# Patient Record
Sex: MALE | Race: WHITE | Employment: OTHER | URBAN - METROPOLITAN AREA
[De-identification: names, ages, dates, MRNs, and addresses within clinical notes are randomized per-mention and may not be internally consistent; named-entity substitution may affect disease eponyms.]

---

## 2017-05-05 PROBLEM — N18.30 STAGE III CHRONIC KIDNEY DISEASE (HCC): Status: ACTIVE | Noted: 2017-05-05

## 2017-09-07 PROBLEM — M54.50 ACUTE RIGHT-SIDED LOW BACK PAIN WITHOUT SCIATICA: Status: ACTIVE | Noted: 2017-09-07

## 2017-11-10 PROBLEM — N18.30 TYPE 2 DIABETES MELLITUS WITH STAGE 3 CHRONIC KIDNEY DISEASE, WITH LONG-TERM CURRENT USE OF INSULIN (HCC): Status: ACTIVE | Noted: 2017-11-10

## 2017-11-10 PROBLEM — Z79.4 TYPE 2 DIABETES MELLITUS WITH STAGE 3 CHRONIC KIDNEY DISEASE, WITH LONG-TERM CURRENT USE OF INSULIN (HCC): Status: ACTIVE | Noted: 2017-11-10

## 2017-11-10 PROBLEM — E11.22 TYPE 2 DIABETES MELLITUS WITH STAGE 3 CHRONIC KIDNEY DISEASE, WITH LONG-TERM CURRENT USE OF INSULIN (HCC): Status: ACTIVE | Noted: 2017-11-10

## 2018-03-28 ENCOUNTER — HOSPITAL ENCOUNTER (OUTPATIENT)
Dept: DIABETES SERVICES | Age: 63
Discharge: HOME | End: 2018-03-28
Payer: MEDICARE

## 2018-03-28 VITALS — WEIGHT: 315 LBS | HEIGHT: 71 IN | BODY MASS INDEX: 44.1 KG/M2

## 2018-03-28 DIAGNOSIS — Z79.4 TYPE 2 DIABETES MELLITUS WITH STAGE 3 CHRONIC KIDNEY DISEASE, WITH LONG-TERM CURRENT USE OF INSULIN (HCC): ICD-10-CM

## 2018-03-28 DIAGNOSIS — E11.22 TYPE 2 DIABETES MELLITUS WITH STAGE 3 CHRONIC KIDNEY DISEASE, WITH LONG-TERM CURRENT USE OF INSULIN (HCC): ICD-10-CM

## 2018-03-28 DIAGNOSIS — N18.30 TYPE 2 DIABETES MELLITUS WITH STAGE 3 CHRONIC KIDNEY DISEASE, WITH LONG-TERM CURRENT USE OF INSULIN (HCC): ICD-10-CM

## 2018-03-28 PROCEDURE — G0108 DIAB MANAGE TRN  PER INDIV: HCPCS

## 2018-04-12 ENCOUNTER — HOSPITAL ENCOUNTER (OUTPATIENT)
Dept: DIABETES SERVICES | Age: 63
Discharge: HOME | End: 2018-04-12
Payer: MEDICARE

## 2018-04-12 PROCEDURE — G0109 DIAB MANAGE TRN IND/GROUP: HCPCS

## 2018-04-12 NOTE — PROGRESS NOTES
Attended nutrition diabetes #1 group session today. Topics included: disease process and treatment; carbohydrate choices (emphasizing high fiber carbohydrates); proteins (emphasizing heart healthy choices) and fat food choices (emphasizing unsaturated fats); free foods; combination food choices; nutrition tips for persons with diabetes; snack ideas; resources for diabetes management. Two methods of meal planning were reviewed: carbohydrate choices and carbohydrate counting. Basics of exercise discussed. Voiced /demonstrated understanding of material covered. Goal for next session is add a protein to meals and snacks. Anticipated adherence is good. Problems/barriers may be: none identified at this time.

## 2018-04-16 ENCOUNTER — HOSPITAL ENCOUNTER (OUTPATIENT)
Dept: DIABETES SERVICES | Age: 63
Discharge: HOME | End: 2018-04-16
Payer: MEDICARE

## 2018-04-16 PROCEDURE — G0109 DIAB MANAGE TRN IND/GROUP: HCPCS

## 2018-04-16 NOTE — PROGRESS NOTES
Participant attended Diabetes #1 session today. Topics included: Characteristics/pathophysiology type 1/type 2 diabetes; Goal/acceptable blood glucose ranges/Hgb A1C/interpreting/using results; Using medications safely; Sick day management; Prevention/detection/treatment of acute complications. - Verbalized understanding of material covered.   -Goal for next session Nutrition Two   -Anticipated adherence is good   -Problems/barriers may be none anticipated

## 2018-04-23 ENCOUNTER — HOSPITAL ENCOUNTER (OUTPATIENT)
Dept: DIABETES SERVICES | Age: 63
Discharge: HOME | End: 2018-04-23
Payer: MEDICARE

## 2018-04-23 PROCEDURE — G0109 DIAB MANAGE TRN IND/GROUP: HCPCS

## 2018-04-23 NOTE — PROGRESS NOTES
Attended nutrition diabetes #2 group session today. Topics included: plate method for portion control; fiber and sodium guidelines; sugar substitutes; alcohol; eating out; recipe modification; label reading. Voiced/demonstrated understanding of material covered. Anticipated adherence is good. Pt's nutrition goal is to add a snack if mealtimes are over 5 hours apart  Problems/barriers: none identified at this time. Recommend check pre-meal blood glucose and 2 hour post-prandial blood glucose to see how food choices affect blood glucose. Plan for follow up is attend diabetes medical #2 class on 5/2/18.

## 2018-05-02 ENCOUNTER — HOSPITAL ENCOUNTER (OUTPATIENT)
Dept: DIABETES SERVICES | Age: 63
Discharge: HOME | End: 2018-05-02
Payer: MEDICARE

## 2018-05-02 PROCEDURE — G0109 DIAB MANAGE TRN IND/GROUP: HCPCS

## 2018-05-02 NOTE — PROGRESS NOTES
Participant attended Diabetes #2 session today. Topics included: Prevention/detection/treatment of chronic complications; sleep apnea; Developing strategies to promote health/change behavior/recommended screenings; Developing strategies to address psychosocial issues; Goal setting. Participants goal/support plan includes Nutritional Goal:  To better control blood sugars, I will add a snack if mealtimes are over 5 hours began 4-12-18. Medical Goal:  I will carry a carbohydrate source to treat low blood sugar. What a Sugar Bar. Began 4-23-18 always. Problems/barriers may be:none anticipated. Plan for follow up/Recommendations: mail follow up survey in 3 months.

## 2018-05-11 PROBLEM — Z79.4 TYPE 2 DIABETES MELLITUS WITH DIABETIC NEUROPATHY, WITH LONG-TERM CURRENT USE OF INSULIN (HCC): Status: ACTIVE | Noted: 2018-05-11

## 2018-05-11 PROBLEM — E11.40 TYPE 2 DIABETES MELLITUS WITH DIABETIC NEUROPATHY (HCC): Status: ACTIVE | Noted: 2018-05-11

## 2018-06-12 ENCOUNTER — HOSPITAL ENCOUNTER (OUTPATIENT)
Dept: DIABETES SERVICES | Age: 63
Discharge: HOME | End: 2018-06-12
Payer: MEDICARE

## 2018-06-12 PROCEDURE — G0109 DIAB MANAGE TRN IND/GROUP: HCPCS

## 2018-06-12 NOTE — PROGRESS NOTES
Attended diabetes follow up group class. Open forum for clients to ask questions based on entire diabetes self management education program. Education topics are driven in this class based on clients' individual questions and needs. Discussed wt loss tips.

## 2018-06-29 PROBLEM — E11.40 TYPE 2 DIABETES MELLITUS WITH DIABETIC NEUROPATHY, WITH LONG-TERM CURRENT USE OF INSULIN (HCC): Status: RESOLVED | Noted: 2018-05-11 | Resolved: 2018-06-29

## 2018-06-29 PROBLEM — E78.00 HYPERCHOLESTEROLEMIA: Status: ACTIVE | Noted: 2018-06-29

## 2018-06-29 PROBLEM — Z79.4 TYPE 2 DIABETES MELLITUS WITH DIABETIC NEUROPATHY, WITH LONG-TERM CURRENT USE OF INSULIN (HCC): Status: RESOLVED | Noted: 2018-05-11 | Resolved: 2018-06-29

## 2018-11-09 PROBLEM — E11.40 TYPE 2 DIABETES MELLITUS WITH DIABETIC NEUROPATHY (HCC): Status: ACTIVE | Noted: 2018-11-09

## 2018-11-10 PROBLEM — Z23 NEED FOR SHINGLES VACCINE: Status: ACTIVE | Noted: 2018-11-10

## 2018-11-16 PROBLEM — E11.40 TYPE 2 DIABETES MELLITUS WITH DIABETIC NEUROPATHY (HCC): Status: RESOLVED | Noted: 2018-11-09 | Resolved: 2018-11-16

## 2018-11-16 PROBLEM — R80.9 ALBUMINURIA: Status: ACTIVE | Noted: 2018-11-16

## 2019-05-09 PROBLEM — Z23 NEED FOR VACCINATION WITH 13-POLYVALENT PNEUMOCOCCAL CONJUGATE VACCINE: Status: ACTIVE | Noted: 2019-05-09

## 2019-05-10 PROBLEM — M54.50 CHRONIC RIGHT-SIDED LOW BACK PAIN WITHOUT SCIATICA: Status: ACTIVE | Noted: 2019-05-10
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
## 2019-05-10 PROBLEM — S80.811D: Status: ACTIVE | Noted: 2019-05-10

## 2019-05-10 PROBLEM — G89.29 CHRONIC RIGHT-SIDED LOW BACK PAIN WITHOUT SCIATICA: Status: ACTIVE | Noted: 2019-05-10

## 2019-05-10 PROBLEM — E11.40 TYPE 2 DIABETES MELLITUS WITH DIABETIC NEUROPATHY (HCC): Status: ACTIVE | Noted: 2019-05-10

## 2019-08-30 PROBLEM — R80.9 ALBUMINURIA: Status: ACTIVE | Noted: 2019-08-30

## 2019-08-30 PROBLEM — E11.40 TYPE 2 DIABETES MELLITUS WITH DIABETIC NEUROPATHY (HCC): Status: RESOLVED | Noted: 2019-05-10 | Resolved: 2019-08-30

## 2019-09-23 PROBLEM — Z23 NEED FOR SHINGLES VACCINE: Status: RESOLVED | Noted: 2018-11-10 | Resolved: 2019-09-23

## 2019-11-07 PROBLEM — Z12.11 COLON CANCER SCREENING: Status: ACTIVE | Noted: 2019-11-07

## 2019-11-07 PROBLEM — Z86.010 HISTORY OF COLONIC POLYPS: Status: ACTIVE | Noted: 2019-11-07

## 2019-11-07 PROBLEM — M54.50 ACUTE RIGHT-SIDED LOW BACK PAIN WITHOUT SCIATICA: Status: RESOLVED | Noted: 2017-09-07 | Resolved: 2019-11-07

## 2019-11-29 PROBLEM — Z79.4 TYPE 2 DIABETES MELLITUS WITH DIABETIC POLYNEUROPATHY, WITH LONG-TERM CURRENT USE OF INSULIN (HCC): Status: ACTIVE | Noted: 2019-11-29

## 2019-11-29 PROBLEM — E11.40 TYPE 2 DIABETES MELLITUS WITH DIABETIC NEUROPATHY (HCC): Status: ACTIVE | Noted: 2019-11-29

## 2019-11-29 PROBLEM — E11.42 TYPE 2 DIABETES MELLITUS WITH DIABETIC POLYNEUROPATHY, WITH LONG-TERM CURRENT USE OF INSULIN (HCC): Status: ACTIVE | Noted: 2019-11-29

## 2020-01-03 PROBLEM — R80.9 ALBUMINURIA: Status: ACTIVE | Noted: 2020-01-03

## 2020-01-03 PROBLEM — I25.10 CHRONIC CORONARY ARTERY DISEASE: Status: ACTIVE | Noted: 2020-01-03

## 2020-05-01 ENCOUNTER — APPOINTMENT (OUTPATIENT)
Dept: CT IMAGING | Age: 65
End: 2020-05-01
Attending: EMERGENCY MEDICINE
Payer: MEDICARE

## 2020-05-01 ENCOUNTER — HOSPITAL ENCOUNTER (EMERGENCY)
Age: 65
Discharge: HOME | End: 2020-05-01
Attending: EMERGENCY MEDICINE
Payer: MEDICARE

## 2020-05-01 VITALS
WEIGHT: 315 LBS | HEART RATE: 73 BPM | OXYGEN SATURATION: 97 % | DIASTOLIC BLOOD PRESSURE: 90 MMHG | SYSTOLIC BLOOD PRESSURE: 147 MMHG | HEIGHT: 71 IN | RESPIRATION RATE: 20 BRPM | BODY MASS INDEX: 44.1 KG/M2 | TEMPERATURE: 98 F

## 2020-05-01 DIAGNOSIS — R10.9 FLANK PAIN: Primary | ICD-10-CM

## 2020-05-01 LAB
BACTERIA URNS QL MICRO: 0 /HPF
CASTS URNS QL MICRO: 0 /LPF
EPI CELLS #/AREA URNS HPF: 0 /HPF
RBC #/AREA URNS HPF: 0 /HPF
WBC URNS QL MICRO: NORMAL /HPF

## 2020-05-01 PROCEDURE — 81015 MICROSCOPIC EXAM OF URINE: CPT

## 2020-05-01 PROCEDURE — 74176 CT ABD & PELVIS W/O CONTRAST: CPT

## 2020-05-01 PROCEDURE — 81003 URINALYSIS AUTO W/O SCOPE: CPT

## 2020-05-01 PROCEDURE — 74011250637 HC RX REV CODE- 250/637: Performed by: EMERGENCY MEDICINE

## 2020-05-01 PROCEDURE — 99283 EMERGENCY DEPT VISIT LOW MDM: CPT

## 2020-05-01 RX ORDER — HYDROCODONE BITARTRATE AND ACETAMINOPHEN 7.5; 325 MG/1; MG/1
1 TABLET ORAL
Status: COMPLETED | OUTPATIENT
Start: 2020-05-01 | End: 2020-05-01

## 2020-05-01 RX ORDER — HYDROCODONE BITARTRATE AND ACETAMINOPHEN 5; 325 MG/1; MG/1
1 TABLET ORAL
Qty: 12 TAB | Refills: 0 | Status: SHIPPED | OUTPATIENT
Start: 2020-05-01 | End: 2020-05-04

## 2020-05-01 RX ADMIN — HYDROCODONE BITARTRATE AND ACETAMINOPHEN 1 TABLET: 7.5; 325 TABLET ORAL at 19:33

## 2020-05-01 NOTE — ED TRIAGE NOTES
Patient presents from home, mask in place on arrival, with complaints of right flank pain that started several weeks ago. Pain had been intermittent, now is becoming consistent. Pt states urinary frequency for two days and a mildly foul odor. Pt denies N/V/D. Pt ambulatory to Triage.

## 2020-05-01 NOTE — DISCHARGE INSTRUCTIONS
Heat to area of discomfort if it helps  May use over-the-counter medications for discomfort  Uncontrolled/unrelieved pain: Norco

## 2020-05-01 NOTE — ED NOTES
I have reviewed discharge instructions with the patient. The patient verbalized understanding. Patient left ED via Discharge Method: ambulatory to Home with family    Opportunity for questions and clarification provided. Patient given 1 scripts. To continue your aftercare when you leave the hospital, you may receive an automated call from our care team to check in on how you are doing. This is a free service and part of our promise to provide the best care and service to meet your aftercare needs.  If you have questions, or wish to unsubscribe from this service please call [phone]. Thank you for Choosing our [facility] Emergency Department.

## 2020-05-01 NOTE — ED PROVIDER NOTES
Here with some right flank lower back discomfort that has been present for a while but much worse the last 2 days. Is quite intense and patient is not able to state that it is worse with movement. Recalls no injury. He has no visible change to his urine is had no carla dysuria frequency or urgency. Other than himself no other direct family member has not had kidney stones by his report. No fevers chills. No nausea vomiting. The history is provided by the patient. Urinary Frequency    This is a new problem. There has been no fever. Associated symptoms include frequency and flank pain. Pertinent negatives include no chills. Flank Pain    Pertinent negatives include no fever.         Past Medical History:   Diagnosis Date    Albuminuria     Allergic rhinitis     Asthma     Atrial fibrillation     Benign essential hypertension     Chronic pain     COPD     Coronary artery disease     Edema     Gastroesophageal reflux disease     Gout     Hepatic steatosis     Hypercholesteremia     Hypertriglyceridemia     Iron deficiency anemia     Morbid obesity     Obstructive sleep apnea on CPAP     Osteoarthritis     Peptic ulcer disease     Sensory neuropathy     Type 2 diabetes mellitus, uncontrolled     Vitamin D deficiency        Past Surgical History:   Procedure Laterality Date    HX CHOLECYSTECTOMY      HX HEENT  mid 2000's    uvulopalatopharynguloplasty    HX HERNIA REPAIR  2013    Dr. Fanta Bal HX KNEE REPLACEMENT Bilateral     HX LAP CHOLECYSTECTOMY  2013    Dr. Fanta Bal [facility]TH[facility]  [address]    right hand    HX PACEMAKER      pacemaker june 2014   Sofia Ray  2013    Dr. Sheila Puga         Family History:   Problem Relation Age of Onset    Heart Disease Mother     Diabetes Mother     Heart Disease Father     Stroke Father     Hypertension Father     Diabetes Father     Cancer Sister     Diabetes Sister     Heart Disease Sister     Hypertension Sister    Blase Liming Diabetes Sister     Heart Disease Sister     Hypertension Sister     No Known Problems Brother     Cancer Brother        Social History     Socioeconomic History    Marital status:      Spouse name: Not on file    Number of children: Not on file    Years of education: Not on file    Highest education level: Not on file   Occupational History    Not on file   Social Needs    Financial resource strain: Not on file    Food insecurity     Worry: Not on file     Inability: Not on file   [employer] needs     Medical: Not on file     Non-medical: Not on file   Tobacco Use    Smoking status: Former Smoker     Packs/day: 0.25     Years: 1.00     Pack years: 0.25     Types: Cigars     Last attempt to quit: 3/24/1985     Years since quittin.1    Smokeless tobacco: Former User     Types: Chew    Tobacco comment: quits, but then starts back   Substance and Sexual Activity    Alcohol use: No     Alcohol/week: 0.0 standard drinks     Comment: former    Drug use: No    Sexual activity: Not on file   Lifestyle    Physical activity     Days per week: Not on file     Minutes per session: Not on file    Stress: Not on file   Relationships    Social connections     Talks on phone: Not on file     Gets together: Not on file     Attends Restorationism service: Not on file     Active member of club or organization: Not on file     Attends meetings of clubs or organizations: Not on file     Relationship status: Not on file    Intimate partner violence     Fear of current or ex partner: Not on file     Emotionally abused: Not on file     Physically abused: Not on file     Forced sexual activity: Not on file   Other Topics Concern    Not on file   Social History Narrative    Not on file         ALLERGIES: Francheska Valentin; Lisinopril; Morphine; and Pcn [penicillins]    Review of Systems   Constitutional: Negative for chills and fever. Respiratory: Negative. Cardiovascular: Negative. Gastrointestinal: Negative. Genitourinary: Positive for flank pain and frequency. Neurological: Negative. Psychiatric/Behavioral: Negative. All other systems reviewed and are negative. Vitals:    05/01/20 1716   BP: (!) 144/92   Pulse: 71   Resp: 20   Temp: 97.9 °F (36.6 °C)   SpO2: 96%   Weight: 147.9 kg (326 lb)   Height: 5' 11\" (1.803 m)            Physical Exam  Vitals signs and nursing note reviewed. Constitutional:       General: He is not in acute distress. Appearance: He is not toxic-appearing or diaphoretic. Comments: BMI 45   HENT:      Nose: Nose normal.      Mouth/Throat:      Pharynx: No oropharyngeal exudate. Eyes:      General: No scleral icterus. Cardiovascular:      Rate and Rhythm: Normal rate and regular rhythm. Pulmonary:      Effort: Pulmonary effort is normal.   Abdominal:      General: Abdomen is protuberant. Bowel sounds are normal.      Tenderness: There is no abdominal tenderness. There is no right CVA tenderness, left CVA tenderness, guarding or rebound. Hernia: A hernia is present. Hernia is present in the umbilical area. Musculoskeletal: Normal range of motion. General: No deformity. Lymphadenopathy:      Cervical: No cervical adenopathy. Skin:     General: Skin is warm. Findings: No bruising, erythema or lesion. Neurological:      General: No focal deficit present. Mental Status: He is alert. Psychiatric:         Mood and Affect: Mood normal.         Thought Content: Thought content normal.          MDM     Study Result     CT ABDOMEN AND PELVIS WITHOUT CONTRAST     HISTORY: Acute right flank and right side pain, 65 years Male complaints of  right flank pain that started several weeks ago. Pain had been intermittent, now  is becoming consistent. Pt states urinary frequency for two days and a mildly  foul odor     COMPARISON: CT abdomen March 13, 2014     TECHNIQUE: No oral or intravenous contrast administered.  Axial helical CT images  were obtained from above the diaphragm through the pelvis. All CT scans at this  location are performed using dose modulation techniques as appropriate to a  performed exam including the following: automated exposure control; adjustment  of the mA and/or kV according to patient's size (this includes techniques or  standardized protocols for targeted exams where dose is matched to  indication/reason for exam; i.e. extremities or head); use of iterative  reconstruction technique.     FINDINGS:     ABDOMEN:  Minimal dependent subsegmental atelectasis bilateral lung bases. Evidence of  cholecystectomy. Normal-appearing liver, spleen, pancreas, bilateral adrenal  glands. Normal appearance of the bilateral kidneys. There is no evidence of  hydronephrosis or nephrolithiasis. Mild calcific atherosclerosis of a normal  caliber abdominal aorta. No evidence of significant lymphadenopathy. Normal-appearing small bowel. No evidence of intraperitoneal free air or free  fluid. Small fat-containing umbilical hernia.     PELVIS:  Normal-appearing urinary bladder. Coarse prostatic calcifications. Normal-appearing seminal vesicles. Persistent sigmoid diverticulosis. Normal-appearing proximal colon and appendix. No evidence of pelvic free fluid. No evidence of significant inguinal or pelvic sidewall lymphadenopathy. Visualized osseous structures unremarkable.     IMPRESSION  IMPRESSION:   No acute pathology identified.   Other chronic findings as above.              Recent Results (from the past 12 hour(s))   URINE MICROSCOPIC    Collection Time: 05/01/20  5:32 PM   Result Value Ref Range    WBC 0-3 0 /hpf    RBC 0 0 /hpf    Epithelial cells 0 0 /hpf    Bacteria 0 0 /hpf    Casts 0 0 /lpf       Procedures

## 2020-05-04 ENCOUNTER — PATIENT OUTREACH (OUTPATIENT)
Dept: CASE MANAGEMENT | Age: 65
End: 2020-05-04

## 2020-05-04 NOTE — PROGRESS NOTES
Patient contacted regarding recent discharge and COVID-19 risk   Care Transition Nurse/ Ambulatory Care Manager contacted the patient by telephone to perform post discharge assessment. Verified name and  with patient as identifiers. Patient has following risk factors of: Flank Pain. CC reviewed discharge instructions, medical action plan and red flags related to discharge diagnosis. Reviewed and educated them on any new and changed medications related to discharge diagnosis. Advised obtaining a 90-day supply of all daily and as-needed medications. Education provided regarding infection prevention, and signs and symptoms of COVID-19 and when to seek medical attention with patient who verbalized understanding. Discussed exposure protocols and quarantine from [address] you at higher risk for severe illness  and given an opportunity for questions and concerns. The patient agrees to contact the COVID-19 hotline [phone] or PCP office for questions related to their healthcare. CTN/ACM provided contact information for future reference. From CDC: Are you at higher risk for severe illness?  Wash your hands often.  Avoid close contact (6 feet, which is about two arm lengths) with people who are sick.  Put distance between yourself and other people if COVID-19 is spreading in your community.  Clean and disinfect frequently touched surfaces.  Avoid all cruise travel and non-essential air travel.  Call your healthcare professional if you have concerns about COVID-19 and your underlying condition or if you are sick.     For more information on steps you can take to protect yourself, see CDC's How to Protect Yourself      Patient/family/caregiver given information for Kecia Aldrich and agrees to enroll no  Patient's preferred e-mail:  Eliza Song   Patient's preferred phone number:   Based on Loop alert triggers, patient will be contacted by nurse care manager for worsening symptoms. Plan for follow-up call in 7-14 days based on severity of symptoms and risk factors.

## 2020-05-04 NOTE — PROGRESS NOTES
1st attempt to contact the patient for COVID 19 Risk Education. Unable to reach the patient on the contact information provided. This Care Coordinator will attempt to call again within 1 business day.

## 2020-05-18 ENCOUNTER — PATIENT OUTREACH (OUTPATIENT)
Dept: CASE MANAGEMENT | Age: 65
End: 2020-05-18

## 2020-05-18 NOTE — PROGRESS NOTES
Patient resolved from Transition of Care episode on 5/18/2020  Patient/family has been provided the following resources and education related to COVID-19:                         Signs, symptoms and red flags related to COVID-19            CDC exposure and quarantine guidelines            Conduit exposure contact - [phone]            Contact for their local Department of Health                 Patient currently reports that the following symptoms have improved:  Flank Pain. No further outreach scheduled with this CC. Episode of Care resolved. Patient has this CC contact information if future needs arise.

## 2020-05-22 PROBLEM — R06.02 SHORTNESS OF BREATH: Status: ACTIVE | Noted: 2020-05-22

## 2020-06-08 PROBLEM — E11.42 TYPE 2 DIABETES MELLITUS WITH DIABETIC POLYNEUROPATHY, WITH LONG-TERM CURRENT USE OF INSULIN (HCC): Status: RESOLVED | Noted: 2019-11-29 | Resolved: 2020-06-08

## 2020-06-08 PROBLEM — Z79.4 TYPE 2 DIABETES MELLITUS WITH DIABETIC POLYNEUROPATHY, WITH LONG-TERM CURRENT USE OF INSULIN (HCC): Status: RESOLVED | Noted: 2019-11-29 | Resolved: 2020-06-08

## 2020-06-17 PROBLEM — K43.9 VENTRAL HERNIA WITHOUT OBSTRUCTION OR GANGRENE: Status: ACTIVE | Noted: 2020-03-10

## 2020-06-17 PROBLEM — M10.9 GOUT: Status: ACTIVE | Noted: 2020-06-17

## 2020-06-17 PROBLEM — K21.9 GASTROESOPHAGEAL REFLUX DISEASE: Status: ACTIVE | Noted: 2020-06-17

## 2020-06-17 PROBLEM — D64.9 NORMOCHROMIC NORMOCYTIC ANEMIA: Status: ACTIVE | Noted: 2020-03-09

## 2020-06-29 ENCOUNTER — HOSPITAL ENCOUNTER (OUTPATIENT)
Dept: LAB | Age: 65
Discharge: HOME | End: 2020-06-29
Payer: MEDICARE

## 2020-06-29 DIAGNOSIS — R06.02 SHORTNESS OF BREATH: ICD-10-CM

## 2020-06-29 LAB
ANION GAP SERPL CALC-SCNC: 7 MMOL/L (ref 7–16)
BASOPHILS # BLD: 0.1 K/UL (ref 0–0.2)
BASOPHILS NFR BLD: 1 % (ref 0–2)
BNP SERPL-MCNC: 494 PG/ML (ref 5–125)
BUN SERPL-MCNC: 26 MG/DL (ref 8–23)
CALCIUM SERPL-MCNC: 8.6 MG/DL (ref 8.3–10.4)
CHLORIDE SERPL-SCNC: 104 MMOL/L (ref 98–107)
CO2 SERPL-SCNC: 29 MMOL/L (ref 21–32)
CREAT SERPL-MCNC: 1.53 MG/DL (ref 0.8–1.5)
DIFFERENTIAL METHOD BLD: ABNORMAL
EOSINOPHIL # BLD: 0.3 K/UL (ref 0–0.8)
EOSINOPHIL NFR BLD: 4 % (ref 0.5–7.8)
ERYTHROCYTE [DISTWIDTH] IN BLOOD BY AUTOMATED COUNT: 17.1 % (ref 11.9–14.6)
GLUCOSE SERPL-MCNC: 110 MG/DL (ref 65–100)
HCT VFR BLD AUTO: 37.8 % (ref 41.1–50.3)
HGB BLD-MCNC: 11.8 G/DL (ref 13.6–17.2)
IMM GRANULOCYTES # BLD AUTO: 0 K/UL (ref 0–0.5)
IMM GRANULOCYTES NFR BLD AUTO: 0 % (ref 0–5)
INR PPP: 1.4
LYMPHOCYTES # BLD: 2.6 K/UL (ref 0.5–4.6)
LYMPHOCYTES NFR BLD: 32 % (ref 13–44)
MAGNESIUM SERPL-MCNC: 2.1 MG/DL (ref 1.8–2.4)
MCH RBC QN AUTO: 25.2 PG (ref 26.1–32.9)
MCHC RBC AUTO-ENTMCNC: 31.2 G/DL (ref 31.4–35)
MCV RBC AUTO: 80.8 FL (ref 79.6–97.8)
MONOCYTES # BLD: 0.6 K/UL (ref 0.1–1.3)
MONOCYTES NFR BLD: 7 % (ref 4–12)
NEUTS SEG # BLD: 4.5 K/UL (ref 1.7–8.2)
NEUTS SEG NFR BLD: 56 % (ref 43–78)
NRBC # BLD: 0 K/UL (ref 0–0.2)
PLATELET # BLD AUTO: 331 K/UL (ref 150–450)
PMV BLD AUTO: 10.8 FL (ref 9.4–12.3)
POTASSIUM SERPL-SCNC: 3.4 MMOL/L (ref 3.5–5.1)
PROTHROMBIN TIME: 17.4 SEC (ref 12–14.7)
RBC # BLD AUTO: 4.68 M/UL (ref 4.23–5.6)
SODIUM SERPL-SCNC: 140 MMOL/L (ref 136–145)
WBC # BLD AUTO: 8.1 K/UL (ref 4.3–11.1)

## 2020-06-29 PROCEDURE — 36415 COLL VENOUS BLD VENIPUNCTURE: CPT

## 2020-06-29 PROCEDURE — 80048 BASIC METABOLIC PNL TOTAL CA: CPT

## 2020-06-29 PROCEDURE — 83735 ASSAY OF MAGNESIUM: CPT

## 2020-06-29 PROCEDURE — 85610 PROTHROMBIN TIME: CPT

## 2020-06-29 PROCEDURE — 85025 COMPLETE CBC W/AUTO DIFF WBC: CPT

## 2020-06-29 PROCEDURE — 83880 ASSAY OF NATRIURETIC PEPTIDE: CPT

## 2020-06-29 NOTE — PROGRESS NOTES
Please call him, needs to double home K. Will need hydration protocol before LHC as well for CKD. Please arrange such.   Thanks

## 2020-07-01 ENCOUNTER — HOSPITAL ENCOUNTER (OUTPATIENT)
Dept: CARDIAC CATH/INVASIVE PROCEDURES | Age: 65
Discharge: HOME | End: 2020-07-01
Attending: INTERNAL MEDICINE | Admitting: INTERNAL MEDICINE
Payer: MEDICARE

## 2020-07-01 VITALS
OXYGEN SATURATION: 96 % | SYSTOLIC BLOOD PRESSURE: 160 MMHG | BODY MASS INDEX: 44.1 KG/M2 | RESPIRATION RATE: 22 BRPM | WEIGHT: 315 LBS | HEIGHT: 71 IN | DIASTOLIC BLOOD PRESSURE: 102 MMHG | HEART RATE: 72 BPM

## 2020-07-01 LAB
ATRIAL RATE: 54 BPM
CALCULATED R AXIS, ECG10: 152 DEGREES
CALCULATED T AXIS, ECG11: 94 DEGREES
DIAGNOSIS, 93000: NORMAL
ERYTHROCYTE [DISTWIDTH] IN BLOOD BY AUTOMATED COUNT: 17.1 % (ref 11.9–14.6)
HCT VFR BLD AUTO: 39.4 % (ref 41.1–50.3)
HGB BLD-MCNC: 11.7 G/DL (ref 13.6–17.2)
INR PPP: 1
MCH RBC QN AUTO: 24.2 PG (ref 26.1–32.9)
MCHC RBC AUTO-ENTMCNC: 29.7 G/DL (ref 31.4–35)
MCV RBC AUTO: 81.4 FL (ref 79.6–97.8)
NRBC # BLD: 0 K/UL (ref 0–0.2)
PLATELET # BLD AUTO: 374 K/UL (ref 150–450)
PMV BLD AUTO: 10.5 FL (ref 9.4–12.3)
PROTHROMBIN TIME: 13.7 SEC (ref 12–14.7)
Q-T INTERVAL, ECG07: 438 MS
QRS DURATION, ECG06: 128 MS
QTC CALCULATION (BEZET), ECG08: 479 MS
RBC # BLD AUTO: 4.84 M/UL (ref 4.23–5.6)
VENTRICULAR RATE, ECG03: 72 BPM
WBC # BLD AUTO: 9.2 K/UL (ref 4.3–11.1)

## 2020-07-01 PROCEDURE — 77030004559 HC CATH ANGI DX SUPT CARD -B

## 2020-07-01 PROCEDURE — 93458 L HRT ARTERY/VENTRICLE ANGIO: CPT

## 2020-07-01 PROCEDURE — 85027 COMPLETE CBC AUTOMATED: CPT

## 2020-07-01 PROCEDURE — 93005 ELECTROCARDIOGRAM TRACING: CPT | Performed by: INTERNAL MEDICINE

## 2020-07-01 PROCEDURE — 74011250636 HC RX REV CODE- 250/636: Performed by: INTERNAL MEDICINE

## 2020-07-01 PROCEDURE — 74011000250 HC RX REV CODE- 250: Performed by: INTERNAL MEDICINE

## 2020-07-01 PROCEDURE — C1769 GUIDE WIRE: HCPCS

## 2020-07-01 PROCEDURE — 77030029997 HC DEV COM RDL R BND TELE -B

## 2020-07-01 PROCEDURE — 99153 MOD SED SAME PHYS/QHP EA: CPT

## 2020-07-01 PROCEDURE — C1894 INTRO/SHEATH, NON-LASER: HCPCS

## 2020-07-01 PROCEDURE — 99152 MOD SED SAME PHYS/QHP 5/>YRS: CPT

## 2020-07-01 PROCEDURE — 85610 PROTHROMBIN TIME: CPT

## 2020-07-01 PROCEDURE — 74011636320 HC RX REV CODE- 636/320: Performed by: INTERNAL MEDICINE

## 2020-07-01 RX ORDER — GUAIFENESIN 100 MG/5ML
325 LIQUID (ML) ORAL ONCE
Status: DISCONTINUED | OUTPATIENT
Start: 2020-07-01 | End: 2020-07-01 | Stop reason: HOSPADM

## 2020-07-01 RX ORDER — SODIUM CHLORIDE 9 MG/ML
75 INJECTION, SOLUTION INTRAVENOUS CONTINUOUS
Status: DISCONTINUED | OUTPATIENT
Start: 2020-07-01 | End: 2020-07-01 | Stop reason: HOSPADM

## 2020-07-01 RX ORDER — HEPARIN SODIUM 10000 [USP'U]/ML
1000-10000 INJECTION, SOLUTION INTRAVENOUS; SUBCUTANEOUS
Status: DISCONTINUED | OUTPATIENT
Start: 2020-07-01 | End: 2020-07-01 | Stop reason: HOSPADM

## 2020-07-01 RX ORDER — MIDAZOLAM HYDROCHLORIDE 1 MG/ML
.5-2 INJECTION, SOLUTION INTRAMUSCULAR; INTRAVENOUS
Status: DISCONTINUED | OUTPATIENT
Start: 2020-07-01 | End: 2020-07-01 | Stop reason: HOSPADM

## 2020-07-01 RX ORDER — LIDOCAINE HYDROCHLORIDE 10 MG/ML
1-30 INJECTION, SOLUTION EPIDURAL; INFILTRATION; INTRACAUDAL; PERINEURAL ONCE
Status: COMPLETED | OUTPATIENT
Start: 2020-07-01 | End: 2020-07-01

## 2020-07-01 RX ORDER — HEPARIN SODIUM 200 [USP'U]/100ML
3 INJECTION, SOLUTION INTRAVENOUS CONTINUOUS
Status: DISCONTINUED | OUTPATIENT
Start: 2020-07-01 | End: 2020-07-01 | Stop reason: HOSPADM

## 2020-07-01 RX ORDER — FENTANYL CITRATE 50 UG/ML
25-75 INJECTION, SOLUTION INTRAMUSCULAR; INTRAVENOUS
Status: DISCONTINUED | OUTPATIENT
Start: 2020-07-01 | End: 2020-07-01 | Stop reason: HOSPADM

## 2020-07-01 RX ADMIN — SODIUM CHLORIDE 75 ML/HR: 900 INJECTION, SOLUTION INTRAVENOUS at 06:48

## 2020-07-01 RX ADMIN — IOPAMIDOL 50 ML: 755 INJECTION, SOLUTION INTRAVENOUS at 08:48

## 2020-07-01 RX ADMIN — HEPARIN SODIUM 3000 UNITS: 10000 INJECTION INTRAVENOUS; SUBCUTANEOUS at 08:34

## 2020-07-01 RX ADMIN — HEPARIN SODIUM 2 ML: 10000 INJECTION INTRAVENOUS; SUBCUTANEOUS at 08:33

## 2020-07-01 RX ADMIN — LIDOCAINE HYDROCHLORIDE 4 ML: 10 INJECTION, SOLUTION EPIDURAL; INFILTRATION; INTRACAUDAL; PERINEURAL at 08:33

## 2020-07-01 RX ADMIN — MIDAZOLAM 2 MG: 1 INJECTION INTRAMUSCULAR; INTRAVENOUS at 08:25

## 2020-07-01 RX ADMIN — HEPARIN SODIUM 3 UNITS/HR: 200 INJECTION, SOLUTION INTRAVENOUS at 08:13

## 2020-07-01 NOTE — PROCEDURES
[facility]  CARDIAC CATH    Name:  Orlin Welch  MR#:  [id number]  :  1955  ACCOUNT #:  [de-identified]  DATE OF SERVICE:  2020    PROCEDURES PERFORMED:   [facility] via R radial artery, selective coronary angiography. PREOPERATIVE DIAGNOSES:  Angina pectoris. POSTOPERATIVE DIAGNOSES:  Noncardiac chest pain. SURGEON:  Mio Wynne. Marlyn Venegas MD    ASSISTANT:  None. ESTIMATED BLOOD LOSS:  Less than 5 mL. SPECIMENS REMOVED:  None. COMPLICATIONS:  None. IMPLANTS:  None. ANESTHESIA:  The patient received 2 mg of Versed and was monitored for conscious sedation beginning at 08:25 and ending at 08:48 by nurse Samia Anders. PROCEDURE:  After informed consent, the patient was prepped and draped in the usual sterile fashion. The right wrist was infiltrated with lidocaine. The right radial artery was accessed by the modified Seldinger technique with a 6-Ivorian sheath. A total of 50 mL of Isovue contrast were used for the entire procedure. A Terumo band was used for hemostasis. CATHETERS USED:  Include a 6-Ivorian JL-3.5, 6-Ivorian JR-5 and 6-Ivorian angled pigtail catheter. FINDINGS:  Left ventricle:  Left ventricular ejection fraction was not assessed in order to reduce contrast exposure. LVEDP:  20 mmHg. Left main normal.  Left anterior descending artery had mild luminal irregularities throughout it. The circumflex artery had mild luminal irregularities throughout. The right coronary artery was dominant, had mild luminal irregularities throughout it. CONCLUSIONS:  This is a 58-year-old male with shortness of breath, positive stress test, but had only mild nonobstructive disease by heart catheterization.         MD CHINO [email or web address]  D:  2020 9:06  T:  2020 14:27  JOB #:  [id number]

## 2020-07-01 NOTE — PROGRESS NOTES
Report received from [address] [place] [address] Procedural findings communicated. Intra procedural  medication administration reviewed. Progression of care discussed.      Patient received into [address] [facility] 1 post sheath removal.     Access site without bleeding or swelling yes    Dressing dry and intact yes    Patient instructed to limit movement to right upper extremity    Routine post procedural vital signs and site assessment initiated yes

## 2020-07-01 NOTE — PROGRESS NOTES
Patient received to [address] [place] [address] room # 10  Ambulatory from [facility]. Patient scheduled for [facility] today with Dr Richie Yancey. Procedure reviewed & questions answered, voiced good understanding consent obtained & placed on chart. All medications and medical history reviewed. Will prep patient per orders. Patient & family updated on plan of care. The patient has a fraility score of 3-MANAGING WELL, based on patient A&Ox3, patient able to ambulate to room without difficulty.

## 2020-07-01 NOTE — DISCHARGE INSTRUCTIONS
DO NOT TAKE METFORMIN (GLUCOPHAGE) UNTIL Friday AFTERNOON. HEART CATHETERIZATION/ANGIOGRAPHY DISCHARGE INSTRUCTIONS    1. Check puncture site frequently for swelling or bleeding. If there is any bleeding, lie down and apply pressure over the area with a clean towel or washcloth and call 911. Notify your doctor for any redness, swelling, drainage, or oozing from the puncture site. Notify your doctor for any fever or chills. 2. If the extremity becomes cold, numb, or painful call Dr. Laura Fleming at [phone].  3. Activity should be limited for the next 48 hours. Avoid pushing, pulling and bending of affected wrist for 48 hours. No heavy lifting (anything over 5 pounds) for 3 days. No driving for 48 hours. 4. You may resume your usual diet. Drink more fluids than usual.  5. Have a responsible person drive you home and stay with you for at least 24 hours after your heart catheterization/angiography. 6. You may remove bandage from your right arm  in 24 hours. You may shower in 24 hours. No tub baths, hot tubs, or swimming for 1 week. Do not place any lotions, creams, powders, or ointments over puncture site for 1 week. You may place a clean band-aid over the puncture site each day for 5 days. Change daily. I have read the above instructions and have had the opportunity to ask questions.

## 2020-07-01 NOTE — ROUTINE PROCESS
TRANSFER - OUT REPORT: 
 
[facility] Dr. Eliazar Bryant RRA access Versed 2mg Diagnostic cath, medical management R band placed with 10ml air Verbal report given to [facility] RN(name) on Luna Cluck  being transferred to cpru(unit) for routine progression of care Report consisted of patients Situation, Background, Assessment and  
Recommendations(SBAR). Information from the following report(s) Procedure Summary was reviewed with the receiving nurse. Lines:  
Peripheral IV 07/01/20 Anterior;Left;Proximal Forearm (Active) Peripheral IV 07/01/20 Anterior;Proximal;Right Forearm (Active) Opportunity for questions and clarification was provided. Patient transported with: 
 [email or web address]

## 2020-07-01 NOTE — PROCEDURES
Brief Cardiac Procedure Note    Patient: Viviana Morales MRN: [id number]  SSN: [id number]    YOB: 1955  Age: 76 y.o. Sex: male      Date of Procedure: 7/1/2020     Pre-procedure Diagnosis: Atypical Angina    Post-procedure Diagnosis: Coronary Artery Disease    Reason for Procedure: New Onset Angina < or = 2 Months    Procedure: Left Heart Catheterization    Brief Description of Procedure:  LHC via R radial artery, selective coronary angiography. Performed By: Myles Garcia MD     Assistants: None    Anesthesia: Moderate Sedation    Estimated Blood Loss: Less than 10 mL      Specimens: None    Implants: None    Findings: LM normal, LAD luminal irregs, Circ luminal irregs, RCA luminal irregs. LVEDP 20 mmHg    Complications: None    Recommendations: Continue medical therapy.     Signed By: Myles Garcia MD     July 1, 2020

## 2020-07-01 NOTE — PROGRESS NOTES
Patient up to bedside, vital signs and site stable. Patient ambulated to bathroom without difficulty. Patient voided without difficulty. Vascular site stable. Discharge instructions and home medications reviewed with patient. Time allowed for questions and answers. 1135 Patient ambulated second time without difficulty. Site stable after ambulation. Peripheral IV sites dc'd without difficulty with tips intact. 1140 Patient discharged to home with family.

## 2020-07-14 ENCOUNTER — HOSPITAL ENCOUNTER (OUTPATIENT)
Dept: LAB | Age: 65
Discharge: HOME | End: 2020-07-14
Attending: INTERNAL MEDICINE
Payer: MEDICARE

## 2020-07-14 DIAGNOSIS — R06.02 SHORTNESS OF BREATH: ICD-10-CM

## 2020-07-14 LAB
ANION GAP SERPL CALC-SCNC: 10 MMOL/L (ref 7–16)
BUN SERPL-MCNC: 22 MG/DL (ref 8–23)
CALCIUM SERPL-MCNC: 8.9 MG/DL (ref 8.3–10.4)
CHLORIDE SERPL-SCNC: 101 MMOL/L (ref 98–107)
CO2 SERPL-SCNC: 28 MMOL/L (ref 21–32)
CREAT SERPL-MCNC: 1.65 MG/DL (ref 0.8–1.5)
GLUCOSE SERPL-MCNC: 106 MG/DL (ref 65–100)
MAGNESIUM SERPL-MCNC: 2 MG/DL (ref 1.8–2.4)
POTASSIUM SERPL-SCNC: 3.9 MMOL/L (ref 3.5–5.1)
SODIUM SERPL-SCNC: 139 MMOL/L (ref 136–145)

## 2020-07-14 PROCEDURE — 80048 BASIC METABOLIC PNL TOTAL CA: CPT

## 2020-07-14 PROCEDURE — 36415 COLL VENOUS BLD VENIPUNCTURE: CPT

## 2020-07-14 PROCEDURE — 83735 ASSAY OF MAGNESIUM: CPT

## 2020-07-15 NOTE — PROGRESS NOTES
Please call him, labs are ok. Normal K, Cr ok. Remain on meds, no changes. Good news. See me as planned, call for issues.    Thanks

## 2020-08-11 ENCOUNTER — APPOINTMENT (OUTPATIENT)
Dept: GENERAL RADIOLOGY | Age: 65
DRG: 177 | End: 2020-08-11
Attending: EMERGENCY MEDICINE
Payer: MEDICARE

## 2020-08-11 ENCOUNTER — HOSPITAL ENCOUNTER (INPATIENT)
Age: 65
LOS: 9 days | Discharge: HOME | DRG: 177 | End: 2020-08-20
Attending: EMERGENCY MEDICINE | Admitting: INTERNAL MEDICINE
Payer: MEDICARE

## 2020-08-11 DIAGNOSIS — R09.02 HYPOXIA: ICD-10-CM

## 2020-08-11 DIAGNOSIS — U07.1 COVID-19: ICD-10-CM

## 2020-08-11 DIAGNOSIS — J44.1 ACUTE EXACERBATION OF CHRONIC OBSTRUCTIVE PULMONARY DISEASE (COPD) (HCC): Primary | ICD-10-CM

## 2020-08-11 PROBLEM — E66.01 MORBID OBESITY (HCC): Chronic | Status: ACTIVE | Noted: 2020-08-11

## 2020-08-11 PROBLEM — I27.20 PULMONARY HYPERTENSION (HCC): Chronic | Status: ACTIVE | Noted: 2020-08-11

## 2020-08-11 PROBLEM — G47.33 OSA ON CPAP: Chronic | Status: ACTIVE | Noted: 2020-08-11

## 2020-08-11 PROBLEM — I48.91 ATRIAL FIBRILLATION (HCC): Chronic | Status: ACTIVE | Noted: 2020-08-11

## 2020-08-11 PROBLEM — Z99.89 OSA ON CPAP: Chronic | Status: ACTIVE | Noted: 2020-08-11

## 2020-08-11 LAB
ALBUMIN SERPL-MCNC: 2.9 G/DL (ref 3.2–4.6)
ALBUMIN/GLOB SERPL: 0.6 {RATIO} (ref 1.2–3.5)
ALP SERPL-CCNC: 106 U/L (ref 50–136)
ALT SERPL-CCNC: 28 U/L (ref 12–65)
ANION GAP SERPL CALC-SCNC: 8 MMOL/L (ref 7–16)
AST SERPL-CCNC: 37 U/L (ref 15–37)
ATRIAL RATE: 70 BPM
BASOPHILS # BLD: 0 K/UL (ref 0–0.2)
BASOPHILS NFR BLD: 1 % (ref 0–2)
BILIRUB SERPL-MCNC: 0.4 MG/DL (ref 0.2–1.1)
BNP SERPL-MCNC: 1347 PG/ML (ref 5–125)
BUN SERPL-MCNC: 35 MG/DL (ref 8–23)
CALCIUM SERPL-MCNC: 8.9 MG/DL (ref 8.3–10.4)
CALCULATED P AXIS, ECG09: 125 DEGREES
CALCULATED R AXIS, ECG10: 142 DEGREES
CALCULATED T AXIS, ECG11: 66 DEGREES
CHLORIDE SERPL-SCNC: 104 MMOL/L (ref 98–107)
CO2 SERPL-SCNC: 26 MMOL/L (ref 21–32)
CREAT SERPL-MCNC: 2.03 MG/DL (ref 0.8–1.5)
CRP SERPL-MCNC: 9.5 MG/DL (ref 0–0.9)
DIAGNOSIS, 93000: NORMAL
DIFFERENTIAL METHOD BLD: ABNORMAL
EOSINOPHIL # BLD: 0.2 K/UL (ref 0–0.8)
EOSINOPHIL NFR BLD: 3 % (ref 0.5–7.8)
ERYTHROCYTE [DISTWIDTH] IN BLOOD BY AUTOMATED COUNT: 16.7 % (ref 11.9–14.6)
ERYTHROCYTE [SEDIMENTATION RATE] IN BLOOD: 66 MM/HR (ref 0–20)
FERRITIN SERPL-MCNC: 53 NG/ML (ref 8–388)
GLOBULIN SER CALC-MCNC: 5.1 G/DL (ref 2.3–3.5)
GLUCOSE BLD STRIP.AUTO-MCNC: 135 MG/DL (ref 65–100)
GLUCOSE SERPL-MCNC: 91 MG/DL (ref 65–100)
HCT VFR BLD AUTO: 37.9 % (ref 41.1–50.3)
HGB BLD-MCNC: 11.6 G/DL (ref 13.6–17.2)
IMM GRANULOCYTES # BLD AUTO: 0.1 K/UL (ref 0–0.5)
IMM GRANULOCYTES NFR BLD AUTO: 1 % (ref 0–5)
INR PPP: 1.5
LACTATE SERPL-SCNC: 1.4 MMOL/L (ref 0.4–2)
LDH SERPL L TO P-CCNC: 271 U/L (ref 110–210)
LYMPHOCYTES # BLD: 1.9 K/UL (ref 0.5–4.6)
LYMPHOCYTES NFR BLD: 32 % (ref 13–44)
MAGNESIUM SERPL-MCNC: 2.2 MG/DL (ref 1.8–2.4)
MCH RBC QN AUTO: 24.5 PG (ref 26.1–32.9)
MCHC RBC AUTO-ENTMCNC: 30.6 G/DL (ref 31.4–35)
MCV RBC AUTO: 80.1 FL (ref 79.6–97.8)
MONOCYTES # BLD: 0.3 K/UL (ref 0.1–1.3)
MONOCYTES NFR BLD: 5 % (ref 4–12)
NEUTS SEG # BLD: 3.6 K/UL (ref 1.7–8.2)
NEUTS SEG NFR BLD: 59 % (ref 43–78)
NRBC # BLD: 0 K/UL (ref 0–0.2)
P-R INTERVAL, ECG05: 216 MS
PLATELET # BLD AUTO: 299 K/UL (ref 150–450)
PMV BLD AUTO: 10.3 FL (ref 9.4–12.3)
POTASSIUM SERPL-SCNC: 3.8 MMOL/L (ref 3.5–5.1)
PROT SERPL-MCNC: 8 G/DL (ref 6.3–8.2)
PROTHROMBIN TIME: 18.3 SEC (ref 12–14.7)
Q-T INTERVAL, ECG07: 412 MS
QRS DURATION, ECG06: 154 MS
QTC CALCULATION (BEZET), ECG08: 444 MS
RBC # BLD AUTO: 4.73 M/UL (ref 4.23–5.6)
SODIUM SERPL-SCNC: 138 MMOL/L (ref 136–145)
TSH SERPL DL<=0.005 MIU/L-ACNC: 0.85 UIU/ML (ref 0.36–3.74)
VENTRICULAR RATE, ECG03: 70 BPM
WBC # BLD AUTO: 6.1 K/UL (ref 4.3–11.1)

## 2020-08-11 PROCEDURE — 71045 X-RAY EXAM CHEST 1 VIEW: CPT

## 2020-08-11 PROCEDURE — 94640 AIRWAY INHALATION TREATMENT: CPT

## 2020-08-11 PROCEDURE — 84443 ASSAY THYROID STIM HORMONE: CPT

## 2020-08-11 PROCEDURE — 85652 RBC SED RATE AUTOMATED: CPT

## 2020-08-11 PROCEDURE — 83615 LACTATE (LD) (LDH) ENZYME: CPT

## 2020-08-11 PROCEDURE — 87635 SARS-COV-2 COVID-19 AMP PRB: CPT

## 2020-08-11 PROCEDURE — 74011000250 HC RX REV CODE- 250: Performed by: EMERGENCY MEDICINE

## 2020-08-11 PROCEDURE — 82728 ASSAY OF FERRITIN: CPT

## 2020-08-11 PROCEDURE — 93005 ELECTROCARDIOGRAM TRACING: CPT | Performed by: EMERGENCY MEDICINE

## 2020-08-11 PROCEDURE — 80053 COMPREHEN METABOLIC PANEL: CPT

## 2020-08-11 PROCEDURE — 99285 EMERGENCY DEPT VISIT HI MDM: CPT

## 2020-08-11 PROCEDURE — 74011250637 HC RX REV CODE- 250/637: Performed by: INTERNAL MEDICINE

## 2020-08-11 PROCEDURE — 94660 CPAP INITIATION&MGMT: CPT

## 2020-08-11 PROCEDURE — 74011000302 HC RX REV CODE- 302: Performed by: INTERNAL MEDICINE

## 2020-08-11 PROCEDURE — 85025 COMPLETE CBC W/AUTO DIFF WBC: CPT

## 2020-08-11 PROCEDURE — 65660000000 HC RM CCU STEPDOWN

## 2020-08-11 PROCEDURE — 77010033711 HC HIGH FLOW OXYGEN

## 2020-08-11 PROCEDURE — 96374 THER/PROPH/DIAG INJ IV PUSH: CPT

## 2020-08-11 PROCEDURE — 94761 N-INVAS EAR/PLS OXIMETRY MLT: CPT

## 2020-08-11 PROCEDURE — 83735 ASSAY OF MAGNESIUM: CPT

## 2020-08-11 PROCEDURE — 86140 C-REACTIVE PROTEIN: CPT

## 2020-08-11 PROCEDURE — 82962 GLUCOSE BLOOD TEST: CPT

## 2020-08-11 PROCEDURE — 83605 ASSAY OF LACTIC ACID: CPT

## 2020-08-11 PROCEDURE — 85610 PROTHROMBIN TIME: CPT

## 2020-08-11 PROCEDURE — 86580 TB INTRADERMAL TEST: CPT | Performed by: INTERNAL MEDICINE

## 2020-08-11 PROCEDURE — 74011250636 HC RX REV CODE- 250/636: Performed by: EMERGENCY MEDICINE

## 2020-08-11 PROCEDURE — 74011636637 HC RX REV CODE- 636/637: Performed by: INTERNAL MEDICINE

## 2020-08-11 PROCEDURE — 83880 ASSAY OF NATRIURETIC PEPTIDE: CPT

## 2020-08-11 RX ORDER — SODIUM CHLORIDE 0.9 % (FLUSH) 0.9 %
5-40 SYRINGE (ML) INJECTION EVERY 8 HOURS
Status: DISCONTINUED | OUTPATIENT
Start: 2020-08-11 | End: 2020-08-20 | Stop reason: HOSPADM

## 2020-08-11 RX ORDER — AZITHROMYCIN 250 MG/1
500 TABLET, FILM COATED ORAL DAILY
Status: DISCONTINUED | OUTPATIENT
Start: 2020-08-11 | End: 2020-08-11

## 2020-08-11 RX ORDER — AMLODIPINE BESYLATE 5 MG/1
5 TABLET ORAL DAILY
Status: DISCONTINUED | OUTPATIENT
Start: 2020-08-12 | End: 2020-08-20 | Stop reason: HOSPADM

## 2020-08-11 RX ORDER — DEXAMETHASONE SODIUM PHOSPHATE 100 MG/10ML
6 INJECTION INTRAMUSCULAR; INTRAVENOUS DAILY
Status: DISCONTINUED | OUTPATIENT
Start: 2020-08-12 | End: 2020-08-16

## 2020-08-11 RX ORDER — INSULIN GLARGINE 100 [IU]/ML
44 INJECTION, SOLUTION SUBCUTANEOUS 2 TIMES DAILY
Status: DISCONTINUED | OUTPATIENT
Start: 2020-08-11 | End: 2020-08-15

## 2020-08-11 RX ORDER — CARVEDILOL 12.5 MG/1
12.5 TABLET ORAL 2 TIMES DAILY WITH MEALS
Status: DISCONTINUED | OUTPATIENT
Start: 2020-08-11 | End: 2020-08-20 | Stop reason: HOSPADM

## 2020-08-11 RX ORDER — ACETAMINOPHEN 650 MG/1
650 SUPPOSITORY RECTAL
Status: DISCONTINUED | OUTPATIENT
Start: 2020-08-11 | End: 2020-08-20 | Stop reason: HOSPADM

## 2020-08-11 RX ORDER — POLYETHYLENE GLYCOL 3350 17 G/17G
17 POWDER, FOR SOLUTION ORAL DAILY PRN
Status: DISCONTINUED | OUTPATIENT
Start: 2020-08-11 | End: 2020-08-20 | Stop reason: HOSPADM

## 2020-08-11 RX ORDER — ALLOPURINOL 300 MG/1
300 TABLET ORAL DAILY
Status: DISCONTINUED | OUTPATIENT
Start: 2020-08-12 | End: 2020-08-20 | Stop reason: HOSPADM

## 2020-08-11 RX ORDER — ONDANSETRON 2 MG/ML
4 INJECTION INTRAMUSCULAR; INTRAVENOUS
Status: DISCONTINUED | OUTPATIENT
Start: 2020-08-11 | End: 2020-08-20 | Stop reason: HOSPADM

## 2020-08-11 RX ORDER — ATORVASTATIN CALCIUM 10 MG/1
10 TABLET, FILM COATED ORAL DAILY
Status: DISCONTINUED | OUTPATIENT
Start: 2020-08-12 | End: 2020-08-20 | Stop reason: HOSPADM

## 2020-08-11 RX ORDER — DEXAMETHASONE SODIUM PHOSPHATE 100 MG/10ML
6 INJECTION INTRAMUSCULAR; INTRAVENOUS
Status: COMPLETED | OUTPATIENT
Start: 2020-08-11 | End: 2020-08-11

## 2020-08-11 RX ORDER — TORSEMIDE 20 MG/1
20 TABLET ORAL 2 TIMES DAILY
Status: DISCONTINUED | OUTPATIENT
Start: 2020-08-11 | End: 2020-08-20 | Stop reason: HOSPADM

## 2020-08-11 RX ORDER — PANTOPRAZOLE SODIUM 40 MG/1
40 TABLET, DELAYED RELEASE ORAL
Status: DISCONTINUED | OUTPATIENT
Start: 2020-08-12 | End: 2020-08-20 | Stop reason: HOSPADM

## 2020-08-11 RX ORDER — LORATADINE 10 MG/1
10 TABLET ORAL DAILY
Status: DISCONTINUED | OUTPATIENT
Start: 2020-08-12 | End: 2020-08-20 | Stop reason: HOSPADM

## 2020-08-11 RX ORDER — BUDESONIDE AND FORMOTEROL FUMARATE DIHYDRATE 160; 4.5 UG/1; UG/1
2 AEROSOL RESPIRATORY (INHALATION)
Status: DISCONTINUED | OUTPATIENT
Start: 2020-08-11 | End: 2020-08-20 | Stop reason: HOSPADM

## 2020-08-11 RX ORDER — SODIUM CHLORIDE 9 MG/ML
250 INJECTION, SOLUTION INTRAVENOUS AS NEEDED
Status: DISCONTINUED | OUTPATIENT
Start: 2020-08-11 | End: 2020-08-20 | Stop reason: HOSPADM

## 2020-08-11 RX ORDER — COLCHICINE 0.6 MG/1
0.6 TABLET ORAL DAILY
Status: DISCONTINUED | OUTPATIENT
Start: 2020-08-12 | End: 2020-08-16

## 2020-08-11 RX ORDER — ACETAMINOPHEN 325 MG/1
650 TABLET ORAL
Status: DISCONTINUED | OUTPATIENT
Start: 2020-08-11 | End: 2020-08-20 | Stop reason: HOSPADM

## 2020-08-11 RX ORDER — INSULIN LISPRO 100 [IU]/ML
INJECTION, SOLUTION INTRAVENOUS; SUBCUTANEOUS
Status: DISCONTINUED | OUTPATIENT
Start: 2020-08-11 | End: 2020-08-20 | Stop reason: HOSPADM

## 2020-08-11 RX ORDER — SODIUM CHLORIDE 0.9 % (FLUSH) 0.9 %
5-40 SYRINGE (ML) INJECTION AS NEEDED
Status: DISCONTINUED | OUTPATIENT
Start: 2020-08-11 | End: 2020-08-20 | Stop reason: HOSPADM

## 2020-08-11 RX ORDER — ASCORBIC ACID 500 MG
500 TABLET ORAL 3 TIMES DAILY
Status: DISCONTINUED | OUTPATIENT
Start: 2020-08-11 | End: 2020-08-20 | Stop reason: HOSPADM

## 2020-08-11 RX ORDER — IPRATROPIUM BROMIDE AND ALBUTEROL SULFATE 2.5; .5 MG/3ML; MG/3ML
3 SOLUTION RESPIRATORY (INHALATION)
Status: COMPLETED | OUTPATIENT
Start: 2020-08-11 | End: 2020-08-11

## 2020-08-11 RX ORDER — ALBUTEROL SULFATE 0.83 MG/ML
2.5 SOLUTION RESPIRATORY (INHALATION)
Status: COMPLETED | OUTPATIENT
Start: 2020-08-11 | End: 2020-08-11

## 2020-08-11 RX ADMIN — BUDESONIDE AND FORMOTEROL FUMARATE DIHYDRATE 2 PUFF: 160; 4.5 AEROSOL RESPIRATORY (INHALATION) at 20:00

## 2020-08-11 RX ADMIN — APIXABAN 5 MG: 5 TABLET, FILM COATED ORAL at 20:22

## 2020-08-11 RX ADMIN — Medication 10 ML: at 22:11

## 2020-08-11 RX ADMIN — OXYCODONE HYDROCHLORIDE AND ACETAMINOPHEN 500 MG: 500 TABLET ORAL at 22:07

## 2020-08-11 RX ADMIN — TUBERCULIN PURIFIED PROTEIN DERIVATIVE 5 UNITS: 5 INJECTION INTRADERMAL at 20:23

## 2020-08-11 RX ADMIN — IPRATROPIUM BROMIDE AND ALBUTEROL SULFATE 3 ML: .5; 3 SOLUTION RESPIRATORY (INHALATION) at 17:26

## 2020-08-11 RX ADMIN — Medication 5 ML: at 17:12

## 2020-08-11 RX ADMIN — Medication 10 ML: at 22:10

## 2020-08-11 RX ADMIN — TORSEMIDE 20 MG: 20 TABLET ORAL at 20:22

## 2020-08-11 RX ADMIN — INSULIN GLARGINE 44 UNITS: 100 INJECTION, SOLUTION SUBCUTANEOUS at 20:26

## 2020-08-11 RX ADMIN — ALBUTEROL SULFATE 2.5 MG: 2.5 SOLUTION RESPIRATORY (INHALATION) at 15:11

## 2020-08-11 RX ADMIN — Medication 10 ML: at 22:08

## 2020-08-11 RX ADMIN — DEXAMETHASONE SODIUM PHOSPHATE 6 MG: 10 INJECTION INTRAMUSCULAR; INTRAVENOUS at 17:12

## 2020-08-11 RX ADMIN — CARVEDILOL 12.5 MG: 12.5 TABLET, FILM COATED ORAL at 20:22

## 2020-08-11 NOTE — ED NOTES
TRANSFER - OUT REPORT:    Verbal report given to yi on Sergei Crisostomo  being transferred to 5th floor for routine progression of care       Report consisted of patients Situation, Background, Assessment and   Recommendations(SBAR). Information from the following report(s) SBAR, ED Summary and MAR was reviewed with the receiving nurse. Lines:   Peripheral IV 08/11/20 Right Hand (Active)   Site Assessment Clean, dry, & intact 08/11/20 1519   Phlebitis Assessment 0 08/11/20 1519   Infiltration Assessment 0 08/11/20 1519   Dressing Status Clean, dry, & intact 08/11/20 1519   Hub Color/Line Status Pink 08/11/20 1519        Opportunity for questions and clarification was provided.       Patient transported with:   O2 @ 2 liters

## 2020-08-11 NOTE — H&P
Hospitalist H&P Note     Admit Date:  2020  2:36 PM   Name:  Luna Awad   Age:  76 y.o.  :  1955   MRN:  [id number]   PCP:  Esther Rocha DO  Treatment Team: Attending Provider: Neil Walls MD; Primary Nurse: Estela Poole RN    HPI:     CC: shortness of breath       Mr. Mario Sutton is a 77 yo male with PMH of morbid obesity, HTN, DM2, CKD3, COPD, AFIB on eliquis s/p AVN ablation/ PPM, RONAK on CPAP, HFpEF, who is evaluated with known COVID positive status (20) and progressive malaise, dyspnea. He was seen by PCP and given oral steroids/ zpack. He admits to fever, no anorexia, some cough with congestion. Has chronic edema. CXR shows vascular congestion. Resting O2 sats are stable though he desats with ambulation to 87%. Prior ECHO  EF 55-60%, moderate pulm HTN. Also [address] [facility] .     10 systems reviewed and negative except as noted in HPI. - no mood or memory changes, has arthritis, has malaise, no ear or throat pain        Past Medical History:   Diagnosis Date    Albuminuria     Allergic rhinitis     Asthma     Atrial fibrillation     Benign essential hypertension     Chronic pain     COPD     COVID-19 2020    Edema     Gastroesophageal reflux disease     Gout     Hepatic steatosis     Hypercholesteremia     Hypertriglyceridemia     Iron deficiency anemia     Obstructive sleep apnea on CPAP     Osteoarthritis     Peptic ulcer disease     Sensory neuropathy     Vitamin D deficiency       Past Surgical History:   Procedure Laterality Date    HX CHOLECYSTECTOMY      HX HEENT  mid     uvulopalatopharynguloplasty    HX HERNIA REPAIR      Dr. Leno Garcia    HX KNEE REPLACEMENT Bilateral     HX LAP CHOLECYSTECTOMY      Dr. Neelima Tim  1262'K    right hand    HX PACEMAKER PLACEMENT      Dr. Ashlee Billingsley      Allergies   Allergen Reactions    Cozaar [Losartan] Angioedema    Lisinopril Angioedema    Morphine Anaphylaxis and Swelling    Pcn [Penicillins] Anaphylaxis     Previous RX for cephalosporin tolerated      Social History     Tobacco Use    Smoking status: Former Smoker     Packs/day: 0.25     Years: 1.00     Pack years: 0.25     Types: Cigars     Last attempt to quit: 3/24/1985     Years since quittin.4    Smokeless tobacco: Former User     Types: Chew    Tobacco comment: quits, but then starts back   Substance Use Topics    Alcohol use: No     Alcohol/week: 0.0 standard drinks     Comment: former      Family History   Problem Relation Age of Onset    Heart Disease Mother     Diabetes Mother     Heart Disease Father     Stroke Father     Hypertension Father     Diabetes Father     Cancer Sister     Diabetes Sister     Heart Disease Sister     Hypertension Sister     Diabetes Sister     Heart Disease Sister     Hypertension Sister     No Known Problems Brother     Cancer Brother       Immunization History   Administered Date(s) Administered    Influenza High Dose Vaccine PF 10/02/2019    Influenza Vaccine 10/19/2012, 10/08/2013, 10/06/2014, 10/03/2016, 2018    Influenza Vaccine (Quad) Mdck Pf 10/18/2017    Influenza Vaccine PF 10/02/2015    Pneumococcal Conjugate (PCV-13) 05/10/2019    Pneumococcal Vaccine (Unspecified Type) 2007    Td 2003    Tdap 10/08/2013     PTA Medications:  Prior to Admission Medications   Prescriptions Last Dose Informant Patient Reported? Taking? ALCOHOL PREP PADS padm   Yes No   BD Insulin Syringe Ultra-Fine 1 mL 30 gauge x 1/2\" syrg   No No   Sig: Use to inject insulin twice daily for Dx E11.65   Lantus U-100 Insulin 100 unit/mL injection   No No   Si Units by SubCUTAneous route two (2) times a day. Adjust by 2 units every 3 days to keep fasting BG . Max 130 units per day. Neelima Pen Needle 32 gauge x \" ndle   No No   Sig: Once a day for Victoza.   Dx E11.65   OMEGA-3 FATTY ACIDS (FISH OIL CONCENTRATE PO)   Yes No   Si in am, 2 in afternoon and 1 at bedtime   OTHER   No No   Sig: . DISABILITY CERTIFICATE:  This patient has a disability requiring a DISABLED PLACARD as documented. Trulicity 1.5 IP/5.7 mL sub-q pen   No No   Si.5 mL by SubCUTAneous route every seven (7) days. acetaminophen (TYLENOL ARTHRITIS PAIN) 650 mg CR tablet   Yes No   Sig: Take 650 mg by mouth every six (6) hours as needed for Pain. albuterol (PROVENTIL VENTOLIN) 2.5 mg /3 mL (0.083 %) nebu   No No   Sig: 3 mL by Nebulization route every four (4) hours as needed (wheezing). allopurinol (ZYLOPRIM) 300 mg tablet   No No   Sig: Take 1 Tab by mouth daily. Indications: prevention of acute gout attack   amLODIPine (NORVASC) 5 mg tablet   No No   Sig: Take 1 Tab by mouth daily. apixaban (Eliquis) 5 mg tablet   Yes No   Sig: Take 5 mg by mouth two (2) times a day. atorvastatin (LIPITOR) 10 mg tablet   No No   Sig: Take 1 Tab by mouth daily. budesonide-formoterol (SYMBICORT) 160-4.5 mcg/actuation HFAA   Yes No   Sig: Take 2 Puffs by inhalation two (2) times a day. carvediloL (COREG) 12.5 mg tablet   No No   Sig: Take 1 Tab by mouth two (2) times daily (with meals). colchicine 0.6 mg tablet   No No   Sig: Take 1 Tab by mouth daily. diclofenac (VOLTAREN) 1 % gel   No No   Sig: Apply 4 g to affected area four (4) times daily. ferrous sulfate (IRON) 325 mg (65 mg iron) tablet   Yes No   Sig: Take  by mouth Daily (before breakfast). Patient states he only takes 2-3 times per week   ipratropium-albuteroL (Combivent Respimat)  mcg/actuation inhaler   Yes No   Sig: Take 1 Puff by inhalation every six (6) hours as needed for Wheezing.   loratadine (CLARITIN) 10 mg tablet   Yes No   Sig: Take 10 mg by mouth daily. metFORMIN (GLUCOPHAGE) 500 mg tablet   No No   Sig: Take 1 Tab by mouth two (2) times daily (with meals). omeprazole (PRILOSEC) 20 mg capsule   Yes No   Sig: Take 20 mg by mouth every morning.    pioglitazone (Actos) 30 mg tablet   No No   Sig: Take 0.5 Tabs by mouth daily. potassium chloride (KLOR-CON) 10 mEq tablet   No No   Sig: Take 1 Tab by mouth two (2) times a day. torsemide (DEMADEX) 20 mg tablet   No No   Sig: TAKE ONE TABLET BY MOUTH TWICE A DAY      Facility-Administered Medications: None       Objective:     Patient Vitals for the past 24 hrs:   Temp Pulse Resp BP SpO2   08/11/20 1726     95 %   08/11/20 1701    110/63 95 %   08/11/20 1640  74  118/65 95 %   08/11/20 1621    118/65 95 %   08/11/20 1537     95 %   08/11/20 1519     95 %   08/11/20 1519     90 %   08/11/20 1500  75  130/68 92 %   08/11/20 1359 99.2 °F (37.3 °C) 72 24 110/76 90 %     Oxygen Therapy  O2 Sat (%): 95 % (08/11/20 1726)  Pulse via Oximetry: 70 beats per minute (08/11/20 1726)  O2 Device: Nasal cannula (08/11/20 1726)  O2 Flow Rate (L/min): 2 l/min (08/11/20 1726)  No intake or output data in the 24 hours ending 08/11/20 1811    Physical Exam:  General:    Alert. No distress, obese, elderly  Eyes:   Normal sclera. Extraocular movements intact. ENT:  Normocephalic, atraumatic. CV:   Irregular, 1+ edema with venous stasis   Lungs:  Diffuse expiratory wheezing  Abdomen: Soft, nontender, obese  Extremities: Warm and dry. Neurologic:  grossly intact. Psych:  Normal mood and affect. I reviewed the labs, imaging, EKGs, telemetry, and other studies done this admission. EKG tracing personally reviewed as paced      Data Review:   Recent Results (from the past 24 hour(s))   EKG, 12 LEAD, INITIAL    Collection Time: 08/11/20  3:09 PM   Result Value Ref Range    Ventricular Rate 70 BPM    Atrial Rate 70 BPM    P-R Interval 216 ms    QRS Duration 154 ms    Q-T Interval 412 ms    QTC Calculation (Bezet) 444 ms    Calculated P Axis 125 degrees    Calculated R Axis 142 degrees    Calculated T Axis 66 degrees    Diagnosis       !! AGE AND GENDER SPECIFIC ECG ANALYSIS !!   Electronic ventricular pacemaker  When compared with ECG of 01-JUL-2020 07:10,  Vent. rate has decreased BY   2 BPM  Confirmed by ST CHAU DUNLAP MD (), MARYJO G ([id number]) on 8/11/2020 4:52:41 PM     CBC WITH AUTOMATED DIFF    Collection Time: 08/11/20  3:11 PM   Result Value Ref Range    WBC 6.1 4.3 - 11.1 K/uL    RBC 4.73 4.23 - 5.6 M/uL    HGB 11.6 (L) 13.6 - 17.2 g/dL    HCT 37.9 (L) 41.1 - 50.3 %    MCV 80.1 79.6 - 97.8 FL    MCH 24.5 (L) 26.1 - 32.9 PG    MCHC 30.6 (L) 31.4 - 35.0 g/dL    RDW 16.7 (H) 11.9 - 14.6 %    PLATELET 099 487 - 356 K/uL    MPV 10.3 9.4 - 12.3 FL    ABSOLUTE NRBC 0.00 0.0 - 0.2 K/uL    DF AUTOMATED      NEUTROPHILS 59 43 - 78 %    LYMPHOCYTES 32 13 - 44 %    MONOCYTES 5 4.0 - 12.0 %    EOSINOPHILS 3 0.5 - 7.8 %    BASOPHILS 1 0.0 - 2.0 %    IMMATURE GRANULOCYTES 1 0.0 - 5.0 %    ABS. NEUTROPHILS 3.6 1.7 - 8.2 K/UL    ABS. LYMPHOCYTES 1.9 0.5 - 4.6 K/UL    ABS. MONOCYTES 0.3 0.1 - 1.3 K/UL    ABS. EOSINOPHILS 0.2 0.0 - 0.8 K/UL    ABS. BASOPHILS 0.0 0.0 - 0.2 K/UL    ABS. IMM. GRANS. 0.1 0.0 - 0.5 K/UL   METABOLIC PANEL, COMPREHENSIVE    Collection Time: 08/11/20  3:11 PM   Result Value Ref Range    Sodium 138 136 - 145 mmol/L    Potassium 3.8 3.5 - 5.1 mmol/L    Chloride 104 98 - 107 mmol/L    CO2 26 21 - 32 mmol/L    Anion gap 8 7 - 16 mmol/L    Glucose 91 65 - 100 mg/dL    BUN 35 (H) 8 - 23 MG/DL    Creatinine 2.03 (H) 0.8 - 1.5 MG/DL    GFR est AA 42 (L) >60 ml/min/1.73m2    GFR est non-AA 35 (L) >60 ml/min/1.73m2    Calcium 8.9 8.3 - 10.4 MG/DL    Bilirubin, total 0.4 0.2 - 1.1 MG/DL    ALT (SGPT) 28 12 - 65 U/L    AST (SGOT) 37 15 - 37 U/L    Alk.  phosphatase 106 50 - 136 U/L    Protein, total 8.0 6.3 - 8.2 g/dL    Albumin 2.9 (L) 3.2 - 4.6 g/dL    Globulin 5.1 (H) 2.3 - 3.5 g/dL    A-G Ratio 0.6 (L) 1.2 - 3.5     MAGNESIUM    Collection Time: 08/11/20  3:11 PM   Result Value Ref Range    Magnesium 2.2 1.8 - 2.4 mg/dL   TSH 3RD GENERATION    Collection Time: 08/11/20  3:11 PM   Result Value Ref Range    TSH 0.849 0.358 - 3.740 uIU/mL   LACTIC ACID Collection Time: 08/11/20  3:11 PM   Result Value Ref Range    Lactic acid 1.4 0.4 - 2.0 MMOL/L   NT-PRO BNP    Collection Time: 08/11/20  3:11 PM   Result Value Ref Range    NT pro-BNP 1,347 (H) 5 - 125 PG/ML       All Micro Results     None          Other Studies:  Xr Chest Sngl V    Result Date: 8/11/2020  PORTABLE CHEST 1 VIEW HISTORY: Cough COMPARISON: October 2, 2009 FINDINGS: EKG leads are present. A cardiac pacemaker device is present. There is pulmonary vascular congestion with retrocardiac consolidation. IMPRESSION: Pulmonary vascular congestion with retrocardiac atelectasis or consolidation.       Assessment and Plan:     Hospital Problems as of 8/11/2020 Date Reviewed: 7/14/2020          Codes Class Noted - Resolved POA    * (Principal) COVID-19 ICD-10-CM: U07.1  ICD-9-CM: 079.89  8/11/2020 - Present Yes        Pulmonary hypertension (HCC) (Chronic) ICD-10-CM: I27.20  ICD-9-CM: 416.8  8/11/2020 - Present Yes        Morbid obesity ([address] (Chronic) ICD-10-CM: E66.01  ICD-9-CM: 278.01  8/11/2020 - Present Yes        Atrial fibrillation ([facility] [address] (Chronic) ICD-10-CM: I48.91  ICD-9-CM: 427.31  8/11/2020 - Present Yes        RONAK on CPAP (Chronic) ICD-10-CM: G47.33, Z99.89  ICD-9-CM: 327.23, V46.8  8/11/2020 - Present Yes        Type 2 diabetes mellitus with stage 3 chronic kidney disease, with long-term current use of insulin (HCC) ICD-10-CM: E11.22, N18.3, Z79.4  ICD-9-CM: 250.40, 585.3, V58.67  11/10/2017 - Present Yes        CKD (chronic kidney disease) stage 3, GFR 30-59 ml/min (HCC) ICD-10-CM: N18.3  ICD-9-CM: 585.3  5/5/2017 - Present Yes        Benign essential hypertension ICD-10-CM: I10  ICD-9-CM: 401.1  Unknown - Present Yes        Atrioventricular block, complete ([facility] ICD-10-CM: I44.2  ICD-9-CM: 426.0  4/14/2016 - Present Yes        Edema of both lower extremities due to peripheral venous insufficiency ICD-10-CM: A62.0  ICD-9-CM: 459.81  12/29/2015 - Present Yes        Chronic obstructive pulmonary disease [facility]) ICD-10-CM: J44.9  ICD-9-CM: 350  Unknown - Present Yes    Overview Signed 1/14/2015 12:11 PM by Luis Vazquez     uses inhalers no oxygen                   · COVID positive with desaturation on ambulation/ hypoxia:  · Admit to COVID isolation, remote tele  · Continued IV decadron D1/10  · Add vitamin C  · Hold zinc with CKD3  · agrees for convalescent plasma  · Has had recent zpack and PCN allergy, hold off on further antibiotics  · Checking LDH, ferritin and sed rate/ CRP      · DM2:  · SSI and continued lantus       · AFIB:  · Continued eliquis      · COPD:  · Continued symbicort and albuterol       · RONAK:  · Will see if RT will allow his CPAP      · CKD3:  · followup BMP      · Gout:  · Continued allopurinol and colchicine       · HTN:  · Continued norvasc, coreg      · HFpEF:  · Continued demedex    Discharge planning:  PPD pending   DVT ppx:   Code status:  Full  Estimated LOS:  Greater than 2 midnights  Risk:  high  Care plan: wife [place]  [phone]  Signed:  Dawit Cordero MD

## 2020-08-11 NOTE — PROGRESS NOTES
TRANSFER - IN REPORT:    Verbal report received from BAUDILIO Mohan on [facility] being received from Emergency Department for routine progression of care. Report consisted of patients Situation, Background, Assessment and   Recommendations(SBAR). Information from the following report(s) SBAR, Kardex, ED Summary, [facility] and Recent Results was reviewed with the receiving nurse. Opportunity for questions and clarification was provided. Assessment will be completed upon patients arrival to unit and care assumed.

## 2020-08-11 NOTE — ED TRIAGE NOTES
Pt arrives from home, mask in place, COVID + with c/o still feeling poorly x 1 week. Pt called PCP who sent him to the ED for \"another test\". Pt SaO2 in triage 90%, placed on O2 for comfort; SaO2 now at 94%. Pt has underlying COPD, does not wear O2 at home. No other c/o pain or discomfort.

## 2020-08-11 NOTE — ED PROVIDER NOTES
50-year-old  male with a history of atrial fibrillation, COPD, hypertension and obesity who presents the emergency department complaining of progressive shortness of breath and cough. Patient was diagnosed with COVID a week ago, given a Z-Willy as well as a prednisone Dosepak without improvement. The history is provided by the patient. Shortness of Breath   This is a new problem. The average episode lasts 1 week. The problem occurs continuously. The current episode started more than 2 days ago. The problem has been rapidly worsening. Associated symptoms include a fever, cough, sputum production, wheezing and leg swelling (chonic and unchanged). Pertinent negatives include no headaches, no coryza, no rhinorrhea, no sore throat, no swollen glands, no ear pain, no neck pain, no hemoptysis, no PND, no orthopnea, no chest pain, no syncope, no vomiting, no abdominal pain, no rash, no leg pain and no claudication. It is unknown what precipitated the problem. He has tried beta-agonist inhalers, leukotriene antagonists and oral steroids (Z-pack) for the symptoms. The treatment provided no relief. He has had no prior hospitalizations. He has had no prior ED visits. He has had no prior ICU admissions. Associated medical issues include asthma, COPD and CAD. Associated medical issues do not include pneumonia, chronic lung disease, PE, heart failure, past MI, DVT or recent surgery.         Past Medical History:   Diagnosis Date    Albuminuria     Allergic rhinitis     Asthma     Atrial fibrillation     Benign essential hypertension     Chronic pain     COPD     Edema     Gastroesophageal reflux disease     Gout     Hepatic steatosis     Hypercholesteremia     Hypertriglyceridemia     Iron deficiency anemia     Obstructive sleep apnea on CPAP     Osteoarthritis     Peptic ulcer disease     Sensory neuropathy     Vitamin D deficiency        Past Surgical History:   Procedure Laterality Date    HX CHOLECYSTECTOMY      HX HEENT  mid     uvulopalatopharynguloplasty    HX HERNIA REPAIR      Dr. Antionette Inman Bilateral     HX LAP CHOLECYSTECTOMY      Dr. Jeffery Tomas      right hand    HX PACEMAKER PLACEMENT      Dr. Agata Redmond         Family History:   Problem Relation Age of Onset    Heart Disease Mother     Diabetes Mother     Heart Disease Father     Stroke Father     Hypertension Father     Diabetes Father     Cancer Sister     Diabetes Sister     Heart Disease Sister     Hypertension Sister     Diabetes Sister     Heart Disease Sister     Hypertension Sister     No Known Problems Brother     Cancer Brother        Social History     Socioeconomic History    Marital status:      Spouse name: Not on file    Number of children: Not on file    Years of education: Not on file    Highest education level: Not on file   Occupational History    Not on file   Social Needs    Financial resource strain: Not on file    Food insecurity     Worry: Not on file     Inability: Not on file    Transportation needs     Medical: Not on file     Non-medical: Not on file   Tobacco Use    Smoking status: Former Smoker     Packs/day: 0.25     Years: 1.00     Pack years: 0.25     Types: Cigars     Last attempt to quit: 3/24/1985     Years since quittin.4    Smokeless tobacco: Former User     Types: Chew    Tobacco comment: quits, but then starts back   Substance and Sexual Activity    Alcohol use: No     Alcohol/week: 0.0 standard drinks     Comment: former    Drug use: No    Sexual activity: Not on file   Lifestyle    Physical activity     Days per week: Not on file     Minutes per session: Not on file    Stress: Not on file   Relationships    Social connections     Talks on phone: Not on file     Gets together: Not on file     Attends [employer] service: Not on file     Active member of club or organization: Not on file     Attends meetings of clubs or organizations: Not on file     Relationship status: Not on file    Intimate partner violence     Fear of current or ex partner: Not on file     Emotionally abused: Not on file     Physically abused: Not on file     Forced sexual activity: Not on file   Other Topics Concern    Not on file   Social History Narrative    Not on file         ALLERGIES: Cozaar [losartan]; Lisinopril; Morphine; and Pcn [penicillins]    Review of Systems   Constitutional: Positive for fever. HENT: Negative for ear pain, rhinorrhea and sore throat. Respiratory: Positive for cough, sputum production, shortness of breath and wheezing. Negative for hemoptysis. Cardiovascular: Positive for leg swelling (chonic and unchanged). Negative for chest pain, orthopnea, claudication, syncope and PND. Gastrointestinal: Negative for abdominal pain, nausea and vomiting. Musculoskeletal: Negative for neck pain. Skin: Negative for rash. Neurological: Negative for headaches. All other systems reviewed and are negative. Vitals:    08/11/20 1359   BP: 110/76   Pulse: 72   Resp: 24   Temp: 99.2 °F (37.3 °C)   SpO2: 90%   Weight: 149.7 kg (330 lb)   Height: 5' 11\" (1.803 m)            Physical Exam  Vitals signs and nursing note reviewed. Constitutional:       General: He is in acute distress. Appearance: He is well-developed. HENT:      Head: Normocephalic and atraumatic. Right Ear: External ear normal.      Left Ear: External ear normal.      Nose: Nose normal.      Mouth/Throat:      Mouth: Mucous membranes are moist.   Eyes:      Extraocular Movements: Extraocular movements intact. Conjunctiva/sclera: Conjunctivae normal.      Pupils: Pupils are equal, round, and reactive to light. Neck:      Musculoskeletal: Normal range of motion and neck supple. Vascular: No carotid bruit. Cardiovascular:      Rate and Rhythm: Normal rate and regular rhythm. Pulses: Normal pulses.       Heart sounds: Normal heart sounds. No murmur. Pulmonary:      Effort: Pulmonary effort is normal. Tachypnea and prolonged expiration present. No respiratory distress. Breath sounds: Examination of the right-lower field reveals rhonchi. Wheezing (Mild scattered) and rhonchi present. No decreased breath sounds or rales. Abdominal:      General: Bowel sounds are normal.      Palpations: Abdomen is soft. Tenderness: There is no abdominal tenderness. There is no right CVA tenderness, left CVA tenderness or guarding. Musculoskeletal: Normal range of motion. Right lower leg: Edema (1-2+ bilaterally) present. Left lower leg: Edema present. Skin:     General: Skin is warm and dry. Capillary Refill: Capillary refill takes less than 2 seconds. Coloration: Skin is not jaundiced. Findings: No rash. Neurological:      General: No focal deficit present. Mental Status: He is alert and oriented to person, place, and time. Psychiatric:         Mood and Affect: Mood normal.         Behavior: Behavior normal.          MDM  Number of Diagnoses or Management Options  Acute exacerbation of chronic obstructive pulmonary disease (COPD) ([facility] [address]): new and requires workup  COVID-19: established and worsening  Hypoxia: new and requires workup     Amount and/or Complexity of Data Reviewed  Clinical lab tests: ordered and reviewed  Tests in the radiology section of CPT®: ordered and reviewed  Obtain history from someone other than the patient: yes  Review and summarize past medical records: yes  Discuss the patient with other providers: yes  Independent visualization of images, tracings, or specimens: yes    Risk of Complications, Morbidity, and/or Mortality  Presenting problems: high  Diagnostic procedures: moderate  Management options: high    Patient Progress  Patient progress: stable         Procedures    The patient was observed in the ED.  off oxygen, patient transferring from just the wheelchair to the bed dropped his oxygen saturations to 87% with increased respiratory distress. First breathing treatment did minimal to help the patient's breathing, but apparently he spilled half of it on his chest while he was getting it. Due to the patient's multiple medical problems as well as tachypnea, case was discussed with the hospitalist who will admit. Results Reviewed:      Recent Results (from the past 24 hour(s))   EKG, 12 LEAD, INITIAL    Collection Time: 08/11/20  3:09 PM   Result Value Ref Range    Ventricular Rate 70 BPM    Atrial Rate 70 BPM    P-R Interval 216 ms    QRS Duration 154 ms    Q-T Interval 412 ms    QTC Calculation (Bezet) 444 ms    Calculated P Axis 125 degrees    Calculated R Axis 142 degrees    Calculated T Axis 66 degrees    Diagnosis       !! AGE AND GENDER SPECIFIC ECG ANALYSIS !! Electronic ventricular pacemaker  When compared with ECG of 01-JUL-2020 07:10,  Vent. rate has decreased BY   2 BPM  Confirmed by ST CHAU DUNLAP MD (), MARYJO ERAZO ([id number]) on 8/11/2020 4:52:41 PM     CBC WITH AUTOMATED DIFF    Collection Time: 08/11/20  3:11 PM   Result Value Ref Range    WBC 6.1 4.3 - 11.1 K/uL    RBC 4.73 4.23 - 5.6 M/uL    HGB 11.6 (L) 13.6 - 17.2 g/dL    HCT 37.9 (L) 41.1 - 50.3 %    MCV 80.1 79.6 - 97.8 FL    MCH 24.5 (L) 26.1 - 32.9 PG    MCHC 30.6 (L) 31.4 - 35.0 g/dL    RDW 16.7 (H) 11.9 - 14.6 %    PLATELET 123 428 - 377 K/uL    MPV 10.3 9.4 - 12.3 FL    ABSOLUTE NRBC 0.00 0.0 - 0.2 K/uL    DF AUTOMATED      NEUTROPHILS 59 43 - 78 %    LYMPHOCYTES 32 13 - 44 %    MONOCYTES 5 4.0 - 12.0 %    EOSINOPHILS 3 0.5 - 7.8 %    BASOPHILS 1 0.0 - 2.0 %    IMMATURE GRANULOCYTES 1 0.0 - 5.0 %    ABS. NEUTROPHILS 3.6 1.7 - 8.2 K/UL    ABS. LYMPHOCYTES 1.9 0.5 - 4.6 K/UL    ABS. MONOCYTES 0.3 0.1 - 1.3 K/UL    ABS. EOSINOPHILS 0.2 0.0 - 0.8 K/UL    ABS. BASOPHILS 0.0 0.0 - 0.2 K/UL    ABS. IMM.  GRANS. 0.1 0.0 - 0.5 K/UL   METABOLIC PANEL, COMPREHENSIVE    Collection Time: 08/11/20  3:11 PM   Result Value Ref Range    Sodium 138 136 - 145 mmol/L    Potassium 3.8 3.5 - 5.1 mmol/L    Chloride 104 98 - 107 mmol/L    CO2 26 21 - 32 mmol/L    Anion gap 8 7 - 16 mmol/L    Glucose 91 65 - 100 mg/dL    BUN 35 (H) 8 - 23 MG/DL    Creatinine 2.03 (H) 0.8 - 1.5 MG/DL    GFR est AA 42 (L) >60 ml/min/1.73m2    GFR est non-AA 35 (L) >60 ml/min/1.73m2    Calcium 8.9 8.3 - 10.4 MG/DL    Bilirubin, total 0.4 0.2 - 1.1 MG/DL    ALT (SGPT) 28 12 - 65 U/L    AST (SGOT) 37 15 - 37 U/L    Alk. phosphatase 106 50 - 136 U/L    Protein, total 8.0 6.3 - 8.2 g/dL    Albumin 2.9 (L) 3.2 - 4.6 g/dL    Globulin 5.1 (H) 2.3 - 3.5 g/dL    A-G Ratio 0.6 (L) 1.2 - 3.5     MAGNESIUM    Collection Time: 08/11/20  3:11 PM   Result Value Ref Range    Magnesium 2.2 1.8 - 2.4 mg/dL   TSH 3RD GENERATION    Collection Time: 08/11/20  3:11 PM   Result Value Ref Range    TSH 0.849 0.358 - 3.740 uIU/mL   LACTIC ACID    Collection Time: 08/11/20  3:11 PM   Result Value Ref Range    Lactic acid 1.4 0.4 - 2.0 MMOL/L   NT-PRO BNP    Collection Time: 08/11/20  3:11 PM   Result Value Ref Range    NT pro-BNP 1,347 (H) 5 - 125 PG/ML     XR CHEST SNGL V   Final Result   IMPRESSION: Pulmonary vascular congestion with retrocardiac atelectasis or   consolidation.

## 2020-08-12 LAB
ABO + RH BLD: NORMAL
ALBUMIN SERPL-MCNC: 2.7 G/DL (ref 3.2–4.6)
ALBUMIN/GLOB SERPL: 0.5 {RATIO} (ref 1.2–3.5)
ALP SERPL-CCNC: 105 U/L (ref 50–136)
ALT SERPL-CCNC: 27 U/L (ref 12–65)
ANION GAP SERPL CALC-SCNC: 9 MMOL/L (ref 7–16)
AST SERPL-CCNC: 37 U/L (ref 15–37)
BASOPHILS # BLD: 0 K/UL (ref 0–0.2)
BASOPHILS NFR BLD: 0 % (ref 0–2)
BILIRUB SERPL-MCNC: 0.4 MG/DL (ref 0.2–1.1)
BLOOD GROUP ANTIBODIES SERPL: NORMAL
BUN SERPL-MCNC: 36 MG/DL (ref 8–23)
CALCIUM SERPL-MCNC: 8.5 MG/DL (ref 8.3–10.4)
CHLORIDE SERPL-SCNC: 104 MMOL/L (ref 98–107)
CO2 SERPL-SCNC: 26 MMOL/L (ref 21–32)
CREAT SERPL-MCNC: 1.96 MG/DL (ref 0.8–1.5)
DIFFERENTIAL METHOD BLD: ABNORMAL
EOSINOPHIL # BLD: 0 K/UL (ref 0–0.8)
EOSINOPHIL NFR BLD: 0 % (ref 0.5–7.8)
ERYTHROCYTE [DISTWIDTH] IN BLOOD BY AUTOMATED COUNT: 16.6 % (ref 11.9–14.6)
GLOBULIN SER CALC-MCNC: 5.4 G/DL (ref 2.3–3.5)
GLUCOSE BLD STRIP.AUTO-MCNC: 146 MG/DL (ref 65–100)
GLUCOSE BLD STRIP.AUTO-MCNC: 176 MG/DL (ref 65–100)
GLUCOSE BLD STRIP.AUTO-MCNC: 180 MG/DL (ref 65–100)
GLUCOSE BLD STRIP.AUTO-MCNC: 186 MG/DL (ref 65–100)
GLUCOSE SERPL-MCNC: 140 MG/DL (ref 65–100)
HCT VFR BLD AUTO: 38.3 % (ref 41.1–50.3)
HGB BLD-MCNC: 11.5 G/DL (ref 13.6–17.2)
IMM GRANULOCYTES # BLD AUTO: 0 K/UL (ref 0–0.5)
IMM GRANULOCYTES NFR BLD AUTO: 0 % (ref 0–5)
LYMPHOCYTES # BLD: 1.2 K/UL (ref 0.5–4.6)
LYMPHOCYTES NFR BLD: 30 % (ref 13–44)
MCH RBC QN AUTO: 24.1 PG (ref 26.1–32.9)
MCHC RBC AUTO-ENTMCNC: 30 G/DL (ref 31.4–35)
MCV RBC AUTO: 80.3 FL (ref 79.6–97.8)
MONOCYTES # BLD: 0.2 K/UL (ref 0.1–1.3)
MONOCYTES NFR BLD: 4 % (ref 4–12)
NEUTS SEG # BLD: 2.6 K/UL (ref 1.7–8.2)
NEUTS SEG NFR BLD: 66 % (ref 43–78)
NRBC # BLD: 0 K/UL (ref 0–0.2)
PLATELET # BLD AUTO: 298 K/UL (ref 150–450)
PMV BLD AUTO: 10.1 FL (ref 9.4–12.3)
POTASSIUM SERPL-SCNC: 4.1 MMOL/L (ref 3.5–5.1)
PROT SERPL-MCNC: 8.1 G/DL (ref 6.3–8.2)
RBC # BLD AUTO: 4.77 M/UL (ref 4.23–5.6)
SARS COV-2, XPGCVT: POSITIVE
SODIUM SERPL-SCNC: 139 MMOL/L (ref 136–145)
SOURCE, COVRS: ABNORMAL
SPECIMEN EXP DATE BLD: NORMAL
WBC # BLD AUTO: 3.9 K/UL (ref 4.3–11.1)

## 2020-08-12 PROCEDURE — 94660 CPAP INITIATION&MGMT: CPT

## 2020-08-12 PROCEDURE — 36415 COLL VENOUS BLD VENIPUNCTURE: CPT

## 2020-08-12 PROCEDURE — 74011636637 HC RX REV CODE- 636/637: Performed by: INTERNAL MEDICINE

## 2020-08-12 PROCEDURE — 74011250636 HC RX REV CODE- 250/636: Performed by: INTERNAL MEDICINE

## 2020-08-12 PROCEDURE — 94640 AIRWAY INHALATION TREATMENT: CPT

## 2020-08-12 PROCEDURE — 36430 TRANSFUSION BLD/BLD COMPNT: CPT

## 2020-08-12 PROCEDURE — 82962 GLUCOSE BLOOD TEST: CPT

## 2020-08-12 PROCEDURE — 77010033711 HC HIGH FLOW OXYGEN

## 2020-08-12 PROCEDURE — 86900 BLOOD TYPING SEROLOGIC ABO: CPT

## 2020-08-12 PROCEDURE — XW13325 TRANSFUSION OF CONVALESCENT PLASMA (NONAUTOLOGOUS) INTO PERIPHERAL VEIN, PERCUTANEOUS APPROACH, NEW TECHNOLOGY GROUP 5: ICD-10-PCS | Performed by: FAMILY MEDICINE

## 2020-08-12 PROCEDURE — 94760 N-INVAS EAR/PLS OXIMETRY 1: CPT

## 2020-08-12 PROCEDURE — 85025 COMPLETE CBC W/AUTO DIFF WBC: CPT

## 2020-08-12 PROCEDURE — 65660000000 HC RM CCU STEPDOWN

## 2020-08-12 PROCEDURE — 80053 COMPREHEN METABOLIC PANEL: CPT

## 2020-08-12 PROCEDURE — 74011250637 HC RX REV CODE- 250/637: Performed by: INTERNAL MEDICINE

## 2020-08-12 RX ORDER — ALBUTEROL SULFATE 90 UG/1
2 AEROSOL, METERED RESPIRATORY (INHALATION)
Status: DISCONTINUED | OUTPATIENT
Start: 2020-08-12 | End: 2020-08-20 | Stop reason: HOSPADM

## 2020-08-12 RX ADMIN — APIXABAN 5 MG: 5 TABLET, FILM COATED ORAL at 08:50

## 2020-08-12 RX ADMIN — LORATADINE 10 MG: 10 TABLET ORAL at 08:50

## 2020-08-12 RX ADMIN — ATORVASTATIN CALCIUM 10 MG: 10 TABLET, FILM COATED ORAL at 08:50

## 2020-08-12 RX ADMIN — Medication 10 ML: at 05:39

## 2020-08-12 RX ADMIN — INSULIN LISPRO 2 UNITS: 100 INJECTION, SOLUTION INTRAVENOUS; SUBCUTANEOUS at 11:25

## 2020-08-12 RX ADMIN — OXYCODONE HYDROCHLORIDE AND ACETAMINOPHEN 500 MG: 500 TABLET ORAL at 21:19

## 2020-08-12 RX ADMIN — ACETAMINOPHEN 650 MG: 325 TABLET, FILM COATED ORAL at 21:37

## 2020-08-12 RX ADMIN — BUDESONIDE AND FORMOTEROL FUMARATE DIHYDRATE 2 PUFF: 160; 4.5 AEROSOL RESPIRATORY (INHALATION) at 08:00

## 2020-08-12 RX ADMIN — CARVEDILOL 12.5 MG: 12.5 TABLET, FILM COATED ORAL at 17:42

## 2020-08-12 RX ADMIN — Medication 20 ML: at 21:21

## 2020-08-12 RX ADMIN — PANTOPRAZOLE SODIUM 40 MG: 40 TABLET, DELAYED RELEASE ORAL at 05:36

## 2020-08-12 RX ADMIN — OXYCODONE HYDROCHLORIDE AND ACETAMINOPHEN 500 MG: 500 TABLET ORAL at 17:42

## 2020-08-12 RX ADMIN — OXYCODONE HYDROCHLORIDE AND ACETAMINOPHEN 500 MG: 500 TABLET ORAL at 08:50

## 2020-08-12 RX ADMIN — DEXAMETHASONE SODIUM PHOSPHATE 6 MG: 10 INJECTION INTRAMUSCULAR; INTRAVENOUS at 17:39

## 2020-08-12 RX ADMIN — INSULIN LISPRO 2 UNITS: 100 INJECTION, SOLUTION INTRAVENOUS; SUBCUTANEOUS at 17:39

## 2020-08-12 RX ADMIN — TORSEMIDE 20 MG: 20 TABLET ORAL at 17:39

## 2020-08-12 RX ADMIN — Medication 20 ML: at 05:37

## 2020-08-12 RX ADMIN — CARVEDILOL 12.5 MG: 12.5 TABLET, FILM COATED ORAL at 08:50

## 2020-08-12 RX ADMIN — TORSEMIDE 20 MG: 20 TABLET ORAL at 08:50

## 2020-08-12 RX ADMIN — APIXABAN 5 MG: 5 TABLET, FILM COATED ORAL at 17:39

## 2020-08-12 RX ADMIN — Medication 10 ML: at 15:31

## 2020-08-12 RX ADMIN — INSULIN LISPRO 2 UNITS: 100 INJECTION, SOLUTION INTRAVENOUS; SUBCUTANEOUS at 21:20

## 2020-08-12 RX ADMIN — INSULIN GLARGINE 44 UNITS: 100 INJECTION, SOLUTION SUBCUTANEOUS at 08:50

## 2020-08-12 RX ADMIN — BUDESONIDE AND FORMOTEROL FUMARATE DIHYDRATE 2 PUFF: 160; 4.5 AEROSOL RESPIRATORY (INHALATION) at 20:12

## 2020-08-12 RX ADMIN — INSULIN GLARGINE 44 UNITS: 100 INJECTION, SOLUTION SUBCUTANEOUS at 17:38

## 2020-08-12 RX ADMIN — Medication 10 ML: at 21:21

## 2020-08-12 NOTE — PROGRESS NOTES
Problem: Risk for Spread of Infection  Goal: Prevent transmission of infectious organism to others  Description: Prevent the transmission of infectious organisms to other patients, staff members, and visitors. Outcome: Progressing Towards Goal     Problem: Falls - Risk of  Goal: *Absence of Falls  Description: Document John Mascorro Fall Risk and appropriate interventions in the flowsheet.   Outcome: Progressing Towards Goal  Note: Fall Risk Interventions:            Medication Interventions: Evaluate medications/consider consulting pharmacy

## 2020-08-12 NOTE — PROGRESS NOTES
's visit via telephone call attempted. The call was unanswered. Chaplains remain available follow-up.      Priscilla De Los Santos MDIV, [facility]

## 2020-08-12 NOTE — PROGRESS NOTES
Nutrition Assessment     Type and Reason for Visit: Initial, Positive nutrition screen    Nutrition Assessment:  Pt admitted due to COVID-19. PMH includes HTN, DM II, CKD III, COPD, HFpEF, GERD, OA, PUD, and a.fib. Per chart review, pt has not had any significant wt loss noted. Pt reports good appetite. Current intakes adequately meet estimated nutrition needs. Estimated Daily Nutrient Needs:  Energy (kcal):  1953-2343kcal(25-30kcal/kg (ideal BW: 78.1kg))  Protein (g):  78-94gm(1-1.2gm/kg; IBW 78.1kg)         Current Nutrition Therapies:  DIET DIABETIC CONSISTENT CARB Regular  DIET NUTRITIONAL SUPPLEMENTS HS Snack; Other (snack)    Anthropometric Measures:  · Height:  5' 10.98\" (180.3 cm)  · Current Body Wt:  146.3 kg (322 lb 8.5 oz)  · BMI: 45    Nutrition Diagnosis:   No nutrition diagnosis at this time    Nutrition Intervention:  Food and/or Nutrient Delivery: Continue current diet     Goals:   Will continue to meet >75% of estimated nutrition needs during admission       Nutrition Monitoring and Evaluation:   Food/Nutrient Intake Outcomes: Diet advancement/tolerance    Discharge Planning:    No discharge needs at this time     Electronically signed by Nico Webb MS, [facility] LOERA 8/12/2020 at 3:38 PM  Contact: [phone]

## 2020-08-12 NOTE — PROGRESS NOTES
SW spoke with patient via telephone call to room 521 on this date. Patient on COVID19 isolation precautions. Patient lives with his wife and 11 y/o granddaughter. Wife is in good health. Patient states he is independent at baseline. DME at home includes: walker, cane, crutches. No home O2 at baseline; 4L here. DC plan: pending further progress. Home with no needs vs possible home O2. CM team will continue to monitor. Care Management Interventions  Mode of Transport at Discharge: Other (see comment)  Transition of Care Consult (CM Consult): Other(No CM consult)  Discharge Durable Medical Equipment: No  Physical Therapy Consult: No  Occupational Therapy Consult: No  Speech Therapy Consult: No  Current Support Network: Lives with Spouse, Family Lives [place], Own Home  Confirm Follow Up Transport: Family  The Plan for Transition of Care is Related to the Following Treatment Goals : return to home with appropriate services coordinated.    Discharge Location  Discharge Placement: Home

## 2020-08-12 NOTE — PROGRESS NOTES
Patient is new admission that came to floor for covid issues. Patient on 4L NC at this time. Bipap being placed in room for patient tonight. Patient has pacemaker that's  Showing mostly V Paced rhythm. . Patient complains of loose stools as well.

## 2020-08-12 NOTE — DISCHARGE INSTRUCTIONS
Advance Care Planning  People with COVID-19 may have no symptoms, mild symptoms, such as fever, cough, and shortness of breath or they may have more severe illness, developing severe and fatal pneumonia. As a result, Advance Care Planning with attention to naming a health care decision maker (someone you trust to make healthcare decisions for you if you could not speak for yourself) and sharing other health care preferences is important BEFORE a possible health crisis. Please contact your Primary Care Provider to discuss Advance Care Planning. Preventing the Spread of Coronavirus Disease 2019 in Homes and Residential Communities  For the most recent information go to [email or web address]    Prevention steps for People with confirmed or suspected COVID-19 (including persons under investigation) who do not need to be hospitalized  and   People with confirmed COVID-19 who were hospitalized and determined to be medically stable to go home    Your healthcare provider and public health staff will evaluate whether you can be cared for at home. If it is determined that you do not need to be hospitalized and can be isolated at home, you will be monitored by staff from your local or state health department. You should follow the prevention steps below until a healthcare provider or local or state health department says you can return to your normal activities. Stay home except to get medical care  People who are mildly ill with COVID-19 are able to isolate at home during their illness. You should restrict activities outside your home, except for getting medical care. Do not go to work, school, or public areas. Avoid using public transportation, ride-sharing, or taxis. Separate yourself from other people and animals in your home  People: As much as possible, you should stay in a specific room and away from other people in your home.  Also, you should use a separate bathroom, if available. Animals: You should restrict contact with pets and other animals while you are sick with COVID-19, just like you would around other people. Although there have not been reports of pets or other animals becoming sick with COVID-19, it is still recommended that people sick with COVID-19 limit contact with animals until more information is known about the virus. When possible, have another member of your household care for your animals while you are sick. If you are sick with COVID-19, avoid contact with your pet, including petting, snuggling, being kissed or licked, and sharing food. If you must care for your pet or be around animals while you are sick, wash your hands before and after you interact with pets and wear a facemask. Call ahead before visiting your doctor  If you have a medical appointment, call the healthcare provider and tell them that you have or may have COVID-19. This will help the healthcare providers office take steps to keep other people from getting infected or exposed. Wear a facemask  You should wear a facemask when you are around other people (e.g., sharing a room or vehicle) or pets and before you enter a healthcare providers office. If you are not able to wear a facemask (for example, because it causes trouble breathing), then people who live with you should not stay in the same room with you, or they should wear a facemask if they enter your room. Cover your coughs and sneezes  Cover your mouth and nose with a tissue when you cough or sneeze. Throw used tissues in a lined trash can. Immediately wash your hands with soap and water for at least 20 seconds or, if soap and water are not available, clean your hands with an alcohol-based hand  that contains at least 60% alcohol.   Clean your hands often  Wash your hands often with soap and water for at least 20 seconds, especially after blowing your nose, coughing, or sneezing; going to the bathroom; and before eating or preparing food. If soap and water are not readily available, use an alcohol-based hand  with at least 60% alcohol, covering all surfaces of your hands and rubbing them together until they feel dry. Soap and water are the best option if hands are visibly dirty. Avoid touching your eyes, nose, and mouth with unwashed hands. Avoid sharing personal household items  You should not share dishes, drinking glasses, cups, eating utensils, towels, or bedding with other people or pets in your home. After using these items, they should be washed thoroughly with soap and water. Clean all high-touch surfaces everyday  High touch surfaces include counters, tabletops, doorknobs, bathroom fixtures, toilets, phones, keyboards, tablets, and bedside tables. Also, clean any surfaces that may have blood, stool, or body fluids on them. Use a household cleaning spray or wipe, according to the label instructions. Labels contain instructions for safe and effective use of the cleaning product including precautions you should take when applying the product, such as wearing gloves and making sure you have good ventilation during use of the product. Monitor your symptoms  Seek prompt medical attention if your illness is worsening (e.g., difficulty breathing). Before seeking care, call your healthcare provider and tell them that you have, or are being evaluated for, COVID-19. Put on a facemask before you enter the facility. These steps will help the healthcare providers office to keep other people in the office or waiting room from getting infected or exposed. Ask your healthcare provider to call the local or state health department. Persons who are placed under active monitoring or facilitated self-monitoring should follow instructions provided by their local health department or occupational health professionals, as appropriate. When working with your local health department check their available hours.   If you have a medical emergency and need to call 911, notify the dispatch personnel that you have, or are being evaluated for COVID-19. If possible, put on a facemask before emergency medical services arrive. Discontinuing home isolation  Patients with confirmed COVID-19 should remain under home isolation precautions until the risk of secondary transmission to others is thought to be low. The decision to discontinue home isolation precautions should be made on a case-by-case basis, in consultation with healthcare providers and state and local health departments. Patient Education        Coronavirus (YIEEC-06): Care Instructions  Overview  The coronavirus disease (COVID-19) is caused by a virus. Symptoms may include a fever, a cough, and shortness of breath. It mainly spreads person-to-person through droplets from coughing and sneezing. The virus also can spread when people are in close contact with someone who is infected. Most people have mild symptoms and can take care of themselves at home. If their symptoms get worse, they may need care in a hospital. There is no medicine to fight the virus. It's important to not spread the virus to others. If you have COVID-19, wear a face cover anytime you are around other people. You need to isolate yourself while you are sick. Your doctor or local public health official will tell you when you no longer need to be isolated. Leave your home only if you need to get medical care. Follow-up care is a key part of your treatment and safety. Be sure to make and go to all appointments, and call your doctor if you are having problems. It's also a good idea to know your test results and keep a list of the medicines you take. How can you care for yourself at home? · Get extra rest. It can help you feel better. · Drink plenty of fluids. This helps replace fluids lost from fever. Fluids also help ease a scratchy throat.  Water, soup, fruit juice, and hot tea with lemon are good choices. · Take acetaminophen (such as Tylenol) to reduce a fever. It may also help with muscle aches. Read and follow all instructions on the label. · Sponge your body with lukewarm water to help with fever. Don't use cold water or ice. · Use petroleum jelly on sore skin. This can help if the skin around your nose and lips becomes sore from rubbing a lot with tissues. Tips for isolation  · Wear a cloth face cover when you are around other people. It can help stop the spread of the virus when you cough or sneeze. · Limit contact with people in your home. If possible, stay in a separate bedroom and use a separate bathroom. · If you have to leave home, avoid crowds and try to stay at least 6 feet away from other people. · Avoid contact with pets and other animals. · Cover your mouth and nose with a tissue when you cough or sneeze. Then throw it in the trash right away. · Wash your hands often, especially after you cough or sneeze. Use soap and water, and scrub for at least 20 seconds. If soap and water aren't available, use an alcohol-based hand . · Don't share personal household items. These include bedding, towels, cups and glasses, and eating utensils. · [address] in the warmest water allowed for the fabric type, and dry it completely. It's okay to wash other people's laundry with yours. · Clean and disinfect your home every day. Use household  and disinfectant wipes or sprays. Take special care to clean things that you grab with your hands. These include doorknobs, remote controls, phones, and handles on your refrigerator and microwave. And don't forget countertops, tabletops, bathrooms, and computer keyboards. When should you call for help? [id number] anytime you think you may need emergency care. For example, call if you have life-threatening symptoms, such as:  · You have severe trouble breathing. (You can't talk at all.)  · You have constant chest pain or pressure.   · You are severely dizzy or lightheaded. · You are confused or can't think clearly. · Your face and lips have a blue color. · You pass out (lose consciousness) or are very hard to wake up. Call your doctor now or seek immediate medical care if:  · You have moderate trouble breathing. (You can't speak a full sentence.)  · You are coughing up blood (more than about 1 teaspoon). · You have signs of low blood pressure. These include feeling lightheaded; being too weak to stand; and having cold, pale, clammy skin. Watch closely for changes in your health, and be sure to contact your doctor if:  · Your symptoms get worse. · You are not getting better as expected. Call before you go to the doctor's office. Follow their instructions. And wear a cloth face cover. Current as of: May 8, 2020               Content Version: 12.5  © 2006-2020 [employer]. Care instructions adapted under license by [employer] (which disclaims liability or warranty for this information). If you have questions about a medical condition or this instruction, always ask your healthcare professional. [address] any warranty or liability for your use of this information.

## 2020-08-12 NOTE — ACP (ADVANCE CARE PLANNING)
SW spoke with patient via telephone call to room 521 to complete ACP discussion. Patient has no ACP documents. Wife is next-of-kin. He has 2 adult children (son and daughter). His responses to healthcare preference questions are listed below. These are patient's current wishes as discussed today and are not intended to take place of [facility]. Advance Care Planning     Advance Care Planning Activator (Inpatient)  Conversation Note      Date of ACP Conversation: 08/12/20     Conversation Conducted with:   Patient with Decision Making Capacity    ACP Activator: 111 \[facility] [place] [facility]\"" makes decisions on behalf of the incapacitated patient: Decision Maker is asked to consider and make decisions based on patient values, known preferences, or best interests. Health Care Decision Maker:    Current Designated Health Care Decision Maker:   (If there is a valid [address] named in the [address] [place] [address] Makers\" box in the ACP activity, but it is not visible above, be sure to open that field and then select the health care decision maker relationship (ie \"primary\") in the blank space to the right of the name.) Validate  this information as still accurate & up-to-date; edit [address] field as needed.)    Note: Assess and validate information in current ACP documents, as indicated. If no Decision Maker listed above or available through scanned documents, then:    If no Authorized Decision Maker has previously been identified, then patient chooses [address]:  \"Who would you like to name as your primary health care decision-maker? \"    Name: Lawrence Barrera   Relationship: wife Phone number: [phone]  Lexie Kennedy this person be reached easily? \" YES      Note: If the relationship of these Decision-Makers to the patient does NOT follow your state's Next of Kin hierarchy, recommend that patient complete ACP document that meets state-specific requirements to allow them to act on the patient's behalf when appropriate. Care Preferences    Ventilation: \"If you were in your present state of health and suddenly became very ill and were unable to breathe on your own, what would your preference be about the use of a ventilator (breathing machine) if it were available to you? \"      If patient would desire the use of a ventilator (breathing machine), answer \"yes\", if not \"no\":yes    \"If your health worsens and it becomes clear that your chance of recovery is unlikely, what would your preference be about the use of a ventilator (breathing machine) if it were available to you? \"     Would the patient desire the use of a ventilator (breathing machine)? YES        Resuscitation  \"CPR works best to restart the heart when there is a sudden event, like a heart attack, in someone who is otherwise healthy. Unfortunately, CPR does not typically restart the heart for people who have serious health conditions or who are very sick. \"    \"In the event your heart stopped as a result of an underlying serious health condition, would you want attempts to be made to restart your heart (answer \"yes\" for attempt to resuscitate) or would you prefer a natural death (answer \"no\" for do not attempt to resuscitate)? \" yes      NOTE: If the patient has a valid advance directive AND now provides care preference(s) that are inconsistent with that prior directive, advise the patient to consider either: creating a new advance directive that complies with state-specific requirements; or, if that is not possible, orally revoking that prior directive in accordance with state-specific requirements, which must be documented in the EHR. [x] Yes  [] No   Educated Patient / [place] regarding differences between Advance Directives and portable DNR orders.     Length of ACP Conversation in minutes:  10    Conversation Outcomes:  [x] ACP discussion completed  [] Existing advance directive reviewed with patient; no changes to patient's previously recorded wishes     [] New Advance Directive completed   [] Portable Do Not Resuscitate prepared for Provider review and signature  [] POLST/POST/MOLST/MOST prepared for Provider review and signature      Follow-up plan:    [] Schedule follow-up conversation to continue planning  [] Referred individual to Provider for additional questions/concerns   [] Advised patient/agent/surrogate to review completed ACP document and update if needed with changes in condition, patient preferences or care setting     [] This note routed to one or more involved healthcare providers

## 2020-08-12 NOTE — PROGRESS NOTES
History of Present Illness/Hospital Course:  75 yo male with PMH of morbid obesity, HTN, DM2, CKD3, COPD, AFIB on eliquis s/p AVN ablation/ PPM, RONAK on CPAP, HFpEF, who is evaluated with known COVID positive status (8-5-20) and progressive malaise, dyspnea. He was seen by PCP and given oral steroids/ zpack. He admits to fever, no anorexia, some cough with congestion. Has chronic edema. CXR shows vascular congestion. Resting O2 sats are stable though he desats with ambulation to 87%. Prior ECHO 7,202 EF 55-60%, moderate pulm HTN. Also [facility] 2,2020. Patient admitted and started on supplemental oxygen, convalescent plasma and IV dexamethasone. Today: Patient wheezing on exam, add albuterol MDI via spacer. Not a candidate for remdesivir given renal function. Comprehensive review of systems negative unless stated above. I have personally reviewed all current data for this patient. Physical Exam:  General: Elderly white male, sitting on side of bed eating lunch, mild to moderate respiratory distress  HEENT: NCAT, moist mucous membranes  Skin: No rash noted  Cardio: RRR, normal S1/S2, no rubs, no gallops, no murmurs  Pulm:  Moderately labored respirations on 3 LPM via NC, good breath sounds, diffuse scattered end expiratory wheezing, no rales, no rhonchi  GI: Soft, Nt, Nd, Nml bowel sounds, no masses noted  Extremity: Atraumatic, no deformities, no edema  Neuro: Alert, oriented, moving all extremities, no focal deficits noted  Psych: Pleasant, cooperative, normal range of affect    Assessment and Plan:    · COVID positive with desaturation on ambulation/ hypoxia:  · Admit to COVID isolation, remote tele  · Supplemental oxygen, wean as able  · Continued IV dexamethasone  · Consented for convalescent plasma, pending  · Not a candidate for remdesivir  · Continue vitamin C  · Hold zinc with CKD3  · Has had recent azithromycin and PCN allergy, hold off on further antibiotics        · DM2:  · Insulin glargine with sliding scale insulin protocol        · AFIB:  · Continued apixaban        · COPD:  · Continued home inhaler        · RONAK:  · Will see if RT will allow his CPAP        · CKD3:  · Trend BMP        · Gout:  · Continued allopurinol and colchicine         · HTN:  · Continued norvasc, coreg        · HFpEF:  · Continued demedex    Inpatient for above    Full code    Apixaban for VTE prophylaxis    Please call [phone] for questions or concerns.

## 2020-08-12 NOTE — PROGRESS NOTES
Patient does not have current positive Covid test result in chart. Patient states that he doesn't know where he got tested but that it was \"somewhere in [place]". Covid test completed for send-out in Emergency Department prior to patient's transfer to 5th floor. Primary RN, Cedrick Shearer, aware of all of the above stated.

## 2020-08-12 NOTE — PROGRESS NOTES
Patient sitting on side of bed. Patient SOB on exertion with coarse lung sounds at this time. Patient on 5L NC at this time. Call light within reach.

## 2020-08-13 LAB
ALBUMIN SERPL-MCNC: 2.8 G/DL (ref 3.2–4.6)
ALBUMIN/GLOB SERPL: 0.5 {RATIO} (ref 1.2–3.5)
ALP SERPL-CCNC: 106 U/L (ref 50–136)
ALT SERPL-CCNC: 31 U/L (ref 12–65)
ANION GAP SERPL CALC-SCNC: 7 MMOL/L (ref 7–16)
AST SERPL-CCNC: 43 U/L (ref 15–37)
BASOPHILS # BLD: 0 K/UL (ref 0–0.2)
BASOPHILS NFR BLD: 0 % (ref 0–2)
BILIRUB SERPL-MCNC: 0.4 MG/DL (ref 0.2–1.1)
BLD PROD TYP BPU: NORMAL
BLOOD BANK CMNT PATIENT-IMP: NORMAL
BNP SERPL-MCNC: 807 PG/ML (ref 5–125)
BPU ID: NORMAL
BUN SERPL-MCNC: 37 MG/DL (ref 8–23)
CALCIUM SERPL-MCNC: 8.9 MG/DL (ref 8.3–10.4)
CHLORIDE SERPL-SCNC: 104 MMOL/L (ref 98–107)
CO2 SERPL-SCNC: 27 MMOL/L (ref 21–32)
CREAT SERPL-MCNC: 1.63 MG/DL (ref 0.8–1.5)
DIFFERENTIAL METHOD BLD: ABNORMAL
EOSINOPHIL # BLD: 0 K/UL (ref 0–0.8)
EOSINOPHIL NFR BLD: 0 % (ref 0.5–7.8)
ERYTHROCYTE [DISTWIDTH] IN BLOOD BY AUTOMATED COUNT: 16.5 % (ref 11.9–14.6)
GLOBULIN SER CALC-MCNC: 5.6 G/DL (ref 2.3–3.5)
GLUCOSE BLD STRIP.AUTO-MCNC: 158 MG/DL (ref 65–100)
GLUCOSE BLD STRIP.AUTO-MCNC: 164 MG/DL (ref 65–100)
GLUCOSE BLD STRIP.AUTO-MCNC: 200 MG/DL (ref 65–100)
GLUCOSE BLD STRIP.AUTO-MCNC: 271 MG/DL (ref 65–100)
GLUCOSE SERPL-MCNC: 144 MG/DL (ref 65–100)
HCT VFR BLD AUTO: 39.5 % (ref 41.1–50.3)
HGB BLD-MCNC: 12.5 G/DL (ref 13.6–17.2)
IMM GRANULOCYTES # BLD AUTO: 0 K/UL (ref 0–0.5)
IMM GRANULOCYTES NFR BLD AUTO: 0 % (ref 0–5)
LYMPHOCYTES # BLD: 1.8 K/UL (ref 0.5–4.6)
LYMPHOCYTES NFR BLD: 25 % (ref 13–44)
MCH RBC QN AUTO: 25.3 PG (ref 26.1–32.9)
MCHC RBC AUTO-ENTMCNC: 31.6 G/DL (ref 31.4–35)
MCV RBC AUTO: 79.8 FL (ref 79.6–97.8)
MONOCYTES # BLD: 0.4 K/UL (ref 0.1–1.3)
MONOCYTES NFR BLD: 6 % (ref 4–12)
NEUTS SEG # BLD: 4.8 K/UL (ref 1.7–8.2)
NEUTS SEG NFR BLD: 69 % (ref 43–78)
NRBC # BLD: 0 K/UL (ref 0–0.2)
PLATELET # BLD AUTO: 231 K/UL (ref 150–450)
PLATELET COMMENTS,PCOM: ADEQUATE
PMV BLD AUTO: 11 FL (ref 9.4–12.3)
POTASSIUM SERPL-SCNC: 4.2 MMOL/L (ref 3.5–5.1)
PROCALCITONIN SERPL-MCNC: 0.12 NG/ML
PROT SERPL-MCNC: 8.4 G/DL (ref 6.3–8.2)
RBC # BLD AUTO: 4.95 M/UL (ref 4.23–5.6)
RBC MORPH BLD: ABNORMAL
SODIUM SERPL-SCNC: 138 MMOL/L (ref 136–145)
STATUS OF UNIT,%ST: NORMAL
UNIT DIVISION, %UDIV: NORMAL
WBC # BLD AUTO: 7 K/UL (ref 4.3–11.1)
WBC MORPH BLD: ABNORMAL

## 2020-08-13 PROCEDURE — 83880 ASSAY OF NATRIURETIC PEPTIDE: CPT

## 2020-08-13 PROCEDURE — 84145 PROCALCITONIN (PCT): CPT

## 2020-08-13 PROCEDURE — 82962 GLUCOSE BLOOD TEST: CPT

## 2020-08-13 PROCEDURE — 94760 N-INVAS EAR/PLS OXIMETRY 1: CPT

## 2020-08-13 PROCEDURE — 74011250636 HC RX REV CODE- 250/636: Performed by: INTERNAL MEDICINE

## 2020-08-13 PROCEDURE — 85025 COMPLETE CBC W/AUTO DIFF WBC: CPT

## 2020-08-13 PROCEDURE — 74011250637 HC RX REV CODE- 250/637: Performed by: INTERNAL MEDICINE

## 2020-08-13 PROCEDURE — 74011636637 HC RX REV CODE- 636/637: Performed by: INTERNAL MEDICINE

## 2020-08-13 PROCEDURE — 94640 AIRWAY INHALATION TREATMENT: CPT

## 2020-08-13 PROCEDURE — 36415 COLL VENOUS BLD VENIPUNCTURE: CPT

## 2020-08-13 PROCEDURE — 65660000000 HC RM CCU STEPDOWN

## 2020-08-13 PROCEDURE — 80053 COMPREHEN METABOLIC PANEL: CPT

## 2020-08-13 PROCEDURE — 94660 CPAP INITIATION&MGMT: CPT

## 2020-08-13 RX ADMIN — INSULIN LISPRO 4 UNITS: 100 INJECTION, SOLUTION INTRAVENOUS; SUBCUTANEOUS at 21:26

## 2020-08-13 RX ADMIN — Medication 10 ML: at 06:13

## 2020-08-13 RX ADMIN — Medication 10 ML: at 15:11

## 2020-08-13 RX ADMIN — CARVEDILOL 12.5 MG: 12.5 TABLET, FILM COATED ORAL at 17:09

## 2020-08-13 RX ADMIN — INSULIN GLARGINE 44 UNITS: 100 INJECTION, SOLUTION SUBCUTANEOUS at 17:10

## 2020-08-13 RX ADMIN — INSULIN GLARGINE 44 UNITS: 100 INJECTION, SOLUTION SUBCUTANEOUS at 09:18

## 2020-08-13 RX ADMIN — DEXAMETHASONE SODIUM PHOSPHATE 6 MG: 10 INJECTION INTRAMUSCULAR; INTRAVENOUS at 09:18

## 2020-08-13 RX ADMIN — CARVEDILOL 12.5 MG: 12.5 TABLET, FILM COATED ORAL at 09:19

## 2020-08-13 RX ADMIN — INSULIN LISPRO 6 UNITS: 100 INJECTION, SOLUTION INTRAVENOUS; SUBCUTANEOUS at 17:10

## 2020-08-13 RX ADMIN — APIXABAN 5 MG: 5 TABLET, FILM COATED ORAL at 17:09

## 2020-08-13 RX ADMIN — OXYCODONE HYDROCHLORIDE AND ACETAMINOPHEN 500 MG: 500 TABLET ORAL at 17:09

## 2020-08-13 RX ADMIN — APIXABAN 5 MG: 5 TABLET, FILM COATED ORAL at 09:20

## 2020-08-13 RX ADMIN — TORSEMIDE 20 MG: 20 TABLET ORAL at 17:09

## 2020-08-13 RX ADMIN — OXYCODONE HYDROCHLORIDE AND ACETAMINOPHEN 500 MG: 500 TABLET ORAL at 09:20

## 2020-08-13 RX ADMIN — LORATADINE 10 MG: 10 TABLET ORAL at 09:19

## 2020-08-13 RX ADMIN — INSULIN LISPRO 2 UNITS: 100 INJECTION, SOLUTION INTRAVENOUS; SUBCUTANEOUS at 06:12

## 2020-08-13 RX ADMIN — PANTOPRAZOLE SODIUM 40 MG: 40 TABLET, DELAYED RELEASE ORAL at 06:14

## 2020-08-13 RX ADMIN — OXYCODONE HYDROCHLORIDE AND ACETAMINOPHEN 500 MG: 500 TABLET ORAL at 21:25

## 2020-08-13 RX ADMIN — Medication 10 ML: at 21:26

## 2020-08-13 RX ADMIN — BUDESONIDE AND FORMOTEROL FUMARATE DIHYDRATE 2 PUFF: 160; 4.5 AEROSOL RESPIRATORY (INHALATION) at 08:13

## 2020-08-13 RX ADMIN — INSULIN LISPRO 2 UNITS: 100 INJECTION, SOLUTION INTRAVENOUS; SUBCUTANEOUS at 11:38

## 2020-08-13 RX ADMIN — BUDESONIDE AND FORMOTEROL FUMARATE DIHYDRATE 2 PUFF: 160; 4.5 AEROSOL RESPIRATORY (INHALATION) at 20:34

## 2020-08-13 RX ADMIN — TORSEMIDE 20 MG: 20 TABLET ORAL at 09:19

## 2020-08-13 RX ADMIN — ATORVASTATIN CALCIUM 10 MG: 10 TABLET, FILM COATED ORAL at 09:20

## 2020-08-13 NOTE — PROGRESS NOTES
Patient on bipap at this time. No distress noted at this time. Call light within reach and urged to call with needs.

## 2020-08-13 NOTE — PROGRESS NOTES
Problem: Risk for Spread of Infection  Goal: Prevent transmission of infectious organism to others  Description: Prevent the transmission of infectious organisms to other patients, staff members, and visitors. Outcome: Progressing Towards Goal     Problem: Falls - Risk of  Goal: *Absence of Falls  Description: Document [facility] Fall Risk and appropriate interventions in the flowsheet.   Outcome: Progressing Towards Goal  Note: Fall Risk Interventions:            Medication Interventions: Patient to call before getting OOB

## 2020-08-13 NOTE — PROGRESS NOTES
Beside shift report received from THE [facility]  Patient lying in bed  Respirations present  No signs of distress  No needs expressed at this time  Safety measures in place

## 2020-08-13 NOTE — PROGRESS NOTES
History of Present Illness/Hospital Course:  77 yo male with PMH of morbid obesity, HTN, DM2, CKD3, COPD, AFIB on eliquis s/p AVN ablation/ PPM, RONAK on CPAP, HFpEF, who is evaluated with known COVID positive status (8-5-20) and progressive malaise, dyspnea. He was seen by PCP and given oral steroids/ zpack. He admits to fever, no anorexia, some cough with congestion. Has chronic edema. CXR shows vascular congestion. Resting O2 sats are stable though he desats with ambulation to 87%. Prior ECHO 7,202 EF 55-60%, moderate pulm HTN. Also [facility] 2,2020. Patient admitted and started on supplemental oxygen, convalescent plasma and IV dexamethasone. Patient wheezing on exam, add albuterol MDI via spacer. Not a candidate for remdesivir given renal function. Today: Persistent respiratory distress, on 5 LPM via NC. Continue current therapy. Comprehensive review of systems negative unless stated above. I have personally reviewed all current data for this patient. Physical Exam:  General: Elderly white male, sitting on side of bed eating lunch, mild to moderate respiratory distress  HEENT: NCAT, moist mucous membranes  Skin: No rash noted  Cardio: RRR, normal S1/S2, no rubs, no gallops, no murmurs  Pulm:  Moderately labored respirations on 3 LPM via NC, good breath sounds, diffuse scattered end expiratory wheezing, no rales, no rhonchi  GI: Soft, Nt, Nd, Nml bowel sounds, no masses noted  Extremity: Atraumatic, no deformities, no edema  Neuro: Alert, oriented, moving all extremities, no focal deficits noted  Psych: Pleasant, cooperative, normal range of affect    Assessment and Plan:    · COVID positive with desaturation on ambulation/ hypoxia:  · Admit to COVID isolation, remote tele  · Supplemental oxygen, wean as able  · Continued IV dexamethasone  · Consented for convalescent plasma, pending  · Not a candidate for remdesivir  · Continue vitamin C  · Hold zinc with CKD3  · Has had recent azithromycin and PCN allergy, hold off on further antibiotics  · Albuterol MDI        · DM2:  · Insulin glargine with sliding scale insulin protocol        · AFIB:  · Continued apixaban        · COPD:  · Continued home maintenance inhaler        · RONAK:  · Will see if RT will allow his CPAP        · CKD3:  · Trend BMP        · Gout:  · Continued allopurinol and colchicine         · HTN:  · Continued norvasc, coreg        · HFpEF:  · Continued demedex    Inpatient for above    Full code    Apixaban for VTE prophylaxis    Please call [phone] for questions or concerns.

## 2020-08-14 LAB
ALBUMIN SERPL-MCNC: 2.7 G/DL (ref 3.2–4.6)
ALBUMIN/GLOB SERPL: 0.4 {RATIO} (ref 1.2–3.5)
ALP SERPL-CCNC: 114 U/L (ref 50–136)
ALT SERPL-CCNC: 40 U/L (ref 12–65)
ANION GAP SERPL CALC-SCNC: 10 MMOL/L (ref 7–16)
AST SERPL-CCNC: 53 U/L (ref 15–37)
BASOPHILS # BLD: 0 K/UL (ref 0–0.2)
BASOPHILS NFR BLD: 0 % (ref 0–2)
BILIRUB SERPL-MCNC: 0.5 MG/DL (ref 0.2–1.1)
BUN SERPL-MCNC: 36 MG/DL (ref 8–23)
CALCIUM SERPL-MCNC: 9.1 MG/DL (ref 8.3–10.4)
CHLORIDE SERPL-SCNC: 101 MMOL/L (ref 98–107)
CO2 SERPL-SCNC: 27 MMOL/L (ref 21–32)
CREAT SERPL-MCNC: 1.56 MG/DL (ref 0.8–1.5)
DIFFERENTIAL METHOD BLD: ABNORMAL
EOSINOPHIL # BLD: 0 K/UL (ref 0–0.8)
EOSINOPHIL NFR BLD: 0 % (ref 0.5–7.8)
ERYTHROCYTE [DISTWIDTH] IN BLOOD BY AUTOMATED COUNT: 16.9 % (ref 11.9–14.6)
GLOBULIN SER CALC-MCNC: 6.3 G/DL (ref 2.3–3.5)
GLUCOSE BLD STRIP.AUTO-MCNC: 141 MG/DL (ref 65–100)
GLUCOSE BLD STRIP.AUTO-MCNC: 182 MG/DL (ref 65–100)
GLUCOSE BLD STRIP.AUTO-MCNC: 235 MG/DL (ref 65–100)
GLUCOSE BLD STRIP.AUTO-MCNC: 254 MG/DL (ref 65–100)
GLUCOSE SERPL-MCNC: 159 MG/DL (ref 65–100)
HCT VFR BLD AUTO: 41.7 % (ref 41.1–50.3)
HGB BLD-MCNC: 12.7 G/DL (ref 13.6–17.2)
IMM GRANULOCYTES # BLD AUTO: 0 K/UL (ref 0–0.5)
IMM GRANULOCYTES NFR BLD AUTO: 0 % (ref 0–5)
LYMPHOCYTES # BLD: 1.1 K/UL (ref 0.5–4.6)
LYMPHOCYTES NFR BLD: 10 % (ref 13–44)
MCH RBC QN AUTO: 24.6 PG (ref 26.1–32.9)
MCHC RBC AUTO-ENTMCNC: 30.5 G/DL (ref 31.4–35)
MCV RBC AUTO: 80.8 FL (ref 79.6–97.8)
MONOCYTES # BLD: 0.3 K/UL (ref 0.1–1.3)
MONOCYTES NFR BLD: 3 % (ref 4–12)
NEUTS SEG # BLD: 9.2 K/UL (ref 1.7–8.2)
NEUTS SEG NFR BLD: 87 % (ref 43–78)
NRBC # BLD: 0 K/UL (ref 0–0.2)
PLATELET # BLD AUTO: 351 K/UL (ref 150–450)
PMV BLD AUTO: 10.4 FL (ref 9.4–12.3)
POTASSIUM SERPL-SCNC: 3.7 MMOL/L (ref 3.5–5.1)
PROT SERPL-MCNC: 9 G/DL (ref 6.3–8.2)
RBC # BLD AUTO: 5.16 M/UL (ref 4.23–5.6)
SODIUM SERPL-SCNC: 138 MMOL/L (ref 136–145)
WBC # BLD AUTO: 10.7 K/UL (ref 4.3–11.1)

## 2020-08-14 PROCEDURE — 82962 GLUCOSE BLOOD TEST: CPT

## 2020-08-14 PROCEDURE — 74011636637 HC RX REV CODE- 636/637: Performed by: INTERNAL MEDICINE

## 2020-08-14 PROCEDURE — 65660000000 HC RM CCU STEPDOWN

## 2020-08-14 PROCEDURE — 74011250636 HC RX REV CODE- 250/636: Performed by: INTERNAL MEDICINE

## 2020-08-14 PROCEDURE — 74011250637 HC RX REV CODE- 250/637: Performed by: INTERNAL MEDICINE

## 2020-08-14 PROCEDURE — 94660 CPAP INITIATION&MGMT: CPT

## 2020-08-14 PROCEDURE — 36415 COLL VENOUS BLD VENIPUNCTURE: CPT

## 2020-08-14 PROCEDURE — 94640 AIRWAY INHALATION TREATMENT: CPT

## 2020-08-14 PROCEDURE — 85025 COMPLETE CBC W/AUTO DIFF WBC: CPT

## 2020-08-14 PROCEDURE — 80053 COMPREHEN METABOLIC PANEL: CPT

## 2020-08-14 RX ADMIN — INSULIN GLARGINE 44 UNITS: 100 INJECTION, SOLUTION SUBCUTANEOUS at 09:00

## 2020-08-14 RX ADMIN — Medication 10 ML: at 14:29

## 2020-08-14 RX ADMIN — Medication 10 ML: at 22:00

## 2020-08-14 RX ADMIN — Medication 10 ML: at 06:00

## 2020-08-14 RX ADMIN — BUDESONIDE AND FORMOTEROL FUMARATE DIHYDRATE 2 PUFF: 160; 4.5 AEROSOL RESPIRATORY (INHALATION) at 08:00

## 2020-08-14 RX ADMIN — CARVEDILOL 12.5 MG: 12.5 TABLET, FILM COATED ORAL at 08:59

## 2020-08-14 RX ADMIN — TORSEMIDE 20 MG: 20 TABLET ORAL at 17:02

## 2020-08-14 RX ADMIN — OXYCODONE HYDROCHLORIDE AND ACETAMINOPHEN 500 MG: 500 TABLET ORAL at 21:58

## 2020-08-14 RX ADMIN — PANTOPRAZOLE SODIUM 40 MG: 40 TABLET, DELAYED RELEASE ORAL at 05:32

## 2020-08-14 RX ADMIN — ALLOPURINOL 300 MG: 300 TABLET ORAL at 08:59

## 2020-08-14 RX ADMIN — INSULIN LISPRO 2 UNITS: 100 INJECTION, SOLUTION INTRAVENOUS; SUBCUTANEOUS at 11:27

## 2020-08-14 RX ADMIN — BUDESONIDE AND FORMOTEROL FUMARATE DIHYDRATE 2 PUFF: 160; 4.5 AEROSOL RESPIRATORY (INHALATION) at 20:00

## 2020-08-14 RX ADMIN — CARVEDILOL 12.5 MG: 12.5 TABLET, FILM COATED ORAL at 17:01

## 2020-08-14 RX ADMIN — ATORVASTATIN CALCIUM 10 MG: 10 TABLET, FILM COATED ORAL at 08:59

## 2020-08-14 RX ADMIN — INSULIN GLARGINE 44 UNITS: 100 INJECTION, SOLUTION SUBCUTANEOUS at 17:03

## 2020-08-14 RX ADMIN — INSULIN LISPRO 4 UNITS: 100 INJECTION, SOLUTION INTRAVENOUS; SUBCUTANEOUS at 16:28

## 2020-08-14 RX ADMIN — APIXABAN 5 MG: 5 TABLET, FILM COATED ORAL at 21:58

## 2020-08-14 RX ADMIN — OXYCODONE HYDROCHLORIDE AND ACETAMINOPHEN 500 MG: 500 TABLET ORAL at 08:59

## 2020-08-14 RX ADMIN — DEXAMETHASONE SODIUM PHOSPHATE 6 MG: 10 INJECTION INTRAMUSCULAR; INTRAVENOUS at 09:00

## 2020-08-14 RX ADMIN — TORSEMIDE 20 MG: 20 TABLET ORAL at 09:04

## 2020-08-14 RX ADMIN — Medication 10 ML: at 21:58

## 2020-08-14 RX ADMIN — APIXABAN 5 MG: 5 TABLET, FILM COATED ORAL at 08:59

## 2020-08-14 RX ADMIN — AMLODIPINE BESYLATE 5 MG: 5 TABLET ORAL at 09:00

## 2020-08-14 RX ADMIN — OXYCODONE HYDROCHLORIDE AND ACETAMINOPHEN 500 MG: 500 TABLET ORAL at 15:49

## 2020-08-14 RX ADMIN — INSULIN LISPRO 6 UNITS: 100 INJECTION, SOLUTION INTRAVENOUS; SUBCUTANEOUS at 21:57

## 2020-08-14 RX ADMIN — Medication 10 ML: at 05:30

## 2020-08-14 RX ADMIN — LORATADINE 10 MG: 10 TABLET ORAL at 08:59

## 2020-08-14 NOTE — PROGRESS NOTES
Patient has slept well overnight and has been observed during hourly rounding. He has worn the BiPap overnight and was up to go to bathroom during night. Patient denies needs to this nurse. He will continue to be assisted and monitored until day shift assumes care of this patient.

## 2020-08-14 NOTE — PROGRESS NOTES
Assumed care at [address] [place] [address]. A&Ox4. Resting in bed at this time. No s/sx of distress at this time. Droplet plus isolation due to positive COVID 19 test result and proper PPE worn. Call light within reach and is encouraged to call for assistance if needed. Will continue to monitor.

## 2020-08-14 NOTE — PROGRESS NOTES
History of Present Illness/Hospital Course:  75 yo male with PMH of morbid obesity, HTN, DM2, CKD3, COPD, AFIB on eliquis s/p AVN ablation/ PPM, RONAK on CPAP, HFpEF, who is evaluated with known COVID positive status (8-5-20) and progressive malaise, dyspnea. He was seen by PCP and given oral steroids/ zpack. He admits to fever, no anorexia, some cough with congestion. Has chronic edema. CXR shows vascular congestion. Resting O2 sats are stable though he desats with ambulation to 87%. Prior ECHO 7,202 EF 55-60%, moderate pulm HTN. Also [address] [facility] 2,2020. Patient admitted and started on supplemental oxygen, convalescent plasma and IV dexamethasone. Patient wheezing on exam, add albuterol MDI via spacer. Not a candidate for remdesivir given renal function. Today: Improved respiratory status, on 3 LPM via NC. Continue current therapy. Comprehensive review of systems negative unless stated above. I have personally reviewed all current data for this patient.     Physical Exam:  General: Elderly white male, sitting on side of bed eating lunch, mild to moderate respiratory distress  HEENT: NCAT, moist mucous membranes  Skin: No rash noted  Cardio: RRR, normal S1/S2, no rubs, no gallops, no murmurs  Pulm: Conversational dyspnea, mildly labored respirations on 3 LPM via NC, good breath sounds, diffuse scattered end expiratory wheezing, no rales, no rhonchi  GI: Soft, Nt, Nd, Nml bowel sounds, no masses noted  Extremity: Atraumatic, no deformities, no edema  Neuro: Alert, oriented, moving all extremities, no focal deficits noted  Psych: Pleasant, cooperative, normal range of affect    Assessment and Plan:    · COVID positive with desaturation on ambulation/ hypoxia:  · Admit to COVID isolation, remote tele  · Supplemental oxygen, wean as able  · Continued IV dexamethasone  · Consented for convalescent plasma, pending  · Not a candidate for remdesivir  · Continue vitamin C  · Hold zinc with CKD3  · Has had recent azithromycin and PCN allergy, hold off on further antibiotics  · Albuterol MDI        · DM2:  · Insulin glargine with sliding scale insulin protocol        · AFIB:  · Continued apixaban        · COPD:  · Continued home maintenance inhaler        · RONAK:  · Will see if RT will allow his CPAP        · CKD3:  · Trend BMP        · Gout:  · Continued allopurinol and colchicine         · HTN:  · Continued norvasc, coreg        · HFpEF:  · Continued demedex    Inpatient for above    Full code    Apixaban for VTE prophylaxis    Please call [phone] for questions or concerns.

## 2020-08-14 NOTE — PROGRESS NOTES
Problem: Patient Education: Go to Patient Education Activity  Goal: Patient/Family Education  Outcome: Progressing Towards Goal  Problem: Falls - Risk of  Goal: *Absence of Falls  Description: Document Morgan Melendrez Fall Risk and appropriate interventions in the flowsheet.   Outcome: Progressing Towards Goal  Note: Fall Risk Interventions:     Medication Interventions: Patient to call before getting OOB, Teach patient to arise slowly

## 2020-08-15 LAB
ALBUMIN SERPL-MCNC: 2.6 G/DL (ref 3.2–4.6)
ALBUMIN/GLOB SERPL: 0.5 {RATIO} (ref 1.2–3.5)
ALP SERPL-CCNC: 103 U/L (ref 50–136)
ALT SERPL-CCNC: 39 U/L (ref 12–65)
ANION GAP SERPL CALC-SCNC: 7 MMOL/L (ref 7–16)
AST SERPL-CCNC: 41 U/L (ref 15–37)
BASOPHILS # BLD: 0 K/UL (ref 0–0.2)
BASOPHILS NFR BLD: 0 % (ref 0–2)
BILIRUB SERPL-MCNC: 0.4 MG/DL (ref 0.2–1.1)
BUN SERPL-MCNC: 42 MG/DL (ref 8–23)
CALCIUM SERPL-MCNC: 8.9 MG/DL (ref 8.3–10.4)
CHLORIDE SERPL-SCNC: 104 MMOL/L (ref 98–107)
CO2 SERPL-SCNC: 29 MMOL/L (ref 21–32)
CREAT SERPL-MCNC: 1.55 MG/DL (ref 0.8–1.5)
DIFFERENTIAL METHOD BLD: ABNORMAL
EOSINOPHIL # BLD: 0 K/UL (ref 0–0.8)
EOSINOPHIL NFR BLD: 0 % (ref 0.5–7.8)
ERYTHROCYTE [DISTWIDTH] IN BLOOD BY AUTOMATED COUNT: 16.4 % (ref 11.9–14.6)
GLOBULIN SER CALC-MCNC: 5.5 G/DL (ref 2.3–3.5)
GLUCOSE BLD STRIP.AUTO-MCNC: 151 MG/DL (ref 65–100)
GLUCOSE BLD STRIP.AUTO-MCNC: 186 MG/DL (ref 65–100)
GLUCOSE BLD STRIP.AUTO-MCNC: 242 MG/DL (ref 65–100)
GLUCOSE BLD STRIP.AUTO-MCNC: 271 MG/DL (ref 65–100)
GLUCOSE SERPL-MCNC: 152 MG/DL (ref 65–100)
HCT VFR BLD AUTO: 38.4 % (ref 41.1–50.3)
HGB BLD-MCNC: 11.9 G/DL (ref 13.6–17.2)
IMM GRANULOCYTES # BLD AUTO: 0.1 K/UL (ref 0–0.5)
IMM GRANULOCYTES NFR BLD AUTO: 1 % (ref 0–5)
LYMPHOCYTES # BLD: 1.3 K/UL (ref 0.5–4.6)
LYMPHOCYTES NFR BLD: 15 % (ref 13–44)
MCH RBC QN AUTO: 24.7 PG (ref 26.1–32.9)
MCHC RBC AUTO-ENTMCNC: 31 G/DL (ref 31.4–35)
MCV RBC AUTO: 79.7 FL (ref 79.6–97.8)
MONOCYTES # BLD: 0.4 K/UL (ref 0.1–1.3)
MONOCYTES NFR BLD: 5 % (ref 4–12)
NEUTS SEG # BLD: 6.9 K/UL (ref 1.7–8.2)
NEUTS SEG NFR BLD: 79 % (ref 43–78)
NRBC # BLD: 0.02 K/UL (ref 0–0.2)
PLATELET # BLD AUTO: 342 K/UL (ref 150–450)
PMV BLD AUTO: 10.2 FL (ref 9.4–12.3)
POTASSIUM SERPL-SCNC: 3.7 MMOL/L (ref 3.5–5.1)
PROT SERPL-MCNC: 8.1 G/DL (ref 6.3–8.2)
RBC # BLD AUTO: 4.82 M/UL (ref 4.23–5.6)
SODIUM SERPL-SCNC: 140 MMOL/L (ref 136–145)
WBC # BLD AUTO: 8.7 K/UL (ref 4.3–11.1)

## 2020-08-15 PROCEDURE — 65660000000 HC RM CCU STEPDOWN

## 2020-08-15 PROCEDURE — 80053 COMPREHEN METABOLIC PANEL: CPT

## 2020-08-15 PROCEDURE — 82962 GLUCOSE BLOOD TEST: CPT

## 2020-08-15 PROCEDURE — 74011250637 HC RX REV CODE- 250/637: Performed by: INTERNAL MEDICINE

## 2020-08-15 PROCEDURE — 85025 COMPLETE CBC W/AUTO DIFF WBC: CPT

## 2020-08-15 PROCEDURE — 77010033678 HC OXYGEN DAILY

## 2020-08-15 PROCEDURE — 94760 N-INVAS EAR/PLS OXIMETRY 1: CPT

## 2020-08-15 PROCEDURE — 74011250636 HC RX REV CODE- 250/636: Performed by: INTERNAL MEDICINE

## 2020-08-15 PROCEDURE — 74011636637 HC RX REV CODE- 636/637: Performed by: FAMILY MEDICINE

## 2020-08-15 PROCEDURE — 94640 AIRWAY INHALATION TREATMENT: CPT

## 2020-08-15 PROCEDURE — 94660 CPAP INITIATION&MGMT: CPT

## 2020-08-15 PROCEDURE — 74011636637 HC RX REV CODE- 636/637: Performed by: INTERNAL MEDICINE

## 2020-08-15 RX ORDER — INSULIN GLARGINE 100 [IU]/ML
50 INJECTION, SOLUTION SUBCUTANEOUS 2 TIMES DAILY
Status: DISCONTINUED | OUTPATIENT
Start: 2020-08-15 | End: 2020-08-15

## 2020-08-15 RX ORDER — INSULIN GLARGINE 100 [IU]/ML
50 INJECTION, SOLUTION SUBCUTANEOUS 2 TIMES DAILY
Status: DISCONTINUED | OUTPATIENT
Start: 2020-08-16 | End: 2020-08-17

## 2020-08-15 RX ADMIN — TORSEMIDE 20 MG: 20 TABLET ORAL at 08:37

## 2020-08-15 RX ADMIN — ALLOPURINOL 300 MG: 300 TABLET ORAL at 08:43

## 2020-08-15 RX ADMIN — OXYCODONE HYDROCHLORIDE AND ACETAMINOPHEN 500 MG: 500 TABLET ORAL at 08:37

## 2020-08-15 RX ADMIN — Medication 10 ML: at 21:19

## 2020-08-15 RX ADMIN — CARVEDILOL 12.5 MG: 12.5 TABLET, FILM COATED ORAL at 08:37

## 2020-08-15 RX ADMIN — INSULIN GLARGINE 50 UNITS: 100 INJECTION, SOLUTION SUBCUTANEOUS at 17:19

## 2020-08-15 RX ADMIN — Medication 10 ML: at 06:05

## 2020-08-15 RX ADMIN — BUDESONIDE AND FORMOTEROL FUMARATE DIHYDRATE 2 PUFF: 160; 4.5 AEROSOL RESPIRATORY (INHALATION) at 21:10

## 2020-08-15 RX ADMIN — AMLODIPINE BESYLATE 5 MG: 5 TABLET ORAL at 08:36

## 2020-08-15 RX ADMIN — OXYCODONE HYDROCHLORIDE AND ACETAMINOPHEN 500 MG: 500 TABLET ORAL at 21:17

## 2020-08-15 RX ADMIN — OXYCODONE HYDROCHLORIDE AND ACETAMINOPHEN 500 MG: 500 TABLET ORAL at 17:18

## 2020-08-15 RX ADMIN — BUDESONIDE AND FORMOTEROL FUMARATE DIHYDRATE 2 PUFF: 160; 4.5 AEROSOL RESPIRATORY (INHALATION) at 09:22

## 2020-08-15 RX ADMIN — INSULIN GLARGINE 44 UNITS: 100 INJECTION, SOLUTION SUBCUTANEOUS at 08:42

## 2020-08-15 RX ADMIN — LORATADINE 10 MG: 10 TABLET ORAL at 08:37

## 2020-08-15 RX ADMIN — APIXABAN 5 MG: 5 TABLET, FILM COATED ORAL at 21:17

## 2020-08-15 RX ADMIN — INSULIN LISPRO 4 UNITS: 100 INJECTION, SOLUTION INTRAVENOUS; SUBCUTANEOUS at 17:20

## 2020-08-15 RX ADMIN — PANTOPRAZOLE SODIUM 40 MG: 40 TABLET, DELAYED RELEASE ORAL at 06:07

## 2020-08-15 RX ADMIN — TORSEMIDE 20 MG: 20 TABLET ORAL at 17:18

## 2020-08-15 RX ADMIN — ATORVASTATIN CALCIUM 10 MG: 10 TABLET, FILM COATED ORAL at 08:37

## 2020-08-15 RX ADMIN — APIXABAN 5 MG: 5 TABLET, FILM COATED ORAL at 08:36

## 2020-08-15 RX ADMIN — Medication 10 ML: at 14:00

## 2020-08-15 RX ADMIN — INSULIN LISPRO 2 UNITS: 100 INJECTION, SOLUTION INTRAVENOUS; SUBCUTANEOUS at 11:45

## 2020-08-15 RX ADMIN — INSULIN LISPRO 6 UNITS: 100 INJECTION, SOLUTION INTRAVENOUS; SUBCUTANEOUS at 21:18

## 2020-08-15 RX ADMIN — CARVEDILOL 12.5 MG: 12.5 TABLET, FILM COATED ORAL at 17:21

## 2020-08-15 RX ADMIN — Medication 10 ML: at 06:06

## 2020-08-15 RX ADMIN — DEXAMETHASONE SODIUM PHOSPHATE 6 MG: 10 INJECTION INTRAMUSCULAR; INTRAVENOUS at 08:37

## 2020-08-15 NOTE — PROGRESS NOTES
Offered to call family to update. Pt states \"I have already talked to my wife just now and updated her\". Up in a recliner. Denies needs at present. To monitor.

## 2020-08-15 NOTE — PROGRESS NOTES
Received bedside shift report from Con Wade RN. Pt lying in bed. No apparent distress. Respirations even and unlabored. Instructed to call for assistance with needs, as they arise. Pt voiced understanding.

## 2020-08-15 NOTE — PROGRESS NOTES
Resting quietly at present. NAD noted. Remains on droplet plus isolation for positive COVID-19 screening. Ordered PPE in place and in use. Safety maintained through out the shift. To report off to oncoming nurse.

## 2020-08-15 NOTE — PROGRESS NOTES
Received bedside shift report from Jorge Abarca RN. Pt in recliner, resting quietly. No apparent distress. Respirations even and unlabored. Instructed to call for assistance with needs, as they arise. Pt voiced understanding.

## 2020-08-15 NOTE — PROGRESS NOTES
Bedside and Verbal shift change report given to self (oncoming nurse) by Tigre Irvin RN (offgoing nurse). Report included the following information SBAR, Kardex, Procedure Summary and MAR.

## 2020-08-15 NOTE — PROGRESS NOTES
Bedside report received from night nurse Lexington Edy. Assessment done as noted  Respiration even and unlabored 20/min; denies pain or nausea at present. Remains afebrile this morning. Non-productive coughing at interval. Remain on remote tele with V-Paced confirmed per monitor tech. O2 intact with on 3 liter via nasal canula. Remains on Droplet plus isolation for positive COVID-19 screening. Ordered PPE in place and in use. Encouraged to call with needs.

## 2020-08-15 NOTE — PROGRESS NOTES
History of Present Illness/Hospital Course:  75 yo male with PMH of morbid obesity, HTN, DM2, CKD3, COPD, AFIB on eliquis s/p AVN ablation/ PPM, RONAK on CPAP, HFpEF, who is evaluated with known COVID positive status (8-5-20) and progressive malaise, dyspnea. He was seen by PCP and given oral steroids/ zpack. He admits to fever, no anorexia, some cough with congestion. Has chronic edema. CXR shows vascular congestion. Resting O2 sats are stable though he desats with ambulation to 87%. Prior ECHO 7,202 EF 55-60%, moderate pulm HTN. Also [address] [facility] 2,2020. Patient admitted and started on supplemental oxygen, convalescent plasma and IV dexamethasone. Patient wheezing on exam, add albuterol MDI via spacer. Not a candidate for remdesivir given renal function. Today: Improved respiratory status, on 1. 5LPM via NC. Continue current therapy. Comprehensive review of systems negative unless stated above. I have personally reviewed all current data for this patient.     Physical Exam:  General: Elderly white male, sitting on side of bed eating lunch, mild to moderate respiratory distress  HEENT: NCAT, moist mucous membranes  Skin: No rash noted  Cardio: RRR, normal S1/S2, no rubs, no gallops, no murmurs  Pulm: Conversational dyspnea, mildly labored respirations on 3 LPM via NC, good breath sounds, diffuse scattered end expiratory wheezing, no rales, no rhonchi  GI: Soft, Nt, Nd, Nml bowel sounds, no masses noted  Extremity: Atraumatic, no deformities, no edema  Neuro: Alert, oriented, moving all extremities, no focal deficits noted  Psych: Pleasant, cooperative, normal range of affect    Assessment and Plan:    · COVID positive with desaturation on ambulation/ hypoxia:  · Admit to COVID isolation, remote tele  · Supplemental oxygen, wean as able  · Continued IV dexamethasone  · Consented for convalescent plasma, pending  · Not a candidate for remdesivir  · Continue vitamin C  · Hold zinc with CKD3  · Has had recent azithromycin and PCN allergy, hold off on further antibiotics  · Albuterol MDI        · DM2:  · Insulin glargine with sliding scale insulin protocol        · AFIB:  · Continued apixaban        · COPD:  · Continued home maintenance inhaler        · RONAK:  · Will see if RT will allow his CPAP        · CKD3:  · Trend BMP        · Gout:  · Continued allopurinol and colchicine         · HTN:  · Continued norvasc, coreg        · HFpEF:  · Continued demedex    Inpatient for above    Full code    Apixaban for VTE prophylaxis    Please call [phone] for questions or concerns.

## 2020-08-16 LAB
ALBUMIN SERPL-MCNC: 2.6 G/DL (ref 3.2–4.6)
ALBUMIN/GLOB SERPL: 0.5 {RATIO} (ref 1.2–3.5)
ALP SERPL-CCNC: 112 U/L (ref 50–136)
ALT SERPL-CCNC: 43 U/L (ref 12–65)
ANION GAP SERPL CALC-SCNC: 9 MMOL/L (ref 7–16)
AST SERPL-CCNC: 35 U/L (ref 15–37)
BASOPHILS # BLD: 0 K/UL (ref 0–0.2)
BASOPHILS NFR BLD: 0 % (ref 0–2)
BILIRUB SERPL-MCNC: 0.5 MG/DL (ref 0.2–1.1)
BUN SERPL-MCNC: 41 MG/DL (ref 8–23)
CALCIUM SERPL-MCNC: 9.1 MG/DL (ref 8.3–10.4)
CHLORIDE SERPL-SCNC: 102 MMOL/L (ref 98–107)
CO2 SERPL-SCNC: 30 MMOL/L (ref 21–32)
CREAT SERPL-MCNC: 1.59 MG/DL (ref 0.8–1.5)
DIFFERENTIAL METHOD BLD: ABNORMAL
EOSINOPHIL # BLD: 0 K/UL (ref 0–0.8)
EOSINOPHIL NFR BLD: 0 % (ref 0.5–7.8)
ERYTHROCYTE [DISTWIDTH] IN BLOOD BY AUTOMATED COUNT: 16.4 % (ref 11.9–14.6)
GLOBULIN SER CALC-MCNC: 5.7 G/DL (ref 2.3–3.5)
GLUCOSE BLD STRIP.AUTO-MCNC: 149 MG/DL (ref 65–100)
GLUCOSE BLD STRIP.AUTO-MCNC: 211 MG/DL (ref 65–100)
GLUCOSE BLD STRIP.AUTO-MCNC: 314 MG/DL (ref 65–100)
GLUCOSE BLD STRIP.AUTO-MCNC: 318 MG/DL (ref 65–100)
GLUCOSE SERPL-MCNC: 139 MG/DL (ref 65–100)
HCT VFR BLD AUTO: 44.5 % (ref 41.1–50.3)
HGB BLD-MCNC: 13.2 G/DL (ref 13.6–17.2)
IMM GRANULOCYTES # BLD AUTO: 0.1 K/UL (ref 0–0.5)
IMM GRANULOCYTES NFR BLD AUTO: 1 % (ref 0–5)
LYMPHOCYTES # BLD: 1.4 K/UL (ref 0.5–4.6)
LYMPHOCYTES NFR BLD: 14 % (ref 13–44)
MCH RBC QN AUTO: 24.1 PG (ref 26.1–32.9)
MCHC RBC AUTO-ENTMCNC: 29.7 G/DL (ref 31.4–35)
MCV RBC AUTO: 81.2 FL (ref 79.6–97.8)
MONOCYTES # BLD: 0.5 K/UL (ref 0.1–1.3)
MONOCYTES NFR BLD: 5 % (ref 4–12)
NEUTS SEG # BLD: 8.1 K/UL (ref 1.7–8.2)
NEUTS SEG NFR BLD: 80 % (ref 43–78)
NRBC # BLD: 0 K/UL (ref 0–0.2)
PLATELET # BLD AUTO: 230 K/UL (ref 150–450)
PMV BLD AUTO: 11.6 FL (ref 9.4–12.3)
POTASSIUM SERPL-SCNC: 3.7 MMOL/L (ref 3.5–5.1)
PROT SERPL-MCNC: 8.3 G/DL (ref 6.3–8.2)
RBC # BLD AUTO: 5.48 M/UL (ref 4.23–5.6)
SODIUM SERPL-SCNC: 141 MMOL/L (ref 136–145)
WBC # BLD AUTO: 10.1 K/UL (ref 4.3–11.1)

## 2020-08-16 PROCEDURE — 74011636637 HC RX REV CODE- 636/637: Performed by: INTERNAL MEDICINE

## 2020-08-16 PROCEDURE — 77010033678 HC OXYGEN DAILY

## 2020-08-16 PROCEDURE — 74011250636 HC RX REV CODE- 250/636: Performed by: INTERNAL MEDICINE

## 2020-08-16 PROCEDURE — 85025 COMPLETE CBC W/AUTO DIFF WBC: CPT

## 2020-08-16 PROCEDURE — 74011636637 HC RX REV CODE- 636/637: Performed by: FAMILY MEDICINE

## 2020-08-16 PROCEDURE — 80053 COMPREHEN METABOLIC PANEL: CPT

## 2020-08-16 PROCEDURE — 94760 N-INVAS EAR/PLS OXIMETRY 1: CPT

## 2020-08-16 PROCEDURE — 94640 AIRWAY INHALATION TREATMENT: CPT

## 2020-08-16 PROCEDURE — 94660 CPAP INITIATION&MGMT: CPT

## 2020-08-16 PROCEDURE — 36415 COLL VENOUS BLD VENIPUNCTURE: CPT

## 2020-08-16 PROCEDURE — 74011250637 HC RX REV CODE- 250/637: Performed by: FAMILY MEDICINE

## 2020-08-16 PROCEDURE — 82962 GLUCOSE BLOOD TEST: CPT

## 2020-08-16 PROCEDURE — 74011250637 HC RX REV CODE- 250/637: Performed by: INTERNAL MEDICINE

## 2020-08-16 PROCEDURE — 65660000000 HC RM CCU STEPDOWN

## 2020-08-16 RX ORDER — DEXAMETHASONE SODIUM PHOSPHATE 100 MG/10ML
6 INJECTION INTRAMUSCULAR; INTRAVENOUS DAILY
Status: DISCONTINUED | OUTPATIENT
Start: 2020-08-17 | End: 2020-08-20 | Stop reason: HOSPADM

## 2020-08-16 RX ADMIN — CARVEDILOL 12.5 MG: 12.5 TABLET, FILM COATED ORAL at 08:39

## 2020-08-16 RX ADMIN — INSULIN GLARGINE 50 UNITS: 100 INJECTION, SOLUTION SUBCUTANEOUS at 08:40

## 2020-08-16 RX ADMIN — CARVEDILOL 12.5 MG: 12.5 TABLET, FILM COATED ORAL at 18:23

## 2020-08-16 RX ADMIN — OXYCODONE HYDROCHLORIDE AND ACETAMINOPHEN 500 MG: 500 TABLET ORAL at 08:40

## 2020-08-16 RX ADMIN — AMLODIPINE BESYLATE 5 MG: 5 TABLET ORAL at 08:38

## 2020-08-16 RX ADMIN — INSULIN LISPRO 8 UNITS: 100 INJECTION, SOLUTION INTRAVENOUS; SUBCUTANEOUS at 18:24

## 2020-08-16 RX ADMIN — BUDESONIDE AND FORMOTEROL FUMARATE DIHYDRATE: 160; 4.5 AEROSOL RESPIRATORY (INHALATION) at 22:04

## 2020-08-16 RX ADMIN — Medication 10 ML: at 05:08

## 2020-08-16 RX ADMIN — Medication 10 ML: at 18:25

## 2020-08-16 RX ADMIN — OXYCODONE HYDROCHLORIDE AND ACETAMINOPHEN 500 MG: 500 TABLET ORAL at 21:20

## 2020-08-16 RX ADMIN — ATORVASTATIN CALCIUM 10 MG: 10 TABLET, FILM COATED ORAL at 08:40

## 2020-08-16 RX ADMIN — INSULIN GLARGINE 50 UNITS: 100 INJECTION, SOLUTION SUBCUTANEOUS at 21:40

## 2020-08-16 RX ADMIN — ALBUTEROL SULFATE: 108 INHALANT RESPIRATORY (INHALATION) at 22:03

## 2020-08-16 RX ADMIN — OXYCODONE HYDROCHLORIDE AND ACETAMINOPHEN 500 MG: 500 TABLET ORAL at 17:07

## 2020-08-16 RX ADMIN — ALLOPURINOL 300 MG: 300 TABLET ORAL at 08:39

## 2020-08-16 RX ADMIN — DEXAMETHASONE SODIUM PHOSPHATE 6 MG: 10 INJECTION INTRAMUSCULAR; INTRAVENOUS at 08:40

## 2020-08-16 RX ADMIN — TORSEMIDE 20 MG: 20 TABLET ORAL at 18:23

## 2020-08-16 RX ADMIN — BUDESONIDE AND FORMOTEROL FUMARATE DIHYDRATE 2 PUFF: 160; 4.5 AEROSOL RESPIRATORY (INHALATION) at 08:35

## 2020-08-16 RX ADMIN — LORATADINE 10 MG: 10 TABLET ORAL at 08:39

## 2020-08-16 RX ADMIN — Medication 10 ML: at 21:41

## 2020-08-16 RX ADMIN — APIXABAN 5 MG: 5 TABLET, FILM COATED ORAL at 08:40

## 2020-08-16 RX ADMIN — APIXABAN 5 MG: 5 TABLET, FILM COATED ORAL at 21:19

## 2020-08-16 RX ADMIN — PANTOPRAZOLE SODIUM 40 MG: 40 TABLET, DELAYED RELEASE ORAL at 06:07

## 2020-08-16 RX ADMIN — INSULIN LISPRO 4 UNITS: 100 INJECTION, SOLUTION INTRAVENOUS; SUBCUTANEOUS at 12:49

## 2020-08-16 RX ADMIN — TORSEMIDE 20 MG: 20 TABLET ORAL at 08:39

## 2020-08-16 RX ADMIN — INSULIN LISPRO 8 UNITS: 100 INJECTION, SOLUTION INTRAVENOUS; SUBCUTANEOUS at 21:39

## 2020-08-16 NOTE — PROGRESS NOTES
History of Present Illness/Hospital Course:  75 yo male with PMH of morbid obesity, HTN, DM2, CKD3, COPD, AFIB on eliquis s/p AVN ablation/ PPM, RONAK on CPAP, HFpEF, who is evaluated with known COVID positive status (8-5-20) and progressive malaise, dyspnea. He was seen by PCP and given oral steroids/ zpack. He admits to fever, no anorexia, some cough with congestion. Has chronic edema. CXR shows vascular congestion. Resting O2 sats are stable though he desats with ambulation to 87%. Prior ECHO 7,202 EF 55-60%, moderate pulm HTN. Also [address] [facility] 2,2020. Patient admitted and started on supplemental oxygen, convalescent plasma and IV dexamethasone. Patient wheezing on exam, add albuterol MDI via spacer. Not a candidate for remdesivir given renal function. Today: Minimally worse oxygen requirement today. Patient appears fatigued today. Patient states pleuritic pain has resolved. Continue current therapy. Comprehensive review of systems negative unless stated above. I have personally reviewed all current data for this patient.     Physical Exam:  General: Elderly white male, sitting on side of bed eating lunch, mild to moderate respiratory distress  HEENT: NCAT, moist mucous membranes  Skin: No rash noted  Cardio: RRR, normal S1/S2, no rubs, no gallops, no murmurs  Pulm: Conversational dyspnea, mildly labored respirations on 3 LPM via NC, good breath sounds, diffuse scattered end expiratory wheezing, no rales, no rhonchi  GI: Soft, Nt, Nd, Nml bowel sounds, no masses noted  Extremity: Atraumatic, no deformities, no edema  Neuro: Alert, oriented, moving all extremities, no focal deficits noted  Psych: Pleasant, cooperative, normal range of affect    Assessment and Plan:    · COVID positive with desaturation on ambulation/ hypoxia:  · Admit to COVID isolation, remote tele  · Supplemental oxygen, wean as able  · Continued IV dexamethasone, started on 8/12/2020  · Consented for convalescent plasma, transfuse 8/11/2020  · Not a candidate for remdesivir  · Continue vitamin C  · Hold zinc with CKD3  · Has had recent azithromycin and PCN allergy, hold off on further antibiotics  · Albuterol MDI        · DM2:  · Insulin glargine with sliding scale insulin protocol        · AFIB:  · Continued apixaban        · COPD:  · Continued home maintenance inhaler        · RONAK:  · Will see if RT will allow his CPAP        · CKD3:  · Trend BMP        · Gout:  · Continued allopurinol and colchicine         · HTN:  · Continued norvasc, coreg        · HFpEF:  · Continued demedex    Inpatient for above    Full code    Apixaban for VTE prophylaxis    Please call [phone] for questions or concerns.

## 2020-08-16 NOTE — ROUTINE PROCESS
Bedside and Verbal shift change report given to [address] (oncoming nurse) by self (offgoing nurse). Report included the following information SBAR, MAR and Recent Results.

## 2020-08-16 NOTE — PROGRESS NOTES
Bedside report received from night nurse Cathy Wilkins. Assessment done as noted  Respiration even and unlabored 20/min; denies pain or nausea at present. Afebrile this morning. Non-productive coughing at interval. Remain on remote tele with V-Paced 69 confirmed per monitor tech. O2 intact with  3 litesr via nasal canula with sats 97%. BiPAP in place at present. Remains on Droplet plus isolation for positive COVID-19 screening. Ordered PPE in place and in use. Encouraged to call with needs.

## 2020-08-17 LAB
ALBUMIN SERPL-MCNC: 2.7 G/DL (ref 3.2–4.6)
ALBUMIN/GLOB SERPL: 0.5 {RATIO} (ref 1.2–3.5)
ALP SERPL-CCNC: 109 U/L (ref 50–136)
ALT SERPL-CCNC: 46 U/L (ref 12–65)
ANION GAP SERPL CALC-SCNC: 7 MMOL/L (ref 7–16)
AST SERPL-CCNC: 31 U/L (ref 15–37)
BASOPHILS # BLD: 0 K/UL (ref 0–0.2)
BASOPHILS NFR BLD: 0 % (ref 0–2)
BILIRUB SERPL-MCNC: 0.5 MG/DL (ref 0.2–1.1)
BUN SERPL-MCNC: 41 MG/DL (ref 8–23)
CALCIUM SERPL-MCNC: 9.1 MG/DL (ref 8.3–10.4)
CHLORIDE SERPL-SCNC: 101 MMOL/L (ref 98–107)
CO2 SERPL-SCNC: 31 MMOL/L (ref 21–32)
CREAT SERPL-MCNC: 1.58 MG/DL (ref 0.8–1.5)
DIFFERENTIAL METHOD BLD: ABNORMAL
EOSINOPHIL # BLD: 0 K/UL (ref 0–0.8)
EOSINOPHIL NFR BLD: 0 % (ref 0.5–7.8)
ERYTHROCYTE [DISTWIDTH] IN BLOOD BY AUTOMATED COUNT: 16.6 % (ref 11.9–14.6)
GLOBULIN SER CALC-MCNC: 5.7 G/DL (ref 2.3–3.5)
GLUCOSE BLD STRIP.AUTO-MCNC: 161 MG/DL (ref 65–100)
GLUCOSE BLD STRIP.AUTO-MCNC: 207 MG/DL (ref 65–100)
GLUCOSE BLD STRIP.AUTO-MCNC: 335 MG/DL (ref 65–100)
GLUCOSE BLD STRIP.AUTO-MCNC: 381 MG/DL (ref 65–100)
GLUCOSE SERPL-MCNC: 197 MG/DL (ref 65–100)
HCT VFR BLD AUTO: 43.7 % (ref 41.1–50.3)
HGB BLD-MCNC: 13.1 G/DL (ref 13.6–17.2)
IMM GRANULOCYTES # BLD AUTO: 0.1 K/UL (ref 0–0.5)
IMM GRANULOCYTES NFR BLD AUTO: 1 % (ref 0–5)
LYMPHOCYTES # BLD: 1.3 K/UL (ref 0.5–4.6)
LYMPHOCYTES NFR BLD: 10 % (ref 13–44)
MCH RBC QN AUTO: 24.3 PG (ref 26.1–32.9)
MCHC RBC AUTO-ENTMCNC: 30 G/DL (ref 31.4–35)
MCV RBC AUTO: 81.1 FL (ref 79.6–97.8)
MONOCYTES # BLD: 0.6 K/UL (ref 0.1–1.3)
MONOCYTES NFR BLD: 4 % (ref 4–12)
NEUTS SEG # BLD: 11.6 K/UL (ref 1.7–8.2)
NEUTS SEG NFR BLD: 85 % (ref 43–78)
NRBC # BLD: 0 K/UL (ref 0–0.2)
PLATELET # BLD AUTO: 500 K/UL (ref 150–450)
PMV BLD AUTO: 10.9 FL (ref 9.4–12.3)
POTASSIUM SERPL-SCNC: 3.5 MMOL/L (ref 3.5–5.1)
PROT SERPL-MCNC: 8.4 G/DL (ref 6.3–8.2)
RBC # BLD AUTO: 5.39 M/UL (ref 4.23–5.6)
SODIUM SERPL-SCNC: 139 MMOL/L (ref 136–145)
WBC # BLD AUTO: 13.6 K/UL (ref 4.3–11.1)

## 2020-08-17 PROCEDURE — 74011636637 HC RX REV CODE- 636/637: Performed by: INTERNAL MEDICINE

## 2020-08-17 PROCEDURE — 82962 GLUCOSE BLOOD TEST: CPT

## 2020-08-17 PROCEDURE — 77010033678 HC OXYGEN DAILY

## 2020-08-17 PROCEDURE — 36415 COLL VENOUS BLD VENIPUNCTURE: CPT

## 2020-08-17 PROCEDURE — 94640 AIRWAY INHALATION TREATMENT: CPT

## 2020-08-17 PROCEDURE — 94760 N-INVAS EAR/PLS OXIMETRY 1: CPT

## 2020-08-17 PROCEDURE — 74011250636 HC RX REV CODE- 250/636: Performed by: FAMILY MEDICINE

## 2020-08-17 PROCEDURE — 74011636637 HC RX REV CODE- 636/637: Performed by: FAMILY MEDICINE

## 2020-08-17 PROCEDURE — 74011250637 HC RX REV CODE- 250/637: Performed by: INTERNAL MEDICINE

## 2020-08-17 PROCEDURE — 80053 COMPREHEN METABOLIC PANEL: CPT

## 2020-08-17 PROCEDURE — 65660000000 HC RM CCU STEPDOWN

## 2020-08-17 PROCEDURE — 85025 COMPLETE CBC W/AUTO DIFF WBC: CPT

## 2020-08-17 RX ORDER — INSULIN GLARGINE 100 [IU]/ML
55 INJECTION, SOLUTION SUBCUTANEOUS DAILY
Status: DISCONTINUED | OUTPATIENT
Start: 2020-08-17 | End: 2020-08-20 | Stop reason: HOSPADM

## 2020-08-17 RX ORDER — INSULIN GLARGINE 100 [IU]/ML
50 INJECTION, SOLUTION SUBCUTANEOUS
Status: DISCONTINUED | OUTPATIENT
Start: 2020-08-17 | End: 2020-08-20 | Stop reason: HOSPADM

## 2020-08-17 RX ORDER — INSULIN GLARGINE 100 [IU]/ML
5 INJECTION, SOLUTION SUBCUTANEOUS ONCE
Status: COMPLETED | OUTPATIENT
Start: 2020-08-17 | End: 2020-08-17

## 2020-08-17 RX ADMIN — Medication 10 ML: at 21:00

## 2020-08-17 RX ADMIN — TORSEMIDE 20 MG: 20 TABLET ORAL at 09:03

## 2020-08-17 RX ADMIN — INSULIN LISPRO 2 UNITS: 100 INJECTION, SOLUTION INTRAVENOUS; SUBCUTANEOUS at 05:53

## 2020-08-17 RX ADMIN — APIXABAN 5 MG: 5 TABLET, FILM COATED ORAL at 20:34

## 2020-08-17 RX ADMIN — CARVEDILOL 12.5 MG: 12.5 TABLET, FILM COATED ORAL at 17:42

## 2020-08-17 RX ADMIN — AMLODIPINE BESYLATE 5 MG: 5 TABLET ORAL at 09:03

## 2020-08-17 RX ADMIN — DEXAMETHASONE SODIUM PHOSPHATE 6 MG: 10 INJECTION INTRAMUSCULAR; INTRAVENOUS at 09:03

## 2020-08-17 RX ADMIN — Medication 10 ML: at 05:34

## 2020-08-17 RX ADMIN — INSULIN GLARGINE 50 UNITS: 100 INJECTION, SOLUTION SUBCUTANEOUS at 20:36

## 2020-08-17 RX ADMIN — BUDESONIDE AND FORMOTEROL FUMARATE DIHYDRATE 2 PUFF: 160; 4.5 AEROSOL RESPIRATORY (INHALATION) at 09:45

## 2020-08-17 RX ADMIN — ATORVASTATIN CALCIUM 10 MG: 10 TABLET, FILM COATED ORAL at 09:02

## 2020-08-17 RX ADMIN — INSULIN LISPRO 8 UNITS: 100 INJECTION, SOLUTION INTRAVENOUS; SUBCUTANEOUS at 17:48

## 2020-08-17 RX ADMIN — INSULIN LISPRO 10 UNITS: 100 INJECTION, SOLUTION INTRAVENOUS; SUBCUTANEOUS at 20:37

## 2020-08-17 RX ADMIN — ALLOPURINOL 300 MG: 300 TABLET ORAL at 09:02

## 2020-08-17 RX ADMIN — INSULIN GLARGINE 5 UNITS: 100 INJECTION, SOLUTION SUBCUTANEOUS at 12:48

## 2020-08-17 RX ADMIN — BUDESONIDE AND FORMOTEROL FUMARATE DIHYDRATE 2 PUFF: 160; 4.5 AEROSOL RESPIRATORY (INHALATION) at 20:00

## 2020-08-17 RX ADMIN — LORATADINE 10 MG: 10 TABLET ORAL at 09:02

## 2020-08-17 RX ADMIN — OXYCODONE HYDROCHLORIDE AND ACETAMINOPHEN 500 MG: 500 TABLET ORAL at 09:03

## 2020-08-17 RX ADMIN — INSULIN LISPRO 4 UNITS: 100 INJECTION, SOLUTION INTRAVENOUS; SUBCUTANEOUS at 12:38

## 2020-08-17 RX ADMIN — PANTOPRAZOLE SODIUM 40 MG: 40 TABLET, DELAYED RELEASE ORAL at 05:34

## 2020-08-17 RX ADMIN — OXYCODONE HYDROCHLORIDE AND ACETAMINOPHEN 500 MG: 500 TABLET ORAL at 20:40

## 2020-08-17 RX ADMIN — TORSEMIDE 20 MG: 20 TABLET ORAL at 17:43

## 2020-08-17 RX ADMIN — OXYCODONE HYDROCHLORIDE AND ACETAMINOPHEN 500 MG: 500 TABLET ORAL at 17:42

## 2020-08-17 RX ADMIN — CARVEDILOL 12.5 MG: 12.5 TABLET, FILM COATED ORAL at 09:02

## 2020-08-17 RX ADMIN — INSULIN GLARGINE 50 UNITS: 100 INJECTION, SOLUTION SUBCUTANEOUS at 09:01

## 2020-08-17 RX ADMIN — APIXABAN 5 MG: 5 TABLET, FILM COATED ORAL at 09:02

## 2020-08-17 NOTE — PROGRESS NOTES
Patient on telemetry and this nurse called telemetry and patient is having artifact and 72 pulse.   Will continue to assess telemetry

## 2020-08-17 NOTE — PROGRESS NOTES
Bedside report received from night nurse Tiera Mccain. Assessment done as noted  Respiration even and unlabored 20/min; denies pain or nausea at present. Afebrile this morning. Productive/non-productive coughing at interval. Remain on remote tele with *** confirmed per monitor tech. O2 intact with *** liter via nasal canula, HiFlo, non rebreather mask. Remains on Droplet plus isolation for positive COVID-19 screening. . Ordered PPE in place and in use. Encouraged to call with needs.

## 2020-08-17 NOTE — PROGRESS NOTES
History of Present Illness/Hospital Course:  77 yo male with PMH of morbid obesity, HTN, DM2, CKD3, COPD, AFIB on eliquis s/p AVN ablation/ PPM, RONAK on CPAP, HFpEF, who is evaluated with known COVID positive status (8-5-20) and progressive malaise, dyspnea. He was seen by PCP and given oral steroids/ zpack. He admits to fever, no anorexia, some cough with congestion. Has chronic edema. CXR shows vascular congestion. Resting O2 sats are stable though he desats with ambulation to 87%. Prior ECHO 7,202 EF 55-60%, moderate pulm HTN. Also [address] [facility] 2,2020. Patient admitted and started on supplemental oxygen, convalescent plasma and IV dexamethasone. Patient wheezing on exam, add albuterol MDI via spacer. Not a candidate for remdesivir given renal function. Today: Persistent hypoxic respiratory failure requiring supplemental oxygen. Patient states he feels much improved today. Continue current therapy. Comprehensive review of systems negative unless stated above. I have personally reviewed all current data for this patient.     Physical Exam:  General: Elderly white male, sitting on side of bed eating lunch, mild to moderate respiratory distress  HEENT: NCAT, moist mucous membranes  Skin: No rash noted  Cardio: RRR, normal S1/S2, no rubs, no gallops, no murmurs  Pulm: Conversational dyspnea, mildly labored respirations on 3 LPM via NC, good breath sounds, diffuse scattered end expiratory wheezing, no rales, no rhonchi  GI: Soft, Nt, Nd, Nml bowel sounds, no masses noted  Extremity: Atraumatic, no deformities, no edema  Neuro: Alert, oriented, moving all extremities, no focal deficits noted  Psych: Pleasant, cooperative, normal range of affect    Assessment and Plan:    · COVID positive with desaturation on ambulation/ hypoxia:  · Admit to COVID isolation, remote tele  · Supplemental oxygen, wean as able  · Continued IV dexamethasone, started on 8/12/2020  · Consented for convalescent plasma, transfuse 8/11/2020  · Not a candidate for remdesivir  · Continue vitamin C  · Hold zinc with CKD3  · Has had recent azithromycin and PCN allergy, hold off on further antibiotics  · Albuterol MDI        · DM2:  · Insulin glargine with sliding scale insulin protocol        · AFIB:  · Continued apixaban        · COPD:  · Continued home maintenance inhaler        · RONAK:  · Will see if RT will allow his CPAP        · CKD3:  · Trend BMP        · Gout:  · Continued allopurinol and colchicine         · HTN:  · Continued norvasc, coreg        · HFpEF:  · Continued demedex    Inpatient for above    Full code    Apixaban for VTE prophylaxis    Please call [phone] for questions or concerns.

## 2020-08-17 NOTE — PROGRESS NOTES
Assessment complete and patient is sitting up in bedside chair. Patient o2 is on at 3 liters and no breathing difficulty noted. No pain or distress at this time.

## 2020-08-17 NOTE — PROGRESS NOTES
Respiratory helping patient put Bi-pap and patient has it on for the night. No pain or distress noted. Patient instructed to use call light when needing assistance with call light in reach.

## 2020-08-17 NOTE — PROGRESS NOTES
Patient resting in bed and Bi-pap still on this am.  Breathing even and non-labored. Non-productive cough. No pain or distress at this time. Call light in reach. Will prepare for bedside shift report.

## 2020-08-17 NOTE — PROGRESS NOTES
Re-assessed patient and no pain or distress noted. Patient has Bi-pap on and has no breathing difficulty noted.   Call light in reach and will continue to assess patient

## 2020-08-18 LAB
ALBUMIN SERPL-MCNC: 2.6 G/DL (ref 3.2–4.6)
ALBUMIN/GLOB SERPL: 0.5 {RATIO} (ref 1.2–3.5)
ALP SERPL-CCNC: 100 U/L (ref 50–136)
ALT SERPL-CCNC: 46 U/L (ref 12–65)
ANION GAP SERPL CALC-SCNC: 8 MMOL/L (ref 7–16)
AST SERPL-CCNC: 27 U/L (ref 15–37)
BASOPHILS # BLD: 0 K/UL (ref 0–0.2)
BASOPHILS NFR BLD: 0 % (ref 0–2)
BILIRUB SERPL-MCNC: 0.4 MG/DL (ref 0.2–1.1)
BUN SERPL-MCNC: 38 MG/DL (ref 8–23)
CALCIUM SERPL-MCNC: 9.3 MG/DL (ref 8.3–10.4)
CHLORIDE SERPL-SCNC: 103 MMOL/L (ref 98–107)
CO2 SERPL-SCNC: 26 MMOL/L (ref 21–32)
CREAT SERPL-MCNC: 1.54 MG/DL (ref 0.8–1.5)
DIFFERENTIAL METHOD BLD: ABNORMAL
EOSINOPHIL # BLD: 0 K/UL (ref 0–0.8)
EOSINOPHIL NFR BLD: 0 % (ref 0.5–7.8)
ERYTHROCYTE [DISTWIDTH] IN BLOOD BY AUTOMATED COUNT: 16.5 % (ref 11.9–14.6)
GLOBULIN SER CALC-MCNC: 5.4 G/DL (ref 2.3–3.5)
GLUCOSE BLD STRIP.AUTO-MCNC: 120 MG/DL (ref 65–100)
GLUCOSE BLD STRIP.AUTO-MCNC: 165 MG/DL (ref 65–100)
GLUCOSE BLD STRIP.AUTO-MCNC: 323 MG/DL (ref 65–100)
GLUCOSE BLD STRIP.AUTO-MCNC: 325 MG/DL (ref 65–100)
GLUCOSE SERPL-MCNC: 162 MG/DL (ref 65–100)
HCT VFR BLD AUTO: 41.9 % (ref 41.1–50.3)
HGB BLD-MCNC: 13.6 G/DL (ref 13.6–17.2)
IMM GRANULOCYTES # BLD AUTO: 0.2 K/UL (ref 0–0.5)
IMM GRANULOCYTES NFR BLD AUTO: 1 % (ref 0–5)
LYMPHOCYTES # BLD: 1.4 K/UL (ref 0.5–4.6)
LYMPHOCYTES NFR BLD: 8 % (ref 13–44)
MCH RBC QN AUTO: 25.2 PG (ref 26.1–32.9)
MCHC RBC AUTO-ENTMCNC: 32.5 G/DL (ref 31.4–35)
MCV RBC AUTO: 77.6 FL (ref 79.6–97.8)
MONOCYTES # BLD: 0.6 K/UL (ref 0.1–1.3)
MONOCYTES NFR BLD: 4 % (ref 4–12)
NEUTS SEG # BLD: 14.1 K/UL (ref 1.7–8.2)
NEUTS SEG NFR BLD: 87 % (ref 43–78)
NRBC # BLD: 0 K/UL (ref 0–0.2)
PLATELET # BLD AUTO: 424 K/UL (ref 150–450)
PMV BLD AUTO: 10.8 FL (ref 9.4–12.3)
POTASSIUM SERPL-SCNC: 4.1 MMOL/L (ref 3.5–5.1)
PROT SERPL-MCNC: 8 G/DL (ref 6.3–8.2)
RBC # BLD AUTO: 5.4 M/UL (ref 4.23–5.6)
SODIUM SERPL-SCNC: 137 MMOL/L (ref 136–145)
WBC # BLD AUTO: 16.3 K/UL (ref 4.3–11.1)

## 2020-08-18 PROCEDURE — 74011250637 HC RX REV CODE- 250/637: Performed by: FAMILY MEDICINE

## 2020-08-18 PROCEDURE — 65660000000 HC RM CCU STEPDOWN

## 2020-08-18 PROCEDURE — 74011636637 HC RX REV CODE- 636/637: Performed by: INTERNAL MEDICINE

## 2020-08-18 PROCEDURE — 80053 COMPREHEN METABOLIC PANEL: CPT

## 2020-08-18 PROCEDURE — 74011636637 HC RX REV CODE- 636/637: Performed by: FAMILY MEDICINE

## 2020-08-18 PROCEDURE — 36415 COLL VENOUS BLD VENIPUNCTURE: CPT

## 2020-08-18 PROCEDURE — 85025 COMPLETE CBC W/AUTO DIFF WBC: CPT

## 2020-08-18 PROCEDURE — 82962 GLUCOSE BLOOD TEST: CPT

## 2020-08-18 PROCEDURE — 74011250636 HC RX REV CODE- 250/636: Performed by: FAMILY MEDICINE

## 2020-08-18 PROCEDURE — 74011250637 HC RX REV CODE- 250/637: Performed by: INTERNAL MEDICINE

## 2020-08-18 PROCEDURE — 77010033678 HC OXYGEN DAILY

## 2020-08-18 PROCEDURE — 94660 CPAP INITIATION&MGMT: CPT

## 2020-08-18 PROCEDURE — 94640 AIRWAY INHALATION TREATMENT: CPT

## 2020-08-18 PROCEDURE — 94760 N-INVAS EAR/PLS OXIMETRY 1: CPT

## 2020-08-18 RX ADMIN — LORATADINE 10 MG: 10 TABLET ORAL at 08:26

## 2020-08-18 RX ADMIN — AMLODIPINE BESYLATE 5 MG: 5 TABLET ORAL at 08:26

## 2020-08-18 RX ADMIN — Medication 10 ML: at 06:24

## 2020-08-18 RX ADMIN — APIXABAN 5 MG: 5 TABLET, FILM COATED ORAL at 20:23

## 2020-08-18 RX ADMIN — TORSEMIDE 20 MG: 20 TABLET ORAL at 17:04

## 2020-08-18 RX ADMIN — OXYCODONE HYDROCHLORIDE AND ACETAMINOPHEN 500 MG: 500 TABLET ORAL at 17:04

## 2020-08-18 RX ADMIN — INSULIN GLARGINE 50 UNITS: 100 INJECTION, SOLUTION SUBCUTANEOUS at 21:00

## 2020-08-18 RX ADMIN — ALBUTEROL SULFATE 2 PUFF: 108 INHALANT RESPIRATORY (INHALATION) at 07:40

## 2020-08-18 RX ADMIN — Medication 10 ML: at 21:00

## 2020-08-18 RX ADMIN — APIXABAN 5 MG: 5 TABLET, FILM COATED ORAL at 08:26

## 2020-08-18 RX ADMIN — ATORVASTATIN CALCIUM 10 MG: 10 TABLET, FILM COATED ORAL at 08:26

## 2020-08-18 RX ADMIN — INSULIN GLARGINE 55 UNITS: 100 INJECTION, SOLUTION SUBCUTANEOUS at 08:25

## 2020-08-18 RX ADMIN — ALBUTEROL SULFATE 2 PUFF: 108 INHALANT RESPIRATORY (INHALATION) at 19:56

## 2020-08-18 RX ADMIN — ALLOPURINOL 300 MG: 300 TABLET ORAL at 08:26

## 2020-08-18 RX ADMIN — CARVEDILOL 12.5 MG: 12.5 TABLET, FILM COATED ORAL at 17:04

## 2020-08-18 RX ADMIN — Medication 10 ML: at 13:46

## 2020-08-18 RX ADMIN — INSULIN LISPRO 8 UNITS: 100 INJECTION, SOLUTION INTRAVENOUS; SUBCUTANEOUS at 21:00

## 2020-08-18 RX ADMIN — OXYCODONE HYDROCHLORIDE AND ACETAMINOPHEN 500 MG: 500 TABLET ORAL at 08:26

## 2020-08-18 RX ADMIN — OXYCODONE HYDROCHLORIDE AND ACETAMINOPHEN 500 MG: 500 TABLET ORAL at 20:23

## 2020-08-18 RX ADMIN — TORSEMIDE 20 MG: 20 TABLET ORAL at 08:25

## 2020-08-18 RX ADMIN — INSULIN LISPRO 2 UNITS: 100 INJECTION, SOLUTION INTRAVENOUS; SUBCUTANEOUS at 11:48

## 2020-08-18 RX ADMIN — BUDESONIDE AND FORMOTEROL FUMARATE DIHYDRATE 2 PUFF: 160; 4.5 AEROSOL RESPIRATORY (INHALATION) at 07:40

## 2020-08-18 RX ADMIN — CARVEDILOL 12.5 MG: 12.5 TABLET, FILM COATED ORAL at 08:25

## 2020-08-18 RX ADMIN — PANTOPRAZOLE SODIUM 40 MG: 40 TABLET, DELAYED RELEASE ORAL at 06:23

## 2020-08-18 RX ADMIN — BUDESONIDE AND FORMOTEROL FUMARATE DIHYDRATE 2 PUFF: 160; 4.5 AEROSOL RESPIRATORY (INHALATION) at 19:57

## 2020-08-18 RX ADMIN — INSULIN LISPRO 8 UNITS: 100 INJECTION, SOLUTION INTRAVENOUS; SUBCUTANEOUS at 17:05

## 2020-08-18 RX ADMIN — DEXAMETHASONE SODIUM PHOSPHATE 6 MG: 10 INJECTION INTRAMUSCULAR; INTRAVENOUS at 08:25

## 2020-08-18 NOTE — ROUTINE PROCESS
Respiratory Care Services Policy Number: -[id number] Title: Oxygen Protocol Effective Date: 01/1996 Revised Date: 06/2013, 02/29/2016, 4/2018, 7/2019 Reviewed Date: 05/2014, 03/2015, 06/2017 I. Policy: The Oxygen Protocol will be initiated for all patients upon written order from physician for administration of oxygen therapy or if a patient is found to have an oxygen saturation of 88% or less. Special consideration: the goal of oxygen therapy for COPD patients is to maintain oxygen saturation between 88% - 92% to comply with GOLD Guidelines. II. Purpose: To provide protocol driven respiratory therapy for the administration of oxygen at concentrations greater than that in ambient air with the intent of treating or preventing the symptoms and manifestations of hypoxia. III. Responsibility: Director Respiratory Care Services, all Respiratory Care Practitioners IV. Indications: A. Implement this protocol for patients when physician orders oxygen to be administered or when patient is found to have an oxygen saturation of 88% or less. B. To assure routine monitoring of patient's oxygen saturation b.i.d. and to make appropriate adjustments in accordance with ordered oxygen saturation parameters. C. To assure continuity of respiratory care that meets [facility] Clinical Practice Guidelines and GOLD Guidelines. D. Hb < 8 
E. Sickle Cell anemia crisis V. Assessment:  Assess the following parameters to determine the need to adjust oxygen: A. Measurement of patient's oxygen saturation via pulse oximetry. B. Observation of patient's color, respiratory effort, and responsiveness. C. Measurement of heart rate and respiratory rate. D. Complete a three-step ambulatory oxygen saturation when ordered. VI. Initiation:  Upon receipt of an order for oxygen, the RCP will: A. Verify order in the patient's EMR, which should include the desired oxygen saturation to be maintained. B. The patient shall be placed on oxygen with humidity (except for those oxygen delivery devices that do not require humidity, i.e. venturi masks and non-rebreather masks) as ordered by the physician to achieve the prescribed oxygen saturation. C. In the event that no saturation is specified, a saturation of 90% will be maintained. D. Patients, who are found to have a SaO2 of 88% or less, may be started on supplemental oxygen as described above. E. Patients admitted with cardiac problems/disease shall be maintained at 92% per Chest Committee recommendation. F. The patient will be informed of the \"no smoking policy\" and instructed in the proper use of oxygen therapy. G. Once desired oxygen saturation has been achieved, the RCP will document FIO2 and oxygen saturation in the respiratory section of the patient's EMR. VII. Maintenance: A. 30-second oxygen saturation check will be taken to maintain the saturation ordered by the physician each day. B. Patients will be assessed each shift and as needed by pulse oximetry to determine if oxygen needs to be decreased, increased or discontinued. C. If changes in FIO2 are indicated, all changes will be documented in the respiratory section of the patient's EMR. D. If no changes in FIO2 are required, the patient's oxygen flow rate and saturation will be recorded in the respiratory section of the patient's EMR. E. Per [facility], patients who are receiving oxygen therapy but are not on oxygen at home, should be weaned off oxygen as soon as possible or when anticipated discharge becomes evident. Min Broderick will be discontinued after oxygen saturation has been maintained for 24 hours on room air and documented in the patient's EMR. G. Patients on the Inpatient Rehabilitation area on 9th floor will be exempt from having their oxygen discontinued per protocol.  Oxygen may be weaned but will be changed to prn to meet the needs of the patient when exercising and participating in therapy. H. The goal of oxygen therapy is to maintain patients with a diagnosis of COPD at oxygen saturation between 88% - 92% to comply with GOLD Guidelines. VIII. Safety: RCP will address the following safety issues: A. Identify patient using the two patient identifiers name and birth date via ID bracelet. B. Perform hand hygiene per hospital policy utilizing Standard Precautions for all patients and following transmission-based isolation as indicated per hospital policy. C. Cardiac patients will be maintained at an oxygen saturation of 92%. D. If a patient's FIO2 requirements necessitate changing oxygen delivery devices to a high concentration of oxygen, documentation indicating the change must be included in the progress notes, as well as in the respiratory flowsheet. E. If a patient has a hemoglobin level <8 mg. RCP will consult physician before discontinuing oxygen. IX. Interventions: A. RCP will assess patient for signs of respiratory distress or suspicion of CO2 retention. B. An ABG may be obtained for patients exhibiting respiratory distress or sickle cell      crisis. C. An order should be entered into patient's EMR for ABG under per protocol. X. Documentation A. Document assessment findings in the respiratory section of the patient's EMR. B. Document changes in therapy per protocol in the respiratory orders section and in the care plan section of the patient's EMR. C. Document patient education in the patient education section of the patient's EMR. XI. Reportable Conditions:  Report to the physician immediately: A. Acute changes in patient's respiratory status. B. An oxygen saturation <85%. C. A change in oxygen delivery device to provide a high concentration of oxygen. XII. Patient Instructions: Review with Patient A. Purpose of oxygen therapy B. Proper technique for using oxygen C. No smoking policy Approval: Pulmonary Committee (1-25-96) Revision: Chest Committee (4-28-05) L - Respiratory Care Department Policy, Procedure and Protocol Guideline Manual, [place], EFREN George. L - Therapist Driven Respiratory Care Protocols  A Practitioner's Guide for Criteria-Based Respiratory Care by Chioma Roland M.D., and EFREN Mcconnell RRT. L - The rationale for therapist-driven protocols: an update. Respiratory Care [facility]. N  [facility] Clinical Practice Guidelines. Respiratory Care Services Policy Number: -[id number] Title: Patient Care Assessment Protocol Effective Date: 01/1999 Revised Date: 05/2014, 04/2018, 12/2018, 07/2019 Reviewed Date: 06/2013/ 03/2015, 03/2016, 06/2017 Overview In an effort to improve quality and reduce costs of respiratory care at [facility], the Respiratory Department has developed a number of Patient Care Protocols. These protocols have been developed according to Gay 3 and are utilized for those patients who are ordered respiratory therapy using therapeutic indications and standardized approaches for accomplishing objectives. Patient Care Protocols are intended to improve care by: ? Defining the indications and standards of care agreed upon by the Pulmonary Medicine and [facility] [place] [address] of [facility]. ? Training respiratory care practitioners to apply those criteria to individual patients and modify therapy as indicated by the protocols. ? Documenting the indication and care plan as part of the initial ordering process. ? Tapering or discontinuing treatments once the indication for therapy changes. The Patient Care Protocols shall be universally applied throughout the hospital as determined by  
the Pulmonary Medicine and [facility] Rationale for Patient Care Assessment Protocols: 
? Continuous Quality Improvement ? Cost containment ? Standardization of care 
? Enhanced continuity of care ? Utilization review ? Timely intervention The following patient care assessment protocols have been developed: ? Aerosolized Medication Protocol http://[facility]/stfrancis/policies/Respiratory%20Care%20Services%20Policies/-[id number]. doc ? Bronchial Hygiene Protocol http://spb/localRed [facility]/stfrancis/policies/Respiratory%20Care%20Services%20Policies/-[id number]. doc 
? Oxygen Protocolhttp://spb/[facility]/stfrancis/policies/Respiratory Care Services Policies/-[id number]. doc http://spb/[facility]/stfrancis/policies/Respiratory%20Care%20Services%20Policies/-[id number]. doc 
? CVRU Fast Track Weaning Protocol http://spb/[facility]/stfrancis/policies/Respiratory%20Care%20Services%20Policies/-[id number]. doc ? Asthma Treatment Protocol ERhttp://spb/[facility]/stfrancis/policies/Respiratory Care Services Policies/-[id number]. doc http://spb/[facility]/stfrancis/policies/Respiratory%20Care%20Services%20Policies/-[id number]. doc ? Pediatric Asthma Treatment Protocol ERhttp://spb/[facility]/stfrancis/policies/Respiratory Care Services Policies/-[id number]. doc http://spb/localDianDian/[facility]/stfrancis/policies/Respiratory%20Care%20Services%20Policies/-[id number]. doc ? Alpha-1 Antitrypsin Deficiency Protocolhttp://spb/localRed Foundrystems/gvl/stfrancis/policies/Respiratory Care Services Policies/-[id number]. doc http://[facility]/stancis/policies/Respiratory%20Care%20Services%20Policies/-[id number]. doc ? Prone Positioning Protocol http://[facility]/policies/Respiratory%20Care%20Services%20Policies/-[id number]. doc 
? COPD Protocol http://[facility]/policies/Respiratory%20Care%20Services%20Policies/-[id number]. doc 
?  Home Oxygen Assessment Protocolhttp://[facility]/stfrancis/policies/Respiratory Care Services Policies/-[id number]. doc http://spb/[facility]/stfrancis/policies/Respiratory%20Care%20Services%20Policies/-[id number]. doc 
? Ventilator Weaning Protocol http://spb/[facility]/stfrancis/policies/Respiratory%20Care%20Services%20Policies/-[id number]. doc ? Lung Volume Expansion Protocolhttp://spb/[facility]/stfrancis/policies/Respiratory Care Services Policies/-[id number]. doc http://[facility]/stfrancis/policies/Respiratory%20Care%20Services%20Policies/-[id number]. docm The Director of [address] [place] [address] oversees the Patient Care Assessment Program. The Respiratory Educator is responsible for protocol development and training. The Supervisor is responsible for implementation and  activities. Each patient with an order for respiratory treatments will receive an evaluation. Respiratory Care Practitioners (RCP's) will perform the evaluations. The same evaluation tool will be utilized for initial and follow-up assessments. If the patient does not meet criteria for ordered therapy, the therapy will be discontinued. If the patient demonstrates an adverse response to initially ordered therapy, the therapy will be discontinued and the physician will be contacted. Specific physician's orders that deviate from protocols and are deemed \"inappropriate\" or \"unsafe\" will be addressed with ordering physician and/or medical director as required. Respiratory Patient Care Assessment Protocols I. Policy:   In an effort to provide quality patient care and effective utilization of services, physicians who order respiratory therapy will have their patients treated via the protocols established (see attached) Respiratory Care Practitioners (RCP's) will complete the initial assessment which will indicate patient needs,  the care plan developed and will performed within 24 hours of admission. Frequency of the therapy will be set according to the results of the respiratory therapy evaluation and frequency guidelines policy. Reassessment will be continued every 48 hours and more frequently as needed for the individual patient. II. Purpose: F. To provide a process that will allow for ongoing assessment and care plan modification for patients receiving respiratory services based on both objective and or subjective patient responses to interventions. This process of protocol utilization will assist in patient care progression while eliminating the need for the physician to continually update respiratory therapy orders. G. To assure continuity of respiratory care that meets [facility] Clinical Practice Guidelines. III. Initiation:  Implement Respiratory Care Protocols for patients who are ordered by physician  
       to receive respiratory therapy procedures or for ventilator management. IV. Protocol: 
E. Upon receiving an order for therapy the RCP will review the patient's EMR (electronic medical record) for all pertinent information includin. Physician's order for therapy 2. Patient history and physical examination 3. Physician progress notes 4. Diagnostic. X-rays, PFT's, arterial blood gases etc. 
F. The RCP will perform a respiratory assessment in the following manner: 1. General observations: color, pattern and effort of breathing, chest expansion, (symmetrical and bilateral), level of consciousness and the ability to ambulate. 2. The RCP will assess patient's cough ability and determine if bronchial hygiene is needed. If patient is unable to produce sputum, at that time, the RCP should question the patient with regard to their sputum: production, color consistency, frequency and amount.  
3. Auscultation: Using a stethoscope, the RCP will listen and note quality of breath sounds and presence or absence of adventitious breath sounds in all lung fields, both anteriorly and posteriorly. 4. Upon completing the EMR review and physical assessment, the RCP will document findings in the RT Assessment section of the EMR. The score level will be provided and will be used to determine the frequency of therapy. V. Indications: A. Bronchial Hygiene Protocol indications: 1. Potential for or presence of atelectasis. 1. Need for hydration and removal of retained secretions. 1. Need for improvement of cough effectiveness. 1. Presence of conditions associated with disorder of pulmonary clearance: 
a. Cystic fibrosis b. Bronchiectasis 
c. Neuromuscular disease 
d. Obstructive lung diseases 
e. Restrictive lung diseases Aerosolized Medication(s) Protocol indications:Treatment of bronchospasm/wheezing 2. Improvement of mucociliary clearance 3. Treatment of stridor 4. History of Bronchiectasis, Asthma or COPD 
C. Oxygen Therapy Protocol indications: 1. Documented hypoxemia 2. Severe trauma 3. Acute myocardial infarction 4. Short-term therapy (e.g. post anesthesia recovery) D. CVRU Ventilator Weaning Protocol indications: 1. All mechanically ventilated surgical patients unless they have a no wean order. E. Asthma Treatment Protocol ER indications: 1. Patients 15years of age and older that have been triaged or diagnosed with   asthma exacerbation shall be indicated for the ER Asthma Treatment Protocol. A physician order will be required to initiate the protocol. F. Pediatric Asthma protocol in the ER indications: 1. Patients less than 15years old that have been triaged or diagnosed with asthma exacerbation shall be indicated for the Pediatric Asthma protocol. A physician order will be required to initiate the protocol. G. Alpha-1 Antitrypsin Deficiency Testing protocol indications: 1. Patients admitted and diagnosed with COPD. H. Prone Positioning Protocol indications: 
       1. Acute lung injury 2. Acute respiratory distress syndrome (ARDS) I. Respiratory Care COPD Protocol indications: 1. History of COPD in patient's records 2. Smoking history J. Home Oxygen Assessment Protocol indications: 1. Chronic lung disease 2. Cor pulmonale 3. Unable to wean to room air 48 hours prior to anticipated discharge. K.  Ventilator Weaning Protocol indications: 1. Patient's mechanically ventilated 2. Managed by intensivist 
L. Lung Volume Expansion Protocol indications: 
      1. Any patient at risk for pulmonary complications. VI. Maintenance: H. Timely patient assessment is an integral part of this protocol therefore the following will be applied: 1. All non- critical care patients will be evaluated upon receiving initial respiratory care orders within 24 hours and re-evaluated within 48 hours (or more as needed). 2. Orders requesting a Respiratory Consult will be responded to in the following manner: 
a. In patient emergency situations, the RCP assigned to the floor will respond immediately to the patient, provide an initial respiratory assessment, and contact the patient's physician as necessary for appropriate orders. b. In non-emergent situations, the RCP assigned to the floor will respond to the patient within 90 minutes and provide an initial respiratory assessment and contact patient's physician as necessary for appropriate orders. c. An RCP will provide a comprehensive assessment as soon as possible. 3. Upon completion of an evaluation, the RCP will complete documentation in the patient's EMR in the RT Assessment section. 4. The RCP who completes the assessment will document orders for therapy in the orders section of the patient's EMR selecting new order. Next, per protocol should be selected indicating it is a protocol order and sign orders should be selected to complete the process.  The applicable protocol must be added to the progress note per Joint Commission guidelines. 5. The Pharmacy and Therapeutics (P&T) Committee has mandated that the medication Xopenex may be changed to unit dose albuterol without an order, except for those patients receiving Xopenex due to cardiac arrhythmias. 1. The dosage for these patients should be 0.63 mg. and may be changed from 1.25 mg. to 0.63 mg per P & T Committee by the RCP completing the assessment. 6. Patients who are not experiencing cardiac arrhythmias, and are ordered Xopenex and Atrovent may be changed to Duoneb. VII. Safety:       
I. The following safety issues shall be monitored: 1. The RCP will perform hand hygiene per hospital policy utilizing Standard Precautions for all patients and following transmission-based isolation as indicated per hospital policy. 2. The RCP must exercise professional judgment in classifying the patient for frequency of therapy. 3. Appropriate classification of the patient will require an evaluation utilizing the Therapy Assessment Protocol Guidelines. 4. The RCP will confer with the physician concerning the care of the patient at any time questions or problems arise. 5. If during therapy, the patient exhibits no improvement, or deterioration in clinical status the RCP will notify the physician and the patient's nurse. VIII. Interventions:  
F. The patient's nurse is responsible concerning all items related to his/her care. Ongoing communication with nursing is essential to successful protocol management. G. The RCP recognizes the value of the team approach in meeting the patient's needs. Nursing input regarding the patient's pulmonary condition will be sought as needed. IX. Reportable conditions: The RCP will inform the physician if: 1. There are acute changes in patient's respiratory status. 2. The therapist is unable to determine appropriate care plan upon assessment. 3. The patient fails to reach therapeutic objective. 4. A change or additional medication is needed. X.  Patient Education:   
D. Patient will receive instruction on the followin. The treatment modality, including objectives and proper technique of therapy 2. Respiratory medications E. Documentation shall occur in the patient education section of the patient's EMR. XI. Documentation: Record all findings as described above in the patient's EMR. Related Protocols: A. Aerosolized Medication Protocol B. Bronchial Hygiene C. Oxygen Protocol D. [facility] Fast Track Weaning Protocol E. Asthma Treatment protocol ER 
F. Alpha-1 antitrypsin Deficiency Protocol G. Prone Positioning Protocol H. Respiratory Care COPD Protocol I. Home Oxygen assessment Protocol J. Ventilator Weaning Protocols K. Volume Expansion protocol Indications      Frequency          Level A. Aerosol therapy 1.  q4h     Severe SOB, wheezing, unable to sleep 1 2.  qid, q4 wa or q6h   Moderate SOB, wheezing   2 3.  tid      Hx of asthma, or COPD mild wheezing,  
      or facilitate secretion removal              3 
 4.  bid      Asthma, or COPD, Intermittent wheezing 4 5. PRN, i.e. tid PRN, qid PRN Asthma, or COPD, occasional wheezing 5 B. Bronchopulmonary Hygiene 1. q4h             Copious secretions, SOB, unable to sleep 1 2. qid & PRN            Moderate amounts of secretions   2 3. tid           Small amounts of secretions and poor cough,   
           history of secretions    3 4. PRN, i.e. tid PRN, qid PRN     Breathing exercises, encourage cough only 4  
  
C. Oxygen Therapy     Follow hospital approved Oxygen Protocol Note: 
qid treatments are due 0800, 1200, 1600, and 2000. tid treatments are due 0800, 1400, and 2000 Q6h treatments are due 0800, 1400, 2000, 0200 Q4 wa teatments are due 0800, 1200, 1600, and 2000. Q4h treatments are due  0800, 1200, 1600, 2000, 0000, and 0400. The Level 1-5 rating system is only to be used as criteria for determining appropriate frequency of therapy. References:  
[address] Standard L    Respiratory Care Department Policy, Procedure and Protocol Guideline Manual, 1995, EFREN [place]  Therapist Driven Respiratory Care Protocols  A Practitioner's Guide for Criteria-Based Respiratory Care by Rachel Salazar M.D., and DESIRAE Emmanuel  The rationale for therapist-driven protocols: an update. Respiratory Care 1998; 29:535-714 L Therapist Driven Respiratory Care Protocols  A Practitioner's Guide for Criteria-Based Respiratory Care by Rachel Salazar M.D., and DESIRAE Emmanuel The rationale for therapist-driven protocols: an update. Respiratory Care 1998; [id number]. N   [facility] Clinical Practice Guidelines. D. The RCP will perform a respiratory assessment in the following manner: 1. Perform hand hygiene per hospital policy utilizing Standard Precautions for all patients and following transmission-based isolation as indicated per policy. 2. Identify patient via ID bracelet verifying patient name and birth date. 3. General observations: color, pattern and effort of breathing, chest expansion, (symmetrical and bilateral), level of consciousness and the ability to ambulate. 4. The RCP will assess patients cough ability and determine if Nasotracheal suctioning is needed. If patient is unable to produce sputum, at that time, the RCP should question the patient with regard to their sputum: production, color consistency, frequency and amount. 5. Auscultation: Using a stethoscope, the RCP will listen and note quality of breath sounds and presence or absence of adventitious breath sounds in all lung fields, both anteriorly and posteriorly. 6. Upon completing the EMR review and physical assessment, the RCP will document findings in the RT Assessment section of the EMR.  The score level will be provided and will be used to determine the frequency of therapy. V. Indications: A. Indications for Bronchial Hygiene Protocol will include: 1. Potential for or presence of atelectasis. 2. Need for hydration and removal of retained secretions. 3. Need for improvement of cough effectiveness. 4. Presence of conditions associated with disorder of pulmonary clearance: 
a. Cystic fibrosis b. Bronchiectasis B. Indications for Aerosolized Medication(s) Protocol should include: 1. Treatment of bronchospasm/wheezing 2. Improvement of mucociliary clearance 3. Treatment of stridor 4. History of Asthma or COPD 
           C.  Indications for Oxygen Therapy Protocol should include: 1. Documented hypoxemia 2. Severe trauma 3. Acute myocardial infarction 4. Short-term therapy (e.g. post anesthesia recovery) VI. Maintenance:   
D. Timely patient assessment is an integral part of this protocol therefore the following will be applied: 1. All non- critical care patients will be evaluated upon receiving initial respiratory care orders within 24 hours and re-evaluated within 48 hours (or more as needed). 2. Orders requesting a Respiratory Consult will be responded to in the following manner: 
a. In patient emergency situations, the RCP assigned to the floor will respond immediately to the patient, provide an initial respiratory assessment, and contact the patients physician as necessary for appropriate orders. b. In non-emergent situations, the RCP assigned to the floor will respond to the patient within 90 minutes and provide an initial respiratory assessment and contact patients physician as necessary for appropriate orders. c. An RCP will provide a comprehensive assessment as soon as possible. 3. Upon completion of an evaluation, the RCP will complete documentation in the patients EMR in the RT Assessment section.  
4. The RCP who completes the assessment will document orders for therapy in the orders section of the patients EMR selecting new order. Next, per protocol should be selected indicating it is a protocol order and sign orders should be selected to complete the process. 5. The Pharmacy and Therapeutics (P&T) Committee has mandated that the medication Xopenex may be changed to unit dose albuterol without an order, except for those patients receiving Xopenex due to cardiac arrhythmias. The dosage for these patients should be 0.63 mg. and may be changed from 1.25 mg. to 0.63 mg per P & T Committee by the RCP completing the assessment. 6. Patients who are not experiencing cardiac arrhythmias, and are ordered Xopenex and Atrovent may be changed to Duoneb. VII. Safety: A. The following safety issues shall be monitored: 1. The RCP will perform hand hygiene per hospital policy utilizing Standard Precautions for all patients and following transmission-based isolation as indicated per hospital policy. 2. The RCP must exercise professional judgment in classifying the patient for frequency of therapy. 3. Appropriate classification of the patient will require an evaluation utilizing the Therapy Assessment Protocol Guidelines. 4. The RCP will confer with the physician concerning the care of the patient at any time questions or problems arise. 5. If during therapy, the patient exhibits no improvement or deterioration in clinical status the RCP will notify the physician and the patients nurse. VIII. Interventions: A. The patients nurse is responsible concerning all items related to his/her care. Ongoing communication with nursing is essential to successful protocol management. B. The RCP recognizes the value of the team approach in meeting the patients needs. Nursing input regarding the patients pulmonary condition will be sought as needed. IX. Reportable conditions: The RCP will inform the physician if: 1. There are acute changes in patients respiratory status. 2. The therapist is unable to determine appropriate care plan upon assessment. 3. The patient fails to reach therapeutic objective. 4. A change or additional medication is needed. X.  Patient Education: A. Patient will receive instruction on the followin. The treatment modality, including objectives and proper technique of therapy 2. Respiratory medications B. Documentation shall occur in the patient education section of the patients EMR. XI. Documentation: Record all findings as described above in the patients EMR. Related Protocols: A. Aerosolized Medication Protocol B. Bronchial Hygiene C. Oxygen Protocol D. Volume Expansion/Secretion Clearance E. Ventilator Weaning Protocols References: 
[address] Standard L    Respiratory Care Department Policy, Procedure and Protocol Guideline Manual, , EFREN [place]  Therapist Driven Respiratory Care Protocols  A Practitioners Guide for Criteria-Based Respiratory Care by Brad Monroy M.D., and DESIRAE Ashby  The rationale for therapist-driven protocols: an update. Respiratory Care 1998; 31:807-675 N   [facility] Clinical Practice Guidelines. Respiratory Care Services Policy Number: -[id number] Title: Aerosolized Medication Protocol Effective Date: 10/1998 Revised Date: , , ,  Reviewed Date: / 03/15 , ,  I. Policy: The Aerosolized Medication Protocol shall by implemented by Respiratory Care Practitioners (RCP) for patients with orders to receive aerosol therapy with medication. II. Purpose: To open and maintain obstructed airways, the RCP, will utilize the following  
protocol to select the indicated aerosolized medication(s) and determine the most effective method of delivery to the patient. III. Patient Type:  All patients who are determined to meet aerosolized medication criteria as  
       outlined in this protocol. IV. Responsibility: Director, [address], registered Respiratory Care Practitioners (RCP's) with documented competency in the performance of respiratory therapeutic techniques. V. Equipment needed: Elyn Legacy I. Pulse oximeter J. AeroEclipse nebulizer K. Dry Powder Inhaler (DPI) VI. Protocol:  
G. The following conditions are accepted indications for aerosolized medication therapy. 5. Bronchospasm/wheezing 6. Impaired mucociliary clearance 7. Tracheobronchial mucosal congestion/and laryngeal stridor 8. Diseases which commonly require aerosolized medication therapy include, but are not limited to: 
f. Asthma/reactive airway disease 
g. Bronchitis/emphysema (COPD) h. Cystic fibrosis 
i. Severe laryngitis/tracheitis 
j. Bronchiectasis 
k. Smoke inhalation or chemical trauma to the lung or upper airway 
l. Physical trauma to the upper airway 
m. Laryngotracheobronchitis 
n. Bronchiolitis 
o. Non-specific wheezing B. Indications for bronchodilator medications will include: 
d. Bronchospasm/ wheezing 
e. Asthma/reactive airway disease 
f. Chronic obstructive pulmonary disease 
g. Obstructive defect on pulmonary function testing C. Administration of medications 5. If a bronchodilator or any other type of respiratory medication is needed, a physician order must be indicated in the medication section in the patient's EMR. 6. When the physician specifies the medication and dosage at the time of request, the ordered medication will be used as part of the care plan. D. The following guidelines will be utilized in the evaluation and selection of the appropriate delivery device for indicated medication(s): 
1. Unassisted aerosol (UA) is the preferred method of aerosol delivery and indicated if 
c. Ventilation is inadequate 
d. Patient demonstrates wheezing e. Routine treatments shall be given via the AeroEclipse nebulizer. f. The Aerogen nebulizer shall be used in the following circumstances: 
i. ER patients and they will continue with this nebulizer if admitted to 8th floor or ICU. 
ii. Patients in ICU  
iii. Patients on 8th floor with severe wheezing (at the RCP's discretion) 2. Dry PowderInhaler (DPI)  
d. Patient should be alert/cooperative 
e. Able to perform 3 second breath hold. f. Patient has used DPI therapy previously, either at home or in the hospital. 
g. Note: The only approved inhaler on formulary is Spiriva. VII. Guidelines:  
Monitor patient's vital signs and evaluate patient's clinical status. The need to change medication and/or modality may be indicated by: 5. A pulse greater than 120 bpm, or if a pulse increase of 20 bpm occurs with bronchodilator medications. 6. Significant worsening of dyspnea or wheezing occurring during or within 30 minutes of discontinuing therapy. 7. Worsening of patient's sensorium (e.g. patient becomes confused or obtunded, and unable to follow directions). 8. Worsening of patient's chest x-ray. 9. Change in sputum (e.g. increased pulmonary infiltrate, which might indicate need for volume expansion therapy). 10. Patient has difficulty coughing up secretions, which might indicate need for acetylcysteine and/or bronchial hygiene therapy. 11. Call physician immediately if dyspnea worsens and is not responsive to modifications allowed by protocol. VIII. Clinical Responsibility: 
2. The therapy assessment guidelines will be used to evaluate all patients receiving aerosolized medications with the exception of critical care areas. 5. RCP's will perform changes in therapy per protocol. 6. It will be the responsibility of RCP to provide instruction regarding respiratory medications, possible side effects, aerosol therapy and proper DPI technique, as well as, spacer usage to patients ordered DPI therapy. 7. Current therapy that is part of a patient's home regimen will not be discontinued. a. Provide spacer and educate patient on proper inhaler technique if needed. IX. Documentation D. Document assessment findings in the respiratory assessment section of the patient's EMR. E. Document changes in therapy per protocol in the respiratory orders section and in the care plan section of the patient's EMR. F. Document patient education in the patient education section of the patient's EMR. X. Outcome Criteria: 
I. Relief of wheezes and obstruction J. Improved cough and sputum color and consistency K. Improved chest x-ray L. Improved arterial oxygen tension and or SaO2 M. Improved Peak Flow on asthmatic patients XI. Related Protocols: J. Respiratory Patient Care Protocols K. Bronchial Hygiene Therapy L. Oxygen Protocol Reference: L - Respiratory Care Department Policy, Procedure and Protocol Guideline Manual, 1995, EFREN George. L -  Therapist Driven Respiratory Care Protocols  A Practitioner's Guide for Criteria-Based Respiratory Care by Calvin Marie M.D., and EFREN Ugarte, EDWAR. L - The rationale for therapist-driven protocols: an update. Respiratory Care 1998; [id number].  -[facility] Clinical Practice Guidelines. Respiratory Care Services Policy Number: -[id number] Title: Bronchial Hygiene Protocol Effective Date: 01/1999 Revised Date: 12/2014, 11/2017, 7/2019 Reviewed Date: 06/2013, 05/2014, 03/2015, 03/2016, 06/2017, 4/2018 I. Purpose: The Respiratory Care Practitioner (RCP) will utilize the following protocol to select and initiate bronchial hygiene therapy to open and maintain obstructed airways when indicated. II. Patients: All patients who are ordered bronchial hygiene therapy. III. Clinical Area: All general patient floors IV. Protocol: The following conditions or diseases are indications for bronchial hygiene therapy. L. Oscillating PEP Therapy Indications should include:  
9. Atelectasis caused by mucus plugging or foreign body 10. Chronic mucociliary clearance disorders 11. Retained secretions which may be associated with the following conditions: 
p. Bronchitis 
q. Bronchiectasis 
r. Pneumonia M. PAP- Positive airway pressure therapy Indications include: 7. Patients with post-operative atelectasis or to prevent post operative atelectasis. 8. Patients who cannot perform deep breathing exercises due to pain. 9. Patients requiring lung expansion therapy who cannot follow instructions. 10. Patients requiring lung expansion therapy with poor inspiratory capacity <10cc/kg. 11. Patients requiring aerosol therapy in conjunction with opening their airways. N. Vibratory / Acoustical Airway Clearance Therapy (ACT)- i.e. (Vibralung, Vest, or Percussor) Indications should include 12. Patient conditions  that involve retained secretions, increased mucus production and defective mucociliary clearance such as: 
h. Cystic fibrosis 
i. Chronic bronchitis 
j. Bronchiectasis 
k. Pneumonia 
l. Asthma 
m. Muscular dystrophy 
n. Post-operative atelectasis 
o. Neuromuscular respiratory impairments 
p. ACT may be considered in patients with COPD with 
symptomatic secretion retention, guided by patient preference, 
toleration, and effectiveness of therapy Chayito Che et al., 2013). O. Nasotracheal suctioning indications should include: 
8. Inability to cough effectively 9. Excessive secretions 10. Artificial airway V. Equipment: A. PEP therapy device B. Vest therapy equipment Twyla Masters Kalyn Cons F. NT suction equipment VI. Guidelines:   Monitor patient's vital signs and evaluate patient's clinical status. The need to  
                             change therapy modality may be indicated by: Marti Galeazzi in patient's sensorium (patient now confused or obtunded, and unable to follow directions). I. A significant deterioration is evident on patient's chest radiograph or increased sputum production. J. Increased thickening of secretions (e.g. mucolytic therapy may be indicated.) K. Development of wheezing L. Decrease in oxygen saturation M. Development of chest pain. VII. Clinical Responsibility: The Therapy Assessment Protocol guidelines will be used to  
             re-evaluate all patients on bronchial hygiene therapy (See Therapy Assessment Protocol). N. RCP's will perform changes in therapy according to protocol. Maria Muck O. Bronchial hygiene therapy may be discontinued when goals of therapy are met, e.g., secretions easily expectorated for 48 hours, atelectasis is resolved, etc. 
P. PAP Therapy may be utilized in place of IPPB therapy per discretion of the RCP, as approved by the Pulmonary Medicine and [facility] Shlomo Mustafa. VIII. Outcome Criteria:  Outcome criteria for bronchial hygiene therapy should include: M. Decrease in sputum production N. Improved breath sounds O. Improved arterial oxygen tension and/or SaO2 P. Improved chest X-ray Q. Subjective response to therapy IX. Documentation G. Document assessment findings in the respiratory assessment section of the patient's EMR. H. Document changes in therapy per protocol in the respiratory orders section and in the care plan section of the patient's EMR. I. Document patient education in the patient education section of the patient's EMR. X. Related Protocols: 3. Respiratory Patient Care Assessment Protocols 2. Aerosolized Medication Protocol 2. Oxygen Therapy Protocol Reference: L - Respiratory Care Department Policy, Procedure and Protocol Guideline Manual, [place], EFREN George. L - Therapist Driven Respiratory Care Protocols  A Practitioner's Guide for Criteria-Based Respiratory Care by Yang Cha M.D., and DESIRAE Vargas  [facility] Clinical Practice Guidelines. [place]., Jaquita Fothergill., Mayur Arango., Isabela Brock., Chloe Bain., . . . MATILDA Alicea (). [facility] Clinical Practice Guideline: Effectiveness of Nonpharmacologic Airway Clearance Therapies in Hospitalized Patients. Respiratory Care, 5812), [phone]. Retrieved 2019 Respiratory Care Services Policy Number: -[id number] Title: Noninvasive Positive Pressure                         Ventilation, (BIPAP VISION) and 
          Respironics (V60) Effective Date: 2005, 2017 Revised Date: 2012, 2014, 2015, 2016 I. Description: Non Invasive ventilation (NIV) is a ventilatory assist technique used as an alternative to endotracheal intubation. NIV is pressure ventilation delivered as BIPAP (Bi-level Positive Airway Pressure) which is a low pressure electronically driven device intended for use as a ventilatory support system for patients who have an intact respiratory drive. The device provides non-invasive ventilatory assistance through the use of a nasal or full face mask. BiPAP  (two levels of ventilatory support) known as inspired positive airway pressure (IPAP) and  positive airway support (EPAP). One level of support can also be delivered which is delivered as EPAP or CPAP which is delivered throughout the respiratory cycle. The aim is to maintain adequate ventilation and minimize the effort of breathing. The BIPAP Vision System and the Respironics V60 are the two systems available for non-invasive ventilation. These devices are also capable of being used for invasive ventilation in the critical care units only. BIPAP Vision: This device uses an electronic pressure control sensing monitor pressure differential in the patient circuit. This feedback allows for adjustment of the flow and pressure output to assist in inhalation or exhalation through the administration at two distinct levels of positive pressure. During inspiration, the level is variably positive and is always higher than the expiratory level. During exhalation, pressure is variably positive or near ambient. In addition, this device has the ability to compensate for leaks through automatic               adjustment of the trigger threshold. This capability allows for the application of BIPAP    for mask-applied ventilation assistance. Respironics V60 The Respironics V60 uses Auto-Trak technology to help ensure patient synchrony and therapy acceptance and is designed to address the specific challenges of NIV. By providing auto-adaptive leak compensation, inspiratory triggering, and expiratory cycling, Auto-Trak delivers optimal synchrony in the face of dynamic leak and changing patient demand. The Respironics V60 is designed to include pediatric use and is equipped with several modes, which allows the practitioner to meet the specific needs of the patients. See Appendix 1 for definitions of settings. II. Policy: The administration of non-invasive positive pressure ventilation (NPPV) will be the responsibility of the Respiratory Care Practitioner (RCP). Upon receipt of a physician order, proper patient instruction, set up and monitoring will be provided to ensure patient understanding, compliance and proper utilization of prescribed therapy. A. A patient may be allowed to use their own NPPV machine after having their equipment checked and approved by [employer]. B. A consent and Release for Home Ventilator form must be signed by the patient and witnessed by a health care provider if the patient chooses to use his home ventilator. C. A patient that is being treated for acute respiratory failure and placed on non-invasive ventilation must be placed in a critical care unit, per Medical Director. D.  A patient who is being treated for sleep apnea may be treated on the floors. E.   A patient has the option of using a hospital owned NPPV unit. F.   A patient may use a hospital unit and their own mask if they choose. G. Patients may be placed on full face mask with NPPV units on the hospital floors            under the following conditions: 1. Patient has an end stage disease process. 2. Patient was placed on full face mask and NPPV unit in the Critical Care Units and it has been established as safe and effective therapy. 3. Patient is ordered full face mask with NPPV unit for home use. 4. In the event patient is to be set up or transitioned to home on a NPPV unit, the Respironics V60 (in the AVAPS mode) shall be utilized while the patient is in the hospital. If a Evelia Carrizales is unavailable, a home NPPV unit may be provided by the DME provider for no more than 48 hours. III. Responsibility: Director, Leena Mustafa, and all Respiratory Care          Practitioners with documented competency. IV. Indications for non-invasive ventilation: A. Acute respiratory failure (Patient must be in Critical Care Unit) B. Chronic respiratory failure C. Alveolar hypoventilation D. Documented sleep apnea V. Contraindications: A. Patients with severe respiratory failure without a spontaneous respiratory drive B. Noninvasive ventilation may be contraindicated for patients with the followin. Inability to maintain a patent airway or adequately clear secretions 2. Acute sinusitis or otitis media 3. Risk for aspiration of gastric contents 4. Hypotension 5. Pre-existing pneumothorax or pneumomediastinum 6. Epistaxis 7. Recent facial, oral or skull surgery or trauma 8. history of allergy or sensitivity to mask materials where the risk from allergic reaction outweighs the benefit of ventilatory assistance C. Potential Complications: 
1. Cardiovascular compromise 2. Skin breakdown and discomfort from mask 3. Gastric distention 4. Increased intracranial pressure 5. Pulmonary barotraumas VI. Mask fit A. It is essential that the patient be correctly fitted with an appropriate size mask. 1. Choose mask according to sizing template on disposable setups. 2.  The mask should fit comfortably on the patients nose,  not occluding the            nares and the base of the mask should fit comfortably between the chin and        bottom lip see picture on setup guide. 3.  Headgear should fit comfortably not tight. The bottom edge of the headgear        should sit at the base of the nape of the neck with side straps underneath the        earlobes. 4. The face masks are better for patients who are dyspneic and tachypneic as            they tend to mouth breathe with a nasal mask and reduce the effectiveness          of the ventilation. 5. Masks that are too tight are uncomfortable and cause pressure areas and              masks that are too loose leak which reduce the efficiency of the system. 6. When using a nasal mask the patient must keep their mouth closed to obtain       the desired effect of the ventilation. 7. Place an Allevyn Gentle Border dressing over bridge of nose to avoid skin          breakdown. VII. Equipment for BIPAP Vision A. BIPAP Vision Ventilatory Support Unit B. BIPAP Vision disposable circuit with proximal pressure and exhalation port. C. Main flow bacterial filter D. Nasal or face mask and disposable head strap F. Smooth inner lumen tubing for connecting the humidifier system to the BIPAP unit. G. Pulse oximetry equipment and supplies H. Oxygen analyzer with circuit adapter I. Device specific humidification system which must be used when using BIPAP. VIII. Procedure for Non-invasive ventilation via BIPAP Vision: A. BIPAP Vision Set Up 1. Assemble the circuit with exhalation port proximal to the patient. 2.  A bacterial filter and oxygen analyzer should be place between the                             machines patient interface port and the patient circuit. If using the oxygen               module, connect to a 50 psi oxygen source. 3. Attach humidifier to the system. 4. Plug electrical cord in AC outlet. Press START on the back of the machine. 5. The Vision will perform a self-test as indicated by the display screen,                        System Self-Test in Progress.  6. Perform the Test Exhalation Port second button from top, left of screen. a) Insure that whistle port and pressure tubing are in line. b) Occlude end of whistle port at end of tubing throughout the test. 
c) Press START TEST, top button right screen; this tests the leak of the            circuit. 7. Assess appropriateness of physicians orders and set ventilatory parameters accordingly. Initial settings as well as changes to ventilatory parameters must be accompanied by a physician order. 8. Select the proper mode by first selecting the mode button at the bottom of screen. a) Choose CPAP or ST mode, top button, right side of screen per  order. b) Activate view mode by pressing the Health system button, bottom right of screen. 9. Select the parameters button below the screen. a) Choose a parameter from the left and right sides of screen. Press the soft button for the parameter of choice. Once it is highlighted, spin knob clockwise to increase value, and counter clockwise to decrease value in the parameter block. Repress the button for that particular parameter to activate the new value. b) Consult the BIPAP Vision Ventilatory Support System Clinical Manual for specific information on the modes of operation and set parameters. 10.   Select alarms button, below screen. Set values for Hi Pressure, Lo Pressure, Lo      Pressure Delay, Apnea, Lo Min Vent, Hi Rate, Lo Rate as appropriate for patient. Kat Bruner Procedure -Cleaning and Sterilization for the BIPAP Vision 1. Before cleaning the unit, turn the Start/Stop switch to the Stop position and unplug the electrical cord from the wall and from the rear of the unit. 2. Clean the front panel with water or 70% Isopropyl Alcohol only. Do not immerse the Vision unit in water. 3. Clean the exterior of the enclosure with a hospital approved disinfectant solution. Do not allow any liquid to enter the inside of the ventilator. 4. Refer to the BIPAP Vision Ventilatory Support System Clinical Manual, Chapter 15 Cleaning and Routine Maintenance and Replacing the [facility]. IX. Procedure for non-invasive ventilation using the V60: 
A. Set up machine circuit using disposable mask and circuit setups only. 1.  Ensure that there is always an exhalation port at the base of the mask for C02 to be . 2. Set mode and settings as per physician orders. 3.  See Appendix 1 for definitions of all modes, usual and alarm settings. 4. Set Alarms on machine. See usual alarm settings in appendix. 5.  Turn on machine and gently hold mask over nose/face until patient becomes accustomed to the airflow. 6. Attach the head strap. 7. Stay with patient until the 02 saturations and observations are stable. B. Monitor patients response for: 1. Decreased Heart rate, respiratory rate, and blood pressure. 2. Decreased sweating 3. Decreased work of breathing (as per baseline observations) 4. Patient feels more comfortable 5. Patient finds it easier to breathe X. Monitoring: A. While in use, the RCP should check the patient and non-invasive unit no less than every four hours. B. Failure of NIV should be suspected if: 1. The patient is unable to maintain adequate oxygenation, decreasing 02 saturations despite increases in 02. 
2. There is a reduction in neurological or conscious state. 3. The patient has excessive secretions or increasing respiratory rate. 4. Failure of PaCO2 or PH to improve on ABG sample. 5. The patient has poorly compliant lungs. XI. Weaning A. Weaning or cessation of non-invasive ventilation should occur under the following                        conditions: 1. PaC02 returns to normal and patient maintains O2 saturations with minimal                oxygen. 2. Respiratory rate is returned within normal limits. 3. Patient is unable to tolerate non-invasive ventilation. 4. Patients dyspnea is reduced. 5. Patient exhibits normal overnight ventilation. 6. Patient is receiving palliative treatment. XII. Documentation: A. Documentation should include the followin. Ventilator settings comply with physician order. 2. Ventilator is functioning properly as evidenced by a check of measured volumes, rates, pressures and FIO2. 3. Alarms are set appropriately. 4. Measured inspired gas temperature (invasive mode only) 5. Vital signs (pulse, respiratory rate, oxygen saturation, breath sounds) 6. Patient tolerance to therapy. 7. Manual resuscitator and appropriate size mask at patient bedside REFERENCES 
BIPAP Vision Ventilatory Support System Clinical Manual. 
 
[facility]  Therapy and Respiratory Care Section. HI Hardwick 2001. Introducing non-invasive positive pressure ventilation. Nursing Standard. 15,26, 42-45. [place]. 2003. Using non-invasive ventilation in acute wards: part 2. Nursing Standard. 18,1, 41-44. Denny 47. Use of continuous positive airway pressure (CPAP) in the critically ill-physiological principles. Critical Care 12 [phone]) 154-58 SARAH Hatch2002. Bi-level positive airway pressure (BiPAP) and acute cardiogenicpulmonary edema ( ACPO) in the emergency department. Critical Care 15 (2):51-63 OCTAVIO Gale2002. Non-invasive BiPap-implementation of a new service. Intensive and Critical Care Nursing 12,[phone] SARAH Leal,et al 2002. Non-invasive ventilation in acute respiratory failure. [place]ax 19:601-539 DEFINITIONS AND USUAL SETTINGS                                                            APPENDIX 1 Settings Settings in Active CPAP Settings in Active BiPAP Description Range Usual Setting Used in NIV 
 
  
 
CPAP Mode   Continuous Positive Airway Pressure 4-24 cmH20 8-14 ST or  BiPAP MODE  Spontaneous Timed or BiLevel Positive Airway Pressure Ventilation. Two level system of alternating during non- invasive ventilation in sync with breathing-set IPAP and EPAP   
 
__  
 
__ AVAPS Mode (only available  on V60)          The volume-targeted AVAPS (average volume-assured pressure support) mode combines the attributes of pressure-controlled and volume-targeted ventilation. __  
 
__  
 
 
EPAP 
 
         
 
            
 
        Positive Airway Pressure. Recruits under ventilated alveoli to remain open during expiration by providing a constant pressure throughout resp. cycle. Must be less than or equal to IPAP  
 4-25 cmH20 5-14 Settings Settings in Active CPAP Settings in Active BiPAP Description Range Usual Setting Used in NIV  
 
IPAP  Inspired Positive Airway Pressure provides pressure throughout the inspiratory phase to support patient ventilation  4-40 cmH20 10-20 I-time  Inspiratory time- time taken to inspire in seconds  0.3  3.0 sec 1:3  
FIO2          Oxygen delivered  21- 100% As ordered Ramp time             
 
 
       The Ramp Time function helps your patient adapt to ventilation by gradually increasing inspiratory and expiratory pressure over a set interval (minutes). This time gradually delivers pressures so to reduce patient anxiety and increases comfort. Off 
or 
5-45 minutes 10 minutes Rate (RR)          Patients respiratory rate 4- 60 BPM 4 Rise time          Speed at which the inspiratory pressure rises to the set pressure. 1-5 
(1 is the fastest) Set at 3 Patient Data Data Description Range Usual setting in NIV Breath phase/trigger Indicator  Bar in left hand corner. Colored according to breath trigger. n/a n/a  
PIP Positive Inspiratory pressure  0-50 n/a Patient total leak Est. or unintentional leak  0-200 n/a Patient trigger Patient triggered breaths as a percentage  0-100% Should be 100% Respiratory rate Respiratory rate 0-90 n/a Ti/Ttot Inspiratory duty cycle or inspiratory time divided by total cycle time  0-91% n/a Minute volume Est. minute ventilation. TV x rate=MV  0-99 LPM n/a Tidal volume Est.  tidal volume  0-3000 ml n/a Alarms Alarm Description Range Set  at Hi Rate High respiratory rate 5-90 10 breaths above patients breath rate Low Rate Low respiratory rate alarm 1-89 BPM 5 breaths below patients breath rate Hi VT High tidal volume alarm 200-2500 ml 200 ml above patients own Low VT Low tidal volume alarm OFF  1500 ml OFF  
HIP High Inspiratory pressure alarm 5-50 cm H20 10 cm above IPAP  
LIP Low inspiratory pressure alarm OFF, 1-40 cmH20 OFF Lo VE Low minute vent alarm OFF, 0.1 to 99L/min OFF  
LIP T Low inspiratory delay time 5-60 seconds 5 seconds CPAP Settings BiPAP Settings Set CPAP level as ordered Set breath rate as ordered Set FIO2 as ordered Set I-time as 1.3 Set Ramp time as ordered Set EPAP as ordered Set C-Flex at 3 Set IPAP as ordered Set Rise time as ordered Set FIO2 as ordered Res[facility] Services Consent and Release for Home Ventilator Patient Name: _________________________________________ Room: ___________ 
 
SSN: _______________________________           Account Number:  __________________ Use and care of my personal home ventilator, (invasive or non-invasive) mechanical life support device while at [facility] system has been discussed with me by my physician and I have been provided with an opportunity to ask questions which have been answered to my satisfaction. I also understand a respiratory care practitioner is not expected to remain in my room or general vicinity at all times. It is understood that every precaution consistent with the best medical practice will be taken and I give Respiratory Care Services permission to monitor my ventilator during my hospital stay. I hereby release [facility] system, its agents, employees and medical staff from liability arising from any and all claims, demands, losses and damages to person and/or property as a result of the above mentioned requests. I certify that I have read and understand this consent agreement and release and that I am legally qualified to danyelle this authorization. ___________________ Date 
 
_____________________________________        ________________________ Signature of Patient or Parent (of minor patient,                  Relationship (if other than Patient) halfway parent) if applicable. Closest Relative, 
Guardian or legal Representative 
 
_____________________________________ Witness Legal representative is defined as person named by the court as a guardian, executor or , or having power of  specifically set forth to authorize this action. [phone] (3/02, 7/14)   Consent and Release for Home Ventilator

## 2020-08-18 NOTE — PROGRESS NOTES
Problem: Risk for Spread of Infection  Goal: Prevent transmission of infectious organism to others  Description: Prevent the transmission of infectious organisms to other patients, staff members, and visitors. Outcome: Progressing Towards Goal     Problem: Falls - Risk of  Goal: *Absence of Falls  Description: Document Rosette [place] Fall Risk and appropriate interventions in the flowsheet.   Outcome: Progressing Towards Goal  Note: Fall Risk Interventions:            Medication Interventions: Evaluate medications/consider consulting pharmacy                   Problem: Gas Exchange - Impaired  Goal: *Absence of hypoxia  Outcome: Progressing Towards Goal

## 2020-08-18 NOTE — PROGRESS NOTES
Patient awake resting in recliner. Respirations present. On 3.5 L NC.  NO signs of distress. No needs expressed. Bed low and locked. Call light within reach. Bedside report given to oncoming RN, Horacio Solo.

## 2020-08-18 NOTE — PROGRESS NOTES
Name: Alexandre Pulido MRN: [id number]  : 1955  Age:65 y.o.  male  Admit Date:  2020 LOS: 7      Hospitalist Progress Note    Alexandre Pulido is a 76 y.o. male with medical history significant for morbid obesity, HTN, DM2, CKD3, COPD, AFIB on eliquis s/p AVN ablation/ PPM, RONAK on CPAP, HFpEF, recent COVID-19 infection (20) who presented due to and progressive malaise, dyspnea despite being on zpack per PMD.  CXR shows vascular congestion. Resting O2 sats are stable though he desats with ambulation to 87%. Patient was admitted for dyspnea and desaturation on exertion. Not a candidate for remdesivir per ID. Received convalescent plasma on . Course of dexamethasone from  - . Subjective (20) :  Patient is seen and examined at bedside. No acute events reported overnight by nursing staff. Patient reports feeling better today. Currently on 4L NC and saturating at 92%. Reports that he feels short of breath when ambulating still but believed it has improved since admission. Patient denies fever, chills, chest pains, shortness of breath at rest, n/v, abdominal pain. Tolerating diet and having BM.       ROS: 10 point review of systems is otherwise negative with the exception of the elements mentioned above.     Objective:    Patient Vitals for the past 24 hrs:   Temp Pulse Resp BP SpO2   20 1527 97.8 °F (36.6 °C) 76 19 140/72 93 %   20 1121 97.9 °F (36.6 °C) 73 18 125/88 91 %   20 0756 97.4 °F (36.3 °C) 74 20 125/72 94 %   20 0740     90 %   20 0311 97.5 °F (36.4 °C) 76 20 139/80 96 %   20 2330 98.4 °F (36.9 °C) 78 20 119/78 95 %   20 2151     93 %   20 2017 98.6 °F (37 °C) 74 19 129/83 92 %     Oxygen Therapy  O2 Sat (%): 93 % (20 1527)  Pulse via Oximetry: 76 beats per minute (20 0740)  O2 Device: Nasal cannula (20 0740)  O2 Flow Rate (L/min): 4 l/min (20 0740)  FIO2 (%): 32 % (20 6173)    Estimated body mass index is 45 kg/m² as calculated from the following:    Height as of this encounter: 5' 10.98\" (1.803 m). Weight as of this encounter: 146.3 kg (322 lb 8.5 oz). Intake/Output Summary (Last 24 hours) at 8/18/2020 1603  Last data filed at 8/18/2020 6185  Gross per 24 hour   Intake 716 ml   Output    Net 716 ml       *Note that automatically entered I/Os may not be accurate; dependent on patient compliance with collection and accurate  by techs. Physical Exam:   General:     alert, awake, no acute distress. Well nourished. Obese  Head:   normocephalic, atraumatic  Eyes, Ears, nose: PERRL, EOMI. Normal conjunctiva  Neck:    supple, non-tender. Trachea midline. Lungs:   Slight diffuse wheezing with , no wheezing, rhonchi, rales  Cardiac:   RRR, Normal S1 and S2. Abdomen:   Soft, distended, nontender, +BS, no guarding/rebound  Extremities:   Warm, dry. No edema   Skin:   No rashes, no jaundice  Neuro:  AAOx3.  No gross focal neurological deficit  Psychiatric:  No anxiety, calm, cooperative    Data Review:  I have reviewed all labs, meds, and studies from the last 24 hours:    Recent Results (from the past 24 hour(s))   GLUCOSE, POC    Collection Time: 08/17/20  4:19 PM   Result Value Ref Range    Glucose (POC) 335 (H) 65 - 100 mg/dL   GLUCOSE, POC    Collection Time: 08/17/20  8:23 PM   Result Value Ref Range    Glucose (POC) 381 (H) 65 - 100 mg/dL   GLUCOSE, POC    Collection Time: 08/18/20  5:51 AM   Result Value Ref Range    Glucose (POC) 120 (H) 65 - 100 mg/dL   GLUCOSE, POC    Collection Time: 08/18/20 11:17 AM   Result Value Ref Range    Glucose (POC) 165 (H) 65 - 100 mg/dL   CBC WITH AUTOMATED DIFF    Collection Time: 08/18/20 12:00 PM   Result Value Ref Range    WBC 16.3 (H) 4.3 - 11.1 K/uL    RBC 5.40 4.23 - 5.6 M/uL    HGB 13.6 13.6 - 17.2 g/dL    HCT 41.9 41.1 - 50.3 %    MCV 77.6 (L) 79.6 - 97.8 FL    MCH 25.2 (L) 26.1 - 32.9 PG    MCHC 32.5 31.4 - 35.0 g/dL RDW 16.5 (H) 11.9 - 14.6 %    PLATELET 694 208 - 843 K/uL    MPV 10.8 9.4 - 12.3 FL    ABSOLUTE NRBC 0.00 0.0 - 0.2 K/uL    DF AUTOMATED      NEUTROPHILS 87 (H) 43 - 78 %    LYMPHOCYTES 8 (L) 13 - 44 %    MONOCYTES 4 4.0 - 12.0 %    EOSINOPHILS 0 (L) 0.5 - 7.8 %    BASOPHILS 0 0.0 - 2.0 %    IMMATURE GRANULOCYTES 1 0.0 - 5.0 %    ABS. NEUTROPHILS 14.1 (H) 1.7 - 8.2 K/UL    ABS. LYMPHOCYTES 1.4 0.5 - 4.6 K/UL    ABS. MONOCYTES 0.6 0.1 - 1.3 K/UL    ABS. EOSINOPHILS 0.0 0.0 - 0.8 K/UL    ABS. BASOPHILS 0.0 0.0 - 0.2 K/UL    ABS. IMM. GRANS. 0.2 0.0 - 0.5 K/UL   METABOLIC PANEL, COMPREHENSIVE    Collection Time: 08/18/20 12:00 PM   Result Value Ref Range    Sodium 137 136 - 145 mmol/L    Potassium 4.1 3.5 - 5.1 mmol/L    Chloride 103 98 - 107 mmol/L    CO2 26 21 - 32 mmol/L    Anion gap 8 7 - 16 mmol/L    Glucose 162 (H) 65 - 100 mg/dL    BUN 38 (H) 8 - 23 MG/DL    Creatinine 1.54 (H) 0.8 - 1.5 MG/DL    GFR est AA 58 (L) >60 ml/min/1.73m2    GFR est non-AA 48 (L) >60 ml/min/1.73m2    Calcium 9.3 8.3 - 10.4 MG/DL    Bilirubin, total 0.4 0.2 - 1.1 MG/DL    ALT (SGPT) 46 12 - 65 U/L    AST (SGOT) 27 15 - 37 U/L    Alk.  phosphatase 100 50 - 136 U/L    Protein, total 8.0 6.3 - 8.2 g/dL    Albumin 2.6 (L) 3.2 - 4.6 g/dL    Globulin 5.4 (H) 2.3 - 3.5 g/dL    A-G Ratio 0.5 (L) 1.2 - 3.5          All Micro Results     None          Current Meds:  Current Facility-Administered Medications   Medication Dose Route Frequency    insulin glargine (LANTUS) injection 50 Units  50 Units SubCUTAneous QHS    insulin glargine (LANTUS) injection 55 Units  55 Units SubCUTAneous DAILY    dexamethasone (DECADRON) 10 mg/mL injection 6 mg  6 mg IntraVENous DAILY    albuterol (PROVENTIL HFA, VENTOLIN HFA, PROAIR HFA) inhaler 2 Puff  2 Puff Inhalation Q4H PRN    sodium chloride (NS) flush 5-40 mL  5-40 mL IntraVENous Q8H    sodium chloride (NS) flush 5-40 mL  5-40 mL IntraVENous PRN    allopurinoL (ZYLOPRIM) tablet 300 mg  300 mg Oral DAILY    amLODIPine (NORVASC) tablet 5 mg  5 mg Oral DAILY    apixaban (ELIQUIS) tablet 5 mg  5 mg Oral BID    atorvastatin (LIPITOR) tablet 10 mg  10 mg Oral DAILY    budesonide-formoteroL (SYMBICORT) 160-4.5 mcg/actuation HFA inhaler 2 Puff  2 Puff Inhalation BID RT    carvediloL (COREG) tablet 12.5 mg  12.5 mg Oral BID WITH MEALS    loratadine (CLARITIN) tablet 10 mg  10 mg Oral DAILY    pantoprazole (PROTONIX) tablet 40 mg  40 mg Oral ACB    torsemide (DEMADEX) tablet 20 mg  20 mg Oral BID    sodium chloride (NS) flush 5-40 mL  5-40 mL IntraVENous Q8H    sodium chloride (NS) flush 5-40 mL  5-40 mL IntraVENous PRN    acetaminophen (TYLENOL) tablet 650 mg  650 mg Oral Q6H PRN    Or    acetaminophen (TYLENOL) suppository 650 mg  650 mg Rectal Q6H PRN    polyethylene glycol (MIRALAX) packet 17 g  17 g Oral DAILY PRN    ondansetron (ZOFRAN) injection 4 mg  4 mg IntraVENous Q6H PRN    insulin lispro (HUMALOG) injection   SubCUTAneous AC&HS    0.9% sodium chloride infusion 250 mL  250 mL IntraVENous PRN    ascorbic acid (vitamin C) (VITAMIN C) tablet 500 mg  500 mg Oral TID         Other Studies:  Xr Chest Sngl V    Result Date: 8/11/2020  PORTABLE CHEST 1 VIEW HISTORY: Cough COMPARISON: October 2, 2009 FINDINGS: EKG leads are present. A cardiac pacemaker device is present. There is pulmonary vascular congestion with retrocardiac consolidation. IMPRESSION: Pulmonary vascular congestion with retrocardiac atelectasis or consolidation.       Assessment:    Active Hospital Problems    Diagnosis Date Noted    COVID-19 08/11/2020    Pulmonary hypertension ([facility] [address] 08/11/2020    Morbid obesity ([facility] [address] 08/11/2020    Atrial fibrillation ([facility] [address] 08/11/2020    RONAK on CPAP 08/11/2020    Type 2 diabetes mellitus with stage 3 chronic kidney disease, with long-term current use of insulin ([facility] [address] 11/10/2017    CKD (chronic kidney disease) stage 3, GFR 30-59 ml/min ([facility]) 05/05/2017    Benign essential hypertension  Atrioventricular block, complete ([facility] [address] 04/14/2016    Edema of both lower extremities due to peripheral venous insufficiency 12/29/2015    Chronic obstructive pulmonary disease (HCC)      uses inhalers no oxygen         Plan:    Covid 19 virus infection  Tested positive 8/5. Convalescent plasma on 8/11. Not a candidate for remdesivir.  - Supplemental oxygen, wean as able  - continue with dexamethasone (8/12 - )  - continue with vitamin C  - holding zinc due to renal function  - albuterol MDI    COPD: stable, continue with nebs  RONAK: CPAP  Gout: continue allopurinol and colchicine    T2DM  - ISS and lantus    HTN // Afib // HFpEF: continue with apixaban, coreg, norvasc, demedex  CKD 3: stable        Diet:  DIET DIABETIC CONSISTENT CARB  DIET NUTRITIONAL SUPPLEMENTS  DVT PPx: apixaban   Code: Full Code  Dispo: home  Estimated Discharge: 48-72hrs    Labs/Imaging Reviewed. Patient is HIGH risk due to current condition and comorbid conditions as well as requiring frequent monitoring and high risk of decline. Plan discussed with staff, patient/family and are in agreement. Time Spent: Greater than 45 minutes was spent in reviewing charts, physical exam, discussion with patient, and answered any questions.     Signed By: Slick Brambila MD     August 18, 2020

## 2020-08-18 NOTE — PROGRESS NOTES
This RN called lab and spoke to Andres to request phlebotomist to draw active labs. Andres said she is not sure when someone would be able to get up here but that she would send a phlebotomist a text message to ask them to do labs.

## 2020-08-18 NOTE — PROGRESS NOTES
Received report from Joel Mccann, [facility]. Patient awake resting in bed. Respirations present. On bi-pap at this time. AxO x4. No needs expressed. No signs of distress. Bed low and locked. Call light within reach. Will continue to monitor. Per previous RN Son Hyatt for labs was sent to lab.   This RN called lab and spoke to [facility] to request phlebotomist.

## 2020-08-19 LAB
ALBUMIN SERPL-MCNC: 2.7 G/DL (ref 3.2–4.6)
ALBUMIN/GLOB SERPL: 0.5 {RATIO} (ref 1.2–3.5)
ALP SERPL-CCNC: 100 U/L (ref 50–136)
ALT SERPL-CCNC: 43 U/L (ref 12–65)
ANION GAP SERPL CALC-SCNC: 8 MMOL/L (ref 7–16)
AST SERPL-CCNC: 22 U/L (ref 15–37)
BASOPHILS # BLD: 0 K/UL (ref 0–0.2)
BASOPHILS NFR BLD: 0 % (ref 0–2)
BILIRUB SERPL-MCNC: 0.5 MG/DL (ref 0.2–1.1)
BUN SERPL-MCNC: 42 MG/DL (ref 8–23)
CALCIUM SERPL-MCNC: 9.3 MG/DL (ref 8.3–10.4)
CHLORIDE SERPL-SCNC: 102 MMOL/L (ref 98–107)
CO2 SERPL-SCNC: 31 MMOL/L (ref 21–32)
CREAT SERPL-MCNC: 1.59 MG/DL (ref 0.8–1.5)
DIFFERENTIAL METHOD BLD: ABNORMAL
EOSINOPHIL # BLD: 0 K/UL (ref 0–0.8)
EOSINOPHIL NFR BLD: 0 % (ref 0.5–7.8)
ERYTHROCYTE [DISTWIDTH] IN BLOOD BY AUTOMATED COUNT: 16.5 % (ref 11.9–14.6)
GLOBULIN SER CALC-MCNC: 5.3 G/DL (ref 2.3–3.5)
GLUCOSE BLD STRIP.AUTO-MCNC: 115 MG/DL (ref 65–100)
GLUCOSE BLD STRIP.AUTO-MCNC: 190 MG/DL (ref 65–100)
GLUCOSE BLD STRIP.AUTO-MCNC: 318 MG/DL (ref 65–100)
GLUCOSE BLD STRIP.AUTO-MCNC: 339 MG/DL (ref 65–100)
GLUCOSE SERPL-MCNC: 118 MG/DL (ref 65–100)
HCT VFR BLD AUTO: 41.9 % (ref 41.1–50.3)
HGB BLD-MCNC: 13.4 G/DL (ref 13.6–17.2)
IMM GRANULOCYTES # BLD AUTO: 0.1 K/UL (ref 0–0.5)
IMM GRANULOCYTES NFR BLD AUTO: 1 % (ref 0–5)
LYMPHOCYTES # BLD: 1.2 K/UL (ref 0.5–4.6)
LYMPHOCYTES NFR BLD: 10 % (ref 13–44)
MCH RBC QN AUTO: 25.2 PG (ref 26.1–32.9)
MCHC RBC AUTO-ENTMCNC: 32 G/DL (ref 31.4–35)
MCV RBC AUTO: 78.8 FL (ref 79.6–97.8)
MONOCYTES # BLD: 0.7 K/UL (ref 0.1–1.3)
MONOCYTES NFR BLD: 6 % (ref 4–12)
NEUTS SEG # BLD: 10 K/UL (ref 1.7–8.2)
NEUTS SEG NFR BLD: 83 % (ref 43–78)
NRBC # BLD: 0 K/UL (ref 0–0.2)
PLATELET # BLD AUTO: 525 K/UL (ref 150–450)
PMV BLD AUTO: 10.4 FL (ref 9.4–12.3)
POTASSIUM SERPL-SCNC: 3.5 MMOL/L (ref 3.5–5.1)
PROT SERPL-MCNC: 8 G/DL (ref 6.3–8.2)
RBC # BLD AUTO: 5.32 M/UL (ref 4.23–5.6)
SODIUM SERPL-SCNC: 141 MMOL/L (ref 136–145)
WBC # BLD AUTO: 12.1 K/UL (ref 4.3–11.1)

## 2020-08-19 PROCEDURE — 94760 N-INVAS EAR/PLS OXIMETRY 1: CPT

## 2020-08-19 PROCEDURE — 74011636637 HC RX REV CODE- 636/637: Performed by: INTERNAL MEDICINE

## 2020-08-19 PROCEDURE — 94660 CPAP INITIATION&MGMT: CPT

## 2020-08-19 PROCEDURE — 74011636637 HC RX REV CODE- 636/637: Performed by: FAMILY MEDICINE

## 2020-08-19 PROCEDURE — 94761 N-INVAS EAR/PLS OXIMETRY MLT: CPT

## 2020-08-19 PROCEDURE — 77010033678 HC OXYGEN DAILY

## 2020-08-19 PROCEDURE — 85025 COMPLETE CBC W/AUTO DIFF WBC: CPT

## 2020-08-19 PROCEDURE — 80053 COMPREHEN METABOLIC PANEL: CPT

## 2020-08-19 PROCEDURE — 74011250636 HC RX REV CODE- 250/636: Performed by: FAMILY MEDICINE

## 2020-08-19 PROCEDURE — 94640 AIRWAY INHALATION TREATMENT: CPT

## 2020-08-19 PROCEDURE — 74011250637 HC RX REV CODE- 250/637: Performed by: INTERNAL MEDICINE

## 2020-08-19 PROCEDURE — 65660000000 HC RM CCU STEPDOWN

## 2020-08-19 PROCEDURE — 82962 GLUCOSE BLOOD TEST: CPT

## 2020-08-19 RX ADMIN — LORATADINE 10 MG: 10 TABLET ORAL at 08:17

## 2020-08-19 RX ADMIN — APIXABAN 5 MG: 5 TABLET, FILM COATED ORAL at 08:17

## 2020-08-19 RX ADMIN — OXYCODONE HYDROCHLORIDE AND ACETAMINOPHEN 500 MG: 500 TABLET ORAL at 08:17

## 2020-08-19 RX ADMIN — BUDESONIDE AND FORMOTEROL FUMARATE DIHYDRATE 2 PUFF: 160; 4.5 AEROSOL RESPIRATORY (INHALATION) at 08:39

## 2020-08-19 RX ADMIN — CARVEDILOL 12.5 MG: 12.5 TABLET, FILM COATED ORAL at 08:17

## 2020-08-19 RX ADMIN — OXYCODONE HYDROCHLORIDE AND ACETAMINOPHEN 500 MG: 500 TABLET ORAL at 20:19

## 2020-08-19 RX ADMIN — OXYCODONE HYDROCHLORIDE AND ACETAMINOPHEN 500 MG: 500 TABLET ORAL at 17:12

## 2020-08-19 RX ADMIN — Medication 10 ML: at 20:20

## 2020-08-19 RX ADMIN — ATORVASTATIN CALCIUM 10 MG: 10 TABLET, FILM COATED ORAL at 08:17

## 2020-08-19 RX ADMIN — CARVEDILOL 12.5 MG: 12.5 TABLET, FILM COATED ORAL at 17:12

## 2020-08-19 RX ADMIN — Medication 10 ML: at 13:17

## 2020-08-19 RX ADMIN — INSULIN LISPRO 8 UNITS: 100 INJECTION, SOLUTION INTRAVENOUS; SUBCUTANEOUS at 17:12

## 2020-08-19 RX ADMIN — INSULIN LISPRO 8 UNITS: 100 INJECTION, SOLUTION INTRAVENOUS; SUBCUTANEOUS at 20:52

## 2020-08-19 RX ADMIN — INSULIN GLARGINE 55 UNITS: 100 INJECTION, SOLUTION SUBCUTANEOUS at 08:18

## 2020-08-19 RX ADMIN — BUDESONIDE AND FORMOTEROL FUMARATE DIHYDRATE 2 PUFF: 160; 4.5 AEROSOL RESPIRATORY (INHALATION) at 19:35

## 2020-08-19 RX ADMIN — TORSEMIDE 20 MG: 20 TABLET ORAL at 17:12

## 2020-08-19 RX ADMIN — DEXAMETHASONE SODIUM PHOSPHATE 6 MG: 10 INJECTION INTRAMUSCULAR; INTRAVENOUS at 08:17

## 2020-08-19 RX ADMIN — TORSEMIDE 20 MG: 20 TABLET ORAL at 08:17

## 2020-08-19 RX ADMIN — INSULIN GLARGINE 50 UNITS: 100 INJECTION, SOLUTION SUBCUTANEOUS at 20:52

## 2020-08-19 RX ADMIN — AMLODIPINE BESYLATE 5 MG: 5 TABLET ORAL at 08:17

## 2020-08-19 RX ADMIN — ALLOPURINOL 300 MG: 300 TABLET ORAL at 08:17

## 2020-08-19 RX ADMIN — APIXABAN 5 MG: 5 TABLET, FILM COATED ORAL at 20:19

## 2020-08-19 RX ADMIN — Medication 10 ML: at 13:18

## 2020-08-19 RX ADMIN — INSULIN LISPRO 2 UNITS: 100 INJECTION, SOLUTION INTRAVENOUS; SUBCUTANEOUS at 11:21

## 2020-08-19 RX ADMIN — Medication 10 ML: at 06:10

## 2020-08-19 RX ADMIN — PANTOPRAZOLE SODIUM 40 MG: 40 TABLET, DELAYED RELEASE ORAL at 06:12

## 2020-08-19 RX ADMIN — Medication 10 ML: at 06:11

## 2020-08-19 NOTE — PROGRESS NOTES
Oxygen Qualifier       Room air: SpO2 with O2 and liter flow   Resting SpO2  86%  88% on 1L, 91% on 2L   Ambulating SpO2   89% on 2L, 92% on 3L       Completed by:    Gerson Miller, RT

## 2020-08-19 NOTE — PROGRESS NOTES
08/19/20 0110   Oxygen Therapy   O2 Sat (%) 93 %   Pulse via Oximetry 82 beats per minute   O2 Device BIPAP   FIO2 (%) 50 %   Respiratory   Respiratory (WDL) WDL   Chest/Tracheal Assessment Chest expansion, symmetrical   Breath Sounds Bilateral Diminished;Clear   Cough Non-productive   CPAP/BIPAP   CPAP/BIPAP Start/Stop On   Device Mode BIPAP   $$ Bipap Daily Yes   Mask Type and Size Large   Skin Condition intact   PIP Observed 20 cm H20   IPAP (cm H2O) 20 cm H2O   EPAP (cm H2O) 16 cm H2O   Inspiratory Time (sec) 0.9 seconds   Vt Spont (ml) 532 ml   Ve Observed (l/min) 15.5 l/min   Backup Rate 15   Total RR (Spontaneous) 29 breaths per minute   Insp Rise Time (sec) 3   Leak (Estimated) 0 L/min   Pt's Home Machine No   Biomedical Check Performed Yes   Settings Verified Yes   Alarm Settings   High Pressure 30   Low Pressure 5   Apnea 20   Low Ve 2   High Rate 60   Low Rate 5   Pulmonary Toilet   Pulmonary Toilet Cough and deep breath;H. O.B elevated

## 2020-08-19 NOTE — PROGRESS NOTES
Name: Chris Mendoza MRN: [id number]  : 1955  Age:65 y.o.  male  Admit Date:  2020 LOS: 8      Hospitalist Progress Note    Chris Mendoza is a 76 y.o. male with medical history significant for morbid obesity, HTN, DM2, CKD3, COPD, AFIB on eliquis s/p AVN ablation/ PPM, RONAK on CPAP, HFpEF, recent COVID-19 infection (20) who presented due to and progressive malaise, dyspnea despite being on zpack per PMD.  CXR shows vascular congestion. Resting O2 sats are stable though he desats with ambulation to 87%. Patient was admitted for dyspnea and desaturation on exertion. Not a candidate for remdesivir per ID. Received convalescent plasma on . Course of dexamethasone from  - . Subjective (20) :  Patient is seen and examined at bedside. No acute events reported overnight by nursing staff. Currently on 3L NC and saturating at 94%. Able to ambulate in the room and with PT with minimal distress. Still requiring O2 ~3L on ambulation. Patient denies fever, chills, chest pains, shortness of breath at rest, n/v, abdominal pain. Tolerating diet and having BM.     ROS: 10 point review of systems is otherwise negative with the exception of the elements mentioned above.     Objective:    Patient Vitals for the past 24 hrs:   Temp Pulse Resp BP SpO2   20 1133 97.6 °F (36.4 °C) 75 20 107/73 92 %   20 0839     91 %   20 0800 97.9 °F (36.6 °C) 73 22 112/56 96 %   20 0331 98.2 °F (36.8 °C) 87 19 150/65 92 %   20 0110     93 %   20 2331 98.3 °F (36.8 °C) 78 20 148/68 91 %   20 98.3 °F (36.8 °C) 71 20 126/79 93 %   20 1527 97.8 °F (36.6 °C) 76 19 140/72 93 %     Oxygen Therapy  O2 Sat (%): 92 % (20 1133)  Pulse via Oximetry: 80 beats per minute (20)  O2 Device: Nasal cannula (20)  O2 Flow Rate (L/min): 2 l/min (20)  FIO2 (%): 50 % (20 0110)    Estimated body mass index is 45 kg/m² as calculated from the following:    Height as of this encounter: 5' 10.98\" (1.803 m). Weight as of this encounter: 146.3 kg (322 lb 8.5 oz). Intake/Output Summary (Last 24 hours) at 8/19/2020 1424  Last data filed at 8/19/2020 0844  Gross per 24 hour   Intake 476 ml   Output    Net 476 ml       *Note that automatically entered I/Os may not be accurate; dependent on patient compliance with collection and accurate  by techs. Physical Exam:   General:     alert, awake, no acute distress. Well nourished. Obese  Head:   normocephalic, atraumatic  Eyes, Ears, nose: PERRL, EOMI. Normal conjunctiva  Neck:    supple, non-tender. Trachea midline. Lungs:   Expiratory wheezing with slight crackles   Cardiac:   RRR, Normal S1 and S2. Abdomen:   Soft, distended, nontender, +BS  Extremities:   Warm, dry. No edema   Skin:   No rashes, no jaundice  Neuro:  AAOx3.  No gross focal neurological deficit  Psychiatric:  No anxiety, calm, cooperative    Data Review:  I have reviewed all labs, meds, and studies from the last 24 hours:    Recent Results (from the past 24 hour(s))   GLUCOSE, POC    Collection Time: 08/18/20  4:27 PM   Result Value Ref Range    Glucose (POC) 325 (H) 65 - 100 mg/dL   GLUCOSE, POC    Collection Time: 08/18/20  8:16 PM   Result Value Ref Range    Glucose (POC) 323 (H) 65 - 100 mg/dL   CBC WITH AUTOMATED DIFF    Collection Time: 08/19/20  3:44 AM   Result Value Ref Range    WBC 12.1 (H) 4.3 - 11.1 K/uL    RBC 5.32 4.23 - 5.6 M/uL    HGB 13.4 (L) 13.6 - 17.2 g/dL    HCT 41.9 41.1 - 50.3 %    MCV 78.8 (L) 79.6 - 97.8 FL    MCH 25.2 (L) 26.1 - 32.9 PG    MCHC 32.0 31.4 - 35.0 g/dL    RDW 16.5 (H) 11.9 - 14.6 %    PLATELET 735 (H) 730 - 450 K/uL    MPV 10.4 9.4 - 12.3 FL    ABSOLUTE NRBC 0.00 0.0 - 0.2 K/uL    DF AUTOMATED      NEUTROPHILS 83 (H) 43 - 78 %    LYMPHOCYTES 10 (L) 13 - 44 %    MONOCYTES 6 4.0 - 12.0 %    EOSINOPHILS 0 (L) 0.5 - 7.8 %    BASOPHILS 0 0.0 - 2.0 %    IMMATURE GRANULOCYTES 1 0.0 - 5.0 %    ABS. NEUTROPHILS 10.0 (H) 1.7 - 8.2 K/UL    ABS. LYMPHOCYTES 1.2 0.5 - 4.6 K/UL    ABS. MONOCYTES 0.7 0.1 - 1.3 K/UL    ABS. EOSINOPHILS 0.0 0.0 - 0.8 K/UL    ABS. BASOPHILS 0.0 0.0 - 0.2 K/UL    ABS. IMM. GRANS. 0.1 0.0 - 0.5 K/UL   METABOLIC PANEL, COMPREHENSIVE    Collection Time: 08/19/20  3:44 AM   Result Value Ref Range    Sodium 141 136 - 145 mmol/L    Potassium 3.5 3.5 - 5.1 mmol/L    Chloride 102 98 - 107 mmol/L    CO2 31 21 - 32 mmol/L    Anion gap 8 7 - 16 mmol/L    Glucose 118 (H) 65 - 100 mg/dL    BUN 42 (H) 8 - 23 MG/DL    Creatinine 1.59 (H) 0.8 - 1.5 MG/DL    GFR est AA 56 (L) >60 ml/min/1.73m2    GFR est non-AA 47 (L) >60 ml/min/1.73m2    Calcium 9.3 8.3 - 10.4 MG/DL    Bilirubin, total 0.5 0.2 - 1.1 MG/DL    ALT (SGPT) 43 12 - 65 U/L    AST (SGOT) 22 15 - 37 U/L    Alk.  phosphatase 100 50 - 136 U/L    Protein, total 8.0 6.3 - 8.2 g/dL    Albumin 2.7 (L) 3.2 - 4.6 g/dL    Globulin 5.3 (H) 2.3 - 3.5 g/dL    A-G Ratio 0.5 (L) 1.2 - 3.5     GLUCOSE, POC    Collection Time: 08/19/20  5:40 AM   Result Value Ref Range    Glucose (POC) 115 (H) 65 - 100 mg/dL   GLUCOSE, POC    Collection Time: 08/19/20 11:07 AM   Result Value Ref Range    Glucose (POC) 190 (H) 65 - 100 mg/dL        All Micro Results     None          Current Meds:  Current Facility-Administered Medications   Medication Dose Route Frequency    insulin glargine (LANTUS) injection 50 Units  50 Units SubCUTAneous QHS    insulin glargine (LANTUS) injection 55 Units  55 Units SubCUTAneous DAILY    dexamethasone (DECADRON) 10 mg/mL injection 6 mg  6 mg IntraVENous DAILY    albuterol (PROVENTIL HFA, VENTOLIN HFA, PROAIR HFA) inhaler 2 Puff  2 Puff Inhalation Q4H PRN    sodium chloride (NS) flush 5-40 mL  5-40 mL IntraVENous Q8H    sodium chloride (NS) flush 5-40 mL  5-40 mL IntraVENous PRN    allopurinoL (ZYLOPRIM) tablet 300 mg  300 mg Oral DAILY    amLODIPine (NORVASC) tablet 5 mg  5 mg Oral DAILY    apixaban (ELIQUIS) tablet 5 mg  5 mg Oral BID    atorvastatin (LIPITOR) tablet 10 mg  10 mg Oral DAILY    budesonide-formoteroL (SYMBICORT) 160-4.5 mcg/actuation HFA inhaler 2 Puff  2 Puff Inhalation BID RT    carvediloL (COREG) tablet 12.5 mg  12.5 mg Oral BID WITH MEALS    loratadine (CLARITIN) tablet 10 mg  10 mg Oral DAILY    pantoprazole (PROTONIX) tablet 40 mg  40 mg Oral ACB    torsemide (DEMADEX) tablet 20 mg  20 mg Oral BID    sodium chloride (NS) flush 5-40 mL  5-40 mL IntraVENous Q8H    sodium chloride (NS) flush 5-40 mL  5-40 mL IntraVENous PRN    acetaminophen (TYLENOL) tablet 650 mg  650 mg Oral Q6H PRN    Or    acetaminophen (TYLENOL) suppository 650 mg  650 mg Rectal Q6H PRN    polyethylene glycol (MIRALAX) packet 17 g  17 g Oral DAILY PRN    ondansetron (ZOFRAN) injection 4 mg  4 mg IntraVENous Q6H PRN    insulin lispro (HUMALOG) injection   SubCUTAneous AC&HS    0.9% sodium chloride infusion 250 mL  250 mL IntraVENous PRN    ascorbic acid (vitamin C) (VITAMIN C) tablet 500 mg  500 mg Oral TID         Other Studies:  Xr Chest Sngl V    Result Date: 8/11/2020  PORTABLE CHEST 1 VIEW HISTORY: Cough COMPARISON: October 2, 2009 FINDINGS: EKG leads are present. A cardiac pacemaker device is present. There is pulmonary vascular congestion with retrocardiac consolidation. IMPRESSION: Pulmonary vascular congestion with retrocardiac atelectasis or consolidation.       Assessment:    Active Hospital Problems    Diagnosis Date Noted    COVID-19 08/11/2020    Pulmonary hypertension ([facility] [address] 08/11/2020    Morbid obesity ([facility] [address] 08/11/2020    Atrial fibrillation ([facility] [address] 08/11/2020    RONAK on CPAP 08/11/2020    Type 2 diabetes mellitus with stage 3 chronic kidney disease, with long-term current use of insulin ([address] 11/10/2017    CKD (chronic kidney disease) stage 3, GFR 30-59 ml/min ([facility] 05/05/2017    Benign essential hypertension     Atrioventricular block, complete ([address] 04/14/2016    Edema of both lower extremities due to peripheral venous insufficiency 12/29/2015    Chronic obstructive pulmonary disease (HCC)      uses inhalers no oxygen         Plan:    Covid 19 virus infection  Tested positive 8/5. Convalescent plasma on 8/11. Not a candidate for remdesivir.  - Supplemental oxygen, wean as able. Currently at 3L NC  - continue with dexamethasone (8/12 - )  - continue with vitamin C  - holding zinc due to renal function  - albuterol MDI    COPD: stable, continue with nebs  RONAK: CPAP  Gout: continue allopurinol and colchicine    T2DM  - ISS and lantus    HTN // Afib // HFpEF: continue with apixaban, coreg, norvasc, demedex  CKD 3: stable      Diet:  DIET DIABETIC CONSISTENT CARB  DIET NUTRITIONAL SUPPLEMENTS  DVT PPx: apixaban   Code: Full Code  Dispo: home  Estimated Discharge: 48hrs pending O2 approval from [place]    Labs/Imaging Reviewed. Patient is HIGH risk due to current condition and comorbid conditions as well as requiring frequent monitoring and high risk of decline. Plan discussed with staff, patient/family and are in agreement. Time Spent: Greater than 45 minutes was spent in reviewing charts, physical exam, discussion with patient, and answered any questions.     Signed By: Shin Paz MD     August 19, 2020

## 2020-08-19 NOTE — PROGRESS NOTES
Pt up in chair all day, breathing unlabored on 2L NC, up to bathroom independently without dyspnea but requires 3L with ambulation. Denies pain, complaints, or needs at this time.

## 2020-08-19 NOTE — PROGRESS NOTES
Patient resting quietly in chair at this time. Respirations regular and unlabored. Call light within reach.

## 2020-08-19 NOTE — PROGRESS NOTES
Home Oxygen faxed to [address] [place] [address] at [phone]  for referral CM also placed f/u call  To confirm fax had been sent. [phone]  left with Alena at   Pending approval from the [place] referral  CM will continue to follow. PT and OT consults placed for discharge planning.   Pending results  CM will continue to follow

## 2020-08-20 VITALS
WEIGHT: 315 LBS | HEART RATE: 72 BPM | OXYGEN SATURATION: 95 % | RESPIRATION RATE: 18 BRPM | BODY MASS INDEX: 44.1 KG/M2 | SYSTOLIC BLOOD PRESSURE: 136 MMHG | DIASTOLIC BLOOD PRESSURE: 84 MMHG | TEMPERATURE: 97.9 F | HEIGHT: 71 IN

## 2020-08-20 LAB
ALBUMIN SERPL-MCNC: 2.6 G/DL (ref 3.2–4.6)
ALBUMIN/GLOB SERPL: 0.5 {RATIO} (ref 1.2–3.5)
ALP SERPL-CCNC: 96 U/L (ref 50–136)
ALT SERPL-CCNC: 41 U/L (ref 12–65)
ANION GAP SERPL CALC-SCNC: 7 MMOL/L (ref 7–16)
AST SERPL-CCNC: 23 U/L (ref 15–37)
BASOPHILS # BLD: 0 K/UL (ref 0–0.2)
BASOPHILS NFR BLD: 0 % (ref 0–2)
BILIRUB SERPL-MCNC: 0.5 MG/DL (ref 0.2–1.1)
BUN SERPL-MCNC: 42 MG/DL (ref 8–23)
CALCIUM SERPL-MCNC: 9 MG/DL (ref 8.3–10.4)
CHLORIDE SERPL-SCNC: 103 MMOL/L (ref 98–107)
CO2 SERPL-SCNC: 30 MMOL/L (ref 21–32)
CREAT SERPL-MCNC: 1.55 MG/DL (ref 0.8–1.5)
DIFFERENTIAL METHOD BLD: ABNORMAL
EOSINOPHIL # BLD: 0 K/UL (ref 0–0.8)
EOSINOPHIL NFR BLD: 0 % (ref 0.5–7.8)
ERYTHROCYTE [DISTWIDTH] IN BLOOD BY AUTOMATED COUNT: 16.6 % (ref 11.9–14.6)
GLOBULIN SER CALC-MCNC: 5.3 G/DL (ref 2.3–3.5)
GLUCOSE BLD STRIP.AUTO-MCNC: 112 MG/DL (ref 65–100)
GLUCOSE BLD STRIP.AUTO-MCNC: 134 MG/DL (ref 65–100)
GLUCOSE BLD STRIP.AUTO-MCNC: 232 MG/DL (ref 65–100)
GLUCOSE BLD STRIP.AUTO-MCNC: 261 MG/DL (ref 65–100)
GLUCOSE SERPL-MCNC: 156 MG/DL (ref 65–100)
HCT VFR BLD AUTO: 41.2 % (ref 41.1–50.3)
HGB BLD-MCNC: 13.2 G/DL (ref 13.6–17.2)
IMM GRANULOCYTES # BLD AUTO: 0.1 K/UL (ref 0–0.5)
IMM GRANULOCYTES NFR BLD AUTO: 1 % (ref 0–5)
LYMPHOCYTES # BLD: 1.3 K/UL (ref 0.5–4.6)
LYMPHOCYTES NFR BLD: 11 % (ref 13–44)
MCH RBC QN AUTO: 25.4 PG (ref 26.1–32.9)
MCHC RBC AUTO-ENTMCNC: 32 G/DL (ref 31.4–35)
MCV RBC AUTO: 79.2 FL (ref 79.6–97.8)
MONOCYTES # BLD: 0.8 K/UL (ref 0.1–1.3)
MONOCYTES NFR BLD: 6 % (ref 4–12)
NEUTS SEG # BLD: 10.3 K/UL (ref 1.7–8.2)
NEUTS SEG NFR BLD: 82 % (ref 43–78)
NRBC # BLD: 0 K/UL (ref 0–0.2)
PLATELET # BLD AUTO: 528 K/UL (ref 150–450)
PMV BLD AUTO: 10.4 FL (ref 9.4–12.3)
POTASSIUM SERPL-SCNC: 3.6 MMOL/L (ref 3.5–5.1)
PROT SERPL-MCNC: 7.9 G/DL (ref 6.3–8.2)
RBC # BLD AUTO: 5.2 M/UL (ref 4.23–5.6)
SODIUM SERPL-SCNC: 140 MMOL/L (ref 136–145)
WBC # BLD AUTO: 12.6 K/UL (ref 4.3–11.1)

## 2020-08-20 PROCEDURE — 82962 GLUCOSE BLOOD TEST: CPT

## 2020-08-20 PROCEDURE — 97530 THERAPEUTIC ACTIVITIES: CPT

## 2020-08-20 PROCEDURE — 94640 AIRWAY INHALATION TREATMENT: CPT

## 2020-08-20 PROCEDURE — 85025 COMPLETE CBC W/AUTO DIFF WBC: CPT

## 2020-08-20 PROCEDURE — 97165 OT EVAL LOW COMPLEX 30 MIN: CPT

## 2020-08-20 PROCEDURE — 74011250637 HC RX REV CODE- 250/637: Performed by: INTERNAL MEDICINE

## 2020-08-20 PROCEDURE — 74011636637 HC RX REV CODE- 636/637: Performed by: INTERNAL MEDICINE

## 2020-08-20 PROCEDURE — 97161 PT EVAL LOW COMPLEX 20 MIN: CPT

## 2020-08-20 PROCEDURE — 80053 COMPREHEN METABOLIC PANEL: CPT

## 2020-08-20 PROCEDURE — 74011636637 HC RX REV CODE- 636/637: Performed by: FAMILY MEDICINE

## 2020-08-20 PROCEDURE — 74011250636 HC RX REV CODE- 250/636: Performed by: FAMILY MEDICINE

## 2020-08-20 RX ORDER — DEXAMETHASONE 6 MG/1
6 TABLET ORAL
Qty: 2 TAB | Refills: 0 | Status: SHIPPED | OUTPATIENT
Start: 2020-08-21 | End: 2020-08-23

## 2020-08-20 RX ADMIN — OXYCODONE HYDROCHLORIDE AND ACETAMINOPHEN 500 MG: 500 TABLET ORAL at 16:19

## 2020-08-20 RX ADMIN — Medication 10 ML: at 06:00

## 2020-08-20 RX ADMIN — CARVEDILOL 12.5 MG: 12.5 TABLET, FILM COATED ORAL at 08:17

## 2020-08-20 RX ADMIN — Medication 10 ML: at 15:55

## 2020-08-20 RX ADMIN — INSULIN LISPRO 4 UNITS: 100 INJECTION, SOLUTION INTRAVENOUS; SUBCUTANEOUS at 11:23

## 2020-08-20 RX ADMIN — OXYCODONE HYDROCHLORIDE AND ACETAMINOPHEN 500 MG: 500 TABLET ORAL at 08:10

## 2020-08-20 RX ADMIN — APIXABAN 5 MG: 5 TABLET, FILM COATED ORAL at 08:11

## 2020-08-20 RX ADMIN — INSULIN GLARGINE 55 UNITS: 100 INJECTION, SOLUTION SUBCUTANEOUS at 08:09

## 2020-08-20 RX ADMIN — AMLODIPINE BESYLATE 5 MG: 5 TABLET ORAL at 08:10

## 2020-08-20 RX ADMIN — BUDESONIDE AND FORMOTEROL FUMARATE DIHYDRATE 2 PUFF: 160; 4.5 AEROSOL RESPIRATORY (INHALATION) at 08:12

## 2020-08-20 RX ADMIN — TORSEMIDE 20 MG: 20 TABLET ORAL at 16:19

## 2020-08-20 RX ADMIN — PANTOPRAZOLE SODIUM 40 MG: 40 TABLET, DELAYED RELEASE ORAL at 06:30

## 2020-08-20 RX ADMIN — Medication 10 ML: at 08:08

## 2020-08-20 RX ADMIN — ATORVASTATIN CALCIUM 10 MG: 10 TABLET, FILM COATED ORAL at 08:10

## 2020-08-20 RX ADMIN — CARVEDILOL 12.5 MG: 12.5 TABLET, FILM COATED ORAL at 16:19

## 2020-08-20 RX ADMIN — DEXAMETHASONE SODIUM PHOSPHATE 6 MG: 10 INJECTION INTRAMUSCULAR; INTRAVENOUS at 08:11

## 2020-08-20 RX ADMIN — INSULIN LISPRO 6 UNITS: 100 INJECTION, SOLUTION INTRAVENOUS; SUBCUTANEOUS at 16:41

## 2020-08-20 RX ADMIN — LORATADINE 10 MG: 10 TABLET ORAL at 08:10

## 2020-08-20 RX ADMIN — TORSEMIDE 20 MG: 20 TABLET ORAL at 08:10

## 2020-08-20 RX ADMIN — ALLOPURINOL 300 MG: 300 TABLET ORAL at 08:11

## 2020-08-20 NOTE — DISCHARGE SUMMARY
Hospitalist Discharge Summary     Admit Date:  2020  2:36 PM   Name:  Juan Gutierrez   Age:  76 y.o.  :  1955   MRN:  [id number]   PCP:  Willis Meredith DO  Treatment Team: Attending Provider: Sam Valera MD; Utilization Review: Anthony Valera; Care Manager: Miranda Elliott RN; Primary Nurse: Savita Hart; Primary Nurse: Surekha Ojeda    Problem List for this Hospitalization:  Active Hospital Problems    Diagnosis Date Noted    COVID-19 2020    Pulmonary hypertension ([address] 2020    Morbid obesity ([address] 2020    Atrial fibrillation ([address] 2020    RONAK on CPAP 2020    Type 2 diabetes mellitus with stage 3 chronic kidney disease, with long-term current use of insulin ([address] 11/10/2017    CKD (chronic kidney disease) stage 3, GFR 30-59 ml/min ([address] 2017    Benign essential hypertension     Atrioventricular block, complete ([address] 2016    Edema of both lower extremities due to peripheral venous insufficiency 2015    Chronic obstructive pulmonary disease ([address]      uses inhalers no oxygen           Hospital Course :  Please refer to the admission H&P for details of presentation. In summary, Juan Gutierrez is a 76 y.o. male with past medical history significant for  morbid obesity, HTN, DM2, CKD3, COPD, AFIB on eliquis s/p AVN ablation/ PPM, RONAK on CPAP, HFpEF, recent COVID-19 infection (20) who presented due to and progressive malaise, dyspnea despite being on zpack per PMD.  CXR shows vascular congestion. Resting O2 sats are stable though he desats with ambulation to 87%. Patient was admitted for dyspnea and desaturation on exertion. Not a candidate for remdesivir per ID. Received convalescent plasma on . Course of dexamethasone from  - . RT evaluated patient and patient qualifies for O2 at rest and ambulation. Patient is medically stable. O2 has been setup by CM and patient is being discharged home today.  He has no needs per PT's evaluation. Disposition: Home or Self Care  Activity: Activity as tolerated  Diet: DIET DIABETIC CONSISTENT CARB Regular  DIET NUTRITIONAL SUPPLEMENTS HS Snack; Other (snack)  Code Status: Full Code      Follow up instructions, discharge meds at bottom of this note. Plan was discussed with patient/family. All questions answered. Patient was stable at time of discharge. Patient will call a physician or return if any concerns. Diagnostic Imaging/Tests:   Xr Chest Sngl V    Result Date: 8/11/2020  PORTABLE CHEST 1 VIEW HISTORY: Cough COMPARISON: October 2, 2009 FINDINGS: EKG leads are present. A cardiac pacemaker device is present. There is pulmonary vascular congestion with retrocardiac consolidation. IMPRESSION: Pulmonary vascular congestion with retrocardiac atelectasis or consolidation. Echocardiogram results:  No results found for this visit on 08/11/20.     Procedures done this admission:  * No surgery found *    All Micro Results     None          Labs: Results:       BMP, Mg, Phos Recent Labs     08/20/20 0348 08/19/20  0344 08/18/20  1200    141 137   K 3.6 3.5 4.1    102 103   CO2 30 31 26   AGAP 7 8 8   BUN 42* 42* 38*   CREA 1.55* 1.59* 1.54*   CA 9.0 9.3 9.3   * 118* 162*      CBC Recent Labs     08/20/20 0348 08/19/20  0344 08/18/20  1200   WBC 12.6* 12.1* 16.3*   RBC 5.20 5.32 5.40   HGB 13.2* 13.4* 13.6   HCT 41.2 41.9 41.9   * 525* 424   GRANS 82* 83* 87*   LYMPH 11* 10* 8*   EOS 0* 0* 0*   MONOS 6 6 4   BASOS 0 0 0   IG 1 1 1   ANEU 10.3* 10.0* 14.1*   ABL 1.3 1.2 1.4   SUE 0.0 0.0 0.0   ABM 0.8 0.7 0.6   ABB 0.0 0.0 0.0   AIG 0.1 0.1 0.2      LFT Recent Labs     08/20/20 0348 08/19/20  0344 08/18/20  1200   ALT 41 43 46   AP 96 100 100   TP 7.9 8.0 8.0   ALB 2.6* 2.7* 2.6*   GLOB 5.3* 5.3* 5.4*   AGRAT 0.5* 0.5* 0.5*      Cardiac Testing No results found for: BNPP, BNP, CPK, RCK1, RCK2, RCK3, RCK4, CKMB, CKNDX, CKND1, TROPT, TROIQ Coagulation Tests Lab Results   Component Value Date/Time    Prothrombin time 18.3 (H) 08/11/2020 03:11 PM    Prothrombin time 13.7 07/01/2020 06:34 AM    Prothrombin time 17.4 (H) 06/29/2020 10:21 AM    INR 1.5 08/11/2020 03:11 PM    INR 1.0 07/01/2020 06:34 AM    INR 1.4 06/29/2020 10:21 AM      A1c Lab Results   Component Value Date/Time    Hemoglobin A1c 8.4 (H) 02/20/2018 09:04 AM    Hemoglobin A1c 7.5 (H) 10/13/2016 08:36 AM    Hemoglobin A1c 8.3 (H) 07/18/2016 08:36 AM    Hemoglobin A1c, External 7.4 11/05/2014      Lipid Panel Lab Results   Component Value Date/Time    Cholesterol, total 99 (L) 05/10/2019 10:43 AM    HDL Cholesterol 25 (L) 05/10/2019 10:43 AM    LDL, calculated 30 05/10/2019 10:43 AM    VLDL, calculated 44 (H) 05/10/2019 10:43 AM    Triglyceride 221 (H) 05/10/2019 10:43 AM      Thyroid Panel Lab Results   Component Value Date/Time    TSH 0.849 08/11/2020 03:11 PM        Most Recent UA Lab Results   Component Value Date/Time    WBC 0-3 05/01/2020 05:32 PM    RBC 0 05/01/2020 05:32 PM    Epithelial cells 0 05/01/2020 05:32 PM    Bacteria 0 05/01/2020 05:32 PM    Casts 0 05/01/2020 05:32 PM        Allergies   Allergen Reactions    Cozaar [Losartan] Angioedema    Lisinopril Angioedema    Morphine Anaphylaxis and Swelling    Pcn [Penicillins] Anaphylaxis     Previous RX for cephalosporin tolerated     Immunization History   Administered Date(s) Administered    Influenza High Dose Vaccine PF 10/02/2019    Influenza Vaccine 10/19/2012, 10/08/2013, 10/06/2014, 10/03/2016, 09/28/2018    Influenza Vaccine (Quad) Mdck Pf 10/18/2017    Influenza Vaccine PF 10/02/2015    Pneumococcal Conjugate (PCV-13) 05/10/2019    Pneumococcal Vaccine (Unspecified Type) 01/01/2007    TB Skin Test (PPD) Intradermal 08/11/2020    Td 01/01/2003    Tdap 10/08/2013       All Labs from Last 24 Hrs:  Recent Results (from the past 24 hour(s))   GLUCOSE, POC    Collection Time: 08/19/20  4:29 PM   Result Value Ref Range    Glucose (POC) 318 (H) 65 - 100 mg/dL   GLUCOSE, POC    Collection Time: 08/19/20  8:24 PM   Result Value Ref Range    Glucose (POC) 339 (H) 65 - 100 mg/dL   CBC WITH AUTOMATED DIFF    Collection Time: 08/20/20  3:48 AM   Result Value Ref Range    WBC 12.6 (H) 4.3 - 11.1 K/uL    RBC 5.20 4.23 - 5.6 M/uL    HGB 13.2 (L) 13.6 - 17.2 g/dL    HCT 41.2 41.1 - 50.3 %    MCV 79.2 (L) 79.6 - 97.8 FL    MCH 25.4 (L) 26.1 - 32.9 PG    MCHC 32.0 31.4 - 35.0 g/dL    RDW 16.6 (H) 11.9 - 14.6 %    PLATELET 184 (H) 892 - 450 K/uL    MPV 10.4 9.4 - 12.3 FL    ABSOLUTE NRBC 0.00 0.0 - 0.2 K/uL    DF AUTOMATED      NEUTROPHILS 82 (H) 43 - 78 %    LYMPHOCYTES 11 (L) 13 - 44 %    MONOCYTES 6 4.0 - 12.0 %    EOSINOPHILS 0 (L) 0.5 - 7.8 %    BASOPHILS 0 0.0 - 2.0 %    IMMATURE GRANULOCYTES 1 0.0 - 5.0 %    ABS. NEUTROPHILS 10.3 (H) 1.7 - 8.2 K/UL    ABS. LYMPHOCYTES 1.3 0.5 - 4.6 K/UL    ABS. MONOCYTES 0.8 0.1 - 1.3 K/UL    ABS. EOSINOPHILS 0.0 0.0 - 0.8 K/UL    ABS. BASOPHILS 0.0 0.0 - 0.2 K/UL    ABS. IMM. GRANS. 0.1 0.0 - 0.5 K/UL   METABOLIC PANEL, COMPREHENSIVE    Collection Time: 08/20/20  3:48 AM   Result Value Ref Range    Sodium 140 136 - 145 mmol/L    Potassium 3.6 3.5 - 5.1 mmol/L    Chloride 103 98 - 107 mmol/L    CO2 30 21 - 32 mmol/L    Anion gap 7 7 - 16 mmol/L    Glucose 156 (H) 65 - 100 mg/dL    BUN 42 (H) 8 - 23 MG/DL    Creatinine 1.55 (H) 0.8 - 1.5 MG/DL    GFR est AA 58 (L) >60 ml/min/1.73m2    GFR est non-AA 48 (L) >60 ml/min/1.73m2    Calcium 9.0 8.3 - 10.4 MG/DL    Bilirubin, total 0.5 0.2 - 1.1 MG/DL    ALT (SGPT) 41 12 - 65 U/L    AST (SGOT) 23 15 - 37 U/L    Alk.  phosphatase 96 50 - 136 U/L    Protein, total 7.9 6.3 - 8.2 g/dL    Albumin 2.6 (L) 3.2 - 4.6 g/dL    Globulin 5.3 (H) 2.3 - 3.5 g/dL    A-G Ratio 0.5 (L) 1.2 - 3.5     GLUCOSE, POC    Collection Time: 08/20/20  5:28 AM   Result Value Ref Range    Glucose (POC) 134 (H) 65 - 100 mg/dL   GLUCOSE, POC    Collection Time: 08/20/20  7:15 AM Result Value Ref Range    Glucose (POC) 112 (H) 65 - 100 mg/dL   GLUCOSE, POC    Collection Time: 08/20/20 11:15 AM   Result Value Ref Range    Glucose (POC) 232 (H) 65 - 100 mg/dL       Current Med List in Hospital:   Current Facility-Administered Medications   Medication Dose Route Frequency    insulin glargine (LANTUS) injection 50 Units  50 Units SubCUTAneous QHS    insulin glargine (LANTUS) injection 55 Units  55 Units SubCUTAneous DAILY    dexamethasone (DECADRON) 10 mg/mL injection 6 mg  6 mg IntraVENous DAILY    albuterol (PROVENTIL HFA, VENTOLIN HFA, PROAIR HFA) inhaler 2 Puff  2 Puff Inhalation Q4H PRN    sodium chloride (NS) flush 5-40 mL  5-40 mL IntraVENous Q8H    sodium chloride (NS) flush 5-40 mL  5-40 mL IntraVENous PRN    allopurinoL (ZYLOPRIM) tablet 300 mg  300 mg Oral DAILY    amLODIPine (NORVASC) tablet 5 mg  5 mg Oral DAILY    apixaban (ELIQUIS) tablet 5 mg  5 mg Oral BID    atorvastatin (LIPITOR) tablet 10 mg  10 mg Oral DAILY    budesonide-formoteroL (SYMBICORT) 160-4.5 mcg/actuation HFA inhaler 2 Puff  2 Puff Inhalation BID RT    carvediloL (COREG) tablet 12.5 mg  12.5 mg Oral BID WITH MEALS    loratadine (CLARITIN) tablet 10 mg  10 mg Oral DAILY    pantoprazole (PROTONIX) tablet 40 mg  40 mg Oral ACB    torsemide (DEMADEX) tablet 20 mg  20 mg Oral BID    sodium chloride (NS) flush 5-40 mL  5-40 mL IntraVENous Q8H    sodium chloride (NS) flush 5-40 mL  5-40 mL IntraVENous PRN    acetaminophen (TYLENOL) tablet 650 mg  650 mg Oral Q6H PRN    Or    acetaminophen (TYLENOL) suppository 650 mg  650 mg Rectal Q6H PRN    polyethylene glycol (MIRALAX) packet 17 g  17 g Oral DAILY PRN    ondansetron (ZOFRAN) injection 4 mg  4 mg IntraVENous Q6H PRN    insulin lispro (HUMALOG) injection   SubCUTAneous AC&HS    0.9% sodium chloride infusion 250 mL  250 mL IntraVENous PRN    ascorbic acid (vitamin C) (VITAMIN C) tablet 500 mg  500 mg Oral TID       Discharge Exam:  Patient Vitals for the past 24 hrs:   Temp Pulse Resp BP SpO2   08/20/20 1533 97.9 °F (36.6 °C) 72 18 136/84 95 %   08/20/20 1124 97.5 °F (36.4 °C) 73 18 128/84 93 %   08/20/20 0812     92 %   08/20/20 0739 97.4 °F (36.3 °C) 72 18 129/87 97 %   08/20/20 0248 97.9 °F (36.6 °C) 70 19 119/79 95 %   08/19/20 2253 98.3 °F (36.8 °C) 79 19 120/69 98 %   08/19/20 2032 97.9 °F (36.6 °C) 71 20 121/79 93 %   08/19/20 1935     94 %     Oxygen Therapy  O2 Sat (%): 95 % (08/20/20 1533)  Pulse via Oximetry: 76 beats per minute (08/20/20 0812)  O2 Device: Nasal cannula (08/20/20 0812)  O2 Flow Rate (L/min): 3 l/min (08/20/20 0812)  FIO2 (%): 32 % (08/19/20 1935)    Estimated body mass index is 45 kg/m² as calculated from the following:    Height as of this encounter: 5' 10.98\" (1.803 m). Weight as of this encounter: 146.3 kg (322 lb 8.5 oz). Intake/Output Summary (Last 24 hours) at 8/20/2020 1611  Last data filed at 8/20/2020 1300  Gross per 24 hour   Intake 400 ml   Output    Net 400 ml       *Note that automatically entered I/Os may not be accurate; dependent on patient compliance with collection and accurate  by assistants. Physical Exam:   General:                     alert, awake, no acute distress. Well nourished. Obese  Head:                          normocephalic, atraumatic  Eyes, Ears, nose:      PERRL, EOMI. Normal conjunctiva  Neck:                          supple, non-tender. Trachea midline. Lungs:                        Expiratory wheezing with slight crackles   Cardiac:                      RRR, Normal S1 and S2. Abdomen:                  Soft, distended, nontender, +BS  Extremities:               Warm, dry. No edema   Skin:                           No rashes, no jaundice  Neuro:              AAOx3.  No gross focal neurological deficit  Psychiatric:                No anxiety, calm, cooperative         Discharge Info:   Current Discharge Medication List      START taking these medications Details   dexAMETHasone (DECADRON) 6 mg tablet Take 1 Tab by mouth Daily (before breakfast) for 2 days. Qty: 2 Tab, Refills: 0         CONTINUE these medications which have NOT CHANGED    Details   ipratropium-albuteroL (Combivent Respimat)  mcg/actuation inhaler Take 1 Puff by inhalation every six (6) hours as needed for Wheezing. potassium chloride (KLOR-CON) 10 mEq tablet Take 1 Tab by mouth two (2) times a day. Qty: 180 Tab, Refills: 1      torsemide (DEMADEX) 20 mg tablet TAKE ONE TABLET BY MOUTH TWICE A DAY  Qty: 180 Tab, Refills: 3    Associated Diagnoses: Edema of both legs      atorvastatin (LIPITOR) 10 mg tablet Take 1 Tab by mouth daily. Qty: 90 Tab, Refills: 3    Associated Diagnoses: Hypercholesterolemia      Lantus U-100 Insulin 100 unit/mL injection 44 Units by SubCUTAneous route two (2) times a day. Adjust by 2 units every 3 days to keep fasting BG . Max 130 units per day. Qty: 4 Vial, Refills: 11    Associated Diagnoses: Type 2 diabetes mellitus with stage 3 chronic kidney disease, with long-term current use of insulin ([facility])      Trulicity 1.5 YF/2.3 mL sub-q pen 0.5 mL by SubCUTAneous route every seven (7) days. Qty: 4 Each, Refills: 11    Associated Diagnoses: Type 2 diabetes mellitus with stage 3 chronic kidney disease, with long-term current use of insulin ([facility])      metFORMIN (GLUCOPHAGE) 500 mg tablet Take 1 Tab by mouth two (2) times daily (with meals). Qty: 180 Tab, Refills: 3    Associated Diagnoses: Type 2 diabetes mellitus with stage 3 chronic kidney disease, with long-term current use of insulin ([facility])      pioglitazone (Actos) 30 mg tablet Take 0.5 Tabs by mouth daily. Qty: 30 Tab, Refills: 11    Associated Diagnoses: Type 2 diabetes mellitus with stage 3 chronic kidney disease, with long-term current use of insulin ([facility])      carvediloL (COREG) 12.5 mg tablet Take 1 Tab by mouth two (2) times daily (with meals).   Qty: 60 Tab, Refills: 11    Associated Diagnoses: Benign essential hypertension      amLODIPine (NORVASC) 5 mg tablet Take 1 Tab by mouth daily. Qty: 30 Tab, Refills: 11    Associated Diagnoses: Benign essential hypertension      apixaban (Eliquis) 5 mg tablet Take 5 mg by mouth two (2) times a day. diclofenac (VOLTAREN) 1 % gel Apply 4 g to affected area four (4) times daily. Qty: 1 Each, Refills: 1    Associated Diagnoses: Left torticollis      albuterol (PROVENTIL VENTOLIN) 2.5 mg /3 mL (0.083 %) nebu 3 mL by Nebulization route every four (4) hours as needed (wheezing). Qty: 30 Nebule, Refills: 1    Associated Diagnoses: Chronic obstructive pulmonary disease with acute lower respiratory infection (HCC)      budesonide-formoterol (SYMBICORT) 160-4.5 mcg/actuation HFAA Take 2 Puffs by inhalation two (2) times a day. colchicine 0.6 mg tablet Take 1 Tab by mouth daily. Qty: 7 Tab, Refills: 1    Associated Diagnoses: Idiopathic chronic gout of multiple sites without tophus      allopurinol (ZYLOPRIM) 300 mg tablet Take 1 Tab by mouth daily. Indications: prevention of acute gout attack  Qty: 90 Tab, Refills: 3    Associated Diagnoses: Idiopathic chronic gout of multiple sites without tophus      OMEGA-3 FATTY ACIDS (FISH OIL CONCENTRATE PO) 2 in am, 2 in afternoon and 1 at bedtime      loratadine (CLARITIN) 10 mg tablet Take 10 mg by mouth daily. ferrous sulfate (IRON) 325 mg (65 mg iron) tablet Take  by mouth Daily (before breakfast). Patient states he only takes 2-3 times per week      acetaminophen (TYLENOL ARTHRITIS PAIN) 650 mg CR tablet Take 650 mg by mouth every six (6) hours as needed for Pain. Associated Diagnoses: Right upper quadrant abdominal pain; Calculous cholecystitis      omeprazole (PRILOSEC) 20 mg capsule Take 20 mg by mouth every morning. Neelima Pen Needle 32 gauge x 5/32\" ndle Once a day for Victoza.   Dx E11.65  Qty: 100 Pen Needle, Refills: 3    Associated Diagnoses: Type 2 diabetes mellitus with stage 3 chronic kidney disease, with long-term current use of insulin (HCC)      BD Insulin Syringe Ultra-Fine 1 mL 30 gauge x 1/2\" syrg Use to inject insulin twice daily for Dx E11.65  Qty: 200 Syringe, Refills: 3    Associated Diagnoses: Type 2 diabetes mellitus with stage 3 chronic kidney disease, with long-term current use of insulin (HCC)      OTHER . DISABILITY CERTIFICATE:  This patient has a disability requiring a DISABLED PLACARD as documented. Qty: 1 Each, Refills: 0      ALCOHOL PREP PADS padm              Follow Up Orders:  No orders of the defined types were placed in this encounter. Follow-up Information     Follow up With Specialties Details Why Contact Info    Kaylyn Gipson, DO Family Medicine In 2 weeks As needed [id number] [facility] [id number]  [phone]            Time spent in patient discharge planning and coordination 40 minutes.     Signed:  Yousuf Duran MD

## 2020-08-20 NOTE — PROGRESS NOTES
Discharge Note    Patient received discharge instructions and verbalized understanding. All questions answered. Denies shortness of breath, pain. Discharged to home. Patient left building via wheelchair accompanied by Davie Villalta and [facility], CNA.

## 2020-08-20 NOTE — PROGRESS NOTES
Name: Ching Herrmann MRN: [id number]  : 1955  Age:65 y.o.  male  Admit Date:  2020 LOS: 9      Hospitalist Progress Note    Ching Herrmann is a 76 y.o. male with medical history significant for morbid obesity, HTN, DM2, CKD3, COPD, AFIB on eliquis s/p AVN ablation/ PPM, RONAK on CPAP, HFpEF, recent COVID-19 infection (20) who presented due to and progressive malaise, dyspnea despite being on zpack per PMD.  CXR shows vascular congestion. Resting O2 sats are stable though he desats with ambulation to 87%. Patient was admitted for dyspnea and desaturation on exertion. Not a candidate for remdesivir per ID. Received convalescent plasma on . Course of dexamethasone from  - . RT evaluated patient and patient qualifies for O2 at rest and ambulation. Management had sent paperwork to [place] for O2 approval.    Subjective (20) :  Patient is seen and examined at bedside. No acute events reported overnight by nursing staff. Remains on 3 L O2 nasal cannula with saturations above 94%. Patient is able to ambulate within the room with minimal shortness of breath. Patient denies fever, chills, chest pains, shortness of breath at rest, n/v, abdominal pain. Tolerating diet and having BM.     ROS: 10 point review of systems is otherwise negative with the exception of the elements mentioned above.     Objective:    Patient Vitals for the past 24 hrs:   Temp Pulse Resp BP SpO2   20 1124 97.5 °F (36.4 °C) 73 18 128/84 93 %   20 0812     92 %   20 0739 97.4 °F (36.3 °C) 72 18 129/87 97 %   20 0248 97.9 °F (36.6 °C) 70 19 119/79 95 %   20 2253 98.3 °F (36.8 °C) 79 19 120/69 98 %   20 2032 97.9 °F (36.6 °C) 71 20 121/79 93 %   20 1935     94 %   20 1518 97.5 °F (36.4 °C) 76 18 134/86 93 %     Oxygen Therapy  O2 Sat (%): 93 % (20 1124)  Pulse via Oximetry: 76 beats per minute (20 0812)  O2 Device: Nasal cannula (20 2237)  O2 Flow Rate (L/min): 3 l/min (08/20/20 0812)  FIO2 (%): 32 % (08/19/20 1935)    Estimated body mass index is 45 kg/m² as calculated from the following:    Height as of this encounter: 5' 10.98\" (1.803 m). Weight as of this encounter: 146.3 kg (322 lb 8.5 oz). Intake/Output Summary (Last 24 hours) at 8/20/2020 1418  Last data filed at 8/20/2020 3583  Gross per 24 hour   Intake 200 ml   Output    Net 200 ml       *Note that automatically entered I/Os may not be accurate; dependent on patient compliance with collection and accurate  by techs. Physical Exam:   General:     alert, awake, no acute distress. Well nourished. Obese  Head:   normocephalic, atraumatic  Eyes, Ears, nose: PERRL, EOMI. Normal conjunctiva  Neck:    supple, non-tender. Trachea midline. Lungs:   Expiratory wheezing with slight crackles   Cardiac:   RRR, Normal S1 and S2. Abdomen:   Soft, distended, nontender, +BS  Extremities:   Warm, dry. No edema   Skin:   No rashes, no jaundice  Neuro:  AAOx3.  No gross focal neurological deficit  Psychiatric:  No anxiety, calm, cooperative    Data Review:  I have reviewed all labs, meds, and studies from the last 24 hours:    Recent Results (from the past 24 hour(s))   GLUCOSE, POC    Collection Time: 08/19/20  4:29 PM   Result Value Ref Range    Glucose (POC) 318 (H) 65 - 100 mg/dL   GLUCOSE, POC    Collection Time: 08/19/20  8:24 PM   Result Value Ref Range    Glucose (POC) 339 (H) 65 - 100 mg/dL   CBC WITH AUTOMATED DIFF    Collection Time: 08/20/20  3:48 AM   Result Value Ref Range    WBC 12.6 (H) 4.3 - 11.1 K/uL    RBC 5.20 4.23 - 5.6 M/uL    HGB 13.2 (L) 13.6 - 17.2 g/dL    HCT 41.2 41.1 - 50.3 %    MCV 79.2 (L) 79.6 - 97.8 FL    MCH 25.4 (L) 26.1 - 32.9 PG    MCHC 32.0 31.4 - 35.0 g/dL    RDW 16.6 (H) 11.9 - 14.6 %    PLATELET 525 (H) 440 - 450 K/uL    MPV 10.4 9.4 - 12.3 FL    ABSOLUTE NRBC 0.00 0.0 - 0.2 K/uL    DF AUTOMATED      NEUTROPHILS 82 (H) 43 - 78 % LYMPHOCYTES 11 (L) 13 - 44 %    MONOCYTES 6 4.0 - 12.0 %    EOSINOPHILS 0 (L) 0.5 - 7.8 %    BASOPHILS 0 0.0 - 2.0 %    IMMATURE GRANULOCYTES 1 0.0 - 5.0 %    ABS. NEUTROPHILS 10.3 (H) 1.7 - 8.2 K/UL    ABS. LYMPHOCYTES 1.3 0.5 - 4.6 K/UL    ABS. MONOCYTES 0.8 0.1 - 1.3 K/UL    ABS. EOSINOPHILS 0.0 0.0 - 0.8 K/UL    ABS. BASOPHILS 0.0 0.0 - 0.2 K/UL    ABS. IMM. GRANS. 0.1 0.0 - 0.5 K/UL   METABOLIC PANEL, COMPREHENSIVE    Collection Time: 08/20/20  3:48 AM   Result Value Ref Range    Sodium 140 136 - 145 mmol/L    Potassium 3.6 3.5 - 5.1 mmol/L    Chloride 103 98 - 107 mmol/L    CO2 30 21 - 32 mmol/L    Anion gap 7 7 - 16 mmol/L    Glucose 156 (H) 65 - 100 mg/dL    BUN 42 (H) 8 - 23 MG/DL    Creatinine 1.55 (H) 0.8 - 1.5 MG/DL    GFR est AA 58 (L) >60 ml/min/1.73m2    GFR est non-AA 48 (L) >60 ml/min/1.73m2    Calcium 9.0 8.3 - 10.4 MG/DL    Bilirubin, total 0.5 0.2 - 1.1 MG/DL    ALT (SGPT) 41 12 - 65 U/L    AST (SGOT) 23 15 - 37 U/L    Alk.  phosphatase 96 50 - 136 U/L    Protein, total 7.9 6.3 - 8.2 g/dL    Albumin 2.6 (L) 3.2 - 4.6 g/dL    Globulin 5.3 (H) 2.3 - 3.5 g/dL    A-G Ratio 0.5 (L) 1.2 - 3.5     GLUCOSE, POC    Collection Time: 08/20/20  5:28 AM   Result Value Ref Range    Glucose (POC) 134 (H) 65 - 100 mg/dL   GLUCOSE, POC    Collection Time: 08/20/20  7:15 AM   Result Value Ref Range    Glucose (POC) 112 (H) 65 - 100 mg/dL   GLUCOSE, POC    Collection Time: 08/20/20 11:15 AM   Result Value Ref Range    Glucose (POC) 232 (H) 65 - 100 mg/dL        All Micro Results     None          Current Meds:  Current Facility-Administered Medications   Medication Dose Route Frequency    insulin glargine (LANTUS) injection 50 Units  50 Units SubCUTAneous QHS    insulin glargine (LANTUS) injection 55 Units  55 Units SubCUTAneous DAILY    dexamethasone (DECADRON) 10 mg/mL injection 6 mg  6 mg IntraVENous DAILY    albuterol (PROVENTIL HFA, VENTOLIN HFA, PROAIR HFA) inhaler 2 Puff  2 Puff Inhalation Q4H PRN    sodium chloride (NS) flush 5-40 mL  5-40 mL IntraVENous Q8H    sodium chloride (NS) flush 5-40 mL  5-40 mL IntraVENous PRN    allopurinoL (ZYLOPRIM) tablet 300 mg  300 mg Oral DAILY    amLODIPine (NORVASC) tablet 5 mg  5 mg Oral DAILY    apixaban (ELIQUIS) tablet 5 mg  5 mg Oral BID    atorvastatin (LIPITOR) tablet 10 mg  10 mg Oral DAILY    budesonide-formoteroL (SYMBICORT) 160-4.5 mcg/actuation HFA inhaler 2 Puff  2 Puff Inhalation BID RT    carvediloL (COREG) tablet 12.5 mg  12.5 mg Oral BID WITH MEALS    loratadine (CLARITIN) tablet 10 mg  10 mg Oral DAILY    pantoprazole (PROTONIX) tablet 40 mg  40 mg Oral ACB    torsemide (DEMADEX) tablet 20 mg  20 mg Oral BID    sodium chloride (NS) flush 5-40 mL  5-40 mL IntraVENous Q8H    sodium chloride (NS) flush 5-40 mL  5-40 mL IntraVENous PRN    acetaminophen (TYLENOL) tablet 650 mg  650 mg Oral Q6H PRN    Or    acetaminophen (TYLENOL) suppository 650 mg  650 mg Rectal Q6H PRN    polyethylene glycol (MIRALAX) packet 17 g  17 g Oral DAILY PRN    ondansetron (ZOFRAN) injection 4 mg  4 mg IntraVENous Q6H PRN    insulin lispro (HUMALOG) injection   SubCUTAneous AC&HS    0.9% sodium chloride infusion 250 mL  250 mL IntraVENous PRN    ascorbic acid (vitamin C) (VITAMIN C) tablet 500 mg  500 mg Oral TID         Other Studies:  Xr Chest Sngl V    Result Date: 8/11/2020  PORTABLE CHEST 1 VIEW HISTORY: Cough COMPARISON: October 2, 2009 FINDINGS: EKG leads are present. A cardiac pacemaker device is present. There is pulmonary vascular congestion with retrocardiac consolidation. IMPRESSION: Pulmonary vascular congestion with retrocardiac atelectasis or consolidation.       Assessment:    Active Hospital Problems    Diagnosis Date Noted    COVID-19 08/11/2020    Pulmonary hypertension ([address] 08/11/2020    Morbid obesity ([address] 08/11/2020    Atrial fibrillation ([address] 08/11/2020    RONAK on CPAP 08/11/2020    Type 2 diabetes mellitus with stage 3 chronic kidney disease, with long-term current use of insulin ([facility] 11/10/2017    CKD (chronic kidney disease) stage 3, GFR 30-59 ml/min ([facility]) 05/05/2017    Benign essential hypertension     Atrioventricular block, complete ([facility] 04/14/2016    Edema of both lower extremities due to peripheral venous insufficiency 12/29/2015    Chronic obstructive pulmonary disease (HCC)      uses inhalers no oxygen         Plan:    Covid 19 virus infection  Tested positive 8/5. Convalescent plasma on 8/11. Not a candidate for remdesivir.  - Supplemental oxygen, wean as able. Currently at 3L NC  - continue with dexamethasone (8/12 - )  - continue with vitamin C  - holding zinc due to renal function  - albuterol MDI    COPD: stable, continue with nebs  RONAK: CPAP  Gout: continue allopurinol and colchicine    T2DM  - ISS and lantus    HTN // Afib // HFpEF: continue with apixaban, coreg, norvasc, demedex  CKD 3: stable      Diet:  DIET DIABETIC CONSISTENT CARB  DIET NUTRITIONAL SUPPLEMENTS  DVT PPx: apixaban   Code: Full Code  Dispo: home  Estimated Discharge: 48hrs pending O2 approval from [place]    Labs/Imaging Reviewed. Patient is moderate risk due to current condition and comorbid conditions as well as requiring frequent monitoring and high risk of decline. Plan discussed with staff, patient/family and are in agreement. Time Spent: Greater than 45 minutes was spent in reviewing charts, physical exam, discussion with patient, and answered any questions.     Signed By: Adilson Solo MD     August 20, 2020

## 2020-08-20 NOTE — PROGRESS NOTES
SBAR received from Lanie Doe RN. Patient stable, resting in bed, in no apparent distress. Using bipap machine at this time. Bed in locked and low position. Call light within reach. Droplet plus precautions maintained.

## 2020-08-20 NOTE — PROGRESS NOTES
CM returned all from Shalom Cummings 45. Left VM with return call back number  CM returned call to wife Ling Goes on status update on pt [facility] referrals. O2 has been submitted  HH is pending PT evals today. CM will submit as soon as PT has completed eval.  CM will continue to follow  Please consult  if any new issues arise.   Discharge plan is Home with [facility]xygen with [facility] approval.

## 2020-08-20 NOTE — PROGRESS NOTES
Patient has V60 in room. Told RT he was able to place machine on and off on his own without assistance. RT expressed that it was her responsibility to make sure machine was on and running properly. Patient refused help, so RT monitored by door throughout the evening.

## 2020-08-20 NOTE — PROGRESS NOTES
This RN called PT and spoke to Cristo Valles to request PT consult for this patient. Jenifer states that Araceli Oseguera is PT coverage and she will text him and ask him to see this patient next.

## 2020-08-20 NOTE — PROGRESS NOTES
LTG:  (1.)Mr. Marisa Aldrich will move from supine to sit and sit to supine , scoot up and down, and roll side to side in bed with MODIFIED INDEPENDENCE within 1 treatment day(s). (2.)Mr. Marisa Aldrich will transfer from bed to chair and chair to bed with MODIFIED INDEPENDENCE using the least restrictive device within 1 treatment day(s). (3.)Mr. Marisa Aldrich will ambulate with MODIFIED INDEPENDENCE for 75 feet with the least restrictive device within 1 treatment day(s). ________________________________________________________________________________________________      PHYSICAL THERAPY: Initial Assessment and PM 8/20/2020  INPATIENT:    Payor: [facility] MEDICARE / Plan: [employer] [place] MED[place]/PPO / Product Type: Managed Care Medicare /       NAME/AGE/GENDER: Rosie Helms is a 76 y.o. male   PRIMARY DIAGNOSIS: COVID-19 [U07.1] COVID-19 COVID-19        ICD-10: Treatment Diagnosis:    Generalized Muscle Weakness (M62.81)  Other lack of cordination (R27.8)  Difficulty in walking, Not elsewhere classified (R26.2)  Other abnormalities of gait and mobility (R26.89)  Unspecified Lack of Coordination (R27.9)   Precaution/Allergies:  Cozaar [losartan]; Lisinopril; Morphine; and Pcn [penicillins]      ASSESSMENT:     Mr. Marisa Aldrich is a pleasant elderly male with above diagnosis who demonstrates with decreased transfers, ambulation and mobility near his prior functional baseline. All transfers are currently modified independent x 1 out of bed to sit and stand followed by ambulation for 75 feet with the deficits stated below. He is on 4 liters of 02 nasal cannula and SP02 above 90%. Rosie Helms is seated in the bedside chair modified independent x 1. No additional PT indicated at this time secondary to high likelihood of returning to baseline.         This section established at most recent assessment   PROBLEM LIST (Impairments causing functional limitations):  Decreased Activity Tolerance   INTERVENTIONS PLANNED: (Benefits and precautions of physical therapy have been discussed with the patient.)  Therapeutic Activites     TREATMENT PLAN: Frequency/Duration: Patient discharged today. Rehabilitation Potential For Stated Goals: Excellent     REHAB RECOMMENDATIONS (at time of discharge pending progress):    Placement: It is my opinion, based on this patient's performance to date, that Mr. Lafaye Collet may benefit from being discharged with NO further skilled therapy due to the high likelihood of returning to baseline. Equipment:   None at this time              HISTORY:   History of Present Injury/Illness (Reason for Referral):     CC: shortness of breath         Mr. Lafaye Collet is a 77 yo male with PMH of morbid obesity, HTN, DM2, CKD3, COPD, AFIB on eliquis s/p AVN ablation/ PPM, RONAK on CPAP, HFpEF, who is evaluated with known COVID positive status (8-5-20) and progressive malaise, dyspnea. He was seen by PCP and given oral steroids/ zpack. He admits to fever, no anorexia, some cough with congestion. Has chronic edema. CXR shows vascular congestion. Resting O2 sats are stable though he desats with ambulation to 87%. Prior ECHO 7,202 EF 55-60%, moderate pulm HTN. Also [address] [facility] 2,2020.      10 systems reviewed and negative except as noted in HPI. - no mood or memory changes, has arthritis, has malaise, no ear or throat pain     Past Medical History/Comorbidities:   Mr. Lafaye Collet  has a past medical history of Albuminuria, Allergic rhinitis, Asthma, Atrial fibrillation, Benign essential hypertension, Chronic pain, COPD, COVID-19 (8/11/2020), Edema, Gastroesophageal reflux disease, Gout, Hepatic steatosis, Hypercholesteremia, Hypertriglyceridemia, Iron deficiency anemia, Obstructive sleep apnea on CPAP, Osteoarthritis, Peptic ulcer disease, Sensory neuropathy, and Vitamin D deficiency.  He also has no past medical history of Aneurysm ([address] Autoimmune disease ([address] Cancer ([address] Chronic kidney disease, Coagulation disorder ([address] Dementia, Endocarditis, Heart failure ([facility] Ill-defined condition, Nicotine vapor product user, Non-nicotine vapor product user, Other ill-defined conditions(799.89), Psychiatric disorder, Rheumatic fever, Seizures ([facility] Stroke ([facility] Thromboembolus ([facility] or Thyroid disease. Mr. Baljeet Owen  has a past surgical history that includes hx lap cholecystectomy (2013); hx pacemaker placement (2013); hx hernia repair (2013); hx cholecystectomy; hx heent (mid 2000's); hx orthopaedic (1990's); and hx knee replacement (Bilateral). Social History/Living Environment:   Home Environment: Private residence  Wheelchair Ramp: Yes  One/Two Story Residence: One story  Living Alone: No  Support Systems: Family member(s)  Patient Expects to be Discharged to[de-identified] Private residence  Current DME Used/Available at Home: None  Prior Level of Function/Work/Activity:  Had been independent with all functional mobility. Dominant Side:         RIGHT    Personal Factors:          Sex:  male        Age:  76 y.o.    Number of Personal Factors/Comorbidities that affect the Plan of Care: 0: LOW COMPLEXITY   EXAMINATION:   Most Recent Physical Functioning:   Gross Assessment:  AROM: Generally decreased, functional  PROM: Generally decreased, functional  Strength: Generally decreased, functional  Coordination: Generally decreased, functional  Tone: Normal  Sensation: Intact               Posture:  Posture (WDL): Exceptions to WDL  Posture Assessment: Cervical, Forward head  Balance:  Sitting: Intact  Standing: Intact Bed Mobility:  Rolling: Modified independent  Supine to Sit: Modified independent  Sit to Supine: Modified independent  Scooting: Modified independent  Wheelchair Mobility:     Transfers:  Sit to Stand: Modified independent  Stand to Sit: Modified independent  Bed to Chair: Modified independent  Gait:     Speed/Sarah: Pace decreased (<100 feet/min)  Stance: Time;Weight shift  Gait Abnormalities: Decreased step clearance  Distance (ft): 75 Feet (ft)  Assistive Device: (no assistive device.  )  Ambulation - Level of Assistance: Modified independent  Interventions: Manual cues; Safety awareness training; Tactile cues; Verbal cues; Visual/Demos      Body Structures Involved:  Bones  Joints  Muscles  Ligaments Body Functions Affected:  Neuromusculoskeletal  Movement Related  Skin Related  Metobolic/Endocrine Activities and Participation Affected:  General Tasks and Demands  Communication  Mobility  Self Care  Community, Social and [place]   Number of elements that affect the Plan of Care: 1-2: LOW COMPLEXITY   CLINICAL PRESENTATION:   Presentation: Stable and uncomplicated: LOW COMPLEXITY   CLINICAL DECISION MAKIN[address] [place] [address] AM-PAC 6 Clicks   Basic Mobility Inpatient Short Form  How much difficulty does the patient currently have. .. Unable A Lot A Little None   1. Turning over in bed (including adjusting bedclothes, sheets and blankets)? [] 1   [] 2   [] 3   [x] 4   2. Sitting down on and standing up from a chair with arms ( e.g., wheelchair, bedside commode, etc.)   [] 1   [] 2   [] 3   [x] 4   3. Moving from lying on back to sitting on the side of the bed? [] 1   [] 2   [] 3   [x] 4   How much help from another person does the patient currently need. .. Total A Lot A Little None   4. Moving to and from a bed to a chair (including a wheelchair)? [] 1   [] 2   [] 3   [x] 4   5. Need to walk in hospital room? [] 1   [] 2   [] 3   [x] 4   6. Climbing 3-5 steps with a railing? [] 1   [] 2   [] 3   [x] 4   © , Trustees of [address] [place] [id number], under license to [employer]. All rights reserved      Score:  Initial: 24 Most Recent: X (Date: -- )    Interpretation of Tool:  Represents activities that are increasingly more difficult (i.e. Bed mobility, Transfers, Gait). Medical Necessity:     Patient demonstrates   excellent   rehab potential due to higher previous functional level.   Reason for Services/Other Comments:  No additional PT indicated at this time. Use of outcome tool(s) and clinical judgement create a POC that gives a: Clear prediction of patient's progress: LOW COMPLEXITY            TREATMENT:   (In addition to Assessment/Re-Assessment sessions the following treatments were rendered)   Pre-treatment Symptoms/Complaints:  0/10 no pain reported. Pain: Initial:   Pain Intensity 1: 0  Post Session:  0/10 no pain reported. Therapeutic Activity: (    23 mins): Therapeutic activities including Bed transfers, Chair transfers, and Ambulation on level ground to improve mobility, strength, balance, and coordination. Required minimal Manual cues; Safety awareness training; Tactile cues; Verbal cues; Visual/Demos to promote coordination of bilateral, lower extremity(s) and promote motor control of bilateral, lower extremity(s). Braces/Orthotics/Lines/Etc:   O2 Device: Nasal cannula  Treatment/Session Assessment:    Response to Treatment:  improved mobility and transfers, and he is appropriate and safe for home discharge. Interdisciplinary Collaboration:   Physical Therapist  Registered Nurse  After treatment position/precautions:   Up in chair  Bed alarm/tab alert on  Bed/Chair-wheels locked  Bed in low position  Call light within reach  RN notified  Side rails x 3   Compliance with Program/Exercises: Compliant all of the time  Recommendations/Intent for next treatment session:  home discharge planned 8/20/202.     Total Treatment Duration:  PT Patient Time In/Time Out  Time In: 1342  Time Out: Shashank Boswell [address]

## 2020-08-20 NOTE — PROGRESS NOTES
Problem: Risk for Spread of Infection  Goal: Prevent transmission of infectious organism to others  Description: Prevent the transmission of infectious organisms to other patients, staff members, and visitors. Outcome: Resolved/Met     Problem: Patient Education:  Go to Education Activity  Goal: Patient/Family Education  8/20/2020 1717 by Nikita Da Silva  Outcome: Resolved/Met  8/20/2020 1716 by Nikita Da Silva  Outcome: Progressing Towards Goal     Problem: Falls - Risk of  Goal: *Absence of Falls  Description: Document [facility] Fall Risk and appropriate interventions in the flowsheet.   Outcome: Resolved/Met  Note: Fall Risk Interventions:            Medication Interventions: Teach patient to arise slowly, Patient to call before getting OOB                   Problem: Patient Education: Go to Patient Education Activity  Goal: Patient/Family Education  Outcome: Resolved/Met     Problem: Gas Exchange - Impaired  Goal: *Absence of hypoxia  Outcome: Resolved/Met

## 2020-08-20 NOTE — PROGRESS NOTES
Patient resting in bed quietly at this time. Bipap on and no distress noted at this time. Call light within reach.

## 2020-08-20 NOTE — PROGRESS NOTES
Problem: Patient Education: Go to Patient Education Activity  Goal: Patient/Family Education  Outcome: Resolved/Met      OCCUPATIONAL THERAPY: Initial Assessment and Discharge 8/20/2020  INPATIENT:    Payor: [facility] MEDICARE / Plan: [employer] [facility] [place]O / Product Type: Managed Care Medicare /      NAME/AGE/GENDER: Juliana Campos is a 76 y.o. male   PRIMARY DIAGNOSIS:  COVID-19 [U07.1] COVID-19 COVID-19        ICD-10: Treatment Diagnosis:    Generalized Muscle Weakness (M62.81)   Precautions/Allergies:     Cozaar [losartan]; Lisinopril; Morphine; and Pcn [penicillins]      ASSESSMENT:     Mr. Daljit Charles presents for the above diagnoses. Upon arrival, pt sitting upright in bedside chair and agreeable to OT evaluation. Pt is alert and oriented x 4. Pt reports living with wife in a 1-story home with w/c ramp entry. At baseline, pt notes independence with ADLs and functional mobility with no DME required. Endorses no hx of falls. Currently resting on 3L 02; reports use of BIPAP at night. Today, pt presents with deficits in overall strength, activity tolerance, ADL performance, and functional mobility. Pt completed functional transfers with SBA today. Static and dynamic sitting balance standing balance are intact with no additional support. Pt notes occasionally completing mobility in room throughout the say. Voices no further OT concerns. At this time, Juliana Campos seems to be functioning at baseline for ADLs. Will d/c patient from further OT services.     This section established at most recent assessment   PROBLEM LIST (Impairments causing functional limitations):  Decreased Activity Tolerance  Decreased Pacing Skills   INTERVENTIONS PLANNED: (Benefits and precautions of occupational therapy have been discussed with the patient.)  discharge     TREATMENT PLAN: Frequency/Duration: discharge  Rehabilitation Potential For Stated Goals: discharge     REHAB RECOMMENDATIONS (at time of discharge pending progress):    Placement: It is my opinion, based on this patient's performance to date, that Mr. Tony Sunshine may benefit from being discharged with NO further skilled therapy due to the high likelihood of returning to baseline. Equipment:   None at this time              OCCUPATIONAL PROFILE AND HISTORY:   History of Present Injury/Illness (Reason for Referral):  See H&P  Past Medical History/Comorbidities:   Mr. Tony Sunshine  has a past medical history of Albuminuria, Allergic rhinitis, Asthma, Atrial fibrillation, Benign essential hypertension, Chronic pain, COPD, COVID-19 (8/11/2020), Edema, Gastroesophageal reflux disease, Gout, Hepatic steatosis, Hypercholesteremia, Hypertriglyceridemia, Iron deficiency anemia, Obstructive sleep apnea on CPAP, Osteoarthritis, Peptic ulcer disease, Sensory neuropathy, and Vitamin D deficiency. He also has no past medical history of Aneurysm ([address] Autoimmune disease ([address] Cancer ([address] Chronic kidney disease, Coagulation disorder ([address] Dementia, Endocarditis, Heart failure ([address] Ill-defined condition, Nicotine vapor product user, Non-nicotine vapor product user, Other ill-defined conditions(799.89), Psychiatric disorder, Rheumatic fever, Seizures ([address] Stroke ([address] Thromboembolus ([address] or Thyroid disease. Mr. Tony Sunshine  has a past surgical history that includes hx lap cholecystectomy (2013); hx pacemaker placement (2013); hx hernia repair (2013); hx cholecystectomy; hx heent (mid 2000's); hx orthopaedic (1990's); and hx knee replacement (Bilateral). Social History/Living Environment:   Home Environment: Private residence  Wheelchair Ramp: Yes  One/Two Story Residence: One story  Living Alone: No  Support Systems: Family member(s)  Patient Expects to be Discharged to[de-identified] Private residence  Current DME Used/Available at Home: None  Prior Level of Function/Work/Activity:  Independent with ADls and functional mobility. Personal Factors:          Sex:  male        Age:  76 y.o.         Other factors that influence how disability is experienced by the patient:  multiple co-morbidities    Number of Personal Factors/Comorbidities that affect the Plan of Care: Brief history (0):  LOW COMPLEXITY   ASSESSMENT OF OCCUPATIONAL PERFORMANCE[de-identified]   Activities of Daily Living:   Basic ADLs (From Assessment) Complex ADLs (From Assessment)   Feeding: Independent  Oral Facial Hygiene/Grooming: Independent  Bathing: Minimum assistance  Upper Body Dressing: Independent  Lower Body Dressing: Independent  Toileting: Independent Instrumental ADL  Meal Preparation: Minimum assistance  Homemaking: Minimum assistance  Medication Management: Minimum assistance  Financial Management: Minimum assistance   Grooming/Bathing/Dressing Activities of Daily Living     Cognitive Retraining  Safety/Judgement: Awareness of environment                       Bed/Mat Mobility  Rolling: Stand-by assistance  Supine to Sit: Stand-by assistance  Sit to Supine: Stand-by assistance  Sit to Stand: Stand-by assistance  Stand to Sit: Stand-by assistance  Scooting: Stand-by assistance     Most Recent Physical Functioning:   Gross Assessment:  AROM: Generally decreased, functional  PROM: Generally decreased, functional  Strength: Generally decreased, functional  Coordination: Generally decreased, functional  Tone: Normal  Sensation: Intact               Posture:     Balance:  Sitting: Intact  Standing: Intact Bed Mobility:  Rolling: Stand-by assistance  Supine to Sit: Stand-by assistance  Sit to Supine: Stand-by assistance  Scooting: Stand-by assistance  Wheelchair Mobility:     Transfers:  Sit to Stand: Stand-by assistance  Stand to Sit: Stand-by assistance            Patient Vitals for the past 6 hrs:   BP SpO2 O2 Flow Rate (L/min) Pulse   08/20/20 0739 129/87 97 % -- 72   08/20/20 0812 -- 92 % 3 l/min --   08/20/20 1124 128/84 93 % -- --       Mental Status  Neurologic State: Alert  Orientation Level: Oriented X4  Cognition: Appropriate decision making, Follows commands  Perception: Appears intact  Perseveration: No perseveration noted  Safety/Judgement: Awareness of environment                          Physical Skills Involved:  Balance  Strength  Activity Tolerance Cognitive Skills Affected (resulting in the inability to perform in a timely and safe manner):  none Psychosocial Skills Affected:  Habits/Routines  Environmental Adaptation   Number of elements that affect the Plan of Care: 5+:  HIGH COMPLEXITY   CLINICAL DECISION MAKIN[address] [place] [address] AM-PAC 6 Clicks   Daily Activity Inpatient Short Form  How much help from another person does the patient currently need. .. Total A Lot A Little None   1. Putting on and taking off regular lower body clothing? [] 1   [] 2   [] 3   [x] 4   2. Bathing (including washing, rinsing, drying)? [] 1   [] 2   [x] 3   [] 4   3. Toileting, which includes using toilet, bedpan or urinal?   [] 1   [] 2   [] 3   [x] 4   4. Putting on and taking off regular upper body clothing? [] 1   [] 2   [] 3   [x] 4   5. Taking care of personal grooming such as brushing teeth? [] 1   [] 2   [] 3   [x] 4   6. Eating meals? [] 1   [] 2   [] 3   [x] 4   © , Trustees of [address] [place] [address], under license to [employer]. All rights reserved      Score:  Initial: 23 Most Recent: X (Date: -- )    Interpretation of Tool:  Represents activities that are increasingly more difficult (i.e. Bed mobility, Transfers, Gait).     Medical Necessity:     discharge  Reason for Services/Other Comments:  discharge   Use of outcome tool(s) and clinical judgement create a POC that gives a: LOW COMPLEXITY         TREATMENT:   (In addition to Assessment/Re-Assessment sessions the following treatments were rendered)     Pre-treatment Symptoms/Complaints:    Pain: Initial:   Pain Intensity 1: 0 /10 Post Session:  same     Assessment/Reassessment only, no treatment provided today    Braces/Orthotics/Lines/Etc:   O2 Device: Nasal cannula  Treatment/Session Assessment:    Response to Treatment:  tolerated well with no issues noted. Eval only.    Interdisciplinary Collaboration:   Occupational Therapist  Registered Nurse  After treatment position/precautions:   Up in chair  Bed/Chair-wheels locked  Call light within reach   Compliance with Program/Exercises:  discharge  Recommendations/Intent for next treatment session: discharge  Total Treatment Duration:  OT Patient Time In/Time Out  Time In: 1033  Time Out: [id number] [place] RADHA Hartmann

## 2020-08-20 NOTE — PROGRESS NOTES
Call from wife pt Oxygen has been delivered to the home. PT and OT consults complete with no needs currently at discharge. Discharge Summary faxed to [place] PCP for follow-up  Wife contacted  will bring portable 02 tank for transport home. O2 supplier [facility]  RN and pt updated. No further needs from case management    Milestones met    Care Management Interventions  PCP Verified by CM: Yes  Mode of Transport at Discharge: Other (see comment)  Transition of Care Consult (CM Consult): Discharge Planning(No CM consult)  Discharge Durable Medical Equipment: No  Physical Therapy Consult: Yes  Occupational Therapy Consult: Yes  Speech Therapy Consult: No  Current Support Network: Lives with Spouse, Family Lives [place], Own Home  Confirm Follow Up Transport: Family  The Plan for Transition of Care is Related to the Following Treatment Goals : return to home with appropriate services coordinated.    The Patient and/or Patient Representative was Provided with a Choice of Provider and Agrees with the Discharge Plan?: Yes  Name of the Patient Representative Who was Provided with a Choice of Provider and Agrees with the Discharge Plan: pt and spouse  Freedom of Choice List was Provided with Basic Dialogue that Supports the Patient's Individualized Plan of Care/Goals, Treatment Preferences and Shares the Quality Data Associated with the Providers?: Yes   Resource Information Provided?: Yes  Discharge Location  Discharge Placement: Home

## 2020-08-21 ENCOUNTER — PATIENT OUTREACH (OUTPATIENT)
Dept: CASE MANAGEMENT | Age: 65
End: 2020-08-21

## 2020-08-21 NOTE — PROGRESS NOTES
Patient was admitted to [facility] on  and discharged on  for COVID 19. Patient was contacted within 1 business days of discharge. Top Discharge Challenges to be reviewed by the provider   Additional needs identified to be addressed with provider no  none  Discussed COVID-19 related testing which was available at this time. Test results were positive. Patient informed of results, if available? yes   Method of communication with provider : none       Advance Care Planning:   Does patient have an Advance Directive:  decision makers updated     Inpatient Readmission Risk score: 47%  Was this a readmission? no   Patient stated reason for the admission: na  Patients top risk factors for readmission: medical condition and medication management  Interventions to address risk factors: ALISSA, PCP follow up, 02, med [facility]    Care Transition Nurse (CTN) contacted the patient by telephone to perform post hospital discharge assessment. Verified name and  with patient as identifiers. Provided introduction to self, and explanation of the CTN role. CTN reviewed discharge instructions, medical action plan and red flags with patient who verbalized understanding. Patient given an opportunity to ask questions and does not have any further questions or concerns at this time. The patient agrees to contact the PCP office for questions related to their healthcare. Medication reconciliation was performed with patient, who verbalizes understanding of administration of home medications. Advised obtaining a 90-day supply of all daily and as-needed medications.    Referral to Pharm D needed: no     Home Health/Outpatient orders at discharge: [address]: na  Date of initial visit: na    Durable Medical [address] at [address]: [employer] [facility]: [facility]    Covid Risk Education    Patient has following risk factors of: COPD, diabetes and chronic kidney disease. Education provided regarding infection prevention, and signs and symptoms of COVID-19 and when to seek medical attention with patient who verbalized understanding. Discussed exposure protocols and quarantine From CDC: Are you at higher risk for severe illness?  and given an opportunity for questions and concerns. The patient agrees to contact the COVID-19 hotline [phone] or PCP office for questions related to COVID-19. For more information on steps you can take to protect yourself, see CDC's How to Protect Yourself     Patient/family/caregiver given information for [employer] [facility] and agrees to enroll no  Patient's preferred e-mail: declines  Patient's preferred phone number: declines    Discussed follow-up appointments. If no appointment was previously scheduled, appointment scheduling offered: yes  [facility] follow up appointment(s): Patient called PCP office and left VM this for hospital follow up   Future Appointments   Date Time Provider Oksana Loomis   10/21/2020  8:00 AM [address]   11/6/2020  8:30 AM Iona Horner MD END BS ENDO   11/13/2020  8:50 AM DO DASHAWN Rosenthal GFM GFM     Non-BS follow up appointment(s): na  Plan for follow-up call in 10-14 days based on severity of symptoms and risk factors. CTN provided contact information for future needs. Goals Addressed                 This Visit's Progress     Patient/Family verbalizes understanding of self-management of chronic disease. Assess barriers to safe and effective d/c AEB    Patient able to obtain medicine after d/c    Patient able to verbalize medicine changes    Patient is aware and attends follow up appointments s/p d/c.

## 2020-09-04 ENCOUNTER — PATIENT OUTREACH (OUTPATIENT)
Dept: CASE MANAGEMENT | Age: 65
End: 2020-09-04

## 2020-09-04 NOTE — PROGRESS NOTES
Transition of Care Hospital Discharge Follow-Up      Date/Time:  2020 1:42 PM    Care Transition Nurse (CTN)/ Ambulatory Care Manager [facility] contacted the patient by telephone to follow up post hospital discharge assessment. Verified name and  with patient as identifiers. CTN/ ACM reinforced discharge instructions and red flags with patient who verbalized understanding. Patient given an opportunity to ask questions and does not have any further questions or concerns at this time. The patient agrees to contact the PCP office for questions related to their healthcare. Disease Specific:   COVID 19    Patients top risk factors for readmission:  lack of knowledge about disease, medical condition, medication management,   [address]: na    Medication(s):   Medication changes since discharge: yes, prednisone, atarax, voltaren    Current Outpatient Medications   Medication Sig    predniSONE (DELTASONE) 10 mg tablet 30mg x 2 days, then 20mg x 2 days then 10mg x 2 days with food.  triamcinolone acetonide (KENALOG) 0.1 % topical cream Apply  to affected area two (2) times a day. use thin layer    hydrOXYzine HCL (ATARAX) 25 mg tablet Take 1 Tab by mouth every eight (8) hours as needed for Itching for up to 10 days.  tiotropium (Spiriva with HandiHaler) 18 mcg inhalation capsule Take 1 Cap by inhalation daily.  albuterol (PROVENTIL HFA, VENTOLIN HFA, PROAIR HFA) 90 mcg/actuation inhaler Take  by inhalation.  potassium chloride (KLOR-CON) 10 mEq tablet Take 1 Tab by mouth two (2) times a day.  torsemide (DEMADEX) 20 mg tablet TAKE ONE TABLET BY MOUTH TWICE A DAY    atorvastatin (LIPITOR) 10 mg tablet Take 1 Tab by mouth daily.  Lantus U-100 Insulin 100 unit/mL injection 44 Units by SubCUTAneous route two (2) times a day. Adjust by 2 units every 3 days to keep fasting BG . Max 130 units per day.  (Patient taking differently: 42 Units by SubCUTAneous route two (2) times a day. Adjust by 2 units every 3 days to keep fasting BG . Max 130 units per day.)    Trulicity 1.5 BG/0.9 mL sub-q pen 0.5 mL by SubCUTAneous route every seven (7) days.  metFORMIN (GLUCOPHAGE) 500 mg tablet Take 1 Tab by mouth two (2) times daily (with meals).  pioglitazone (Actos) 30 mg tablet Take 0.5 Tabs by mouth daily.  Neelima Pen Needle 32 gauge x 5/32\" ndle Once a day for Victoza. Dx E11.65 (Patient taking differently: No sig reported)    BD Insulin Syringe Ultra-Fine 1 mL 30 gauge x 1/2\" syrg Use to inject insulin twice daily for Dx E11.65    carvediloL (COREG) 12.5 mg tablet Take 1 Tab by mouth two (2) times daily (with meals).  amLODIPine (NORVASC) 5 mg tablet Take 1 Tab by mouth daily.  apixaban (Eliquis) 5 mg tablet Take 5 mg by mouth two (2) times a day.  diclofenac (VOLTAREN) 1 % gel Apply 4 g to affected area four (4) times daily.  albuterol (PROVENTIL VENTOLIN) 2.5 mg /3 mL (0.083 %) nebu 3 mL by Nebulization route every four (4) hours as needed (wheezing).  budesonide-formoterol (SYMBICORT) 160-4.5 mcg/actuation HFAA Take 2 Puffs by inhalation two (2) times a day.  colchicine 0.6 mg tablet Take 1 Tab by mouth daily.  OTHER . DISABILITY CERTIFICATE:  This patient has a disability requiring a DISABLED PLACARD as documented.  allopurinol (ZYLOPRIM) 300 mg tablet Take 1 Tab by mouth daily. Indications: prevention of acute gout attack    OMEGA-3 FATTY ACIDS (FISH OIL CONCENTRATE PO) 2 in am, 2 in afternoon and 1 at bedtime    ALCOHOL PREP PADS padm     loratadine (CLARITIN) 10 mg tablet Take 10 mg by mouth daily.  acetaminophen (TYLENOL ARTHRITIS PAIN) 650 mg CR tablet Take 650 mg by mouth every six (6) hours as needed for Pain.  omeprazole (PRILOSEC) 20 mg capsule Take 20 mg by mouth every morning. No current facility-administered medications for this visit.           [facility] follow up appointment(s):   Future Appointments   Date Time Provider Oksana Loomis   10/21/2020  8:00 AM SUNGLLE REMOTE AICD 62 [facility] UCDG UCD   11/6/2020  8:30 AM Marques Horner MD END BS ENDO   11/13/2020  8:50 AM DO DASHAWN Guzman GFM GFM      Non-[facility] follow up appointment(s): na  Patient attended follow up appointments since last contact:   What was the outcome of the appointment: Patient had     Other Issues/ Miscellaneous? (Transportation, access to meals, ability to perform ADLs, adequate caregiver support, etc.)  Referrals needed? (SW, Diabetes education, HH, etc.)      Goals      Patient/Family verbalizes understanding of self-management of chronic disease. Assess barriers to safe and effective d/c AEB    Patient able to obtain medicine after d/c    Patient able to verbalize medicine changes    Patient is aware and attends follow up appointments s/p d/c. Next Outreach Scheduled: 14 days, irritant contact dermatitis- recent virtual visit, meds given, patient stated he has noticed improvement in the past 2 days in the skin irritant.   Patient has felt well otherwise and reports being on room air and maintaining 02 saturation >92%

## 2020-09-17 ENCOUNTER — PATIENT OUTREACH (OUTPATIENT)
Dept: CASE MANAGEMENT | Age: 65
End: 2020-09-17

## 2020-09-17 NOTE — PROGRESS NOTES
Transition of Care Hospital Discharge Follow-Up      Date/Time:  2020 2:04 PM    Care Transition Nurse (CTN)/ Ambulatory Care Manager [facility]) contacted the patient by telephone to follow up post hospital discharge assessment. Verified name and  with patient as identifiers. CTN/ ACM reinforced discharge instructions and red flags with patient who verbalized understanding. Patient given an opportunity to ask questions and does not have any further questions or concerns at this time. The patient agrees to contact the PCP office for questions related to their healthcare. Disease Specific:   N/A    Patients top risk factors for readmission:   medical condition, medication [address]: na    Medication(s):   Medication changes since discharge: meds/topical for skin irritant- currently resolved    Current Outpatient Medications   Medication Sig    predniSONE (DELTASONE) 10 mg tablet 30mg x 2 days, then 20mg x 2 days then 10mg x 2 days with food.  triamcinolone acetonide (KENALOG) 0.1 % topical cream Apply  to affected area two (2) times a day. use thin layer    tiotropium (Spiriva with HandiHaler) 18 mcg inhalation capsule Take 1 Cap by inhalation daily.  albuterol (PROVENTIL HFA, VENTOLIN HFA, PROAIR HFA) 90 mcg/actuation inhaler Take  by inhalation.  potassium chloride (KLOR-CON) 10 mEq tablet Take 1 Tab by mouth two (2) times a day.  torsemide (DEMADEX) 20 mg tablet TAKE ONE TABLET BY MOUTH TWICE A DAY    atorvastatin (LIPITOR) 10 mg tablet Take 1 Tab by mouth daily.  Lantus U-100 Insulin 100 unit/mL injection 44 Units by SubCUTAneous route two (2) times a day. Adjust by 2 units every 3 days to keep fasting BG . Max 130 units per day. (Patient taking differently: 42 Units by SubCUTAneous route two (2) times a day. Adjust by 2 units every 3 days to keep fasting BG .  Max 130 units per day.)    Trulicity 1.5 IV/2.8 mL sub-q pen 0.5 mL by SubCUTAneous route every seven (7) days.  metFORMIN (GLUCOPHAGE) 500 mg tablet Take 1 Tab by mouth two (2) times daily (with meals).  pioglitazone (Actos) 30 mg tablet Take 0.5 Tabs by mouth daily.  Neelima Pen Needle 32 gauge x 5/32\" ndle Once a day for Victoza. Dx E11.65 (Patient taking differently: No sig reported)    BD Insulin Syringe Ultra-Fine 1 mL 30 gauge x 1/2\" syrg Use to inject insulin twice daily for Dx E11.65    carvediloL (COREG) 12.5 mg tablet Take 1 Tab by mouth two (2) times daily (with meals).  amLODIPine (NORVASC) 5 mg tablet Take 1 Tab by mouth daily.  apixaban (Eliquis) 5 mg tablet Take 5 mg by mouth two (2) times a day.  diclofenac (VOLTAREN) 1 % gel Apply 4 g to affected area four (4) times daily.  albuterol (PROVENTIL VENTOLIN) 2.5 mg /3 mL (0.083 %) nebu 3 mL by Nebulization route every four (4) hours as needed (wheezing).  budesonide-formoterol (SYMBICORT) 160-4.5 mcg/actuation HFAA Take 2 Puffs by inhalation two (2) times a day.  colchicine 0.6 mg tablet Take 1 Tab by mouth daily.  OTHER . DISABILITY CERTIFICATE:  This patient has a disability requiring a DISABLED PLACARD as documented.  allopurinol (ZYLOPRIM) 300 mg tablet Take 1 Tab by mouth daily. Indications: prevention of acute gout attack    OMEGA-3 FATTY ACIDS (FISH OIL CONCENTRATE PO) 2 in am, 2 in afternoon and 1 at bedtime    ALCOHOL PREP PADS padm     loratadine (CLARITIN) 10 mg tablet Take 10 mg by mouth daily.  acetaminophen (TYLENOL ARTHRITIS PAIN) 650 mg CR tablet Take 650 mg by mouth every six (6) hours as needed for Pain.  omeprazole (PRILOSEC) 20 mg capsule Take 20 mg by mouth every morning. No current facility-administered medications for this visit.           [facility] follow up appointment(s):   Future Appointments   Date Time Provider Oksana Loomis   10/21/2020  8:00 AM GVLLE REMOTE AICD 62 SSA UCDG UCD   11/6/2020  8:30 AM Jessica Horner MD END BS ENDO   11/13/2020 8:50 AM DO DASHAWN Farmer GFM GFM      Non-[facility] follow up appointment(s): na  Patient attended follow up appointments since last contact:   What was the outcome of the appointment: recent skin irritant/rash resolved, negative COVID test reported    Other Issues/ Miscellaneous?  (Transportation, access to meals, ability to perform ADLs, adequate caregiver support, etc.)  Referrals needed? (SW, Diabetes education, HH, etc.)      Goals    None          Next Outreach Scheduled: Graduate from program. No further needs at this time and no questions/concerns

## 2020-11-13 PROBLEM — E11.40 TYPE 2 DIABETES MELLITUS WITH DIABETIC NEUROPATHY (HCC): Status: ACTIVE | Noted: 2020-11-13

## 2020-12-21 ENCOUNTER — HOSPITAL ENCOUNTER (OUTPATIENT)
Dept: LAB | Age: 65
Discharge: HOME | End: 2020-12-21
Payer: MEDICARE

## 2020-12-21 DIAGNOSIS — I48.11 LONGSTANDING PERSISTENT ATRIAL FIBRILLATION (HCC): ICD-10-CM

## 2020-12-21 DIAGNOSIS — I47.29 NSVT (NONSUSTAINED VENTRICULAR TACHYCARDIA): ICD-10-CM

## 2020-12-21 LAB
ANION GAP SERPL CALC-SCNC: 5 MMOL/L (ref 7–16)
BASOPHILS # BLD: 0.1 K/UL (ref 0–0.2)
BASOPHILS NFR BLD: 1 % (ref 0–2)
BUN SERPL-MCNC: 22 MG/DL (ref 8–23)
CALCIUM SERPL-MCNC: 9 MG/DL (ref 8.3–10.4)
CHLORIDE SERPL-SCNC: 108 MMOL/L (ref 98–107)
CO2 SERPL-SCNC: 28 MMOL/L (ref 21–32)
CREAT SERPL-MCNC: 1.56 MG/DL (ref 0.8–1.5)
DIFFERENTIAL METHOD BLD: ABNORMAL
EOSINOPHIL # BLD: 0.3 K/UL (ref 0–0.8)
EOSINOPHIL NFR BLD: 3 % (ref 0.5–7.8)
ERYTHROCYTE [DISTWIDTH] IN BLOOD BY AUTOMATED COUNT: 16.5 % (ref 11.9–14.6)
GLUCOSE SERPL-MCNC: 118 MG/DL (ref 65–100)
HCT VFR BLD AUTO: 39.2 % (ref 41.1–50.3)
HGB BLD-MCNC: 11.6 G/DL (ref 13.6–17.2)
IMM GRANULOCYTES # BLD AUTO: 0 K/UL (ref 0–0.5)
IMM GRANULOCYTES NFR BLD AUTO: 0 % (ref 0–5)
INR PPP: 1.2
LYMPHOCYTES # BLD: 3.3 K/UL (ref 0.5–4.6)
LYMPHOCYTES NFR BLD: 34 % (ref 13–44)
MAGNESIUM SERPL-MCNC: 2 MG/DL (ref 1.8–2.4)
MCH RBC QN AUTO: 24.5 PG (ref 26.1–32.9)
MCHC RBC AUTO-ENTMCNC: 29.6 G/DL (ref 31.4–35)
MCV RBC AUTO: 82.9 FL (ref 79.6–97.8)
MONOCYTES # BLD: 0.7 K/UL (ref 0.1–1.3)
MONOCYTES NFR BLD: 7 % (ref 4–12)
NEUTS SEG # BLD: 5.2 K/UL (ref 1.7–8.2)
NEUTS SEG NFR BLD: 54 % (ref 43–78)
NRBC # BLD: 0 K/UL (ref 0–0.2)
PLATELET # BLD AUTO: 332 K/UL (ref 150–450)
PMV BLD AUTO: 11 FL (ref 9.4–12.3)
POTASSIUM SERPL-SCNC: 3.7 MMOL/L (ref 3.5–5.1)
PROTHROMBIN TIME: 16 SEC (ref 12.5–14.7)
RBC # BLD AUTO: 4.73 M/UL (ref 4.23–5.6)
SODIUM SERPL-SCNC: 141 MMOL/L (ref 136–145)
WBC # BLD AUTO: 9.6 K/UL (ref 4.3–11.1)

## 2020-12-21 PROCEDURE — 83735 ASSAY OF MAGNESIUM: CPT

## 2020-12-21 PROCEDURE — 85025 COMPLETE CBC W/AUTO DIFF WBC: CPT

## 2020-12-21 PROCEDURE — 80048 BASIC METABOLIC PNL TOTAL CA: CPT

## 2020-12-21 PROCEDURE — 85610 PROTHROMBIN TIME: CPT

## 2020-12-21 PROCEDURE — 36415 COLL VENOUS BLD VENIPUNCTURE: CPT

## 2020-12-28 ENCOUNTER — ANESTHESIA EVENT (OUTPATIENT)
Dept: SURGERY | Age: 65
End: 2020-12-28
Payer: MEDICARE

## 2020-12-28 RX ORDER — SODIUM CHLORIDE, SODIUM LACTATE, POTASSIUM CHLORIDE, CALCIUM CHLORIDE 600; 310; 30; 20 MG/100ML; MG/100ML; MG/100ML; MG/100ML
100 INJECTION, SOLUTION INTRAVENOUS CONTINUOUS
Status: CANCELLED | OUTPATIENT
Start: 2020-12-28 | End: 2020-12-29

## 2020-12-28 RX ORDER — MIDAZOLAM HYDROCHLORIDE 1 MG/ML
2 INJECTION, SOLUTION INTRAMUSCULAR; INTRAVENOUS
Status: CANCELLED | OUTPATIENT
Start: 2020-12-28 | End: 2020-12-29

## 2020-12-28 RX ORDER — LIDOCAINE HYDROCHLORIDE 10 MG/ML
0.1 INJECTION INFILTRATION; PERINEURAL AS NEEDED
Status: CANCELLED | OUTPATIENT
Start: 2020-12-28

## 2020-12-28 RX ORDER — MIDAZOLAM HYDROCHLORIDE 1 MG/ML
2 INJECTION, SOLUTION INTRAMUSCULAR; INTRAVENOUS ONCE
Status: CANCELLED | OUTPATIENT
Start: 2020-12-28 | End: 2020-12-28

## 2020-12-28 RX ORDER — FENTANYL CITRATE 50 UG/ML
100 INJECTION, SOLUTION INTRAMUSCULAR; INTRAVENOUS ONCE
Status: CANCELLED | OUTPATIENT
Start: 2020-12-28 | End: 2020-12-28

## 2020-12-28 NOTE — PROGRESS NOTES
Patient pre-assessment complete for Battery change scheduled for 2020, arrival time 0900. Patient verified using . Patient instructed to bring all home medications in labeled bottles on the day of procedure. NPO status reinforced. Patient instructed to HOLD all medications except omeprazole. Patient verbalizes understanding of all instructions & denies any questions at this time.

## 2020-12-29 ENCOUNTER — APPOINTMENT (OUTPATIENT)
Dept: CARDIAC CATH/INVASIVE PROCEDURES | Age: 65
End: 2020-12-29
Payer: MEDICARE

## 2020-12-29 ENCOUNTER — HOSPITAL ENCOUNTER (OUTPATIENT)
Age: 65
Setting detail: OUTPATIENT SURGERY
Discharge: HOME | End: 2020-12-29
Attending: INTERNAL MEDICINE | Admitting: INTERNAL MEDICINE
Payer: MEDICARE

## 2020-12-29 ENCOUNTER — ANESTHESIA (OUTPATIENT)
Dept: SURGERY | Age: 65
End: 2020-12-29
Payer: MEDICARE

## 2020-12-29 VITALS
SYSTOLIC BLOOD PRESSURE: 151 MMHG | DIASTOLIC BLOOD PRESSURE: 87 MMHG | RESPIRATION RATE: 12 BRPM | TEMPERATURE: 98.5 F | OXYGEN SATURATION: 95 % | HEART RATE: 70 BPM

## 2020-12-29 DIAGNOSIS — I44.2 ATRIOVENTRICULAR BLOCK, COMPLETE (HCC): ICD-10-CM

## 2020-12-29 DIAGNOSIS — N18.31 STAGE 3A CHRONIC KIDNEY DISEASE (HCC): ICD-10-CM

## 2020-12-29 DIAGNOSIS — I48.0 PAROXYSMAL ATRIAL FIBRILLATION (HCC): Chronic | ICD-10-CM

## 2020-12-29 DIAGNOSIS — U07.1 COVID-19: ICD-10-CM

## 2020-12-29 DIAGNOSIS — I48.11 LONGSTANDING PERSISTENT ATRIAL FIBRILLATION (HCC): ICD-10-CM

## 2020-12-29 LAB
ANION GAP SERPL CALC-SCNC: 5 MMOL/L (ref 7–16)
ATRIAL RATE: 63 BPM
BUN SERPL-MCNC: 21 MG/DL (ref 8–23)
CALCIUM SERPL-MCNC: 8.9 MG/DL (ref 8.3–10.4)
CALCULATED R AXIS, ECG10: 139 DEGREES
CALCULATED T AXIS, ECG11: 87 DEGREES
CHLORIDE SERPL-SCNC: 106 MMOL/L (ref 98–107)
CO2 SERPL-SCNC: 28 MMOL/L (ref 21–32)
CREAT SERPL-MCNC: 1.49 MG/DL (ref 0.8–1.5)
DIAGNOSIS, 93000: NORMAL
ERYTHROCYTE [DISTWIDTH] IN BLOOD BY AUTOMATED COUNT: 17.2 % (ref 11.9–14.6)
GLUCOSE SERPL-MCNC: 101 MG/DL (ref 65–100)
HCT VFR BLD AUTO: 37.1 % (ref 41.1–50.3)
HGB BLD-MCNC: 11.2 G/DL (ref 13.6–17.2)
INR PPP: 1.1
MCH RBC QN AUTO: 24.8 PG (ref 26.1–32.9)
MCHC RBC AUTO-ENTMCNC: 30.2 G/DL (ref 31.4–35)
MCV RBC AUTO: 82.1 FL (ref 79.6–97.8)
NRBC # BLD: 0 K/UL (ref 0–0.2)
P-R INTERVAL, ECG05: 158 MS
PLATELET # BLD AUTO: 373 K/UL (ref 150–450)
PMV BLD AUTO: 9.9 FL (ref 9.4–12.3)
POTASSIUM SERPL-SCNC: 3.6 MMOL/L (ref 3.5–5.1)
PROTHROMBIN TIME: 14.8 SEC (ref 12.5–14.7)
Q-T INTERVAL, ECG07: 490 MS
QRS DURATION, ECG06: 126 MS
QTC CALCULATION (BEZET), ECG08: 501 MS
RBC # BLD AUTO: 4.52 M/UL (ref 4.23–5.6)
SODIUM SERPL-SCNC: 139 MMOL/L (ref 138–145)
VENTRICULAR RATE, ECG03: 63 BPM
WBC # BLD AUTO: 9.6 K/UL (ref 4.3–11.1)

## 2020-12-29 PROCEDURE — 77030010507 HC ADH SKN DERMBND J&J -B

## 2020-12-29 PROCEDURE — 85610 PROTHROMBIN TIME: CPT

## 2020-12-29 PROCEDURE — C2621 PMKR, OTHER THAN SING/DUAL: HCPCS

## 2020-12-29 PROCEDURE — 77030031304 HC WAVWGD EIGR DISP INVO -D

## 2020-12-29 PROCEDURE — 77030041279 HC DRSG PRMSL AG MDII -B

## 2020-12-29 PROCEDURE — 77030033067 HC SUT PDO STRATFX SPIR J&J -B

## 2020-12-29 PROCEDURE — 74011000250 HC RX REV CODE- 250: Performed by: NURSE ANESTHETIST, CERTIFIED REGISTERED

## 2020-12-29 PROCEDURE — 85027 COMPLETE CBC AUTOMATED: CPT

## 2020-12-29 PROCEDURE — 74011250636 HC RX REV CODE- 250/636: Performed by: NURSE ANESTHETIST, CERTIFIED REGISTERED

## 2020-12-29 PROCEDURE — 74011250636 HC RX REV CODE- 250/636: Performed by: INTERNAL MEDICINE

## 2020-12-29 PROCEDURE — 80048 BASIC METABOLIC PNL TOTAL CA: CPT

## 2020-12-29 PROCEDURE — 74011000272 HC RX REV CODE- 272: Performed by: INTERNAL MEDICINE

## 2020-12-29 PROCEDURE — 77030022704 HC SUT VLOC COVD -B

## 2020-12-29 PROCEDURE — 93005 ELECTROCARDIOGRAM TRACING: CPT | Performed by: INTERNAL MEDICINE

## 2020-12-29 PROCEDURE — 76060000033 HC ANESTHESIA 1 TO 1.5 HR: Performed by: INTERNAL MEDICINE

## 2020-12-29 PROCEDURE — 33229 REMV&REPLC PM GEN MULT LEADS: CPT

## 2020-12-29 PROCEDURE — 33229 REMV&REPLC PM GEN MULT LEADS: CPT | Performed by: INTERNAL MEDICINE

## 2020-12-29 PROCEDURE — 74011000250 HC RX REV CODE- 250: Performed by: INTERNAL MEDICINE

## 2020-12-29 RX ORDER — SODIUM CHLORIDE, SODIUM LACTATE, POTASSIUM CHLORIDE, CALCIUM CHLORIDE 600; 310; 30; 20 MG/100ML; MG/100ML; MG/100ML; MG/100ML
75 INJECTION, SOLUTION INTRAVENOUS CONTINUOUS
Status: DISCONTINUED | OUTPATIENT
Start: 2020-12-29 | End: 2020-12-29 | Stop reason: HOSPADM

## 2020-12-29 RX ORDER — LIDOCAINE HYDROCHLORIDE 20 MG/ML
INJECTION, SOLUTION EPIDURAL; INFILTRATION; INTRACAUDAL; PERINEURAL AS NEEDED
Status: DISCONTINUED | OUTPATIENT
Start: 2020-12-29 | End: 2020-12-29 | Stop reason: HOSPADM

## 2020-12-29 RX ORDER — HYDROMORPHONE HYDROCHLORIDE 2 MG/ML
0.5 INJECTION, SOLUTION INTRAMUSCULAR; INTRAVENOUS; SUBCUTANEOUS
Status: DISCONTINUED | OUTPATIENT
Start: 2020-12-29 | End: 2020-12-29 | Stop reason: HOSPADM

## 2020-12-29 RX ORDER — PROPOFOL 10 MG/ML
INJECTION, EMULSION INTRAVENOUS AS NEEDED
Status: DISCONTINUED | OUTPATIENT
Start: 2020-12-29 | End: 2020-12-29 | Stop reason: HOSPADM

## 2020-12-29 RX ORDER — NALOXONE HYDROCHLORIDE 0.4 MG/ML
0.1 INJECTION, SOLUTION INTRAMUSCULAR; INTRAVENOUS; SUBCUTANEOUS AS NEEDED
Status: DISCONTINUED | OUTPATIENT
Start: 2020-12-29 | End: 2020-12-29 | Stop reason: HOSPADM

## 2020-12-29 RX ORDER — SODIUM CHLORIDE, SODIUM LACTATE, POTASSIUM CHLORIDE, CALCIUM CHLORIDE 600; 310; 30; 20 MG/100ML; MG/100ML; MG/100ML; MG/100ML
100 INJECTION, SOLUTION INTRAVENOUS CONTINUOUS
Status: DISCONTINUED | OUTPATIENT
Start: 2020-12-29 | End: 2020-12-29 | Stop reason: HOSPADM

## 2020-12-29 RX ORDER — DOXYCYCLINE 100 MG/1
100 TABLET ORAL 2 TIMES DAILY
Qty: 10 TAB | Refills: 0 | Status: SHIPPED | OUTPATIENT
Start: 2020-12-29 | End: 2021-01-03

## 2020-12-29 RX ORDER — LIDOCAINE HYDROCHLORIDE AND EPINEPHRINE 10; 10 MG/ML; UG/ML
20 INJECTION, SOLUTION INFILTRATION; PERINEURAL ONCE
Status: COMPLETED | OUTPATIENT
Start: 2020-12-29 | End: 2020-12-29

## 2020-12-29 RX ORDER — ALBUTEROL SULFATE 0.83 MG/ML
2.5 SOLUTION RESPIRATORY (INHALATION) AS NEEDED
Status: DISCONTINUED | OUTPATIENT
Start: 2020-12-29 | End: 2020-12-29 | Stop reason: HOSPADM

## 2020-12-29 RX ORDER — OXYCODONE HYDROCHLORIDE 5 MG/1
5 TABLET ORAL
Status: DISCONTINUED | OUTPATIENT
Start: 2020-12-29 | End: 2020-12-29 | Stop reason: HOSPADM

## 2020-12-29 RX ORDER — OXYCODONE HYDROCHLORIDE 5 MG/1
10 TABLET ORAL
Status: DISCONTINUED | OUTPATIENT
Start: 2020-12-29 | End: 2020-12-29 | Stop reason: HOSPADM

## 2020-12-29 RX ORDER — PROPOFOL 10 MG/ML
INJECTION, EMULSION INTRAVENOUS
Status: DISCONTINUED | OUTPATIENT
Start: 2020-12-29 | End: 2020-12-29 | Stop reason: HOSPADM

## 2020-12-29 RX ORDER — DIPHENHYDRAMINE HYDROCHLORIDE 50 MG/ML
12.5 INJECTION, SOLUTION INTRAMUSCULAR; INTRAVENOUS
Status: DISCONTINUED | OUTPATIENT
Start: 2020-12-29 | End: 2020-12-29 | Stop reason: HOSPADM

## 2020-12-29 RX ADMIN — SODIUM CHLORIDE, SODIUM LACTATE, POTASSIUM CHLORIDE, AND CALCIUM CHLORIDE: 600; 310; 30; 20 INJECTION, SOLUTION INTRAVENOUS at 11:46

## 2020-12-29 RX ADMIN — LIDOCAINE HYDROCHLORIDE AND EPINEPHRINE 200 MG: 10; 10 INJECTION, SOLUTION INFILTRATION; PERINEURAL at 11:55

## 2020-12-29 RX ADMIN — PROPOFOL 75 MCG/KG/MIN: 10 INJECTION, EMULSION INTRAVENOUS at 11:53

## 2020-12-29 RX ADMIN — LIDOCAINE HYDROCHLORIDE 40 MG: 20 INJECTION, SOLUTION EPIDURAL; INFILTRATION; INTRACAUDAL; PERINEURAL at 11:51

## 2020-12-29 RX ADMIN — CEFAZOLIN 3 G: 1 INJECTION, POWDER, FOR SOLUTION INTRAVENOUS at 11:56

## 2020-12-29 RX ADMIN — NEOMYCIN AND POLYMYXIN B SULFATES 1000 ML: 40; 200000 SOLUTION IRRIGATION at 11:55

## 2020-12-29 RX ADMIN — PROPOFOL 30 MG: 10 INJECTION, EMULSION INTRAVENOUS at 11:51

## 2020-12-29 NOTE — ANESTHESIA PREPROCEDURE EVALUATION
Anesthetic History               Review of Systems / Medical History  Patient summary reviewed and pertinent labs reviewed    Pulmonary    COPD: mild    Sleep apnea: BiPAP           Neuro/Psych              Cardiovascular    Hypertension: well controlled        Dysrhythmias (atrial fib)       Exercise tolerance: >4 METS     GI/Hepatic/Renal     GERD: well controlled           Endo/Other    Diabetes: well controlled, type 2    Morbid obesity     Other Findings              Physical Exam    Airway  Mallampati: II  TM Distance: 4 - 6 cm  Neck ROM: normal range of motion   Mouth opening: Normal     Cardiovascular  Regular rate and rhythm,  S1 and S2 normal,  no murmur, click, rub, or gallop             Dental         Pulmonary  Breath sounds clear to auscultation               Abdominal         Other Findings            Anesthetic Plan    ASA: 3  Anesthesia type: total IV anesthesia          Induction: Intravenous  Anesthetic plan and risks discussed with: Patient and Spouse

## 2020-12-29 NOTE — PROCEDURES
: Gregorio Orozco. Raquel Soto MD    REFERRING: Marian Tony DO    Procedure: Biventricular pacemaker replacement. Pre-Procedure Diagnosis  1. Complete heart block  2. Mild systolic heart failure  3. Persistent atrial fibrillation  4. Pacemaker at [facility]    Procedure Performed  1. Insertion/replacement of a [employer] biventricular pacemaker. Anesthesia: MAC     Estimated Blood Loss: Less than 50 mL     Specimens: * No specimens in log *     Contrast:  0 ml    Fluoro Time: 0 minutes    Complications: None    Patient Information and Indications: The procedure, indications, risks, benefits, and alternatives were discussed with the patient and family members, who desired to proceed after questions were answered and informed consent was documented. After informed consent was obtained, the patient was brought to the Electrophysiology Laboratory in a post-absorptive and was prepped and draped in sterile fashion. Prophylactic antibiotic was administered prior to skin incision. Conscious sedation was administered with continuous oxygen saturation measurement and blood pressure measurement by Anesthesia. Local anesthetic (1% lidocaine with epinephrine) was delivered to the left pectoral region and an incision was made parallel to the deltopectoral groove directly over the prior surgical scar. The subcutaneous pocket was opened using blunt dissection and Bovie electrocautery using the PhotonBlade, and adequate hemostasis was established. The device was freed from overlying fibrotic tissue and the leads freed to give enough slack for device exchange. The leads were individually removed from the chronic generator and tested using the PSA revealing excellent pacing/sensing parameters. The lead pins were then cleaned with antibiotic soaked gauze, dried gently, and attached to a new biventricular pacemaker generator. The chronic device was removed from the field.  Pins were directly observed to pass the tip electrode, and the ring hex wrench screws were secured, and leads tug tested. The device and leads were gently positioned within the pocket. The pocket was irrigated copiously with a saline antimicrobial solution. The device was and leads were tested a second time prior to pocket closure. The wound was closed with two layers of 3-0 Stratafix followed by skin closure with 4-0 V-loc. The patient tolerated the procedure well and there was no complications. The patient was allowed to awake from anesthesia and was transferred to the recovery area in stable condition. All sharps and sponges were accounted for x 2. Device and Lead Information  Pulse Generator Model #  Serial # Location Implant   [id number] [facility] [id number] Left Pectoral New   [id number] [facility] [id number] Left Pectoral Explant     Lead Model Number  Serial Number Lead position Implant   RA 2088TC/52 SJ [id number] [place] Chronic   RV 2088TC/58 SJ [id number] RV [place] Chronic   LV 1458Q-86 SJ [id number] Lateral Chronic   :  Lead Sensitivity and Threshold  Lead R or P sensitivity (mv) Threshold (V) Threshold PW (msec) Impedance (ohms) Final output Voltage (V) Final PW (msec)   RA 4.7 - 0.5 480 2.5 0.5   RV - 0.625 0.5 450 2.5 0.5   LV - 2.0 1.0 550 2.5 1.0     Bradycardia Settings  Ant Mode LRL URL Pace AVD (ms) Sense AVD (ms) Rate Response Mode Switching Mode SW Rate   DDI 70 120 190 - Y - 180       IMPRESSION: Successful biventricular pacemaker pulse generator replacement. PLAN:  1) Routine CIED instructions. 2) Device clinic follow up in 1-2 weeks. 3) Routine cardiology follow up with Dr. Yojana Moseley. Niles Alberto.  Daniel Gillette MD, MS  Clinical Cardiac Electrophysiology   [facility] Cardiology  7/21/2020  1:28 PM

## 2020-12-29 NOTE — PROGRESS NOTES
Patient received to [address] [place] [address] room # 9  Ambulatory from [facility]. Patient scheduled for Battery Change today with Dr Kalie Bourne. Procedure reviewed & questions answered, voiced good understanding consent obtained & placed on chart. All medications and medical history reviewed. Will prep patient per orders. Patient & family updated on plan of care.       The patient has a fraility score of 3-MANAGING WELL, based on reports no recent falls

## 2020-12-29 NOTE — DISCHARGE INSTRUCTIONS
Post Op Device Instructions        Incision & Dressing Care   Please keep Aquacel dressing on wound until your 2 week follow up in device clinic. The dressing promotes healing and is meant to stay on the wound. Keep incision site completely dry for 48 hours. During this time you may take a sponge bath, but no shower. After 48 hours you may shower with your back to the water letting the water run over the dressing. Do not let the water directly hit the dressing, it is water resistant no water proof. Do not use any lotions, creams, or ointments on the incision. Avoid manipulating the device or leads with your fingers. Do not massage or excessively rub the implant site. This could displace the leads      For four weeks after your implant   Do not lift anything heavier than a gallon of milk. Do not raise your elbow above the level of your shoulder on the Left shoulder implant side. Avoid excessive pushing, pulling, or twisting. Call you doctor for any of the following:   Any signs of infection, including redness, swelling or pain at the incision site, or a   temperature of 101° F or greater. If you have twitching chest muscles, hiccups that won't stop, or a swollen arm on the   side of the incision. Any feelings of light-headedness, chest pain, or shortness of breath. Please notify your doctors and dentists that you have an ICD. You should not drive until 1 week after your implant or after your first follow-up appointment, whichever comes first. At that time, you can discuss when you may return to your regular driving routine. About 1 month after implant, you will receive a card by mail from the company who manufactured your ICD. Your device was manufactured by [employer]. You should carry this card with you at all times. If you receive one shock from your device:   and you feel ok, you may call to let your physician know. but if you are feeling poorly, please notify your doctor. You may need to be seen. If you receive more than one shock in a 24 hour period, call your physician to schedule a visit or report to the emergency room. [email or web address] [address] at  [phone] if you have questions or concerns about your device. Please call the hospital if it is after 5 pm or the weekend please page the on call cardiologist at [facility] at [address] will need to have your device checked 1- 2 weeks after the procedure and should have an appointment with the device clinic. If you do not hear from them call the device clinic.

## 2020-12-29 NOTE — PROGRESS NOTES
Pt ambulated in hallway and back to bed. Pt tolerated well. Left SC incision C/D/I without bleeding or hematoma.

## 2020-12-29 NOTE — ANESTHESIA POSTPROCEDURE EVALUATION
Procedure(s):  BIV PACEMAKER GEN CHANGE/ SJM. total IV anesthesia    Anesthesia Post Evaluation      Multimodal analgesia: multimodal analgesia used between 6 hours prior to anesthesia start to PACU discharge  Patient location during evaluation: PACU  Patient participation: complete - patient participated  Level of consciousness: awake and awake and alert  Pain management: adequate  Airway patency: patent  Anesthetic complications: no  Cardiovascular status: acceptable  Respiratory status: acceptable  Hydration status: acceptable  Post anesthesia nausea and vomiting:  controlled      INITIAL Post-op Vital signs:   Vitals Value Taken Time   /83 12/29/20 1323   Temp     Pulse 76 12/29/20 1324   Resp     SpO2 95 % 12/29/20 1324   Vitals shown include unvalidated device data.

## 2021-03-24 PROBLEM — Z86.16 HISTORY OF 2019 NOVEL CORONAVIRUS DISEASE (COVID-19): Status: ACTIVE | Noted: 2020-08-05

## 2021-05-14 PROBLEM — N18.31 STAGE 3A CHRONIC KIDNEY DISEASE (HCC): Status: ACTIVE | Noted: 2017-05-05

## 2021-05-14 PROBLEM — Z28.21 REFUSED VARICELLA VACCINE: Status: ACTIVE | Noted: 2021-05-14

## 2021-05-14 PROBLEM — N18.31 TYPE 2 DIABETES MELLITUS WITH STAGE 3A CHRONIC KIDNEY DISEASE, WITH LONG-TERM CURRENT USE OF INSULIN (HCC): Status: ACTIVE | Noted: 2021-05-14

## 2021-05-14 PROBLEM — E11.29 TYPE 2 DIABETES MELLITUS WITH KIDNEY COMPLICATION, WITH LONG-TERM CURRENT USE OF INSULIN (HCC): Status: ACTIVE | Noted: 2021-05-14

## 2021-05-14 PROBLEM — Z79.4 TYPE 2 DIABETES MELLITUS WITH DIABETIC NEUROPATHY, WITH LONG-TERM CURRENT USE OF INSULIN (HCC): Status: ACTIVE | Noted: 2020-11-13

## 2021-05-14 PROBLEM — Z79.4 TYPE 2 DIABETES MELLITUS WITH KIDNEY COMPLICATION, WITH LONG-TERM CURRENT USE OF INSULIN (HCC): Status: ACTIVE | Noted: 2021-05-14

## 2021-05-14 PROBLEM — E11.22 TYPE 2 DIABETES MELLITUS WITH STAGE 3A CHRONIC KIDNEY DISEASE, WITH LONG-TERM CURRENT USE OF INSULIN (HCC): Status: ACTIVE | Noted: 2021-05-14

## 2021-11-28 PROBLEM — E11.29 TYPE 2 DIABETES MELLITUS WITH MICROALBUMINURIA, WITH LONG-TERM CURRENT USE OF INSULIN (HCC): Status: ACTIVE | Noted: 2018-11-16

## 2021-11-28 PROBLEM — Z28.21 COVID-19 VACCINATION REFUSED: Status: ACTIVE | Noted: 2021-11-28

## 2021-11-28 PROBLEM — H52.4 PRESBYOPIA: Status: ACTIVE | Noted: 2021-02-01

## 2021-11-28 PROBLEM — Z12.5 SCREENING PSA (PROSTATE SPECIFIC ANTIGEN): Status: ACTIVE | Noted: 2021-11-28

## 2021-11-28 PROBLEM — D64.9 NORMOCHROMIC NORMOCYTIC ANEMIA: Status: RESOLVED | Noted: 2020-03-09 | Resolved: 2021-11-28

## 2021-11-28 PROBLEM — Z79.4 TYPE 2 DIABETES MELLITUS WITH MICROALBUMINURIA, WITH LONG-TERM CURRENT USE OF INSULIN (HCC): Status: ACTIVE | Noted: 2018-11-16

## 2021-11-28 PROBLEM — Z79.899 HIGH RISK MEDICATION USE: Status: ACTIVE | Noted: 2021-11-28

## 2021-12-23 ENCOUNTER — HOSPITAL ENCOUNTER (OUTPATIENT)
Dept: LAB | Age: 66
Discharge: HOME | End: 2021-12-23
Payer: MEDICARE

## 2021-12-23 DIAGNOSIS — I27.20 PULMONARY HTN (HCC): ICD-10-CM

## 2021-12-23 DIAGNOSIS — R06.02 SOB (SHORTNESS OF BREATH): ICD-10-CM

## 2021-12-23 LAB
BNP SERPL-MCNC: 526 PG/ML (ref 5–125)
HIV 1+2 AB+HIV1 P24 AG SERPL QL IA: NONREACTIVE
HIV12 RESULT COMMENT, HHIVC: ABNORMAL

## 2021-12-23 PROCEDURE — 87389 HIV-1 AG W/HIV-1&-2 AB AG IA: CPT

## 2021-12-23 PROCEDURE — 36415 COLL VENOUS BLD VENIPUNCTURE: CPT

## 2021-12-23 PROCEDURE — 83880 ASSAY OF NATRIURETIC PEPTIDE: CPT

## 2021-12-23 PROCEDURE — 86038 ANTINUCLEAR ANTIBODIES: CPT

## 2021-12-23 PROCEDURE — 86803 HEPATITIS C AB TEST: CPT

## 2021-12-26 LAB
ANA SER QL: NEGATIVE
HCV AB S/CO SERPL IA: 0.1 S/CO RATIO (ref 0–0.9)
HCV AB SERPL QL IA: NORMAL

## 2021-12-28 PROBLEM — Z12.5 SCREENING PSA (PROSTATE SPECIFIC ANTIGEN): Status: RESOLVED | Noted: 2021-11-28 | Resolved: 2021-12-28

## 2022-01-07 ENCOUNTER — TRANSCRIBE ORDER (OUTPATIENT)
Dept: SCHEDULING | Age: 67
End: 2022-01-07

## 2022-01-07 DIAGNOSIS — M79.605 LEFT LEG PAIN: Primary | ICD-10-CM

## 2022-01-07 DIAGNOSIS — M54.50 LOW BACK PAIN: ICD-10-CM

## 2022-01-08 ENCOUNTER — HOSPITAL ENCOUNTER (OUTPATIENT)
Dept: ULTRASOUND IMAGING | Age: 67
Discharge: HOME | End: 2022-01-08
Attending: STUDENT IN AN ORGANIZED HEALTH CARE EDUCATION/TRAINING PROGRAM
Payer: MEDICARE

## 2022-01-08 DIAGNOSIS — M54.50 LOW BACK PAIN: ICD-10-CM

## 2022-01-08 DIAGNOSIS — M79.605 LEFT LEG PAIN: ICD-10-CM

## 2022-01-08 PROCEDURE — 93971 EXTREMITY STUDY: CPT

## 2022-01-13 ENCOUNTER — HOSPITAL ENCOUNTER (OUTPATIENT)
Age: 67
Setting detail: OBSERVATION
Discharge: HOME | End: 2022-01-15
Attending: EMERGENCY MEDICINE | Admitting: FAMILY MEDICINE
Payer: MEDICARE

## 2022-01-13 ENCOUNTER — APPOINTMENT (OUTPATIENT)
Dept: CT IMAGING | Age: 67
End: 2022-01-13
Attending: EMERGENCY MEDICINE
Payer: MEDICARE

## 2022-01-13 DIAGNOSIS — K85.90 ACUTE PANCREATITIS WITHOUT INFECTION OR NECROSIS, UNSPECIFIED PANCREATITIS TYPE: ICD-10-CM

## 2022-01-13 DIAGNOSIS — K85.90 ACUTE PANCREATITIS, UNSPECIFIED COMPLICATION STATUS, UNSPECIFIED PANCREATITIS TYPE: Primary | ICD-10-CM

## 2022-01-13 DIAGNOSIS — R74.8 ELEVATED LIVER ENZYMES: ICD-10-CM

## 2022-01-13 LAB
ALBUMIN SERPL-MCNC: 3.2 G/DL (ref 3.2–4.6)
ALBUMIN/GLOB SERPL: 0.6 {RATIO} (ref 1.2–3.5)
ALP SERPL-CCNC: 314 U/L (ref 50–136)
ALT SERPL-CCNC: 204 U/L (ref 12–65)
ANION GAP SERPL CALC-SCNC: 8 MMOL/L (ref 7–16)
AST SERPL-CCNC: 118 U/L (ref 15–37)
BASOPHILS # BLD: 0 K/UL (ref 0–0.2)
BASOPHILS NFR BLD: 0 % (ref 0–2)
BILIRUB SERPL-MCNC: 3.2 MG/DL (ref 0.2–1.1)
BUN SERPL-MCNC: 20 MG/DL (ref 8–23)
CALCIUM SERPL-MCNC: 9.4 MG/DL (ref 8.3–10.4)
CHLORIDE SERPL-SCNC: 98 MMOL/L (ref 98–107)
CO2 SERPL-SCNC: 29 MMOL/L (ref 21–32)
CREAT SERPL-MCNC: 1.71 MG/DL (ref 0.8–1.5)
DIFFERENTIAL METHOD BLD: ABNORMAL
EOSINOPHIL # BLD: 0.1 K/UL (ref 0–0.8)
EOSINOPHIL NFR BLD: 1 % (ref 0.5–7.8)
ERYTHROCYTE [DISTWIDTH] IN BLOOD BY AUTOMATED COUNT: 15.3 % (ref 11.9–14.6)
GLOBULIN SER CALC-MCNC: 5.5 G/DL (ref 2.3–3.5)
GLUCOSE SERPL-MCNC: 118 MG/DL (ref 65–100)
HCT VFR BLD AUTO: 42.3 % (ref 41.1–50.3)
HGB BLD-MCNC: 13.4 G/DL (ref 13.6–17.2)
IMM GRANULOCYTES # BLD AUTO: 0.1 K/UL (ref 0–0.5)
IMM GRANULOCYTES NFR BLD AUTO: 0 % (ref 0–5)
LIPASE SERPL-CCNC: 2609 U/L (ref 73–393)
LYMPHOCYTES # BLD: 1.8 K/UL (ref 0.5–4.6)
LYMPHOCYTES NFR BLD: 11 % (ref 13–44)
MCH RBC QN AUTO: 27 PG (ref 26.1–32.9)
MCHC RBC AUTO-ENTMCNC: 31.7 G/DL (ref 31.4–35)
MCV RBC AUTO: 85.3 FL (ref 79.6–97.8)
MONOCYTES # BLD: 1.3 K/UL (ref 0.1–1.3)
MONOCYTES NFR BLD: 8 % (ref 4–12)
NEUTS SEG # BLD: 13.2 K/UL (ref 1.7–8.2)
NEUTS SEG NFR BLD: 80 % (ref 43–78)
NRBC # BLD: 0 K/UL (ref 0–0.2)
PLATELET # BLD AUTO: 319 K/UL (ref 150–450)
PMV BLD AUTO: 10.2 FL (ref 9.4–12.3)
POTASSIUM SERPL-SCNC: 3.9 MMOL/L (ref 3.5–5.1)
PROT SERPL-MCNC: 8.7 G/DL (ref 6.3–8.2)
RBC # BLD AUTO: 4.96 M/UL (ref 4.23–5.6)
SODIUM SERPL-SCNC: 135 MMOL/L (ref 136–145)
WBC # BLD AUTO: 16.5 K/UL (ref 4.3–11.1)

## 2022-01-13 PROCEDURE — 99284 EMERGENCY DEPT VISIT MOD MDM: CPT

## 2022-01-13 PROCEDURE — 96374 THER/PROPH/DIAG INJ IV PUSH: CPT

## 2022-01-13 PROCEDURE — 81003 URINALYSIS AUTO W/O SCOPE: CPT

## 2022-01-13 PROCEDURE — 83690 ASSAY OF LIPASE: CPT

## 2022-01-13 PROCEDURE — 74011250636 HC RX REV CODE- 250/636: Performed by: EMERGENCY MEDICINE

## 2022-01-13 PROCEDURE — 74011000636 HC RX REV CODE- 636: Performed by: EMERGENCY MEDICINE

## 2022-01-13 PROCEDURE — 87635 SARS-COV-2 COVID-19 AMP PRB: CPT

## 2022-01-13 PROCEDURE — 80053 COMPREHEN METABOLIC PANEL: CPT

## 2022-01-13 PROCEDURE — 74011000258 HC RX REV CODE- 258: Performed by: EMERGENCY MEDICINE

## 2022-01-13 PROCEDURE — 85025 COMPLETE CBC W/AUTO DIFF WBC: CPT

## 2022-01-13 PROCEDURE — 74177 CT ABD & PELVIS W/CONTRAST: CPT

## 2022-01-13 PROCEDURE — 96361 HYDRATE IV INFUSION ADD-ON: CPT

## 2022-01-13 PROCEDURE — 96375 TX/PRO/DX INJ NEW DRUG ADDON: CPT

## 2022-01-13 RX ORDER — SODIUM CHLORIDE 0.9 % (FLUSH) 0.9 %
5-10 SYRINGE (ML) INJECTION EVERY 8 HOURS
Status: DISCONTINUED | OUTPATIENT
Start: 2022-01-13 | End: 2022-01-15 | Stop reason: HOSPADM

## 2022-01-13 RX ORDER — ONDANSETRON 2 MG/ML
4 INJECTION INTRAMUSCULAR; INTRAVENOUS
Status: COMPLETED | OUTPATIENT
Start: 2022-01-13 | End: 2022-01-13

## 2022-01-13 RX ORDER — FENTANYL CITRATE 50 UG/ML
25 INJECTION, SOLUTION INTRAMUSCULAR; INTRAVENOUS
Status: COMPLETED | OUTPATIENT
Start: 2022-01-13 | End: 2022-01-13

## 2022-01-13 RX ORDER — SODIUM CHLORIDE, SODIUM LACTATE, POTASSIUM CHLORIDE, CALCIUM CHLORIDE 600; 310; 30; 20 MG/100ML; MG/100ML; MG/100ML; MG/100ML
1000 INJECTION, SOLUTION INTRAVENOUS CONTINUOUS
Status: DISCONTINUED | OUTPATIENT
Start: 2022-01-13 | End: 2022-01-15 | Stop reason: HOSPADM

## 2022-01-13 RX ORDER — SODIUM CHLORIDE 0.9 % (FLUSH) 0.9 %
5-10 SYRINGE (ML) INJECTION AS NEEDED
Status: DISCONTINUED | OUTPATIENT
Start: 2022-01-13 | End: 2022-01-15 | Stop reason: HOSPADM

## 2022-01-13 RX ORDER — SODIUM CHLORIDE 0.9 % (FLUSH) 0.9 %
10 SYRINGE (ML) INJECTION
Status: COMPLETED | OUTPATIENT
Start: 2022-01-13 | End: 2022-01-13

## 2022-01-13 RX ADMIN — IOPAMIDOL 100 ML: 755 INJECTION, SOLUTION INTRAVENOUS at 23:30

## 2022-01-13 RX ADMIN — FENTANYL CITRATE 25 MCG: 50 INJECTION INTRAMUSCULAR; INTRAVENOUS at 22:27

## 2022-01-13 RX ADMIN — SODIUM CHLORIDE 100 ML: 9 INJECTION, SOLUTION INTRAVENOUS at 23:29

## 2022-01-13 RX ADMIN — ONDANSETRON 4 MG: 2 INJECTION INTRAMUSCULAR; INTRAVENOUS at 22:27

## 2022-01-13 RX ADMIN — SODIUM CHLORIDE, SODIUM LACTATE, POTASSIUM CHLORIDE, AND CALCIUM CHLORIDE 1000 ML: 600; 310; 30; 20 INJECTION, SOLUTION INTRAVENOUS at 22:27

## 2022-01-13 RX ADMIN — Medication 10 ML: at 23:29

## 2022-01-14 PROBLEM — K85.90 ACUTE PANCREATITIS: Status: ACTIVE | Noted: 2022-01-14

## 2022-01-14 PROBLEM — I48.20 CHRONIC ATRIAL FIBRILLATION (HCC): Status: ACTIVE | Noted: 2022-01-14

## 2022-01-14 PROBLEM — Z86.16 HISTORY OF COVID-19: Status: ACTIVE | Noted: 2022-01-14

## 2022-01-14 PROBLEM — U07.1 COVID-19: Status: ACTIVE | Noted: 2022-01-14

## 2022-01-14 PROBLEM — K21.9 GASTROESOPHAGEAL REFLUX DISEASE: Chronic | Status: ACTIVE | Noted: 2020-06-17

## 2022-01-14 LAB
ALBUMIN SERPL-MCNC: 2.8 G/DL (ref 3.2–4.6)
ALBUMIN/GLOB SERPL: 0.6 {RATIO} (ref 1.2–3.5)
ALP SERPL-CCNC: 267 U/L (ref 50–136)
ALT SERPL-CCNC: 155 U/L (ref 12–65)
ANION GAP SERPL CALC-SCNC: 8 MMOL/L (ref 7–16)
AST SERPL-CCNC: 75 U/L (ref 15–37)
BILIRUB SERPL-MCNC: 2.5 MG/DL (ref 0.2–1.1)
BUN SERPL-MCNC: 20 MG/DL (ref 8–23)
CALCIUM SERPL-MCNC: 8.8 MG/DL (ref 8.3–10.4)
CHLORIDE SERPL-SCNC: 100 MMOL/L (ref 98–107)
CHOLEST SERPL-MCNC: 88 MG/DL
CO2 SERPL-SCNC: 29 MMOL/L (ref 21–32)
COVID-19 RAPID TEST, COVR: NOT DETECTED
CREAT SERPL-MCNC: 1.51 MG/DL (ref 0.8–1.5)
ERYTHROCYTE [DISTWIDTH] IN BLOOD BY AUTOMATED COUNT: 15.4 % (ref 11.9–14.6)
GLOBULIN SER CALC-MCNC: 4.7 G/DL (ref 2.3–3.5)
GLUCOSE BLD STRIP.AUTO-MCNC: 104 MG/DL (ref 65–100)
GLUCOSE SERPL-MCNC: 99 MG/DL (ref 65–100)
HCT VFR BLD AUTO: 38.2 % (ref 41.1–50.3)
HDLC SERPL-MCNC: 20 MG/DL (ref 40–60)
HDLC SERPL: 4.4 {RATIO}
HGB BLD-MCNC: 12.3 G/DL (ref 13.6–17.2)
LDLC SERPL CALC-MCNC: 40.8 MG/DL
LIPASE SERPL-CCNC: 1438 U/L (ref 73–393)
MCH RBC QN AUTO: 27.6 PG (ref 26.1–32.9)
MCHC RBC AUTO-ENTMCNC: 32.2 G/DL (ref 31.4–35)
MCV RBC AUTO: 85.8 FL (ref 79.6–97.8)
NRBC # BLD: 0 K/UL (ref 0–0.2)
PLATELET # BLD AUTO: 304 K/UL (ref 150–450)
PMV BLD AUTO: 10.8 FL (ref 9.4–12.3)
POTASSIUM SERPL-SCNC: 3.3 MMOL/L (ref 3.5–5.1)
PROT SERPL-MCNC: 7.5 G/DL (ref 6.3–8.2)
RBC # BLD AUTO: 4.45 M/UL (ref 4.23–5.6)
SERVICE CMNT-IMP: ABNORMAL
SODIUM SERPL-SCNC: 137 MMOL/L (ref 136–145)
SOURCE, COVRS: NORMAL
TRIGL SERPL-MCNC: 136 MG/DL (ref 35–150)
VLDLC SERPL CALC-MCNC: 27.2 MG/DL (ref 6–23)
WBC # BLD AUTO: 17.7 K/UL (ref 4.3–11.1)

## 2022-01-14 PROCEDURE — 74011250636 HC RX REV CODE- 250/636: Performed by: NURSE PRACTITIONER

## 2022-01-14 PROCEDURE — G0378 HOSPITAL OBSERVATION PER HR: HCPCS

## 2022-01-14 PROCEDURE — 94640 AIRWAY INHALATION TREATMENT: CPT

## 2022-01-14 PROCEDURE — 94760 N-INVAS EAR/PLS OXIMETRY 1: CPT

## 2022-01-14 PROCEDURE — 65270000029 HC RM PRIVATE

## 2022-01-14 PROCEDURE — 36415 COLL VENOUS BLD VENIPUNCTURE: CPT

## 2022-01-14 PROCEDURE — 83690 ASSAY OF LIPASE: CPT

## 2022-01-14 PROCEDURE — 85027 COMPLETE CBC AUTOMATED: CPT

## 2022-01-14 PROCEDURE — 74011250636 HC RX REV CODE- 250/636: Performed by: INTERNAL MEDICINE

## 2022-01-14 PROCEDURE — 80053 COMPREHEN METABOLIC PANEL: CPT

## 2022-01-14 PROCEDURE — 94664 DEMO&/EVAL PT USE INHALER: CPT

## 2022-01-14 PROCEDURE — 77030013032 HC MSK BPAP/CPAP FISP -B

## 2022-01-14 PROCEDURE — 74011000250 HC RX REV CODE- 250: Performed by: FAMILY MEDICINE

## 2022-01-14 PROCEDURE — 94660 CPAP INITIATION&MGMT: CPT

## 2022-01-14 PROCEDURE — 74011000250 HC RX REV CODE- 250: Performed by: INTERNAL MEDICINE

## 2022-01-14 PROCEDURE — 82962 GLUCOSE BLOOD TEST: CPT

## 2022-01-14 PROCEDURE — 74011000250 HC RX REV CODE- 250: Performed by: EMERGENCY MEDICINE

## 2022-01-14 PROCEDURE — 80074 ACUTE HEPATITIS PANEL: CPT

## 2022-01-14 PROCEDURE — 80061 LIPID PANEL: CPT

## 2022-01-14 PROCEDURE — C9113 INJ PANTOPRAZOLE SODIUM, VIA: HCPCS | Performed by: INTERNAL MEDICINE

## 2022-01-14 PROCEDURE — 96375 TX/PRO/DX INJ NEW DRUG ADDON: CPT

## 2022-01-14 PROCEDURE — 74011250637 HC RX REV CODE- 250/637: Performed by: FAMILY MEDICINE

## 2022-01-14 RX ORDER — ONDANSETRON 2 MG/ML
4 INJECTION INTRAMUSCULAR; INTRAVENOUS
Status: DISCONTINUED | OUTPATIENT
Start: 2022-01-14 | End: 2022-01-15 | Stop reason: HOSPADM

## 2022-01-14 RX ORDER — ONDANSETRON 4 MG/1
4 TABLET, ORALLY DISINTEGRATING ORAL
Status: DISCONTINUED | OUTPATIENT
Start: 2022-01-14 | End: 2022-01-15 | Stop reason: HOSPADM

## 2022-01-14 RX ORDER — ATORVASTATIN CALCIUM 10 MG/1
10 TABLET, FILM COATED ORAL DAILY
Status: DISCONTINUED | OUTPATIENT
Start: 2022-01-14 | End: 2022-01-15 | Stop reason: HOSPADM

## 2022-01-14 RX ORDER — AMLODIPINE BESYLATE 5 MG/1
5 TABLET ORAL DAILY
Status: DISCONTINUED | OUTPATIENT
Start: 2022-01-14 | End: 2022-01-15 | Stop reason: HOSPADM

## 2022-01-14 RX ORDER — CETIRIZINE HCL 10 MG
10 TABLET ORAL DAILY
Status: DISCONTINUED | OUTPATIENT
Start: 2022-01-14 | End: 2022-01-15 | Stop reason: HOSPADM

## 2022-01-14 RX ORDER — METHOCARBAMOL 500 MG/1
500 TABLET, FILM COATED ORAL 2 TIMES DAILY
Status: DISCONTINUED | OUTPATIENT
Start: 2022-01-14 | End: 2022-01-15 | Stop reason: HOSPADM

## 2022-01-14 RX ORDER — BUDESONIDE AND FORMOTEROL FUMARATE DIHYDRATE 160; 4.5 UG/1; UG/1
2 AEROSOL RESPIRATORY (INHALATION)
Status: DISCONTINUED | OUTPATIENT
Start: 2022-01-14 | End: 2022-01-15 | Stop reason: HOSPADM

## 2022-01-14 RX ORDER — ALLOPURINOL 300 MG/1
300 TABLET ORAL DAILY
Status: DISCONTINUED | OUTPATIENT
Start: 2022-01-14 | End: 2022-01-15 | Stop reason: HOSPADM

## 2022-01-14 RX ORDER — INSULIN GLARGINE 100 [IU]/ML
48 INJECTION, SOLUTION SUBCUTANEOUS 2 TIMES DAILY
Status: DISCONTINUED | OUTPATIENT
Start: 2022-01-14 | End: 2022-01-15 | Stop reason: HOSPADM

## 2022-01-14 RX ORDER — ACETAMINOPHEN 650 MG/1
650 SUPPOSITORY RECTAL
Status: DISCONTINUED | OUTPATIENT
Start: 2022-01-14 | End: 2022-01-15 | Stop reason: HOSPADM

## 2022-01-14 RX ORDER — POLYETHYLENE GLYCOL 3350 17 G/17G
17 POWDER, FOR SOLUTION ORAL DAILY PRN
Status: DISCONTINUED | OUTPATIENT
Start: 2022-01-14 | End: 2022-01-15 | Stop reason: HOSPADM

## 2022-01-14 RX ORDER — CARVEDILOL 6.25 MG/1
12.5 TABLET ORAL 2 TIMES DAILY WITH MEALS
Status: DISCONTINUED | OUTPATIENT
Start: 2022-01-14 | End: 2022-01-15 | Stop reason: HOSPADM

## 2022-01-14 RX ORDER — SODIUM CHLORIDE, SODIUM LACTATE, POTASSIUM CHLORIDE, CALCIUM CHLORIDE 600; 310; 30; 20 MG/100ML; MG/100ML; MG/100ML; MG/100ML
200 INJECTION, SOLUTION INTRAVENOUS CONTINUOUS
Status: DISCONTINUED | OUTPATIENT
Start: 2022-01-14 | End: 2022-01-15 | Stop reason: HOSPADM

## 2022-01-14 RX ORDER — ALBUTEROL SULFATE 0.83 MG/ML
2.5 SOLUTION RESPIRATORY (INHALATION)
Status: DISCONTINUED | OUTPATIENT
Start: 2022-01-14 | End: 2022-01-15 | Stop reason: HOSPADM

## 2022-01-14 RX ORDER — OXYCODONE HYDROCHLORIDE 5 MG/1
5 TABLET ORAL
Status: DISCONTINUED | OUTPATIENT
Start: 2022-01-14 | End: 2022-01-15 | Stop reason: HOSPADM

## 2022-01-14 RX ORDER — FLUTICASONE PROPIONATE 50 MCG
2 SPRAY, SUSPENSION (ML) NASAL DAILY
Status: DISCONTINUED | OUTPATIENT
Start: 2022-01-14 | End: 2022-01-15 | Stop reason: HOSPADM

## 2022-01-14 RX ORDER — TORSEMIDE 20 MG/1
20 TABLET ORAL 2 TIMES DAILY
Status: DISCONTINUED | OUTPATIENT
Start: 2022-01-14 | End: 2022-01-15 | Stop reason: HOSPADM

## 2022-01-14 RX ORDER — SODIUM CHLORIDE 0.9 % (FLUSH) 0.9 %
5-40 SYRINGE (ML) INJECTION EVERY 8 HOURS
Status: DISCONTINUED | OUTPATIENT
Start: 2022-01-14 | End: 2022-01-15 | Stop reason: HOSPADM

## 2022-01-14 RX ORDER — ACETAMINOPHEN 325 MG/1
650 TABLET ORAL
Status: DISCONTINUED | OUTPATIENT
Start: 2022-01-14 | End: 2022-01-15 | Stop reason: HOSPADM

## 2022-01-14 RX ORDER — SODIUM CHLORIDE 0.9 % (FLUSH) 0.9 %
5-40 SYRINGE (ML) INJECTION AS NEEDED
Status: DISCONTINUED | OUTPATIENT
Start: 2022-01-14 | End: 2022-01-14

## 2022-01-14 RX ADMIN — SODIUM CHLORIDE, PRESERVATIVE FREE 10 ML: 5 INJECTION INTRAVENOUS at 14:11

## 2022-01-14 RX ADMIN — SODIUM CHLORIDE, PRESERVATIVE FREE 10 ML: 5 INJECTION INTRAVENOUS at 01:29

## 2022-01-14 RX ADMIN — OXYCODONE 5 MG: 5 TABLET ORAL at 17:57

## 2022-01-14 RX ADMIN — ATORVASTATIN CALCIUM 10 MG: 10 TABLET, FILM COATED ORAL at 21:06

## 2022-01-14 RX ADMIN — SODIUM CHLORIDE, SODIUM LACTATE, POTASSIUM CHLORIDE, AND CALCIUM CHLORIDE 200 ML/HR: 600; 310; 30; 20 INJECTION, SOLUTION INTRAVENOUS at 14:10

## 2022-01-14 RX ADMIN — SODIUM CHLORIDE, PRESERVATIVE FREE 10 ML: 5 INJECTION INTRAVENOUS at 22:00

## 2022-01-14 RX ADMIN — ALLOPURINOL 300 MG: 300 TABLET ORAL at 08:16

## 2022-01-14 RX ADMIN — METHOCARBAMOL TABLETS 500 MG: 500 TABLET, COATED ORAL at 17:53

## 2022-01-14 RX ADMIN — CARVEDILOL 12.5 MG: 6.25 TABLET, FILM COATED ORAL at 18:01

## 2022-01-14 RX ADMIN — CETIRIZINE HYDROCHLORIDE 10 MG: 10 TABLET ORAL at 08:16

## 2022-01-14 RX ADMIN — OXYCODONE 5 MG: 5 TABLET ORAL at 01:44

## 2022-01-14 RX ADMIN — BUDESONIDE AND FORMOTEROL FUMARATE DIHYDRATE 2 PUFF: 160; 4.5 AEROSOL RESPIRATORY (INHALATION) at 20:15

## 2022-01-14 RX ADMIN — SODIUM CHLORIDE, SODIUM LACTATE, POTASSIUM CHLORIDE, AND CALCIUM CHLORIDE 200 ML/HR: 600; 310; 30; 20 INJECTION, SOLUTION INTRAVENOUS at 08:58

## 2022-01-14 RX ADMIN — SODIUM CHLORIDE 40 MG: 9 INJECTION, SOLUTION INTRAMUSCULAR; INTRAVENOUS; SUBCUTANEOUS at 12:49

## 2022-01-14 RX ADMIN — CARVEDILOL 12.5 MG: 6.25 TABLET, FILM COATED ORAL at 17:53

## 2022-01-14 RX ADMIN — TORSEMIDE 20 MG: 20 TABLET ORAL at 08:32

## 2022-01-14 RX ADMIN — METHOCARBAMOL TABLETS 500 MG: 500 TABLET, COATED ORAL at 08:16

## 2022-01-14 RX ADMIN — SODIUM CHLORIDE, PRESERVATIVE FREE 10 ML: 5 INJECTION INTRAVENOUS at 05:00

## 2022-01-14 RX ADMIN — SODIUM CHLORIDE, SODIUM LACTATE, POTASSIUM CHLORIDE, AND CALCIUM CHLORIDE 200 ML/HR: 600; 310; 30; 20 INJECTION, SOLUTION INTRAVENOUS at 19:48

## 2022-01-14 RX ADMIN — APIXABAN 5 MG: 5 TABLET, FILM COATED ORAL at 21:06

## 2022-01-14 RX ADMIN — TIOTROPIUM BROMIDE INHALATION SPRAY 2 PUFF: 3.12 SPRAY, METERED RESPIRATORY (INHALATION) at 07:42

## 2022-01-14 RX ADMIN — OXYCODONE 5 MG: 5 TABLET ORAL at 07:31

## 2022-01-14 RX ADMIN — TORSEMIDE 20 MG: 20 TABLET ORAL at 17:53

## 2022-01-14 RX ADMIN — APIXABAN 5 MG: 5 TABLET, FILM COATED ORAL at 08:16

## 2022-01-14 RX ADMIN — BUDESONIDE AND FORMOTEROL FUMARATE DIHYDRATE 2 PUFF: 160; 4.5 AEROSOL RESPIRATORY (INHALATION) at 07:42

## 2022-01-14 RX ADMIN — AMLODIPINE BESYLATE 5 MG: 5 TABLET ORAL at 08:16

## 2022-01-14 RX ADMIN — CARVEDILOL 12.5 MG: 6.25 TABLET, FILM COATED ORAL at 08:16

## 2022-01-14 NOTE — PROGRESS NOTES
TRANSFER - IN REPORT:    Verbal report received from Aracelis Nunndi  being received from ED(unit) for routine progression of care      Report consisted of patients Situation, Background, Assessment and   Recommendations(SBAR). Information from the following report(s) SBAR and Kardex was reviewed with the receiving nurse. Opportunity for questions and clarification was provided. Assessment completed upon patients arrival to unit and care assumed.

## 2022-01-14 NOTE — PROGRESS NOTES
Care Management Interventions  PCP Verified by CM:  Yes  Last Visit to PCP: 01/04/22  Support Systems: Spouse/Significant Other  Confirm Follow Up Transport: Family  Discharge Location  Discharge Placement: Unable to determine at this time    Sw spoke w/ pt., pt is a 77 y/o male admitted for Acute Pancreatitis, pt lives w/ his wife, pt's wife and 15 y/o granddaughter helps take care of pt whenever pt is not feeling well, pt bathes and dresses himself w/o assistance,  pt has not had any previous falls and only uses a cane occasionally, in the past pt has had home health services (2020) when pt was d/c from the hospital w/ Covid    Pt's PCP is Dr. Florin Coats and he sees a physician for [facility], pt sees Dr. Florin Coats the most because it is easier to get an appt and close to their home, pt's receives all of his medication through the [place], Sw will follow pt until d/c    SHANTEL Dietrich

## 2022-01-14 NOTE — ED PROVIDER NOTES
58-year-old male with a history of atrial fibrillation, hypertension, chronic pain, COPD, GERD, peptic ulcer disease, cholecystectomy in 2014, pacemaker presents with diffuse abdominal pain and distention for the past 2 days. Pain started after eating soup. He had nausea and vomiting at that time that resolved. He has had a normal bowel movement today. No diarrhea or blood in stools. No fevers. Primary care physician ordered labs and was found to have elevated lipase and creatinine, so sent to the emergency department. Patient denies history of pancreatitis or alcohol abuse. Abdominal Pain   Associated symptoms include nausea, vomiting and back pain. Pertinent negatives include no fever, no diarrhea, no dysuria, no headaches and no chest pain. Past Medical History:   Diagnosis Date    Albuminuria     Allergic rhinitis     Asthma     Atrial fibrillation     Benign essential hypertension     Calculous cholecystitis 03/19/2014    Chronic pain     COPD     COVID-19 8/11/2020    Edema     Gastroesophageal reflux disease     Gout     Hepatic steatosis     Hypercholesteremia     Hypertriglyceridemia     Iron deficiency anemia     Normochromic normocytic anemia 3/9/2020    Last Assessment & Plan: This is likely hypoproliferative due to his longstanding chronic kidney disease, diabetes, and other comorbidities. He can continue oral iron but this may not be necessary. He does have microcytic indices but this appears unchanged for the last 6 years. He really has no GI complaints.     Obstructive sleep apnea on CPAP     Osteoarthritis     Peptic ulcer disease     Sensory neuropathy     Vitamin D deficiency        Past Surgical History:   Procedure Laterality Date    HX CHOLECYSTECTOMY      HX HEART CATHETERIZATION  2020    HX HEENT  mid 2000's    uvulopalatopharynguloplasty    HX HERNIA REPAIR  2013    Dr. Yumi Krause Bilateral     [address] LAP CHOLECYSTECTOMY  2013 Dr. Reina Cutler  4498'V    right hand    HX PACEMAKER      2020    HX PACEMAKER PLACEMENT      Dr. Noni Henao    [address] [place] [address]  10/29/2020    replaced         Family History:   Problem Relation Age of Onset    Heart Disease Mother     Diabetes Mother     Heart Disease Father     Stroke Father     Hypertension Father     Diabetes Father     Cancer Sister     Diabetes Sister     Heart Disease Sister     Hypertension Sister     Diabetes Sister     Heart Disease Sister     Hypertension Sister     No Known Problems Brother     Cancer Brother        Social History     Socioeconomic History    Marital status:      Spouse name: Not on file    Number of children: Not on file    Years of education: Not on file    Highest education level: Not on file   Occupational History    Not on file   Tobacco Use    Smoking status: Former Smoker     Packs/day: 0.25     Years: 1.00     Pack years: 0.25     Types: Cigars     Quit date:      Years since quittin.0    Smokeless tobacco: Former User     Types: Chew    Tobacco comment: quits, but then starts back   Substance and Sexual Activity    Alcohol use: No     Alcohol/week: 0.0 standard drinks     Comment: former    Drug use: No    Sexual activity: Not on file   Other Topics Concern    Not on file   Social History Narrative    Not on file     Social Determinants of Health     Financial Resource Strain:     Difficulty of Paying Living Expenses: Not on file   Food Insecurity:     Worried About [address] [employer] in the Last Year: Not on file    [facility] in the Last Year: Not on file   Transportation Needs:     Lack of Transportation (Medical): Not on file    Lack of Transportation (Non-Medical):  Not on file   Physical Activity:     Days of Exercise per Week: Not on file    Minutes of Exercise per Session: Not on file   Stress:     Feeling of Stress : Not on file   Social Connections:     Frequency of Communication with Friends and Family: Not on file    Frequency of Social Gatherings with Friends and Family: Not on file    Attends Scientologist Services: Not on file    Active Member of Clubs or Organizations: Not on file    Attends Club or Organization Meetings: Not on file    Marital Status: Not on file   Intimate Partner Violence:     Fear of Current or Ex-Partner: Not on file    Emotionally Abused: Not on file    Physically Abused: Not on file    Sexually Abused: Not on file   Housing Stability:     Unable to Pay for Housing in the Last Year: Not on file    Number of [place] in the Last Year: Not on file    Unstable Housing in the Last Year: Not on file         ALLERGIES: Cozaar [losartan], Lisinopril, Morphine, and Pcn [penicillins]    Review of Systems   Constitutional: Positive for appetite change. Negative for fever. HENT: Negative for hearing loss. Eyes: Negative for visual disturbance. Respiratory: Negative for cough and shortness of breath. Cardiovascular: Negative for chest pain. Gastrointestinal: Positive for abdominal pain, nausea and vomiting. Negative for diarrhea. Genitourinary: Negative for dysuria. Musculoskeletal: Positive for back pain. Skin: Negative for rash. Neurological: Negative for headaches. Psychiatric/Behavioral: Negative for confusion. All other systems reviewed and are negative. Vitals:    01/13/22 2045   BP: 130/76   Pulse: 71   Resp: 20   Temp: 99.5 °F (37.5 °C)   SpO2: 95%   Weight: 146.5 kg (322 lb 15.6 oz)   Height: 5' 11\" (1.803 m)            Physical Exam  Vitals and nursing note reviewed. Constitutional:       Appearance: Normal appearance. He is well-developed. He is obese. HENT:      Head: Normocephalic and atraumatic. Nose: Nose normal.      Mouth/Throat:      Mouth: Mucous membranes are moist.   Eyes:      Pupils: Pupils are equal, round, and reactive to light.    Cardiovascular:      Rate and Rhythm: Regular rhythm. Heart sounds: Normal heart sounds. Pulmonary:      Effort: Pulmonary effort is normal.      Breath sounds: Normal breath sounds. Abdominal:      General: Abdomen is protuberant. There is distension. Tenderness: There is generalized abdominal tenderness. Musculoskeletal:         General: No deformity. Normal range of motion. Cervical back: Normal range of motion and neck supple. Skin:     General: Skin is warm and dry. Neurological:      General: No focal deficit present. Mental Status: He is alert. Mental status is at baseline. Psychiatric:         Mood and Affect: Mood normal.         Behavior: Behavior normal.          MDM  Number of Diagnoses or Management Options  Diagnosis management comments: Parts of this document were created using dragon voice recognition software. The chart has been reviewed but errors may still be present. I wore appropriate PPE throughout this patient's ED visit. Jefe Leone MD, 10:07 PM    11:49 PM  1st episode of pancreatitis with elevated LFT\"s and h/o cholecystectomy in 2014. No ETOH use. dw hospitalist for admission.        Amount and/or Complexity of Data Reviewed  Clinical lab tests: ordered and reviewed (Results for orders placed or performed during the hospital encounter of 01/13/22  -CBC WITH AUTOMATED DIFF:        Result                      Value             Ref Range           WBC                         16.5 (H)          4.3 - 11.1 K*       RBC                         4.96              4.23 - 5.6 M*       HGB                         13.4 (L)          13.6 - 17.2 *       HCT                         42.3              41.1 - 50.3 %       MCV                         85.3              79.6 - 97.8 *       MCH                         27.0              26.1 - 32.9 *       MCHC                        31.7              31.4 - 35.0 *       RDW                         15.3 (H)          11.9 - 14.6 %       PLATELET 319               150 - 450 K/*       MPV                         10.2              9.4 - 12.3 FL       ABSOLUTE NRBC               0.00              0.0 - 0.2 K/*       DF                          AUTOMATED                             NEUTROPHILS                 80 (H)            43 - 78 %           LYMPHOCYTES                 11 (L)            13 - 44 %           MONOCYTES                   8                 4.0 - 12.0 %        EOSINOPHILS                 1                 0.5 - 7.8 %         BASOPHILS                   0                 0.0 - 2.0 %         IMMATURE GRANULOCYTES       0                 0.0 - 5.0 %         ABS. NEUTROPHILS            13.2 (H)          1.7 - 8.2 K/*       ABS. LYMPHOCYTES            1.8               0.5 - 4.6 K/*       ABS. MONOCYTES              1.3               0.1 - 1.3 K/*       ABS. EOSINOPHILS            0.1               0.0 - 0.8 K/*       ABS. BASOPHILS              0.0               0.0 - 0.2 K/*       ABS. IMM.  GRANS.            0.1               0.0 - 0.5 K/*  -METABOLIC PANEL, COMPREHENSIVE:        Result                      Value             Ref Range           Sodium                      135 (L)           136 - 145 mm*       Potassium                   3.9               3.5 - 5.1 mm*       Chloride                    98                98 - 107 mmo*       CO2                         29                21 - 32 mmol*       Anion gap                   8                 7 - 16 mmol/L       Glucose                     118 (H)           65 - 100 mg/*       BUN                         20                8 - 23 MG/DL        Creatinine                  1.71 (H)          0.8 - 1.5 MG*       GFR est AA                  52 (L)            >60 ml/min/1*       GFR est non-AA              43 (L)            >60 ml/min/1*       Calcium                     9.4               8.3 - 10.4 M*       Bilirubin, total            3.2 (H)           0.2 - 1.1 MG*       ALT (SGPT) 204 (H)           12 - 65 U/L         AST (SGOT)                  118 (H)           15 - 37 U/L         Alk.  phosphatase            314 (H)           50 - 136 U/L        Protein, total              8.7 (H)           6.3 - 8.2 g/*       Albumin                     3.2               3.2 - 4.6 g/*       Globulin                    5.5 (H)           2.3 - 3.5 g/*       A-G Ratio                   0.6 (L)           1.2 - 3.5      -LIPASE:        Result                      Value             Ref Range           Lipase                      2,609 (H)         73 - 393 U/L   )  Tests in the radiology section of CPT®: ordered and reviewed  Tests in the medicine section of CPT®: reviewed and ordered           Procedures

## 2022-01-14 NOTE — ED NOTES
TRANSFER - OUT REPORT:    Verbal report given to RN (name) on Elin Friend  being transferred to medical (unit) for routine progression of care       Report consisted of patients Situation, Background, Assessment and   Recommendations(SBAR). Information from the following report(s) SBAR and ED Summary was reviewed with the receiving nurse. Lines:   Peripheral IV 01/13/22 Right Forearm (Active)   Site Assessment Clean, dry, & intact 01/13/22 2057   Phlebitis Assessment 0 01/13/22 2057   Infiltration Assessment 0 01/13/22 2057   Dressing Status Clean, dry, & intact 01/13/22 2057   Dressing Type Gauze;Tape;Transparent 01/13/22 2057   Hub Color/Line Status Pink;Flushed 01/13/22 2057   Action Taken Blood drawn 01/13/22 2057        Opportunity for questions and clarification was provided.       Patient transported with:   Registered Nurse

## 2022-01-14 NOTE — ED TRIAGE NOTES
Pt states that he has had abd pain in the right lower abd area for over a week. Pain radiates into his lower right back and down his right leg. Has been seen by his PMD and was instructed to come to the ED.   Masked on arrival

## 2022-01-14 NOTE — H&P
Hospitalist History and Physical   Admit Date:  2022  9:48 PM   Name:  Geeta Hall   Age:  78 y.o. Sex:  male  :  1955   MRN:  [id number]     Presenting Complaint: abdominal pain  Reason(s) for Admission: Acute pancreatitis [K85.90]     History of Present Illness:   Geeta Hall is a 78 y.o. male with medical history of DM2, COPD, HTN who presented to ED with abdominal pain. Patient reports pain started 2 days ago after eating. He denies any diarrhea/constipation. Pain is located in epigastrium. Reports having nausea/vomiting initially, but that has improved. Upon ER evaluation, patient has elevated lipase of 2,609 as well as elevated LFTs with t.bili of 3.2. Patient has h/o prior cholecystectomy in . Hospitalist to admit. Review of Systems:  10 systems reviewed and negative except as noted in HPI. Assessment & Plan:   * Acute pancreatitis  - Does not drink alcohol  - Prior anam in 2014  - LFTs are elevated  - CT results pending  - NPO  - Consult with GI    Type 2 diabetes mellitus with diabetic polyneuropathy, with long-term current use of insulin (HCC)  - Holding PO meds including metformin and actos  - Continue home insulin, but reduce dosage given NPO status  - SSI    Benign essential hypertension  - Home meds    Longstanding persistent atrial fibrillation (HCC)  - Home meds  - Currently rate controlled    Chronic obstructive pulmonary disease ([facility] [address]  - Home meds  - Not in exacerbation       Disposition: inpatient    Diet: DIET NPO  VTE ppx: SCDs  Code status: Prior      Past medical history reviewed.     Past Medical History:   Diagnosis Date    Albuminuria     Allergic rhinitis     Asthma     Atrial fibrillation     Benign essential hypertension     Calculous cholecystitis 2014    Chronic pain     COPD     COVID-19 2020    Edema     Gastroesophageal reflux disease     Gout     Hepatic steatosis     Hypercholesteremia     Hypertriglyceridemia  Iron deficiency anemia     Normochromic normocytic anemia 3/9/2020    Last Assessment & Plan: This is likely hypoproliferative due to his longstanding chronic kidney disease, diabetes, and other comorbidities. He can continue oral iron but this may not be necessary. He does have microcytic indices but this appears unchanged for the last 6 years. He really has no GI complaints.  Obstructive sleep apnea on CPAP     Osteoarthritis     Peptic ulcer disease     Sensory neuropathy     Vitamin D deficiency      Past surgical history reviewed.     Past Surgical History:   Procedure Laterality Date    HX CHOLECYSTECTOMY      HX HEART CATHETERIZATION      HX HEENT  mid     uvulopalatopharynguloplasty    HX HERNIA REPAIR      Dr. Rosemarie Loo Bilateral     HX LAP CHOLECYSTECTOMY      Dr. Uvaldo Alejo HX ORTHOPAEDIC  55's    right hand    HX PACEMAKER      2020    [place]go      Dr. Rashad Mosquera    HX [address] [place] [address]  10/29/2020    replaced      Allergies   Allergen Reactions    Cozaar [Losartan] Angioedema    Lisinopril Angioedema    Morphine Anaphylaxis and Swelling    Pcn [Penicillins] Anaphylaxis     Previous RX for cephalosporin tolerated      Social History     Tobacco Use    Smoking status: Former Smoker     Packs/day: 0.25     Years: 1.00     Pack years: 0.25     Types: Cigars     Quit date:      Years since quittin.0    Smokeless tobacco: Former User     Types: Chew    Tobacco comment: quits, but then starts back   Substance Use Topics    Alcohol use: No     Alcohol/week: 0.0 standard drinks     Comment: former      Family History   Problem Relation Age of Onset    Heart Disease Mother     Diabetes Mother     Heart Disease Father     Stroke Father     Hypertension Father     Diabetes Father     Cancer Sister     Diabetes Sister     Heart Disease Sister     Hypertension Sister     Diabetes Sister     Heart Disease Sister     Hypertension Sister     No Known Problems Brother     Cancer Brother       Family history reviewed and noncontributory to patient's acute condition; no relevant family history unless otherwise noted above. Immunization History   Administered Date(s) Administered    Influenza High Dose Vaccine PF 10/02/2019, 09/30/2021    Influenza Vaccine 10/19/2012, 10/08/2013, 10/06/2014, 10/03/2016, 09/28/2018    Influenza Vaccine (Quad) Mdck Pf (>2 Yrs Flucelvax QUAD [id number]) 10/18/2017    Influenza Vaccine PF 10/02/2015    Influenza, High-dose, Quadrivalent (>65 Yrs Fluzone High Dose Quad 96623) 10/14/2020    Pneumococcal Conjugate (PCV-13) 05/10/2019, 11/13/2020    Pneumococcal Polysaccharide (PPSV-23) 11/15/2021    Pneumococcal Vaccine (Unspecified Type) 01/01/2007    TB Skin Test (PPD) Intradermal 08/11/2020    Td 01/01/2003    Tdap 10/08/2013     PTA Medications:  Current Outpatient Medications   Medication Instructions    acetaminophen (TYLENOL ARTHRITIS PAIN) 650 mg, EVERY 6 HOURS AS NEEDED    albuterol (PROVENTIL HFA, VENTOLIN HFA, PROAIR HFA) 90 mcg/actuation inhaler Inhalation    ALCOHOL PREP PADS padm No dose, route, or frequency recorded.  allopurinoL (ZYLOPRIM) 300 mg, Oral, DAILY    amLODIPine (NORVASC) 5 mg, Oral, DAILY    ammonium lactate (LAC-HYDRIN) 12 % lotion Topical, AS NEEDED, rub in to affected area well     apixaban (ELIQUIS) 5 mg, Oral, 2 TIMES DAILY    ascorbic acid, vitamin C, (Vitamin C) 500 mg tablet Oral    atorvastatin (LIPITOR) 10 mg tablet TAKE ONE TABLET BY MOUTH ONE TIME DAILY    BD Insulin Syringe Ultra-Fine 1 mL 30 gauge x 1/2\" syrg Use to inject insulin twice daily for Dx E11.65    camphor-methyl salicyl-menthoL (Salonpas) 3.1-10-6 % ptmd Apply Externally    carvediloL (COREG) 12.5 mg, Oral, 2 TIMES DAILY WITH MEALS    colchicine (COLCRYS) 0.6 mg, Oral, DAILY, Take 2 pills on first day, then 1 pill daily thereafter.     diclofenac (VOLTAREN) 4 g, Topical, 4 TIMES DAILY    ferrous sulfate (Iron) 325 mg (65 mg iron) tablet Oral, EVERY OTHER DAY    fluticasone propion-salmeteroL (Wixela Inhub) 500-50 mcg/dose diskus inhaler 1 Puff, Inhalation, EVERY 12 HOURS    fluticasone propionate (Flonase Allergy Relief) 50 mcg/actuation nasal spray 2 Sprays, Both Nostrils, DAILY    Lantus U-100 Insulin 50 Units, SubCUTAneous, 2 TIMES DAILY, Adjust by 2 units every 3 days to keep fasting BG . Max 130 units per day.  lidocaine (URO-JET/ GLYDO) 2 % jelp jelly Urethral, AS NEEDED    loratadine (CLARITIN) 10 mg, Oral, DAILY    metFORMIN (GLUCOPHAGE) 500 mg, Oral, 2 TIMES DAILY WITH MEALS    methocarbamoL (ROBAXIN) 500 mg tablet 1 Tablet, Oral, 2 TIMES DAILY    OMEGA-3 FATTY ACIDS (FISH OIL CONCENTRATE PO) 2 in am, 2 in afternoon and 1 at bedtime    omeprazole (PRILOSEC) 20 mg, 7AM    OTHER . DISABILITY CERTIFICATE:  This patient has a disability requiring a DISABLED PLACARD as documented.  Ozempic 1 mg, SubCUTAneous, EVERY 7 DAYS    pioglitazone (ACTOS) 15 mg, Oral, DAILY    potassium chloride (KLOR-CON) 10 mEq tablet 10 mEq, Oral, 2 TIMES DAILY    tiotropium bromide (Spiriva Respimat) 2.5 mcg/actuation inhaler 2 Puffs, Inhalation, DAILY    torsemide (DEMADEX) 20 mg tablet TAKE ONE TABLET BY MOUTH TWICE A DAY    trolamine salicylate (Asper-Flex) 10 % topical cream Topical, AS NEEDED, Asper cream        Objective:     Patient Vitals for the past 24 hrs:   Temp Pulse Resp BP SpO2   01/13/22 2300    120/65 91 %   01/13/22 2232    110/62 94 %   01/13/22 2045 99.5 °F (37.5 °C) 71 20 130/76 95 %     Oxygen Therapy  O2 Sat (%): 91 % (01/13/22 2300)  Pulse via Oximetry: 72 beats per minute (01/13/22 2300)  O2 Device: None (Room air) (01/13/22 2045)    Estimated body mass index is 45.05 kg/m² as calculated from the following:    Height as of this encounter: 5' 11\" (1.803 m). Weight as of this encounter: 146.5 kg (322 lb 15.6 oz).   No intake or output data in the 24 hours ending 01/14/22 0009      Physical Exam:  General:    Well nourished. No overt distress  Head:  Normocephalic, atraumatic  Eyes:  Sclerae appear normal.  Pupils equally round. HENT:  Nares appear normal, no drainage. Moist mucous membranes  Neck:  No restricted ROM. Trachea midline  CV:   RRR. S1/S2 auscultated  Lungs:   CTAB. No wheezing, rhonchi, or rales. Appears even, unlabored  Abdomen: Bowel sounds present. Obese. Soft, distended. Mild epigastric tenderness to palpation    Extremities: Warm and dry. No cyanosis or clubbing. No edema. Skin:     No rashes. Normal turgor. Normal coloration  Neuro:  Cranial nerves II-XII grossly intact. Sensation intact  Psych:  Normal mood and affect.   Alert and oriented x3    Data Ordered and Personally Reviewed:    Last 24hr Labs:  Recent Results (from the past 24 hour(s))   AMB POC URINALYSIS DIP STICK AUTO W/O MICRO    Collection Time: 01/13/22 12:12 PM   Result Value Ref Range    Color (UA POC) Brown     Clarity (UA POC) Cloudy     Glucose (UA POC) Negative Negative    Bilirubin (UA POC) 2+ Negative    Ketones (UA POC) Negative Negative    Specific gravity (UA POC) 1.015 1.001 - 1.035    Blood (UA POC) Negative Negative    pH (UA POC) 6.0 4.6 - 8.0    Protein (UA POC) Trace Negative    Urobilinogen (UA POC) 0.2 mg/dL 0.2 - 1    Nitrites (UA POC) Negative Negative    Leukocyte esterase (UA POC) Negative Negative   CBC WITH AUTOMATED DIFF    Collection Time: 01/13/22  9:00 PM   Result Value Ref Range    WBC 16.5 (H) 4.3 - 11.1 K/uL    RBC 4.96 4.23 - 5.6 M/uL    HGB 13.4 (L) 13.6 - 17.2 g/dL    HCT 42.3 41.1 - 50.3 %    MCV 85.3 79.6 - 97.8 FL    MCH 27.0 26.1 - 32.9 PG    MCHC 31.7 31.4 - 35.0 g/dL    RDW 15.3 (H) 11.9 - 14.6 %    PLATELET 788 261 - 903 K/uL    MPV 10.2 9.4 - 12.3 FL    ABSOLUTE NRBC 0.00 0.0 - 0.2 K/uL    DF AUTOMATED      NEUTROPHILS 80 (H) 43 - 78 %    LYMPHOCYTES 11 (L) 13 - 44 %    MONOCYTES 8 4.0 - 12.0 %    EOSINOPHILS 1 0.5 - 7.8 %    BASOPHILS 0 0.0 - 2.0 %    IMMATURE GRANULOCYTES 0 0.0 - 5.0 %    ABS. NEUTROPHILS 13.2 (H) 1.7 - 8.2 K/UL    ABS. LYMPHOCYTES 1.8 0.5 - 4.6 K/UL    ABS. MONOCYTES 1.3 0.1 - 1.3 K/UL    ABS. EOSINOPHILS 0.1 0.0 - 0.8 K/UL    ABS. BASOPHILS 0.0 0.0 - 0.2 K/UL    ABS. IMM. GRANS. 0.1 0.0 - 0.5 K/UL   METABOLIC PANEL, COMPREHENSIVE    Collection Time: 01/13/22  9:00 PM   Result Value Ref Range    Sodium 135 (L) 136 - 145 mmol/L    Potassium 3.9 3.5 - 5.1 mmol/L    Chloride 98 98 - 107 mmol/L    CO2 29 21 - 32 mmol/L    Anion gap 8 7 - 16 mmol/L    Glucose 118 (H) 65 - 100 mg/dL    BUN 20 8 - 23 MG/DL    Creatinine 1.71 (H) 0.8 - 1.5 MG/DL    GFR est AA 52 (L) >60 ml/min/1.73m2    GFR est non-AA 43 (L) >60 ml/min/1.73m2    Calcium 9.4 8.3 - 10.4 MG/DL    Bilirubin, total 3.2 (H) 0.2 - 1.1 MG/DL    ALT (SGPT) 204 (H) 12 - 65 U/L    AST (SGOT) 118 (H) 15 - 37 U/L    Alk. phosphatase 314 (H) 50 - 136 U/L    Protein, total 8.7 (H) 6.3 - 8.2 g/dL    Albumin 3.2 3.2 - 4.6 g/dL    Globulin 5.5 (H) 2.3 - 3.5 g/dL    A-G Ratio 0.6 (L) 1.2 - 3.5     LIPASE    Collection Time: 01/13/22  9:00 PM   Result Value Ref Range    Lipase 2,609 (H) 73 - 393 U/L       All Micro Results     Procedure Component Value Units Date/Time    COVID-19 RAPID TEST [[id number]] Collected: 01/13/22 3789    Order Status: Completed Updated: 01/14/22 0001          Other Studies:  No results found.       Medications Administered     fentaNYL citrate (PF) injection 25 mcg     Admin Date  01/13/2022 Action  Given Dose  25 mcg Route  IntraVENous Administered By  Dnenis Velasquez RN          iopamidoL (ISOVUE-370) 76 % injection 100 mL     Admin Date  01/13/2022 Action  Given Dose  100 mL Route  IntraVENous Administered By  Felecia Martinez          lactated Ringers infusion 1,000 mL     Admin Date  01/13/2022 Action  New Bag Dose  1,000 mL Route  IntraVENous Administered By  Dennis Velasquez RN          ondansetron [facility] injection 4 mg     Admin Date  01/13/2022 Action  Given Dose  4 mg Route  IntraVENous Administered By  Calixto Ray RN          saline peripheral flush soln 10 mL     Admin Date  01/13/2022 Action  Given Dose  10 mL Route  InterCATHeter Administered By  Devan Her          sodium chloride 0.9 % bolus infusion 100 mL     Admin Date  01/13/2022 Action  New Bag Dose  100 mL Route  IntraVENous Administered By  Devan Her                  Signed:  Naheed Delgado MD

## 2022-01-14 NOTE — CONSULTS
[facility] Consult Note       Primary GI Physician: Dr. Jazmine Watkins    Referring Provider:  Dr. Regina Robles Date:  1/14/2022    Admit Date:  1/13/2022    Chief Complaint:  Pancreatitis    Subjective:     History of Present Illness:  Patient is a 78 y.o. male with PMH including but not limited to, A fib on Eliquis, Pacemaker, COPD, HTN, DM, chronic pain, RONAK with CPAP, GERD who is seen in consultation at the request of Dr. Hu Roberts for acute pancreatitis. Mr. Errol Arredondo presented to the ED on 1/13/2022 with a 2 day history of abdominal pain with associated N&V. On evaluation, he was found to have an elevated WBC 16.5, abnormal LFTS to include T bili 3.2, , , , lipase of 2,609. CT findings as below with suspected pancreatitis and possible cirrhosis. He was admitted, started on IVF and made NPO. He denies any previous episodes of pancreatitis. He reports heavy ETOH use in the 1970'2 in the [facility], minimal since with no ETOH use in 5 years. Stopped smoking cigarette smoking in the 1970's. Does report that he was given a new medication for sciatica just piror to onset of pain (does not know what this was but maybe started with a D or T). Took 4 does of this and then D/C after pain started. Reports mid abd pain and bloating. Last BM yesterday. Reports no nausea currently, feels this is worse in the evening time. He has had a prior cholecystectomy in 2014. Takes Omeprazole for GERD with good symptom control. Report no hx of liver disease. Blood transfusion > 7 yo. Denies IV drug use hx. EXAM: CT abdomen and pelvis with IV contrast.1/13/2022   INDICATION: Abdominal pain.     COMPARISON: Prior CT abdomen and pelvis on May 1, 2020.     TECHNIQUE: Axial CT images of the abdomen and pelvis were obtained after the  intravenous injection of 100 mL Isovue 370 CT contrast. Radiation dose reduction  techniques were used for this study.   Our CT scanners use one or all of the  following:  Automated exposure control, adjustment of the mA or kV according to  patient size, iterative reconstruction.     FINDINGS:     - Liver: The liver has a somewhat lobulated contour, raising the possibility of  underlying cirrhosis. No focal liver mass is seen. - Gallbladder and bile ducts: The gallbladder is absent, with no biliary  dilatation.  - Spleen: Within normal limits. - Urinary tract: Within normal limits. - Adrenals: Within normal limits. - Pancreas: There is mild stranding around the pancreatic head and tail, suspect  for acute pancreatitis. The pancreas is otherwise normal in appearance. No  pancreatic necrosis, ductal dilatation or pseudocyst is seen. - Gastrointestinal tract: Colonic diverticulosis, without evidence of acute  diverticulitis. Small bowel and appendix are normal.  - Retroperitoneum: Mild abdominal aortic atherosclerosis, with no aneurysmal  dilatation or dissection.  - Peritoneal cavity and abdominal wall: No ascites or free intraperitoneal air. There is a 3.5 cm fat-containing umbilical hernia. - Pelvis: Within normal limits. - Spine/bones: No acute process. - Other comments: There is mild atelectasis or scarring in the left lung base.     IMPRESSION  1. Mild hazy stranding in the peripancreatic fat, suspect for acute  pancreatitis. 2. Prior cholecystectomy. 3. The liver has a somewhat lobulated contour, possibly due to underlying  cirrhosis. 4. Colonic diverticulosis, without evidence of acute diverticulitis. Colonoscopy 6/17/2020 by Dr. Leslye Singer revealed hyperplastic polyp in the transverse colon. Repeat in 5 years due multiple adenomatous polyps at [place] in 2015. 9/6/2007 EGD by Dr. Pina Lara revealed gastritis.  H. Pylori positive      PMH:  Past Medical History:   Diagnosis Date    Albuminuria     Allergic rhinitis     Asthma     Atrial fibrillation     Benign essential hypertension     Calculous cholecystitis 03/19/2014    Chronic pain     COPD  COVID-19 8/11/2020    Edema     Gastroesophageal reflux disease     Gout     Hepatic steatosis     Hypercholesteremia     Hypertriglyceridemia     Iron deficiency anemia     Normochromic normocytic anemia 3/9/2020    Last Assessment & Plan: This is likely hypoproliferative due to his longstanding chronic kidney disease, diabetes, and other comorbidities. He can continue oral iron but this may not be necessary. He does have microcytic indices but this appears unchanged for the last 6 years. He really has no GI complaints.  Obstructive sleep apnea on CPAP     Osteoarthritis     Peptic ulcer disease     Sensory neuropathy     Vitamin D deficiency        PSH:  Past Surgical History:   Procedure Laterality Date    HX CHOLECYSTECTOMY      HX HEART CATHETERIZATION  2020    HX HEENT  mid 2000's    uvulopalatopharynguloplasty    HX HERNIA REPAIR  2013    Dr. Yumi Krause Bilateral     HX LAP CHOLECYSTECTOMY  2013    Dr. Nicolas Bridges HX ORTHOPAEDIC  [id number]    right hand    HX PACEMAKER      12/29/2020    HX PACEMAKER PLACEMENT  2013    Dr. Jett Olivas  10/29/2020    replaced       Allergies: Allergies   Allergen Reactions    Cozaar [Losartan] Angioedema    Lisinopril Angioedema    Morphine Anaphylaxis and Swelling    Pcn [Penicillins] Anaphylaxis     Previous RX for cephalosporin tolerated       Home Medications:  Prior to Admission medications    Medication Sig Start Date End Date Taking? Authorizing Provider   fluticasone propion-salmeteroL (Wixela Inhub) 500-50 mcg/dose diskus inhaler Take 1 Puff by inhalation every twelve (12) hours. Yes Provider, Historical   fluticasone propionate (Flonase Allergy Relief) 50 mcg/actuation nasal spray 2 Sprays by Both Nostrils route daily. Yes Provider, Historical   colchicine (Colcrys) 0.6 mg tablet Take 1 Tablet by mouth daily. Take 2 pills on first day, then 1 pill daily thereafter.   Indications: acute inflammation of the joints due to gout attack 11/15/21  Yes Gerson Zavala P, DO   potassium chloride (KLOR-CON) 10 mEq tablet Take 1 Tablet by mouth two (2) times a day. 11/15/21  Yes Gerson Zavala P, DO   ammonium lactate (LAC-HYDRIN) 12 % lotion Apply  to affected area as needed. rub in to affected area well   Yes Provider, Historical   lidocaine (URO-JET/ GLYDO) 2 % jelp jelly by Urethral route as needed. Yes Provider, Historical   atorvastatin (LIPITOR) 10 mg tablet TAKE ONE TABLET BY MOUTH ONE TIME DAILY 9/18/21  Yes Gerson LE, DO   Lantus U-100 Insulin 100 unit/mL injection 50 Units by SubCUTAneous route two (2) times a day. Adjust by 2 units every 3 days to keep fasting BG . Max 130 units per day. Patient taking differently: 48 Units by SubCUTAneous route two (2) times a day. Adjust by 2 units every 3 days to keep fasting BG . Max 130 units per day. 9/10/21  Yes Yessi Horner MD   Ozempic 1 mg/dose (2 mg/1.5 mL) sub-q pen 1 mg by SubCUTAneous route every seven (7) days. 9/10/21  Yes Yessi Horner MD   pioglitazone (Actos) 30 mg tablet Take 0.5 Tablets by mouth daily. 9/10/21  Yes Yessi Horner MD   metFORMIN (GLUCOPHAGE) 500 mg tablet Take 1 Tablet by mouth two (2) times daily (with meals). 9/10/21  Yes Yessi Horner MD   BD Insulin Syringe Ultra-Fine 1 mL 30 gauge x 1/2\" syrg Use to inject insulin twice daily for Dx E11.65 9/10/21  Yes Maria G Church MD   carvediloL (COREG) 12.5 mg tablet Take 1 Tablet by mouth two (2) times daily (with meals). 9/10/21  Yes Yessi Horner MD   camphor-methyl salicyl-menthoL (Salonpas) 3.1-10-6 % ptmd by Apply Externally route. Yes Provider, Historical   trolamine salicylate (Asper-Flex) 10 % topical cream Apply  to affected area as needed.  Asper cream   Yes Provider, Historical   torsemide (DEMADEX) 20 mg tablet TAKE ONE TABLET BY MOUTH TWICE A DAY 6/23/21  Yes Manolo Talbot, DO   methocarbamoL (ROBAXIN) 500 mg tablet Take 1 Tab by mouth two (2) times a day. 12/3/20  Yes Provider, Historical   tiotropium bromide (Spiriva Respimat) 2.5 mcg/actuation inhaler Take 2 Puffs by inhalation daily. Yes Provider, Historical   ascorbic acid, vitamin C, (Vitamin C) 500 mg tablet Take  by mouth. Yes Provider, Historical   albuterol (PROVENTIL HFA, VENTOLIN HFA, PROAIR HFA) 90 mcg/actuation inhaler Take  by inhalation. Yes Provider, Historical   apixaban (Eliquis) 5 mg tablet Take 5 mg by mouth two (2) times a day. Yes Provider, Historical   diclofenac (VOLTAREN) 1 % gel Apply 4 g to affected area four (4) times daily. 12/27/19  Yes Ladd Romberg, DO   OTHER . DISABILITY CERTIFICATE:  This patient has a disability requiring a DISABLED PLACARD as documented. 3/22/18  Yes Ladd Romberg,    allopurinol (ZYLOPRIM) 300 mg tablet Take 1 Tab by mouth daily. Indications: prevention of acute gout attack 1/9/18  Yes Wally LE, DO   OMEGA-3 FATTY ACIDS (FISH OIL CONCENTRATE PO) 2 in am, 2 in afternoon and 1 at bedtime   Yes Provider, Historical   ALCOHOL PREP PADS padm  10/6/16  Yes Provider, Historical   loratadine (CLARITIN) 10 mg tablet Take 10 mg by mouth daily. 12/9/16  Yes Provider, Historical   acetaminophen (TYLENOL ARTHRITIS PAIN) 650 mg CR tablet Take 650 mg by mouth every six (6) hours as needed for Pain. Yes Provider, Historical   omeprazole (PRILOSEC) 20 mg capsule Take 20 mg by mouth every morning. Yes Other, MD Huy   amLODIPine (NORVASC) 5 mg tablet Take 1 Tablet by mouth daily. 9/10/21   Michael Horner MD   ferrous sulfate (Iron) 325 mg (65 mg iron) tablet Take  by mouth every other day.   Patient not taking: Reported on 1/14/2022    Provider, Historical       Hospital Medications:  Current Facility-Administered Medications   Medication Dose Route Frequency    cetirizine (ZYRTEC) tablet 10 mg  10 mg Oral DAILY    allopurinoL (ZYLOPRIM) tablet 300 mg  300 mg Oral DAILY    apixaban (ELIQUIS) tablet 5 mg  5 mg Oral BID    albuterol (PROVENTIL VENTOLIN) nebulizer solution 2.5 mg  2.5 mg Nebulization Q4H PRN    tiotropium bromide (SPIRIVA RESPIMAT) 2.5 mcg /actuation  2 Puff Inhalation DAILY    methocarbamoL (ROBAXIN) tablet 500 mg  500 mg Oral BID    torsemide (DEMADEX) tablet 20 mg  20 mg Oral BID    [Held by provider] insulin glargine (LANTUS) injection 48 Units  48 Units SubCUTAneous BID    carvediloL (COREG) tablet 12.5 mg  12.5 mg Oral BID WITH MEALS    amLODIPine (NORVASC) tablet 5 mg  5 mg Oral DAILY    atorvastatin (LIPITOR) tablet 10 mg  10 mg Oral DAILY    budesonide-formoteroL (SYMBICORT) 160-4.5 mcg/actuation HFA inhaler 2 Puff  2 Puff Inhalation BID RT    fluticasone propionate (FLONASE) 50 mcg/actuation nasal spray 2 Spray  2 Spray Both Nostrils DAILY    sodium chloride (NS) flush 5-40 mL  5-40 mL IntraVENous Q8H    acetaminophen (TYLENOL) tablet 650 mg  650 mg Oral Q6H PRN    Or    acetaminophen (TYLENOL) suppository 650 mg  650 mg Rectal Q6H PRN    polyethylene glycol (MIRALAX) packet 17 g  17 g Oral DAILY PRN    ondansetron (ZOFRAN ODT) tablet 4 mg  4 mg Oral Q8H PRN    Or    ondansetron (ZOFRAN) injection 4 mg  4 mg IntraVENous Q6H PRN    oxyCODONE IR (ROXICODONE) tablet 5 mg  5 mg Oral Q4H PRN    sodium chloride (NS) flush 5-10 mL  5-10 mL IntraVENous Q8H    sodium chloride (NS) flush 5-10 mL  5-10 mL IntraVENous PRN    lactated Ringers infusion 1,000 mL  1,000 mL IntraVENous CONTINUOUS       Social History:  Social History     Tobacco Use    Smoking status: Former Smoker     Packs/day: 0.25     Years: 1.00     Pack years: 0.25     Types: Cigars     Quit date:      Years since quittin.0    Smokeless tobacco: Former User     Types: Chew    Tobacco comment: quits, but then starts back   Substance Use Topics    Alcohol use: No     Alcohol/week: 0.0 standard drinks     Comment: former         Family History:  Family History   Problem Relation Age of Onset    Heart Disease Mother     Diabetes Mother     Heart Disease Father     Stroke Father     Hypertension Father     Diabetes Father    Lazaro Sanchez Sister     Diabetes Sister     Heart Disease Sister     Hypertension Sister     Diabetes Sister     Heart Disease Sister     Hypertension Sister     No Known Problems Brother     Cancer Brother        Review of Systems:  A detailed 10 system ROS is obtained, with pertinent positives as listed above. All others are negative. Diet:  NPO    Objective:     Physical Exam:  Vitals:  Visit Vitals  /87 (BP 1 Location: Right upper arm, BP Patient Position: Sitting)   Pulse 71   Temp 98.7 °F (37.1 °C)   Resp 20   Ht 5' 11\" (1.803 m)   Wt 146.5 kg (322 lb 15.6 oz)   SpO2 96%   BMI 45.05 kg/m²     Gen:  Pt is alert, cooperative, no acute distress  Skin:  Extremities and face reveal no rashes. HEENT: Sclerae anicteric. Extra-occular muscles are intact. No oral ulcers. No abnormal pigmentation of the lips. The neck is supple. Cardiovascular: Regular rate and rhythm. No murmurs, gallops, or rubs. Respiratory:  Comfortable breathing with no accessory muscle use. Clear breath sounds anteriorly with no wheezes, rales, or rhonchi. GI:  Abdomen nondistended, soft, obese, mild ttp m  In mid abd and left side. Normal active bowel sounds. No enlargement of the liver or spleen. No masses palpable. Musculoskeletal:  No pitting edema of the lower legs. Neurological:  Gross memory appears intact. Patient is alert and oriented. Psychiatric:  Mood appears appropriate with judgement intact. Lymphatic:  No cervical or supraclavicular adenopathy.     Laboratory:    Recent Labs     01/14/22  0503 01/13/22  2100   WBC 17.7* 16.5*   HGB 12.3* 13.4*   HCT 38.2* 42.3    319   MCV 85.8 85.3    135*   K 3.3* 3.9    98   CO2 29 29   BUN 20 20   CREA 1.51* 1.71*   CA 8.8 9.4   GLU 99 118*   * 314*   AST 75* 118*   * 204*   TBILI 2.5* 3.2*   ALB 2.8* 3.2   TP 7.5 8.7*   LPSE  --  2,609*          Assessment:     Principal Problem:    Acute pancreatitis (1/14/2022)    Active Problems:    Chronic obstructive pulmonary disease (HCC) ()      Longstanding persistent atrial fibrillation ([facility] [address] (4/10/2007)      Benign essential hypertension ()      Type 2 diabetes mellitus with diabetic polyneuropathy, with long-term current use of insulin ([facility] (11/29/2019)      Overview: Follows with Dr. Gretchen Fam    78 y.o. male with PMH including but not limited to, A fib on Eliquis, Pacemaker, COPD, HTN, DM, chronic pain, RONAK with CPAP, GERD who is seen in consultation at the request of Dr. Dinorah Teixeira for acute pancreatitis. Mr. Yesica Marroquin presented to the ED on 1/13/2022 with a 2 day history of abdominal pain with associated N&V. On evaluation, he was found to have an elevated WBC 16.5, abnormal LFTS to include T bili 3.2, , , , lipase of 2,609. CT of the abdomen/pelvis with contrast 1/13/2021 revealed Mild hazy stranding in the peripancreatic fat, suspect for acute pancreatitis. The liver has a somewhat lobulated contour, possibly due to underlying cirrhosis. Remote ETOH use. New medication for sciatic. Plan:     -Triglycerides and TC wnl  -Hepatitis panel   -Keep NPO  -Continue LR but at 200cc/hr  -Supportive care with antiemetics and pain management  -Daily labs  - PPI every day, on at home    Hadley Perez NP  Patient is seen and examined in collaboration with Dr. Kev Mckeon. Assessment and plan as per Dr. Kev Mckeon.

## 2022-01-14 NOTE — PROGRESS NOTES
END OF SHIFT NOTE:        VITAL SIGNS  Patient Vitals for the past 12 hrs:   Temp Pulse Resp BP SpO2   01/14/22 0101 99.6 °F (37.6 °C) 70 19 (!) 144/84 96 %   01/14/22 0000    (!) 146/73 94 %   01/13/22 2300    120/65 91 %   01/13/22 2232    110/62 94 %   01/13/22 2045 99.5 °F (37.5 °C) 71 20 130/76 95 %     -pt given oxy 1x for pain  -pt resting in bed, vss

## 2022-01-14 NOTE — ASSESSMENT & PLAN NOTE
- Does not drink alcohol  - Prior anam in 2014  - LFTs are elevated  - CT results pending  - NPO  - Consult with GI

## 2022-01-14 NOTE — PROGRESS NOTES
01/14/22 0116   Dual Skin Pressure Injury Assessment   Dual Skin Pressure Injury Assessment WDL   Second Care Provider (Based on [address] [place] [address]) BAUDILIO Weinberg   Skin Integumentary   Skin Integumentary (WDL) WDL    Pressure  Injury Documentation No Pressure Injury Noted-Pressure Ulcer Prevention Initiated   Skin Color Appropriate for ethnicity   Skin Condition/Temp Warm;Dry   Skin Integrity Intact; Abrasion  (abrasion L hand from dog)   Turgor Non-tenting

## 2022-01-14 NOTE — ASSESSMENT & PLAN NOTE
- Holding PO meds including metformin and actos  - Continue home insulin, but reduce dosage given NPO status  - SSI

## 2022-01-14 NOTE — PROGRESS NOTES
Hospitalist Progress Note   Admit Date:  2022  9:48 PM   Name:  Jimmy Romeo   Age:  78 y.o. Sex:  male  :  1955   MRN:  [id number]   Room:  365/01    Presenting Complaint: Abdominal Pain    Reason(s) for Admission: Acute pancreatitis [facility] Course & Interval History:   78 y.o. male with medical history of DM2, COPD, HTN who presented to ED with abdominal pain. Patient reports pain started 2 days ago after eating. He denies any diarrhea/constipation. Pain is located in epigastrium. Reports having nausea/vomiting initially, but that has improved. Upon ER evaluation, patient has elevated lipase of 2,609 as well as elevated LFTs with t.bili of 3.2. Patient has h/o prior cholecystectomy in . Hospitalist to admit. Gastroenterology consulted. Plan for MRCP. Subjective (22): This is a nonbillable note. Patient admitted by my partner this morning. Evaluated by gastroenterology plan for MRCP. Symptomatic management.     Assessment & Plan:   Type 2 diabetes mellitus with diabetic polyneuropathy, with long-term current use of insulin (HCC)  - Holding PO meds including metformin and actos  - Continue home insulin, but reduce dosage given NPO status  - SSI    Benign essential hypertension  - Home meds    Longstanding persistent atrial fibrillation (HCC)  - Home meds  - Currently rate controlled    Chronic obstructive pulmonary disease ([facility] [address]  - Home meds  - Not in exacerbation    * Acute pancreatitis  - Does not drink alcohol  - Prior anam in   - LFTs are elevated  - CT results pending  - NPO  - Consult with GI      Dispo/Discharge Planning:    Continue inpatient care    Diet:  DIET NPO Sips of Water with Meds  DVT PPx: SCDs  Code status: Full Code    Hospital Problems as of 2022 Date Reviewed: 2022          Codes Class Noted - Resolved POA    * (Principal) Acute pancreatitis ICD-10-CM: K85.90  ICD-9-CM: 111.9  2022 - Present Unknown        Type 2 diabetes mellitus with diabetic polyneuropathy, with long-term current use of insulin (HCC) ICD-10-CM: E11.42, Z79.4  ICD-9-CM: 250.60, 357.2, V58.67  11/29/2019 - Present Yes    Overview Signed 11/28/2021  1:20 AM by Basil Pat, DO     Follows with Dr. Brandee Irvin             Benign essential hypertension ICD-10-CM: I10  ICD-9-CM: 401.1  Unknown - Present Yes        Chronic obstructive pulmonary disease ([facility] ICD-10-CM: J44.9  ICD-9-CM: 584  Unknown - Present Yes        Asthma ICD-10-CM: J45.909  ICD-9-CM: 493.90  Unknown - Present Unknown        Longstanding persistent atrial fibrillation ([facility] ICD-10-CM: I48.11  ICD-9-CM: 427.31  4/10/2007 - Present Yes              Objective:     Patient Vitals for the past 24 hrs:   Temp Pulse Resp BP SpO2   01/14/22 0748     96 %   01/14/22 0726 98.7 °F (37.1 °C) 71 20 139/87 91 %   01/14/22 0217     94 %   01/14/22 0215     91 %   01/14/22 0101 99.6 °F (37.6 °C) 70 19 (!) 144/84 96 %   01/14/22 0000    (!) 146/73 94 %   01/13/22 2300    120/65 91 %   01/13/22 2232    110/62 94 %   01/13/22 2045 99.5 °F (37.5 °C) 71 20 130/76 95 %     Oxygen Therapy  O2 Sat (%): 96 % (01/14/22 0748)  Pulse via Oximetry: 74 beats per minute (01/14/22 0748)  O2 Device: None (Room air) (01/14/22 0748)  Skin Assessment: Clean, dry, & intact (01/14/22 0217)  O2 Flow Rate (L/min): 3 l/min (01/14/22 0217)  FIO2 (%): 21 % (01/14/22 0748)    Estimated body mass index is 45.05 kg/m² as calculated from the following:    Height as of this encounter: 5' 11\" (1.803 m). Weight as of this encounter: 146.5 kg (322 lb 15.6 oz). No intake or output data in the 24 hours ending 01/14/22 0921      Blood pressure 139/87, pulse 71, temperature 98.7 °F (37.1 °C), resp. rate 20, height 5' 11\" (1.803 m), weight 146.5 kg (322 lb 15.6 oz), SpO2 96 %. Physical Exam  Vitals and nursing note reviewed. Constitutional:       General: He is in acute distress (mild).       Appearance: Normal appearance. He is morbidly obese. HENT:      Head: Normocephalic. Eyes:      Extraocular Movements: Extraocular movements intact. Cardiovascular:      Rate and Rhythm: Normal rate. Pulses:           Radial pulses are 2+ on the left side. Pulmonary:      Effort: Pulmonary effort is normal. No respiratory distress. Abdominal:      General: There is distension. Tenderness: There is abdominal tenderness. Comments: pannus   Musculoskeletal:         General: No deformity. Cervical back: No rigidity. Skin:     General: Skin is warm and dry. Neurological:      General: No focal deficit present. Mental Status: He is alert and oriented to person, place, and time.    Psychiatric:         Mood and Affect: Mood normal.         Behavior: Behavior normal.         I have reviewed ordered lab tests and independently visualized imaging below:    Recent Labs:  Recent Results (from the past 48 hour(s))   AMB POC URINALYSIS DIP STICK AUTO W/O MICRO    Collection Time: 01/13/22 12:12 PM   Result Value Ref Range    Color (UA POC) Brown     Clarity (UA POC) Cloudy     Glucose (UA POC) Negative Negative    Bilirubin (UA POC) 2+ Negative    Ketones (UA POC) Negative Negative    Specific gravity (UA POC) 1.015 1.001 - 1.035    Blood (UA POC) Negative Negative    pH (UA POC) 6.0 4.6 - 8.0    Protein (UA POC) Trace Negative    Urobilinogen (UA POC) 0.2 mg/dL 0.2 - 1    Nitrites (UA POC) Negative Negative    Leukocyte esterase (UA POC) Negative Negative   CBC WITH AUTOMATED DIFF    Collection Time: 01/13/22  9:00 PM   Result Value Ref Range    WBC 16.5 (H) 4.3 - 11.1 K/uL    RBC 4.96 4.23 - 5.6 M/uL    HGB 13.4 (L) 13.6 - 17.2 g/dL    HCT 42.3 41.1 - 50.3 %    MCV 85.3 79.6 - 97.8 FL    MCH 27.0 26.1 - 32.9 PG    MCHC 31.7 31.4 - 35.0 g/dL    RDW 15.3 (H) 11.9 - 14.6 %    PLATELET 946 086 - 974 K/uL    MPV 10.2 9.4 - 12.3 FL    ABSOLUTE NRBC 0.00 0.0 - 0.2 K/uL    DF AUTOMATED      NEUTROPHILS 80 (H) 43 - 78 %    LYMPHOCYTES 11 (L) 13 - 44 %    MONOCYTES 8 4.0 - 12.0 %    EOSINOPHILS 1 0.5 - 7.8 %    BASOPHILS 0 0.0 - 2.0 %    IMMATURE GRANULOCYTES 0 0.0 - 5.0 %    ABS. NEUTROPHILS 13.2 (H) 1.7 - 8.2 K/UL    ABS. LYMPHOCYTES 1.8 0.5 - 4.6 K/UL    ABS. MONOCYTES 1.3 0.1 - 1.3 K/UL    ABS. EOSINOPHILS 0.1 0.0 - 0.8 K/UL    ABS. BASOPHILS 0.0 0.0 - 0.2 K/UL    ABS. IMM. GRANS. 0.1 0.0 - 0.5 K/UL   METABOLIC PANEL, COMPREHENSIVE    Collection Time: 01/13/22  9:00 PM   Result Value Ref Range    Sodium 135 (L) 136 - 145 mmol/L    Potassium 3.9 3.5 - 5.1 mmol/L    Chloride 98 98 - 107 mmol/L    CO2 29 21 - 32 mmol/L    Anion gap 8 7 - 16 mmol/L    Glucose 118 (H) 65 - 100 mg/dL    BUN 20 8 - 23 MG/DL    Creatinine 1.71 (H) 0.8 - 1.5 MG/DL    GFR est AA 52 (L) >60 ml/min/1.73m2    GFR est non-AA 43 (L) >60 ml/min/1.73m2    Calcium 9.4 8.3 - 10.4 MG/DL    Bilirubin, total 3.2 (H) 0.2 - 1.1 MG/DL    ALT (SGPT) 204 (H) 12 - 65 U/L    AST (SGOT) 118 (H) 15 - 37 U/L    Alk.  phosphatase 314 (H) 50 - 136 U/L    Protein, total 8.7 (H) 6.3 - 8.2 g/dL    Albumin 3.2 3.2 - 4.6 g/dL    Globulin 5.5 (H) 2.3 - 3.5 g/dL    A-G Ratio 0.6 (L) 1.2 - 3.5     LIPASE    Collection Time: 01/13/22  9:00 PM   Result Value Ref Range    Lipase 2,609 (H) 73 - 393 U/L   COVID-19 RAPID TEST    Collection Time: 01/13/22 11:58 PM   Result Value Ref Range    Specimen source NASAL SWAB      COVID-19 rapid test Not detected NOTD     GLUCOSE, POC    Collection Time: 01/14/22  1:13 AM   Result Value Ref Range    Glucose (POC) 104 (H) 65 - 100 mg/dL    Performed by [employer]    METABOLIC PANEL, COMPREHENSIVE    Collection Time: 01/14/22  5:03 AM   Result Value Ref Range    Sodium 137 136 - 145 mmol/L    Potassium 3.3 (L) 3.5 - 5.1 mmol/L    Chloride 100 98 - 107 mmol/L    CO2 29 21 - 32 mmol/L    Anion gap 8 7 - 16 mmol/L    Glucose 99 65 - 100 mg/dL    BUN 20 8 - 23 MG/DL    Creatinine 1.51 (H) 0.8 - 1.5 MG/DL    GFR est AA 60 (L) >60 ml/min/1.73m2    GFR est non-AA 49 (L) >60 ml/min/1.73m2    Calcium 8.8 8.3 - 10.4 MG/DL    Bilirubin, total 2.5 (H) 0.2 - 1.1 MG/DL    ALT (SGPT) 155 (H) 12 - 65 U/L    AST (SGOT) 75 (H) 15 - 37 U/L    Alk. phosphatase 267 (H) 50 - 136 U/L    Protein, total 7.5 6.3 - 8.2 g/dL    Albumin 2.8 (L) 3.2 - 4.6 g/dL    Globulin 4.7 (H) 2.3 - 3.5 g/dL    A-G Ratio 0.6 (L) 1.2 - 3.5     CBC W/O DIFF    Collection Time: 01/14/22  5:03 AM   Result Value Ref Range    WBC 17.7 (H) 4.3 - 11.1 K/uL    RBC 4.45 4.23 - 5.6 M/uL    HGB 12.3 (L) 13.6 - 17.2 g/dL    HCT 38.2 (L) 41.1 - 50.3 %    MCV 85.8 79.6 - 97.8 FL    MCH 27.6 26.1 - 32.9 PG    MCHC 32.2 31.4 - 35.0 g/dL    RDW 15.4 (H) 11.9 - 14.6 %    PLATELET 026 453 - 546 K/uL    MPV 10.8 9.4 - 12.3 FL    ABSOLUTE NRBC 0.00 0.0 - 0.2 K/uL       All Micro Results     Procedure Component Value Units Date/Time    COVID-19 RAPID TEST [[id number]] Collected: 01/13/22 0917    Order Status: Completed Specimen: Nasopharyngeal Updated: 01/14/22 0018     Specimen source NASAL SWAB        COVID-19 rapid test Not detected        Comment:      The specimen is NEGATIVE for SARS-CoV-2, the novel coronavirus associated with COVID-19. A negative result does not rule out COVID-19. This test has been authorized by the FDA under an Emergency Use Authorization (EUA) for use by authorized laboratories. Fact sheet for Healthcare Providers: ConventionUpdate.co.nz  Fact sheet for Patients: ConventionUpdate.co.nz       Methodology: Isothermal Nucleic Acid Amplification               Other Studies:  CT ABD PELV W CONT    Result Date: 1/14/2022  EXAM: CT abdomen and pelvis with IV contrast. INDICATION: Abdominal pain. COMPARISON: Prior CT abdomen and pelvis on May 1, 2020. TECHNIQUE: Axial CT images of the abdomen and pelvis were obtained after the intravenous injection of 100 mL Isovue 370 CT contrast. Radiation dose reduction techniques were used for this study.   Our CT scanners use one or all of the following:  Automated exposure control, adjustment of the mA or kV according to patient size, iterative reconstruction. FINDINGS: - Liver: The liver has a somewhat lobulated contour, raising the possibility of underlying cirrhosis. No focal liver mass is seen. - Gallbladder and bile ducts: The gallbladder is absent, with no biliary dilatation. - Spleen: Within normal limits. - Urinary tract: Within normal limits. - Adrenals: Within normal limits. - Pancreas: There is mild stranding around the pancreatic head and tail, suspect for acute pancreatitis. The pancreas is otherwise normal in appearance. No pancreatic necrosis, ductal dilatation or pseudocyst is seen. - Gastrointestinal tract: Colonic diverticulosis, without evidence of acute diverticulitis. Small bowel and appendix are normal. - Retroperitoneum: Mild abdominal aortic atherosclerosis, with no aneurysmal dilatation or dissection. - Peritoneal cavity and abdominal wall: No ascites or free intraperitoneal air. There is a 3.5 cm fat-containing umbilical hernia. - Pelvis: Within normal limits. - Spine/bones: No acute process. - Other comments: There is mild atelectasis or scarring in the left lung base. 1. Mild hazy stranding in the peripancreatic fat, suspect for acute pancreatitis. 2. Prior cholecystectomy. 3. The liver has a somewhat lobulated contour, possibly due to underlying cirrhosis. 4. Colonic diverticulosis, without evidence of acute diverticulitis.        Current Meds:  Current Facility-Administered Medications   Medication Dose Route Frequency    cetirizine (ZYRTEC) tablet 10 mg  10 mg Oral DAILY    allopurinoL (ZYLOPRIM) tablet 300 mg  300 mg Oral DAILY    apixaban (ELIQUIS) tablet 5 mg  5 mg Oral BID    albuterol (PROVENTIL VENTOLIN) nebulizer solution 2.5 mg  2.5 mg Nebulization Q4H PRN    tiotropium bromide (SPIRIVA RESPIMAT) 2.5 mcg /actuation  2 Puff Inhalation DAILY    methocarbamoL (ROBAXIN) tablet 500 mg  500 mg Oral BID    torsemide (DEMADEX) tablet 20 mg  20 mg Oral BID    [Held by provider] insulin glargine (LANTUS) injection 48 Units  48 Units SubCUTAneous BID    carvediloL (COREG) tablet 12.5 mg  12.5 mg Oral BID WITH MEALS    amLODIPine (NORVASC) tablet 5 mg  5 mg Oral DAILY    atorvastatin (LIPITOR) tablet 10 mg  10 mg Oral DAILY    budesonide-formoteroL (SYMBICORT) 160-4.5 mcg/actuation HFA inhaler 2 Puff  2 Puff Inhalation BID RT    fluticasone propionate (FLONASE) 50 mcg/actuation nasal spray 2 Spray  2 Spray Both Nostrils DAILY    sodium chloride (NS) flush 5-40 mL  5-40 mL IntraVENous Q8H    acetaminophen (TYLENOL) tablet 650 mg  650 mg Oral Q6H PRN    Or    acetaminophen (TYLENOL) suppository 650 mg  650 mg Rectal Q6H PRN    polyethylene glycol (MIRALAX) packet 17 g  17 g Oral DAILY PRN    ondansetron (ZOFRAN ODT) tablet 4 mg  4 mg Oral Q8H PRN    Or    ondansetron (ZOFRAN) injection 4 mg  4 mg IntraVENous Q6H PRN    oxyCODONE IR (ROXICODONE) tablet 5 mg  5 mg Oral Q4H PRN    lactated Ringers infusion  200 mL/hr IntraVENous CONTINUOUS    sodium chloride (NS) flush 5-10 mL  5-10 mL IntraVENous Q8H    sodium chloride (NS) flush 5-10 mL  5-10 mL IntraVENous PRN    lactated Ringers infusion 1,000 mL  1,000 mL IntraVENous CONTINUOUS       Signed:  Liliya Mir MD

## 2022-01-15 VITALS
HEIGHT: 71 IN | WEIGHT: 315 LBS | SYSTOLIC BLOOD PRESSURE: 115 MMHG | TEMPERATURE: 98.2 F | RESPIRATION RATE: 20 BRPM | BODY MASS INDEX: 44.1 KG/M2 | HEART RATE: 70 BPM | DIASTOLIC BLOOD PRESSURE: 71 MMHG | OXYGEN SATURATION: 93 %

## 2022-01-15 LAB
ALBUMIN SERPL-MCNC: 2.4 G/DL (ref 3.2–4.6)
ALBUMIN/GLOB SERPL: 0.5 {RATIO} (ref 1.2–3.5)
ALP SERPL-CCNC: 224 U/L (ref 50–136)
ALT SERPL-CCNC: 111 U/L (ref 12–65)
ANION GAP SERPL CALC-SCNC: 7 MMOL/L (ref 7–16)
AST SERPL-CCNC: 47 U/L (ref 15–37)
BILIRUB SERPL-MCNC: 2 MG/DL (ref 0.2–1.1)
BUN SERPL-MCNC: 21 MG/DL (ref 8–23)
CALCIUM SERPL-MCNC: 9.1 MG/DL (ref 8.3–10.4)
CHLORIDE SERPL-SCNC: 100 MMOL/L (ref 98–107)
CO2 SERPL-SCNC: 31 MMOL/L (ref 21–32)
CREAT SERPL-MCNC: 1.38 MG/DL (ref 0.8–1.5)
ERYTHROCYTE [DISTWIDTH] IN BLOOD BY AUTOMATED COUNT: 15.9 % (ref 11.9–14.6)
GLOBULIN SER CALC-MCNC: 5.1 G/DL (ref 2.3–3.5)
GLUCOSE BLD STRIP.AUTO-MCNC: 114 MG/DL (ref 65–100)
GLUCOSE BLD STRIP.AUTO-MCNC: 125 MG/DL (ref 65–100)
GLUCOSE SERPL-MCNC: 104 MG/DL (ref 65–100)
HCT VFR BLD AUTO: 38.3 % (ref 41.1–50.3)
HGB BLD-MCNC: 12 G/DL (ref 13.6–17.2)
LIPASE SERPL-CCNC: 491 U/L (ref 73–393)
MCH RBC QN AUTO: 27.7 PG (ref 26.1–32.9)
MCHC RBC AUTO-ENTMCNC: 31.3 G/DL (ref 31.4–35)
MCV RBC AUTO: 88.5 FL (ref 79.6–97.8)
NRBC # BLD: 0 K/UL (ref 0–0.2)
PLATELET # BLD AUTO: 296 K/UL (ref 150–450)
PMV BLD AUTO: 11.3 FL (ref 9.4–12.3)
POTASSIUM SERPL-SCNC: 3.5 MMOL/L (ref 3.5–5.1)
PROT SERPL-MCNC: 7.5 G/DL (ref 6.3–8.2)
RBC # BLD AUTO: 4.33 M/UL (ref 4.23–5.6)
SERVICE CMNT-IMP: ABNORMAL
SERVICE CMNT-IMP: ABNORMAL
SODIUM SERPL-SCNC: 138 MMOL/L (ref 136–145)
WBC # BLD AUTO: 14.2 K/UL (ref 4.3–11.1)

## 2022-01-15 PROCEDURE — 74011000250 HC RX REV CODE- 250: Performed by: FAMILY MEDICINE

## 2022-01-15 PROCEDURE — 96376 TX/PRO/DX INJ SAME DRUG ADON: CPT

## 2022-01-15 PROCEDURE — 36415 COLL VENOUS BLD VENIPUNCTURE: CPT

## 2022-01-15 PROCEDURE — 83690 ASSAY OF LIPASE: CPT

## 2022-01-15 PROCEDURE — 74011000250 HC RX REV CODE- 250: Performed by: INTERNAL MEDICINE

## 2022-01-15 PROCEDURE — 80053 COMPREHEN METABOLIC PANEL: CPT

## 2022-01-15 PROCEDURE — 74011250637 HC RX REV CODE- 250/637: Performed by: FAMILY MEDICINE

## 2022-01-15 PROCEDURE — 85027 COMPLETE CBC AUTOMATED: CPT

## 2022-01-15 PROCEDURE — 74011000250 HC RX REV CODE- 250: Performed by: EMERGENCY MEDICINE

## 2022-01-15 PROCEDURE — 94640 AIRWAY INHALATION TREATMENT: CPT

## 2022-01-15 PROCEDURE — 82962 GLUCOSE BLOOD TEST: CPT

## 2022-01-15 PROCEDURE — 94760 N-INVAS EAR/PLS OXIMETRY 1: CPT

## 2022-01-15 PROCEDURE — 74011250636 HC RX REV CODE- 250/636: Performed by: INTERNAL MEDICINE

## 2022-01-15 PROCEDURE — C9113 INJ PANTOPRAZOLE SODIUM, VIA: HCPCS | Performed by: INTERNAL MEDICINE

## 2022-01-15 PROCEDURE — G0378 HOSPITAL OBSERVATION PER HR: HCPCS

## 2022-01-15 RX ORDER — NALOXONE HYDROCHLORIDE 4 MG/.1ML
SPRAY NASAL
Qty: 2 EACH | Refills: 0 | Status: SHIPPED | OUTPATIENT
Start: 2022-01-15

## 2022-01-15 RX ORDER — OXYCODONE HYDROCHLORIDE 5 MG/1
5 TABLET ORAL
Qty: 18 TABLET | Refills: 0 | Status: SHIPPED | OUTPATIENT
Start: 2022-01-15 | End: 2022-01-18

## 2022-01-15 RX ORDER — ONDANSETRON 4 MG/1
4 TABLET, ORALLY DISINTEGRATING ORAL
Qty: 15 TABLET | Refills: 0 | Status: SHIPPED | OUTPATIENT
Start: 2022-01-15

## 2022-01-15 RX ADMIN — AMLODIPINE BESYLATE 5 MG: 5 TABLET ORAL at 09:15

## 2022-01-15 RX ADMIN — BUDESONIDE AND FORMOTEROL FUMARATE DIHYDRATE 2 PUFF: 160; 4.5 AEROSOL RESPIRATORY (INHALATION) at 08:14

## 2022-01-15 RX ADMIN — ALLOPURINOL 300 MG: 300 TABLET ORAL at 09:15

## 2022-01-15 RX ADMIN — APIXABAN 5 MG: 5 TABLET, FILM COATED ORAL at 09:16

## 2022-01-15 RX ADMIN — CETIRIZINE HYDROCHLORIDE 10 MG: 10 TABLET ORAL at 09:16

## 2022-01-15 RX ADMIN — SODIUM CHLORIDE, PRESERVATIVE FREE 10 ML: 5 INJECTION INTRAVENOUS at 05:55

## 2022-01-15 RX ADMIN — CARVEDILOL 12.5 MG: 6.25 TABLET, FILM COATED ORAL at 09:15

## 2022-01-15 RX ADMIN — OXYCODONE 5 MG: 5 TABLET ORAL at 05:03

## 2022-01-15 RX ADMIN — TIOTROPIUM BROMIDE INHALATION SPRAY 2 PUFF: 3.12 SPRAY, METERED RESPIRATORY (INHALATION) at 08:14

## 2022-01-15 RX ADMIN — SODIUM CHLORIDE 40 MG: 9 INJECTION, SOLUTION INTRAMUSCULAR; INTRAVENOUS; SUBCUTANEOUS at 09:15

## 2022-01-15 RX ADMIN — TORSEMIDE 20 MG: 20 TABLET ORAL at 09:15

## 2022-01-15 RX ADMIN — METHOCARBAMOL TABLETS 500 MG: 500 TABLET, COATED ORAL at 09:16

## 2022-01-15 RX ADMIN — OXYCODONE 5 MG: 5 TABLET ORAL at 09:57

## 2022-01-15 RX ADMIN — SODIUM CHLORIDE, PRESERVATIVE FREE 10 ML: 5 INJECTION INTRAVENOUS at 06:00

## 2022-01-15 NOTE — PROGRESS NOTES
GI DAILY PROGRESS NOTE    Admit Date:  1/13/2022    Today's Date:  1/15/2022    CC:  pancreatitis    Subjective:     Patient states he is better than on presenation and requiring pain meds less frequently. Urine color lighter than initially. He had been taking tizanidine recently for sciatica which made him feel unwell and so he self-discontinue.d  No sick contacts, bowel changes other than somewhat dec BMs. History of Present Illness:  Patient is a 78 y.o. male with PMH including but not limited to, A fib on Eliquis, Pacemaker, COPD, HTN, DM, chronic pain, RONAK with CPAP, GERD who is seen in consultation at the request of Dr. Yaneth Mcmullen for acute pancreatitis. Mr. Hong Ma presented to the ED on 1/13/2022 with a 2 day history of abdominal pain with associated N&V. On evaluation, he was found to have an elevated WBC 16.5, abnormal LFTS to include T bili 3.2, , , , lipase of 2,609. CT findings as below with suspected pancreatitis and possible cirrhosis. He was admitted, started on IVF and made NPO. He denies any previous episodes of pancreatitis. He reports heavy ETOH use in the 1970'2 in the [facility], minimal since with no ETOH use in 5 years. Stopped smoking cigarette smoking in the 1970's. Does report that he was given a new medication for sciatica just piror to onset of pain (does not know what this was but maybe started with a D or T). Took 4 does of this and then D/C after pain started. Reports mid abd pain and bloating. Last BM yesterday. Reports no nausea currently, feels this is worse in the evening time. He has had a prior cholecystectomy in 2014. Takes Omeprazole for GERD with good symptom control. Report no hx of liver disease. Blood transfusion > 7 yo.   Denies IV drug use hx.       EXAM: CT abdomen and pelvis with IV contrast.1/13/2022   INDICATION: Abdominal pain.     COMPARISON: Prior CT abdomen and pelvis on May 1, 2020.     TECHNIQUE: Axial CT images of the abdomen and pelvis were obtained after the  intravenous injection of 100 mL Isovue 370 CT contrast. Radiation dose reduction  techniques were used for this study.  Our CT scanners use one or all of the  following:  Automated exposure control, adjustment of the mA or kV according to  patient size, iterative reconstruction.     FINDINGS:     - Liver: The liver has a somewhat lobulated contour, raising the possibility of  underlying cirrhosis. No focal liver mass is seen. - Gallbladder and bile ducts: The gallbladder is absent, with no biliary  dilatation.  - Spleen: Within normal limits. - Urinary tract: Within normal limits. - Adrenals: Within normal limits. - Pancreas: There is mild stranding around the pancreatic head and tail, suspect  for acute pancreatitis. The pancreas is otherwise normal in appearance. No  pancreatic necrosis, ductal dilatation or pseudocyst is seen. - Gastrointestinal tract: Colonic diverticulosis, without evidence of acute  diverticulitis. Small bowel and appendix are normal.  - Retroperitoneum: Mild abdominal aortic atherosclerosis, with no aneurysmal  dilatation or dissection.  - Peritoneal cavity and abdominal wall: No ascites or free intraperitoneal air. There is a 3.5 cm fat-containing umbilical hernia. - Pelvis: Within normal limits. - Spine/bones: No acute process. - Other comments: There is mild atelectasis or scarring in the left lung base.     IMPRESSION  1. Mild hazy stranding in the peripancreatic fat, suspect for acute  pancreatitis. 2. Prior cholecystectomy. 3. The liver has a somewhat lobulated contour, possibly due to underlying  cirrhosis. 4. Colonic diverticulosis, without evidence of acute diverticulitis.     Colonoscopy 6/17/2020 by Dr. Leslye Singer revealed hyperplastic polyp in the transverse colon. Repeat in 5 years due multiple adenomatous polyps at [place] in 2015. 9/6/2007 EGD by Dr. Pina Lara revealed gastritis.  H. Pylori positive       Medications:   Current Facility-Administered Medications   Medication Dose Route Frequency    cetirizine (ZYRTEC) tablet 10 mg  10 mg Oral DAILY    allopurinoL (ZYLOPRIM) tablet 300 mg  300 mg Oral DAILY    apixaban (ELIQUIS) tablet 5 mg  5 mg Oral BID    albuterol (PROVENTIL VENTOLIN) nebulizer solution 2.5 mg  2.5 mg Nebulization Q4H PRN    tiotropium bromide (SPIRIVA RESPIMAT) 2.5 mcg /actuation  2 Puff Inhalation DAILY    methocarbamoL (ROBAXIN) tablet 500 mg  500 mg Oral BID    torsemide (DEMADEX) tablet 20 mg  20 mg Oral BID    [Held by provider] insulin glargine (LANTUS) injection 48 Units  48 Units SubCUTAneous BID    carvediloL (COREG) tablet 12.5 mg  12.5 mg Oral BID WITH MEALS    amLODIPine (NORVASC) tablet 5 mg  5 mg Oral DAILY    atorvastatin (LIPITOR) tablet 10 mg  10 mg Oral DAILY    budesonide-formoteroL (SYMBICORT) 160-4.5 mcg/actuation HFA inhaler 2 Puff  2 Puff Inhalation BID RT    fluticasone propionate (FLONASE) 50 mcg/actuation nasal spray 2 Spray  2 Spray Both Nostrils DAILY    sodium chloride (NS) flush 5-40 mL  5-40 mL IntraVENous Q8H    acetaminophen (TYLENOL) tablet 650 mg  650 mg Oral Q6H PRN    Or    acetaminophen (TYLENOL) suppository 650 mg  650 mg Rectal Q6H PRN    polyethylene glycol (MIRALAX) packet 17 g  17 g Oral DAILY PRN    ondansetron (ZOFRAN ODT) tablet 4 mg  4 mg Oral Q8H PRN    Or    ondansetron (ZOFRAN) injection 4 mg  4 mg IntraVENous Q6H PRN    oxyCODONE IR (ROXICODONE) tablet 5 mg  5 mg Oral Q4H PRN    lactated Ringers infusion  200 mL/hr IntraVENous CONTINUOUS    pantoprazole (PROTONIX) 40 mg in 0.9% sodium chloride 10 mL injection  40 mg IntraVENous DAILY    sodium chloride (NS) flush 5-10 mL  5-10 mL IntraVENous Q8H    sodium chloride (NS) flush 5-10 mL  5-10 mL IntraVENous PRN    lactated Ringers infusion 1,000 mL  1,000 mL IntraVENous CONTINUOUS       Review of Systems:  ROS was obtained, with pertinent positives as listed above. No chest pain or SOB.     Diet: npo    Objective:   Vitals:  Visit Vitals  /71 (BP 1 Location: Left lower arm, BP Patient Position: Supine)   Pulse 70   Temp 98.6 °F (37 °C)   Resp 20   Ht 5' 11\" (1.803 m)   Wt 146.5 kg (322 lb 15.6 oz)   SpO2 93%   BMI 45.05 kg/m²     Intake/Output:  No intake/output data recorded. 01/13 1901 - 01/15 0700  In: 7536 [I.V.:3868]  Out: -   Exam:  General appearance: alert, cooperative, mild distress from recent movement   Lungs: clear to auscultation bilaterally anteriorly  Heart: regular rate and rhythm  Abdomen: OBESE soft, mild-tender to palpation in lower quadrants rather than epigastric, bowel sounds normal. No masses, no organomegaly  Extremities: extremities normal, atraumatic, no cyanosis or edema  Neuro:  alert and oriented    Data Review (Labs):    Recent Labs     01/15/22  0444 01/14/22  0503 01/13/22  2100   WBC 14.2* 17.7* 16.5*   HGB 12.0* 12.3* 13.4*   HCT 38.3* 38.2* 42.3    304 319   MCV 88.5 85.8 85.3    137 135*   K 3.5 3.3* 3.9    100 98   CO2 31 29 29   BUN 21 20 20   CREA 1.38 1.51* 1.71*   CA 9.1 8.8 9.4   * 99 118*   * 267* 314*   ALB 2.4* 2.8* 3.2   TP 7.5 7.5 8.7*   LPSE 491* 1,438* 2,609*       Assessment:     Principal Problem:    Acute pancreatitis (1/14/2022)    Active Problems:    Class 3 severe obesity due to excess calories with serious comorbidity and body mass index (BMI) of 45.0 to 49.9 in adult [facility]) ()      Overview: Low carb diet advised/reviewed. Information given. Chronic obstructive pulmonary disease (HCC) ()      Longstanding persistent atrial fibrillation ([facility] [address] (4/10/2007)      Obstructive sleep apnea on CPAP ()      Benign essential hypertension ()      Type 2 diabetes mellitus with diabetic polyneuropathy, with long-term current use of insulin ([address] (11/29/2019)      Overview:  Follows with Dr. Edvin Ornelas      Presence of cardiac pacemaker (1/13/2016)      Gastroesophageal reflux disease (6/17/2020)      78 y.o. male with PMH including but not limited to, A fib on Eliquis, Pacemaker, COPD, HTN, DM, chronic pain, RONAK with CPAP, GERD who is seen in consultation at the request of Dr. Lilli Eng for acute pancreatitis. Mr. Elena Waller presented to the ED on 1/13/2022 with a 2 day history of abdominal pain with associated N&V. On evaluation, he was found to have an elevated WBC 16.5, abnormal LFTS to include T bili 3.2, , , , lipase of 2,609. CT of the abdomen/pelvis with contrast 1/13/2021 revealed Mild hazy stranding in the peripancreatic fat, suspect for acute pancreatitis. The liver has a somewhat lobulated contour, possibly due to underlying cirrhosis. Remote ETOH use. New medication for sciatica could have contributed to changes in LFTs - kaleb ALT.   Etiology of pancreatitis not entirely clear  Plan:     Clear diet   Maintain IVF for now as long as no SOB  Pain meds PRN - not requiring often  LFTs improving - could be component of tizanidine use which patient had been on recently and was a new med  Low threshold for repeat imaging in any clinical deterioration given hx of obesity, copd, DM etc.    Dontae Hooper MD  [facility], [place]
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
## 2022-01-15 NOTE — DISCHARGE INSTRUCTIONS
DISCHARGE SUMMARY from Nurse  He should follow-up with his primary care provider within 7 days. Primary care provider may consider the following:  -Plan to follow-up with gastroenterology  -Resolution of elevated transaminases  -Risk factors for pancreatitis  -Lifestyle changes    PATIENT INSTRUCTIONS:    After general anesthesia or intravenous sedation, for 24 hours or while taking prescription Narcotics:  · Limit your activities  · Do not drive and operate hazardous machinery  · Do not make important personal or business decisions  · Do  not drink alcoholic beverages  · If you have not urinated within 8 hours after discharge, please contact your surgeon on call. Report the following to your surgeon:  · Excessive pain, swelling, redness or odor of or around the surgical area  · Temperature over 100.5  · Nausea and vomiting lasting longer than 4 hours or if unable to take medications  · Any signs of decreased circulation or nerve impairment to extremity: change in color, persistent  numbness, tingling, coldness or increase pain  · Any questions    What to do at Home:  Recommended activity: Activity as tolerated,     If you experience any of the following symptoms fever >100.5, uncontrollable n/v, uncontrollable pain please follow up with your pcp. *  Please give a list of your current medications to your Primary Care Provider. *  Please update this list whenever your medications are discontinued, doses are      changed, or new medications (including over-the-counter products) are added. *  Please carry medication information at all times in case of emergency situations. These are general instructions for a healthy lifestyle:    No smoking/ No tobacco products/ Avoid exposure to second hand smoke  Surgeon General's Warning:  Quitting smoking now greatly reduces serious risk to your health.     Obesity, smoking, and sedentary lifestyle greatly increases your risk for illness    A healthy diet, regular physical exercise & weight monitoring are important for maintaining a healthy lifestyle    You may be retaining fluid if you have a history of heart failure or if you experience any of the following symptoms:  Weight gain of 3 pounds or more overnight or 5 pounds in a week, increased swelling in our hands or feet or shortness of breath while lying flat in bed. Please call your doctor as soon as you notice any of these symptoms; do not wait until your next office visit. The discharge information has been reviewed with the {PATIENT PARENT GUARDIAN:19764}. The {PATIENT PARENT GUARDIAN:32855} verbalized understanding. Discharge medications reviewed with the {Dishcarge meds reviewed EEVK:41919} and appropriate educational materials and side effects teaching were provided.   ___________________________________________________________________________________________________________________________________

## 2022-01-17 LAB
HAV IGM SER QL: NONREACTIVE
HBV CORE IGM SER QL: NONREACTIVE
HBV SURFACE AG SER QL: NONREACTIVE
HCV AB SER QL: NONREACTIVE

## 2022-02-12 PROBLEM — I27.20 PULMONARY HYPERTENSION (HCC): Status: ACTIVE | Noted: 2020-08-11

## 2022-02-12 PROBLEM — Z87.19 HISTORY OF ACUTE PANCREATITIS: Status: ACTIVE | Noted: 2022-01-14

## 2022-02-12 PROBLEM — R74.01 ELEVATED TRANSAMINASE LEVEL: Status: ACTIVE | Noted: 2022-02-12

## 2022-03-18 PROBLEM — R74.01 ELEVATED TRANSAMINASE LEVEL: Status: ACTIVE | Noted: 2022-02-12

## 2022-03-18 PROBLEM — K43.9 VENTRAL HERNIA WITHOUT OBSTRUCTION OR GANGRENE: Status: ACTIVE | Noted: 2020-03-10

## 2022-03-18 PROBLEM — Z12.11 COLON CANCER SCREENING: Status: ACTIVE | Noted: 2019-11-07

## 2022-03-18 PROBLEM — I27.20 PULMONARY HYPERTENSION (HCC): Status: ACTIVE | Noted: 2020-08-11

## 2022-03-18 PROBLEM — Z86.16 HISTORY OF 2019 NOVEL CORONAVIRUS DISEASE (COVID-19): Status: ACTIVE | Noted: 2020-08-05

## 2022-03-18 PROBLEM — Z28.21 COVID-19 VACCINATION REFUSED: Status: ACTIVE | Noted: 2021-11-28

## 2022-03-18 PROBLEM — M10.9 GOUT: Status: ACTIVE | Noted: 2020-06-17

## 2022-03-19 PROBLEM — R06.02 SHORTNESS OF BREATH: Status: ACTIVE | Noted: 2020-05-22

## 2022-03-19 PROBLEM — Z79.4 TYPE 2 DIABETES MELLITUS WITH DIABETIC POLYNEUROPATHY, WITH LONG-TERM CURRENT USE OF INSULIN (HCC): Status: ACTIVE | Noted: 2019-11-29

## 2022-03-19 PROBLEM — K21.9 GASTROESOPHAGEAL REFLUX DISEASE: Status: ACTIVE | Noted: 2020-06-17

## 2022-03-19 PROBLEM — E11.42 TYPE 2 DIABETES MELLITUS WITH DIABETIC POLYNEUROPATHY, WITH LONG-TERM CURRENT USE OF INSULIN (HCC): Status: ACTIVE | Noted: 2019-11-29

## 2022-03-19 PROBLEM — Z79.899 HIGH RISK MEDICATION USE: Status: ACTIVE | Noted: 2021-11-28

## 2022-03-19 PROBLEM — Z86.0100 HISTORY OF COLONIC POLYPS: Status: ACTIVE | Noted: 2019-11-07

## 2022-03-19 PROBLEM — Z28.21 REFUSED VARICELLA VACCINE: Status: ACTIVE | Noted: 2021-05-14

## 2022-03-19 PROBLEM — Z86.010 HISTORY OF COLONIC POLYPS: Status: ACTIVE | Noted: 2019-11-07

## 2022-03-19 PROBLEM — E78.00 HYPERCHOLESTEROLEMIA: Status: ACTIVE | Noted: 2018-06-29

## 2022-03-19 PROBLEM — I48.20 CHRONIC ATRIAL FIBRILLATION (HCC): Status: ACTIVE | Noted: 2022-01-14

## 2022-03-19 PROBLEM — Z87.19 HISTORY OF ACUTE PANCREATITIS: Status: ACTIVE | Noted: 2022-01-14

## 2022-03-20 PROBLEM — Z79.4 TYPE 2 DIABETES MELLITUS WITH STAGE 3A CHRONIC KIDNEY DISEASE, WITH LONG-TERM CURRENT USE OF INSULIN (HCC): Status: ACTIVE | Noted: 2021-05-14

## 2022-03-20 PROBLEM — G89.29 CHRONIC RIGHT-SIDED LOW BACK PAIN WITHOUT SCIATICA: Status: ACTIVE | Noted: 2019-05-10

## 2022-03-20 PROBLEM — Z79.4 TYPE 2 DIABETES MELLITUS WITH MICROALBUMINURIA, WITH LONG-TERM CURRENT USE OF INSULIN (HCC): Status: ACTIVE | Noted: 2018-11-16

## 2022-03-20 PROBLEM — N18.31 TYPE 2 DIABETES MELLITUS WITH STAGE 3A CHRONIC KIDNEY DISEASE, WITH LONG-TERM CURRENT USE OF INSULIN (HCC): Status: ACTIVE | Noted: 2021-05-14

## 2022-03-20 PROBLEM — E11.22 TYPE 2 DIABETES MELLITUS WITH STAGE 3A CHRONIC KIDNEY DISEASE, WITH LONG-TERM CURRENT USE OF INSULIN (HCC): Status: ACTIVE | Noted: 2021-05-14

## 2022-03-20 PROBLEM — R80.9 TYPE 2 DIABETES MELLITUS WITH MICROALBUMINURIA, WITH LONG-TERM CURRENT USE OF INSULIN (HCC): Status: ACTIVE | Noted: 2018-11-16

## 2022-03-20 PROBLEM — M54.50 CHRONIC RIGHT-SIDED LOW BACK PAIN WITHOUT SCIATICA: Status: ACTIVE | Noted: 2019-05-10

## 2022-03-20 PROBLEM — E11.29 TYPE 2 DIABETES MELLITUS WITH MICROALBUMINURIA, WITH LONG-TERM CURRENT USE OF INSULIN (HCC): Status: ACTIVE | Noted: 2018-11-16

## 2022-03-20 PROBLEM — H52.4 PRESBYOPIA: Status: ACTIVE | Noted: 2021-02-01

## 2022-06-01 DIAGNOSIS — E11.65 UNCONTROLLED TYPE 2 DIABETES MELLITUS WITH HYPERGLYCEMIA (HCC): ICD-10-CM

## 2022-06-01 DIAGNOSIS — E78.00 HYPERCHOLESTEROLEMIA: Primary | ICD-10-CM

## 2022-06-01 DIAGNOSIS — E11.22 TYPE 2 DIABETES MELLITUS WITH STAGE 3A CHRONIC KIDNEY DISEASE, WITH LONG-TERM CURRENT USE OF INSULIN (HCC): ICD-10-CM

## 2022-06-01 DIAGNOSIS — N18.31 TYPE 2 DIABETES MELLITUS WITH STAGE 3A CHRONIC KIDNEY DISEASE, WITH LONG-TERM CURRENT USE OF INSULIN (HCC): ICD-10-CM

## 2022-06-01 DIAGNOSIS — E11.29 TYPE 2 DIABETES MELLITUS WITH MICROALBUMINURIA, WITH LONG-TERM CURRENT USE OF INSULIN (HCC): ICD-10-CM

## 2022-06-01 DIAGNOSIS — Z79.4 TYPE 2 DIABETES MELLITUS WITH DIABETIC POLYNEUROPATHY, WITH LONG-TERM CURRENT USE OF INSULIN (HCC): ICD-10-CM

## 2022-06-01 DIAGNOSIS — Z79.4 TYPE 2 DIABETES MELLITUS WITH MICROALBUMINURIA, WITH LONG-TERM CURRENT USE OF INSULIN (HCC): ICD-10-CM

## 2022-06-01 DIAGNOSIS — R80.9 TYPE 2 DIABETES MELLITUS WITH MICROALBUMINURIA, WITH LONG-TERM CURRENT USE OF INSULIN (HCC): ICD-10-CM

## 2022-06-01 DIAGNOSIS — Z79.4 TYPE 2 DIABETES MELLITUS WITH STAGE 3A CHRONIC KIDNEY DISEASE, WITH LONG-TERM CURRENT USE OF INSULIN (HCC): ICD-10-CM

## 2022-06-01 DIAGNOSIS — E11.42 TYPE 2 DIABETES MELLITUS WITH DIABETIC POLYNEUROPATHY, WITH LONG-TERM CURRENT USE OF INSULIN (HCC): ICD-10-CM

## 2022-06-01 DIAGNOSIS — Z79.4 ENCOUNTER FOR LONG-TERM (CURRENT) INSULIN USE (HCC): ICD-10-CM

## 2022-06-07 ENCOUNTER — TELEPHONE (OUTPATIENT)
Dept: FAMILY MEDICINE CLINIC | Facility: CLINIC | Age: 67
End: 2022-06-07

## 2022-06-07 NOTE — TELEPHONE ENCOUNTER
Pt has a lab appt Friday. Asking if Dr. Elena Jones can order the same labs that are in there from Dr. Erendira Soni.

## 2022-06-17 RX ORDER — TORSEMIDE 20 MG/1
TABLET ORAL
Qty: 180 TABLET | Refills: 3 | Status: SHIPPED | OUTPATIENT
Start: 2022-06-17

## 2022-06-20 ENCOUNTER — OFFICE VISIT (OUTPATIENT)
Dept: FAMILY MEDICINE CLINIC | Facility: CLINIC | Age: 67
End: 2022-06-20
Payer: MEDICARE

## 2022-06-20 VITALS
WEIGHT: 315 LBS | BODY MASS INDEX: 44.1 KG/M2 | SYSTOLIC BLOOD PRESSURE: 122 MMHG | HEART RATE: 72 BPM | DIASTOLIC BLOOD PRESSURE: 68 MMHG | HEIGHT: 71 IN | OXYGEN SATURATION: 98 %

## 2022-06-20 DIAGNOSIS — E66.01 CLASS 3 SEVERE OBESITY DUE TO EXCESS CALORIES WITH SERIOUS COMORBIDITY AND BODY MASS INDEX (BMI) OF 45.0 TO 49.9 IN ADULT (HCC): ICD-10-CM

## 2022-06-20 DIAGNOSIS — E11.22 TYPE 2 DIABETES MELLITUS WITH STAGE 3A CHRONIC KIDNEY DISEASE, WITH LONG-TERM CURRENT USE OF INSULIN (HCC): Primary | ICD-10-CM

## 2022-06-20 DIAGNOSIS — E11.29 TYPE 2 DIABETES MELLITUS WITH MICROALBUMINURIA, WITH LONG-TERM CURRENT USE OF INSULIN (HCC): ICD-10-CM

## 2022-06-20 DIAGNOSIS — Z79.4 TYPE 2 DIABETES MELLITUS WITH DIABETIC POLYNEUROPATHY, WITH LONG-TERM CURRENT USE OF INSULIN (HCC): ICD-10-CM

## 2022-06-20 DIAGNOSIS — Z79.4 TYPE 2 DIABETES MELLITUS WITH STAGE 3A CHRONIC KIDNEY DISEASE, WITH LONG-TERM CURRENT USE OF INSULIN (HCC): Primary | ICD-10-CM

## 2022-06-20 DIAGNOSIS — I27.20 PULMONARY HYPERTENSION (HCC): ICD-10-CM

## 2022-06-20 DIAGNOSIS — N18.31 TYPE 2 DIABETES MELLITUS WITH STAGE 3A CHRONIC KIDNEY DISEASE, WITH LONG-TERM CURRENT USE OF INSULIN (HCC): Primary | ICD-10-CM

## 2022-06-20 DIAGNOSIS — E78.00 HYPERCHOLESTEROLEMIA: ICD-10-CM

## 2022-06-20 DIAGNOSIS — Z79.4 TYPE 2 DIABETES MELLITUS WITH MICROALBUMINURIA, WITH LONG-TERM CURRENT USE OF INSULIN (HCC): ICD-10-CM

## 2022-06-20 DIAGNOSIS — R80.9 TYPE 2 DIABETES MELLITUS WITH MICROALBUMINURIA, WITH LONG-TERM CURRENT USE OF INSULIN (HCC): ICD-10-CM

## 2022-06-20 DIAGNOSIS — E11.42 TYPE 2 DIABETES MELLITUS WITH DIABETIC POLYNEUROPATHY, WITH LONG-TERM CURRENT USE OF INSULIN (HCC): ICD-10-CM

## 2022-06-20 LAB
ALBUMIN SERPL-MCNC: 3.8 G/DL (ref 3.2–4.6)
ALBUMIN/GLOB SERPL: 0.9 {RATIO} (ref 1.2–3.5)
ALP SERPL-CCNC: 122 U/L (ref 50–136)
ALT SERPL-CCNC: 21 U/L (ref 12–65)
ANION GAP SERPL CALC-SCNC: 8 MMOL/L (ref 7–16)
AST SERPL-CCNC: 21 U/L (ref 15–37)
BILIRUB SERPL-MCNC: 0.5 MG/DL (ref 0.2–1.1)
BUN SERPL-MCNC: 22 MG/DL (ref 8–23)
CALCIUM SERPL-MCNC: 9.2 MG/DL (ref 8.3–10.4)
CHLORIDE SERPL-SCNC: 103 MMOL/L (ref 98–107)
CHOLEST SERPL-MCNC: 117 MG/DL
CO2 SERPL-SCNC: 29 MMOL/L (ref 21–32)
CREAT SERPL-MCNC: 1.6 MG/DL (ref 0.8–1.5)
CREAT UR-MCNC: 34 MG/DL
EST. AVERAGE GLUCOSE BLD GHB EST-MCNC: 143 MG/DL
GLOBULIN SER CALC-MCNC: 4.3 G/DL (ref 2.3–3.5)
GLUCOSE SERPL-MCNC: 95 MG/DL (ref 65–100)
HBA1C MFR BLD: 6.6 % (ref 4.2–6.3)
HDLC SERPL-MCNC: 30 MG/DL (ref 40–60)
HDLC SERPL: 3.9 {RATIO}
LDLC SERPL CALC-MCNC: 42.6 MG/DL
MICROALBUMIN UR-MCNC: 3.25 MG/DL
MICROALBUMIN/CREAT UR-RTO: 96 MG/G
POTASSIUM SERPL-SCNC: 4.8 MMOL/L (ref 3.5–5.1)
PROT SERPL-MCNC: 8.1 G/DL (ref 6.3–8.2)
SODIUM SERPL-SCNC: 140 MMOL/L (ref 136–145)
TRIGL SERPL-MCNC: 222 MG/DL (ref 35–150)
VLDLC SERPL CALC-MCNC: 44.4 MG/DL (ref 6–23)

## 2022-06-20 PROCEDURE — 1123F ACP DISCUSS/DSCN MKR DOCD: CPT | Performed by: FAMILY MEDICINE

## 2022-06-20 PROCEDURE — 3044F HG A1C LEVEL LT 7.0%: CPT | Performed by: FAMILY MEDICINE

## 2022-06-20 PROCEDURE — 99214 OFFICE O/P EST MOD 30 MIN: CPT | Performed by: FAMILY MEDICINE

## 2022-06-20 NOTE — PROGRESS NOTES
Ernesto Neves DO                Diplomate of the [employer] of [facility] [place]         [phone]    Mckenzie Metcalf is a 75 y.o. male who was seen on 6/20/2022 for   Chief Complaint   Patient presents with    Diabetes       Assessment & Plan     Diagnosis Orders   1. Type 2 diabetes mellitus with stage 3a chronic kidney disease, with long-term current use of insulin (HCC)  Comprehensive Metabolic Panel    Hemoglobin A1C    Microalbumin / Creatinine Urine Ratio    Hemoglobin A1C   2. Type 2 diabetes mellitus with diabetic polyneuropathy, with long-term current use of insulin (HCC)  Comprehensive Metabolic Panel    Hemoglobin A1C    Microalbumin / Creatinine Urine Ratio    Hemoglobin A1C   3. Type 2 diabetes mellitus with microalbuminuria, with long-term current use of insulin ([facility] [address]     4. Hypercholesterolemia  Lipid Panel   5. Pulmonary hypertension (HCC)     6. Class 3 severe obesity due to excess calories with serious comorbidity and body mass index (BMI) of 45.0 to 49.9 in [facility])         No problem-specific Assessment & Plan notes found for this encounter. Follow-up and Dispositions    · Return in about 6 months (around 12/20/2022) for NO DAVONTE, LABS TODAY.          RECENT LABS/TESTS TO REVIEW and DISCUSS    Results for orders placed or performed in visit on 06/20/22   Comprehensive Metabolic Panel   Result Value Ref Range    Sodium 140 136 - 145 mmol/L    Potassium 4.8 3.5 - 5.1 mmol/L    Chloride 103 98 - 107 mmol/L    CO2 29 21 - 32 mmol/L    Anion Gap 8 7 - 16 mmol/L    Glucose 95 65 - 100 mg/dL    BUN 22 8 - 23 MG/DL    CREATININE 1.60 (H) 0.8 - 1.5 MG/DL    GFR  56 (L) >60 ml/min/1.73m2    GFR Non- 46 (L) >60 ml/min/1.73m2    Calcium 9.2 8.3 - 10.4 MG/DL    Total Bilirubin 0.5 0.2 - 1.1 MG/DL    ALT 21 12 - 65 U/L    AST 21 15 - 37 U/L    Alk Phosphatase 122 50 - 136 U/L Total Protein 8.1 6.3 - 8.2 g/dL    Albumin 3.8 3.2 - 4.6 g/dL    Globulin 4.3 (H) 2.3 - 3.5 g/dL    Albumin/Globulin Ratio 0.9 (L) 1.2 - 3.5     Hemoglobin A1C   Result Value Ref Range    Hemoglobin A1C 6.6 (H) 4.20 - 6.30 %    eAG 143 mg/dL   Microalbumin / Creatinine Urine Ratio   Result Value Ref Range    Microalbumin, Random Urine 3.25 (H) <2.0 MG/DL    Creatinine, Ur 34.00 mg/dL    Microalbumin Creatinine Ratio 96 mg/g   Lipid Panel   Result Value Ref Range    Cholesterol, Total 117 <200 MG/DL    Triglycerides 222 (H) 35 - 150 MG/DL    HDL 30 (L) 40 - 60 MG/DL    LDL Calculated 42.6 <100 MG/DL    VLDL Cholesterol Calculated 44.4 (H) 6.0 - 23.0 MG/DL    Chol/HDL Ratio 3.9       Subjective    HPI: Please see my assessment and plan notes (above) for individual problems addressed today. Other important information: This is a 69-year-old male patient here today for follow-up on diabetes among other medical problems. He brings with him a record of his recent lab studies obtained at the [address] [place] [address] which can also be reviewed in care everywhere. For DM/IGT patients: The patient reports average FBS is: 115 for the past 7 days, 119 for the past 14 days and 127 for the past 30 days. The patient is due for her annual wellness visit but we are unable to perform that today because of our recent change in electronic medical records. We will schedule follow-up for this. Reviewed and updated this visit by provider:           Review of Systems   Constitutional: Negative for fatigue and fever. HENT: Negative. Eyes: Negative. Respiratory: Negative for cough, chest tightness, shortness of breath and wheezing. Cardiovascular: Negative for chest pain and leg swelling. Gastrointestinal: Negative for abdominal pain, constipation, diarrhea, nausea and vomiting. Endocrine: Negative. Genitourinary: Negative for difficulty urinating, dysuria and frequency.    Musculoskeletal: Negative for arthralgias, back pain and neck pain. Skin: Negative for rash. Allergic/Immunologic: Negative. Neurological: Negative for dizziness and light-headedness. Psychiatric/Behavioral: Negative for dysphoric mood. The patient is not nervous/anxious. All other systems reviewed and are negative. Objective    /68 (Site: Right Upper Arm, Position: Sitting, Cuff Size: Large Adult)   Pulse 72   Ht 5' 11\" (1.803 m)   Wt (!) 330 lb (149.7 kg)   SpO2 98%   BMI 46.03 kg/m²     Physical Exam  Vitals reviewed. Constitutional:       General: He is not in acute distress. Appearance: Normal appearance. HENT:      Head: Normocephalic and atraumatic. Right Ear: Tympanic membrane, ear canal and external ear normal.      Left Ear: Tympanic membrane, ear canal and external ear normal.      Mouth/Throat:      Mouth: Mucous membranes are moist.   Eyes:      Extraocular Movements: Extraocular movements intact. Conjunctiva/sclera: Conjunctivae normal.      Pupils: Pupils are equal, round, and reactive to light. Neck:      Vascular: No carotid bruit. Cardiovascular:      Rate and Rhythm: Normal rate and regular rhythm. Heart sounds: Normal heart sounds. Pulmonary:      Effort: Pulmonary effort is normal.      Breath sounds: Normal breath sounds. Abdominal:      General: Bowel sounds are normal.      Palpations: Abdomen is soft. Musculoskeletal:      Cervical back: Neck supple. No rigidity or tenderness. Lymphadenopathy:      Cervical: No cervical adenopathy. Skin:     General: Skin is warm and dry. Neurological:      General: No focal deficit present. Mental Status: He is alert and oriented to person, place, and time. Psychiatric:         Mood and Affect: Mood normal.         Behavior: Behavior normal.         Thought Content:  Thought content normal.         Judgment: Judgment normal.         On this date 06/20/2022 I have spent 31 minutes reviewing previous notes, lab/imaging results and face to face with the patient discussing the diagnoses and importance of compliance with the treatment plan, as well as documenting on the day of the visit.         DO Chelsea Wolff [facility] [place]

## 2022-06-23 RX ORDER — TORSEMIDE 20 MG/1
TABLET ORAL
Qty: 180 TABLET | Refills: 3 | OUTPATIENT
Start: 2022-06-23

## 2022-06-27 PROBLEM — Z12.11 COLON CANCER SCREENING: Status: RESOLVED | Noted: 2019-11-07 | Resolved: 2022-06-27

## 2022-06-27 ASSESSMENT — ENCOUNTER SYMPTOMS
ABDOMINAL PAIN: 0
VOMITING: 0
EYES NEGATIVE: 1
CONSTIPATION: 0
SHORTNESS OF BREATH: 0
BACK PAIN: 0
CHEST TIGHTNESS: 0
NAUSEA: 0
COUGH: 0
WHEEZING: 0
ALLERGIC/IMMUNOLOGIC NEGATIVE: 1
DIARRHEA: 0

## 2022-07-20 ENCOUNTER — TELEPHONE (OUTPATIENT)
Dept: FAMILY MEDICINE CLINIC | Facility: CLINIC | Age: 67
End: 2022-07-20

## 2022-07-20 NOTE — TELEPHONE ENCOUNTER
Patients wife called and would like to know if she could get a call back in regards to her my chart message from 7/16/2022. Patients wife states that the patient has a bad cough and would like to know if there is something that can be called in for him. Please Advise.

## 2022-08-17 ENCOUNTER — OFFICE VISIT (OUTPATIENT)
Dept: ENDOCRINOLOGY | Age: 67
End: 2022-08-17
Payer: MEDICARE

## 2022-08-17 VITALS
DIASTOLIC BLOOD PRESSURE: 78 MMHG | BODY MASS INDEX: 44.1 KG/M2 | HEIGHT: 71 IN | WEIGHT: 315 LBS | OXYGEN SATURATION: 97 % | HEART RATE: 78 BPM | SYSTOLIC BLOOD PRESSURE: 130 MMHG

## 2022-08-17 DIAGNOSIS — Z79.4 TYPE 2 DIABETES MELLITUS WITH HYPERGLYCEMIA, WITH LONG-TERM CURRENT USE OF INSULIN (HCC): Primary | ICD-10-CM

## 2022-08-17 DIAGNOSIS — I10 ESSENTIAL HYPERTENSION: ICD-10-CM

## 2022-08-17 DIAGNOSIS — R80.9 ALBUMINURIA: ICD-10-CM

## 2022-08-17 DIAGNOSIS — N18.31 STAGE 3A CHRONIC KIDNEY DISEASE (HCC): ICD-10-CM

## 2022-08-17 DIAGNOSIS — E78.00 HYPERCHOLESTEROLEMIA: ICD-10-CM

## 2022-08-17 DIAGNOSIS — E11.65 TYPE 2 DIABETES MELLITUS WITH HYPERGLYCEMIA, WITH LONG-TERM CURRENT USE OF INSULIN (HCC): Primary | ICD-10-CM

## 2022-08-17 PROCEDURE — 99214 OFFICE O/P EST MOD 30 MIN: CPT | Performed by: INTERNAL MEDICINE

## 2022-08-17 PROCEDURE — 3044F HG A1C LEVEL LT 7.0%: CPT | Performed by: INTERNAL MEDICINE

## 2022-08-17 PROCEDURE — 1123F ACP DISCUSS/DSCN MKR DOCD: CPT | Performed by: INTERNAL MEDICINE

## 2022-08-17 RX ORDER — CARVEDILOL 12.5 MG/1
12.5 TABLET ORAL 2 TIMES DAILY WITH MEALS
Qty: 180 TABLET | Refills: 3
Start: 2022-08-17

## 2022-08-17 RX ORDER — INSULIN GLARGINE 100 [IU]/ML
48 INJECTION, SOLUTION SUBCUTANEOUS NIGHTLY
COMMUNITY
End: 2022-08-17 | Stop reason: SDUPTHER

## 2022-08-17 RX ORDER — INSULIN GLARGINE 100 [IU]/ML
48 INJECTION, SOLUTION SUBCUTANEOUS 2 TIMES DAILY
Qty: 90 ML | Refills: 3
Start: 2022-08-17

## 2022-08-17 RX ORDER — SEMAGLUTIDE 1.34 MG/ML
1 INJECTION, SOLUTION SUBCUTANEOUS
COMMUNITY
End: 2022-08-17 | Stop reason: SDUPTHER

## 2022-08-17 RX ORDER — SEMAGLUTIDE 1.34 MG/ML
1 INJECTION, SOLUTION SUBCUTANEOUS WEEKLY
Qty: 9 ML | Refills: 3
Start: 2022-08-17

## 2022-08-17 RX ORDER — AMLODIPINE BESYLATE 5 MG/1
5 TABLET ORAL DAILY
Qty: 90 TABLET | Refills: 3 | Status: SHIPPED | OUTPATIENT
Start: 2022-08-17

## 2022-08-17 RX ORDER — POTASSIUM CHLORIDE 750 MG/1
10 TABLET, FILM COATED, EXTENDED RELEASE ORAL 2 TIMES DAILY
COMMUNITY
Start: 2022-05-18 | End: 2022-10-21 | Stop reason: SDUPTHER

## 2022-08-17 RX ORDER — PIOGLITAZONEHYDROCHLORIDE 30 MG/1
15 TABLET ORAL DAILY
Qty: 45 TABLET | Refills: 3
Start: 2022-08-17

## 2022-08-17 RX ORDER — ATORVASTATIN CALCIUM 10 MG/1
10 TABLET, FILM COATED ORAL DAILY
Qty: 90 TABLET | Refills: 3
Start: 2022-08-17 | End: 2022-09-16 | Stop reason: SDUPTHER

## 2022-08-17 NOTE — PROGRESS NOTES
received formal diabetes education (March and April 2018). Home blood glucose monitoring frequency: 1.9 times per day     Blood glucose: by review of meter download              fasting mostly 80s to 140s              AC lunch not checked               AC supper not checked              Bedtime mostly 110s to 160s              14-day average 118 (range )     Hypoglycemia: rare. He had a single episode of severe hypoglycemia (BG 35, fasting, October 2021). Other than that, lowest recent BG has been in the high 70s. Hemoglobin A1c:  4/23/2015: 7.2%. 7/15/2015: 7.2%. 4/13/2016: 8.0%. 7/18/2016: 8.3%. 8/17/2016:  8.2%. 10/13/2016: 7.5%. 12/30/2016: 7.4%. 5/5/2017:  7.0%. 9/15/2017: 6.7%. 2/20/2018: 8.4%.  6/29/2018: 7.0%. 9/6/2018: 8.2%.  3/29/2019: 7.8%. 8/23/2019: 7.5%. 11/6/2020: 7.0%. 3/9/2021: 6.7%. 6/7/2021: 6.5%. 9/10/2021: 6.3%.  3/11/2022: 6.4%.  6/20/2022: 6.6%. Diabetic complications:                Retinopathy: Last eye exam was 3/23/2022 and demonstrated no diabetic retinopathy. Eye care specialist is Сергей Walls OD [facility]). Albuminuria/nephropathy:                          4/22/2016: Urine microalbumin 45.2.                          8/17/2016:  Urine microlbumin 291 (+), serum creatinine 1.3.                          4/25/2017: Urine microalbumin to creatinine ratio 100.7 (+).                          5/5/2017:  Serum creatinine 1.7 (GFR 42%). 2/20/2018: Urine microalbumin to creatinine ratio 148.7 (+), serum creatinine 1.51 (GFR 48%). 6/18/2018: Serum creatinine 1.52 (GFR 48%). 11/9/2018: Serum creatinine 1.28 (GFR 59%). 5/10/2019: Urine microalbumin creatinine ratio 85.9 (+).                          8/23/2019: Urine microalbumin 43.00 (+), serum creatinine 1.80 (GFR 38%). 8/20/2020: Serum creatinine 1.55 (GFR 48%). 3/9/2021: Urine microalbumin to creatinine ration 152 (+), serum creatinine 1.25.                          2/16/2022: Serum creatinine 1.28.    6/20/2022: Urine microalbumin to creatinine ratio 96 (+), serum creatinine 1.60 (GFR 46%). Neuropathy:  numbness, tingling, and allodynia of hands and feet     Other pertinent labs:              Lipids:                          10/13/2016: Total cholesterol 115, triglycerides 276, HDL 24, LDL 36.                          4/25/2017: Total cholesterol 100, triglycerides 245, HDL 21, LDL 30.                          2/20/2018: Total cholesterol 111, triglycerides 432, HDL 22, LDL cannot be calculated secondary to high triglycerides. 5/4/2018: Total cholesterol 119, triglycerides 247, HDL 26, LDL 44.                          5/10/2019: Total cholesterol 99, triglycerides 221, HDL 25, LDL 30.                          8/23/2019: Total cholesterol 106, triglycerides 252, HDL 23, LDL 33.                          3/9/2021: Total cholesterol 120, triglycerides 187, HDL 29, LDL 60.                          6/7/2021: Total cholesterol 116, triglycerides 165, HDL 27, LDL 56.    6/20/2020: Total cholesterol 117, triglycerides 222, HDL 30, LDL 42.6. Thyroid:                          8/23/2019: TSH 1.44.                          3/9/2021: TSH 2.240. Review of Systems   Constitutional:  Negative for fatigue and unexpected weight change. Eyes:  Negative for visual disturbance. /78   Pulse 78   Ht 5' 11\" (1.803 m)   Wt (!) 326 lb (147.9 kg)   SpO2 97%   BMI 45.47 kg/m²   Wt Readings from Last 3 Encounters:   08/17/22 (!) 326 lb (147.9 kg)   06/20/22 (!) 330 lb (149.7 kg)   03/11/22 (!) 329 lb (149.2 kg)       Physical Exam  Constitutional:       Appearance: Normal appearance. HENT:      Head: Normocephalic. Neck:      Thyroid: No thyroid mass or thyromegaly. Cardiovascular:      Rate and Rhythm: Normal rate and regular rhythm. Pulmonary:      Effort: Pulmonary effort is normal.      Breath sounds: Normal breath sounds. Neurological:      Mental Status: He is alert. Psychiatric:         Mood and Affect: Mood normal.         Behavior: Behavior normal.       No orders of the defined types were placed in this encounter.       Current Outpatient Medications   Medication Sig Dispense Refill    KLOR-CON 10 10 MEQ extended release tablet       LANTUS SOLOSTAR 100 UNIT/ML injection pen Inject 48 Units into the skin 2 times daily 90 mL 3    OZEMPIC, 1 MG/DOSE, 4 MG/3ML SOPN Inject 1 mg into the skin once a week 9 mL 3    metFORMIN (GLUCOPHAGE) 500 MG tablet Take 1 tablet by mouth 2 times daily (with meals) 180 tablet 3    pioglitazone (ACTOS) 30 MG tablet Take 0.5 tablets by mouth daily 45 tablet 3    amLODIPine (NORVASC) 5 MG tablet Take 1 tablet by mouth daily 90 tablet 3    atorvastatin (LIPITOR) 10 MG tablet Take 1 tablet by mouth daily 90 tablet 3    carvedilol (COREG) 12.5 MG tablet Take 1 tablet by mouth 2 times daily (with meals) 180 tablet 3    torsemide (DEMADEX) 20 MG tablet TAKE ONE TABLET BY MOUTH TWICE A  tablet 3    acetaminophen (TYLENOL) 650 MG extended release tablet Take 650 mg by mouth every 6 hours as needed      albuterol sulfate  (90 Base) MCG/ACT inhaler Inhale into the lungs      albuterol (PROVENTIL) (2.5 MG/3ML) 0.083% nebulizer solution Inhale 2.5 mg into the lungs every 6 hours as needed      allopurinol (ZYLOPRIM) 300 MG tablet Take 300 mg by mouth daily      ammonium lactate (LAC-HYDRIN) 12 % lotion Apply topically as needed      apixaban (ELIQUIS) 5 MG TABS tablet Take 5 mg by mouth 2 times daily      ascorbic acid (VITAMIN C) 500 MG tablet Take by mouth      Camphor-Menthol-Methyl Sal 3.1-6-10 % PTCH Apply topically      colchicine (COLCRYS) 0.6 MG tablet Take 0.6 mg by mouth daily      diclofenac sodium (VOLTAREN) 1 % GEL

## 2022-09-12 DIAGNOSIS — E78.00 HYPERCHOLESTEROLEMIA: ICD-10-CM

## 2022-09-12 RX ORDER — ATORVASTATIN CALCIUM 10 MG/1
TABLET, FILM COATED ORAL
Qty: 90 TABLET | Refills: 3 | OUTPATIENT
Start: 2022-09-12

## 2022-09-15 ENCOUNTER — HOSPITAL ENCOUNTER (OUTPATIENT)
Dept: LAB | Age: 67
Discharge: HOME | End: 2022-09-18

## 2022-09-15 PROCEDURE — 88305 TISSUE EXAM BY PATHOLOGIST: CPT

## 2022-09-15 PROCEDURE — 88312 SPECIAL STAINS GROUP 1: CPT

## 2022-09-16 DIAGNOSIS — E78.00 HYPERCHOLESTEROLEMIA: ICD-10-CM

## 2022-09-16 RX ORDER — ATORVASTATIN CALCIUM 10 MG/1
10 TABLET, FILM COATED ORAL DAILY
Qty: 90 TABLET | Refills: 3 | Status: SHIPPED | OUTPATIENT
Start: 2022-09-16

## 2022-09-16 NOTE — TELEPHONE ENCOUNTER
Called and spoke with [facility].  Stated Metformin Rx was sent from Dr Ludivina Suggs 8/17/2022 for a one year supply, this RX is on file  In need of refill for atorvastatin

## 2022-09-16 NOTE — TELEPHONE ENCOUNTER
Patient stating needing refill on atorvastatin (LIPITOR) 10 MG tablet and metformin to [facility] ([facility]

## 2022-09-19 ENCOUNTER — OFFICE VISIT (OUTPATIENT)
Dept: FAMILY MEDICINE CLINIC | Facility: CLINIC | Age: 67
End: 2022-09-19
Payer: MEDICARE

## 2022-09-19 VITALS
DIASTOLIC BLOOD PRESSURE: 78 MMHG | BODY MASS INDEX: 44.1 KG/M2 | WEIGHT: 315 LBS | SYSTOLIC BLOOD PRESSURE: 126 MMHG | OXYGEN SATURATION: 95 % | HEART RATE: 70 BPM | HEIGHT: 71 IN

## 2022-09-19 DIAGNOSIS — M76.62 TENDONITIS, ACHILLES, LEFT: ICD-10-CM

## 2022-09-19 DIAGNOSIS — I50.22 CHRONIC SYSTOLIC (CONGESTIVE) HEART FAILURE (HCC): ICD-10-CM

## 2022-09-19 DIAGNOSIS — M10.072 ACUTE IDIOPATHIC GOUT OF LEFT ANKLE: Primary | ICD-10-CM

## 2022-09-19 DIAGNOSIS — I27.20 PULMONARY HYPERTENSION (HCC): ICD-10-CM

## 2022-09-19 PROCEDURE — 1123F ACP DISCUSS/DSCN MKR DOCD: CPT | Performed by: FAMILY MEDICINE

## 2022-09-19 PROCEDURE — 99213 OFFICE O/P EST LOW 20 MIN: CPT | Performed by: FAMILY MEDICINE

## 2022-09-19 RX ORDER — METHYLPREDNISOLONE 4 MG/1
4 TABLET ORAL SEE ADMIN INSTRUCTIONS
Qty: 1 KIT | Refills: 0 | Status: SHIPPED | OUTPATIENT
Start: 2022-09-19 | End: 2022-09-25

## 2022-09-19 SDOH — ECONOMIC STABILITY: FOOD INSECURITY: WITHIN THE PAST 12 MONTHS, YOU WORRIED THAT YOUR FOOD WOULD RUN OUT BEFORE YOU GOT MONEY TO BUY MORE.: NEVER TRUE

## 2022-09-19 SDOH — ECONOMIC STABILITY: FOOD INSECURITY: WITHIN THE PAST 12 MONTHS, THE FOOD YOU BOUGHT JUST DIDN'T LAST AND YOU DIDN'T HAVE MONEY TO GET MORE.: NEVER TRUE

## 2022-09-19 ASSESSMENT — PATIENT HEALTH QUESTIONNAIRE - PHQ9
2. FEELING DOWN, DEPRESSED OR HOPELESS: 0
SUM OF ALL RESPONSES TO PHQ9 QUESTIONS 1 & 2: 0
1. LITTLE INTEREST OR PLEASURE IN DOING THINGS: 0
SUM OF ALL RESPONSES TO PHQ QUESTIONS 1-9: 0

## 2022-09-19 ASSESSMENT — ENCOUNTER SYMPTOMS
CHEST TIGHTNESS: 0
SHORTNESS OF BREATH: 0
ABDOMINAL PAIN: 0

## 2022-09-19 ASSESSMENT — SOCIAL DETERMINANTS OF HEALTH (SDOH): HOW HARD IS IT FOR YOU TO PAY FOR THE VERY BASICS LIKE FOOD, HOUSING, MEDICAL CARE, AND HEATING?: NOT HARD AT ALL

## 2022-09-19 NOTE — PROGRESS NOTES
PROGRESS NOTE    SUBJECTIVE:   Lili Wheeler is a 75 y.o. male seen for a follow up visit regarding   Chief Complaint   Patient presents with    Ankle Pain     Left ankle pain since last Thursday; worsen swelling and pain; has started colchine due to believing it was a gout flare up but has not noticed any improvement. HPI:  Patient presents with a 6-day history of left heel pain. He denies any recent trauma or misstep. No new activities. He has history of gout, he started taking colchicine with the onset of this pain but has had no effect. Denies any dietary indiscretions. Has chronic systolic heart failure, sees cardiology regularly. Appears compensated presently, no new shortness of breath or chest pain. Deals with chronic venous stasis dermatitis. Type 2 diabetes, sees endocrinology      Ankle Pain        Reviewed and updated this visit by provider:  Tobacco  Allergies  Meds  Problems  Med Hx  Surg Hx  Fam Hx           Review of Systems   Constitutional:  Negative for fatigue and unexpected weight change. Respiratory:  Negative for chest tightness and shortness of breath. Cardiovascular:  Negative for chest pain and palpitations. Gastrointestinal:  Negative for abdominal pain. OBJECTIVE:  Vitals:    09/19/22 1356   BP: 126/78   Site: Left Upper Arm   Cuff Size: Large Adult   Pulse: 70   SpO2: 95%   Weight: (!) 335 lb 12.8 oz (152.3 kg)   Height: 5' 11\" (1.803 m)        Physical Exam   General: Alert, oriented, no distress  CVS: Regular rate and rhythm presently, no murmur appreciated, normal S1, S2, no S3 or S4 noted  Pulmonary: Good airflow, no rales or rhonchi  Musculoskeletal: The left total demonstrates mild swelling at the Achilles insertion, tender to palpation, mildly erythematous    Medical problems and test results were reviewed with the patient today. No results found for this or any previous visit (from the past 672 hour(s)).     No results found for any visits on 09/19/22. ASSESSMENT and PLAN    1. Acute idiopathic gout of left ankle  -     methylPREDNISolone (MEDROL DOSEPACK) 4 MG tablet; Take 1 tablet by mouth See Admin Instructions for 6 days Take by mouth., Disp-1 kit, R-0Normal  2. Tendonitis, Achilles, left  -     methylPREDNISolone (MEDROL DOSEPACK) 4 MG tablet; Take 1 tablet by mouth See Admin Instructions for 6 days Take by mouth., Disp-1 kit, R-0Normal  3. Chronic systolic (congestive) heart failure  4. Pulmonary hypertension ([facility] [address]         Return if symptoms worsen or fail to improve.        Justin Conway MD

## 2022-09-23 ENCOUNTER — TELEPHONE (OUTPATIENT)
Dept: FAMILY MEDICINE CLINIC | Facility: CLINIC | Age: 67
End: 2022-09-23

## 2022-09-29 ENCOUNTER — OFFICE VISIT (OUTPATIENT)
Dept: FAMILY MEDICINE CLINIC | Facility: CLINIC | Age: 67
End: 2022-09-29
Payer: MEDICARE

## 2022-09-29 VITALS
WEIGHT: 315 LBS | DIASTOLIC BLOOD PRESSURE: 84 MMHG | OXYGEN SATURATION: 98 % | HEART RATE: 70 BPM | HEIGHT: 71 IN | SYSTOLIC BLOOD PRESSURE: 130 MMHG | BODY MASS INDEX: 44.1 KG/M2

## 2022-09-29 DIAGNOSIS — M10.072 ACUTE IDIOPATHIC GOUT OF LEFT ANKLE: Primary | ICD-10-CM

## 2022-09-29 PROBLEM — M19.90 OSTEOARTHRITIS: Status: ACTIVE | Noted: 2022-09-29

## 2022-09-29 PROBLEM — K29.01 ACUTE GASTRITIS WITH HEMORRHAGE: Status: ACTIVE | Noted: 2022-09-29

## 2022-09-29 PROBLEM — M79.673 PAIN IN UNSPECIFIED FOOT: Status: ACTIVE | Noted: 2022-09-29

## 2022-09-29 PROBLEM — J44.9 CHRONIC OBSTRUCTIVE PULMONARY DISEASE (HCC): Status: ACTIVE | Noted: 2022-09-29

## 2022-09-29 PROBLEM — J31.0 CHRONIC RHINITIS: Status: ACTIVE | Noted: 2022-09-29

## 2022-09-29 PROBLEM — J98.4 DISORDER OF LUNG: Status: ACTIVE | Noted: 2022-09-29

## 2022-09-29 PROBLEM — I10 HYPERTENSION: Status: ACTIVE | Noted: 2022-09-29

## 2022-09-29 PROBLEM — G62.9 NEUROPATHY: Status: ACTIVE | Noted: 2022-09-29

## 2022-09-29 PROBLEM — D64.9 NORMOCHROMIC NORMOCYTIC ANEMIA: Status: ACTIVE | Noted: 2020-03-09

## 2022-09-29 PROBLEM — G47.9 SLEEP DISORDER: Status: ACTIVE | Noted: 2022-09-29

## 2022-09-29 PROBLEM — H93.90 EAR PROBLEM: Status: ACTIVE | Noted: 2022-09-29

## 2022-09-29 PROBLEM — R20.0 NUMBNESS: Status: ACTIVE | Noted: 2022-09-29

## 2022-09-29 PROBLEM — K92.9 DIGESTIVE SYSTEM DISORDER: Status: ACTIVE | Noted: 2022-09-29

## 2022-09-29 PROBLEM — K63.5 BENIGN COLONIC POLYP: Status: ACTIVE | Noted: 2022-09-29

## 2022-09-29 PROBLEM — J01.20 ACUTE NON-RECURRENT ETHMOIDAL SINUSITIS: Status: ACTIVE | Noted: 2022-03-09

## 2022-09-29 PROCEDURE — 1123F ACP DISCUSS/DSCN MKR DOCD: CPT | Performed by: FAMILY MEDICINE

## 2022-09-29 PROCEDURE — 99213 OFFICE O/P EST LOW 20 MIN: CPT | Performed by: FAMILY MEDICINE

## 2022-09-29 RX ORDER — COLCHICINE 0.6 MG/1
0.6 TABLET ORAL DAILY
Qty: 30 TABLET | Refills: 1 | Status: SHIPPED | OUTPATIENT
Start: 2022-09-29

## 2022-09-29 ASSESSMENT — PATIENT HEALTH QUESTIONNAIRE - PHQ9
1. LITTLE INTEREST OR PLEASURE IN DOING THINGS: 0
SUM OF ALL RESPONSES TO PHQ9 QUESTIONS 1 & 2: 0
SUM OF ALL RESPONSES TO PHQ QUESTIONS 1-9: 0
2. FEELING DOWN, DEPRESSED OR HOPELESS: 0

## 2022-09-29 NOTE — PROGRESS NOTES
PROGRESS NOTE    SUBJECTIVE:   Maricruz Arango is a 75 y.o. male seen for a follow up visit regarding   Chief Complaint   Patient presents with    Foot Pain     Follow up on left foot pain. Was seen at [place] yesterday and had labwork and XR. Reports still having pain         HPI:  Patient is here today for follow-up. He was seen recently for pain in his left heel/ankle region. Was treated with a Medrol dose pack for presumed gouty arthritis. He continues to have some pain. He was seen yesterday at the [place], uric acid was drawn, results not back yet. Foot Pain        Reviewed and updated this visit by provider:  Tobacco  Allergies  Meds  Problems  Med Hx  Surg Hx  Fam Hx           Review of Systems   General: Feels well overall, appropriate energy for age, denies fever, denies significant change in weight, good appetite  Pulmonary: No cough or wheezing  CVS: No chest pain, palpitations, claudication  GI: No nausea, vomiting, diarrhea, constipation      OBJECTIVE:  Vitals:    09/29/22 1411   BP: 130/84   Site: Left Upper Arm   Cuff Size: Large Adult   Pulse: 70   SpO2: 98%   Weight: (!) 331 lb 12.8 oz (150.5 kg)   Height: 5' 11\" (1.803 m)        Physical Exam   General: Alert and oriented x3, well-appearing  Pulmonary: Normal effort, good airflow, no rales or rhonchi  CVS: Regular rate and rhythm, normal S1, S2, no S3 or S4, no murmurs; no carotid bruits, 2+ pedal pulses  Musculoskeletal: The left heel demonstrates mild swelling at the Achilles insertion, tender to palpation, mildly erythematous  Medical problems and test results were reviewed with the patient today. No results found for this or any previous visit (from the past 672 hour(s)). No results found for any visits on 09/29/22. ASSESSMENT and PLAN    1. Acute idiopathic gout of left ankle  -     colchicine (COLCRYS) 0.6 MG tablet;  Take 1 tablet by mouth daily, Disp-30 tablet, R-1Normal   -Resume colchicine, continue with allopurinol, consider steroid injection if no improvement      Return in about 3 months (around 12/29/2022).        Lala Ganser, MD

## 2022-10-20 ENCOUNTER — NURSE TRIAGE (OUTPATIENT)
Dept: OTHER | Facility: CLINIC | Age: 67
End: 2022-10-20

## 2022-10-20 ENCOUNTER — OFFICE VISIT (OUTPATIENT)
Dept: FAMILY MEDICINE CLINIC | Facility: CLINIC | Age: 67
End: 2022-10-20
Payer: MEDICARE

## 2022-10-20 VITALS
HEART RATE: 69 BPM | DIASTOLIC BLOOD PRESSURE: 82 MMHG | SYSTOLIC BLOOD PRESSURE: 132 MMHG | HEIGHT: 71 IN | OXYGEN SATURATION: 97 % | WEIGHT: 315 LBS | BODY MASS INDEX: 44.1 KG/M2

## 2022-10-20 DIAGNOSIS — M92.62 ACQUIRED HAGLUND'S DEFORMITY OF LEFT HEEL: Primary | ICD-10-CM

## 2022-10-20 DIAGNOSIS — M76.62 TENDONITIS, ACHILLES, LEFT: ICD-10-CM

## 2022-10-20 PROCEDURE — 99212 OFFICE O/P EST SF 10 MIN: CPT | Performed by: FAMILY MEDICINE

## 2022-10-20 PROCEDURE — 1123F ACP DISCUSS/DSCN MKR DOCD: CPT | Performed by: FAMILY MEDICINE

## 2022-10-20 ASSESSMENT — PATIENT HEALTH QUESTIONNAIRE - PHQ9
SUM OF ALL RESPONSES TO PHQ QUESTIONS 1-9: 0
SUM OF ALL RESPONSES TO PHQ QUESTIONS 1-9: 0
2. FEELING DOWN, DEPRESSED OR HOPELESS: 0
SUM OF ALL RESPONSES TO PHQ QUESTIONS 1-9: 0
SUM OF ALL RESPONSES TO PHQ9 QUESTIONS 1 & 2: 0
1. LITTLE INTEREST OR PLEASURE IN DOING THINGS: 0
SUM OF ALL RESPONSES TO PHQ QUESTIONS 1-9: 0

## 2022-10-20 NOTE — TELEPHONE ENCOUNTER
Location of patient: [place]    Received call from [facility] at [facility] with [employer]. Subjective: Caller states \"I am having left heel pain and swelling\"     Current Symptoms: left heel swelling and pain. Pain and Swelling is raised and across back of heel. Swelling progressing up leg, had calf pain yesterday. Slight discoloration on heel. Had Flu shot Sept 2022    Onset: 5-6 weeks ago; worsening    Associated Symptoms: reduced activity, increased wakefulness    Pain Severity: 4-9/10; sharp, stabbing; constant, waxing and waning    Temperature: none     What has been tried: gout med not working, using cane    LMP: NA Pregnant: NA    Recommended disposition: See PCP within 3 Days    Care advice provided, patient verbalizes understanding; denies any other questions or concerns; instructed to call back for any new or worsening symptoms. Patient/Caller agrees with recommended disposition; writer provided warm transfer to [facility] for appointment scheduling    Attention Provider: Thank you for allowing me to participate in the care of your patient. The patient was connected to triage in response to information provided to the ECC/PSC. Please do not respond through this encounter as the response is not directed to a shared pool. Reason for Disposition   MODERATE pain (e.g., interferes with normal activities, limping) and present > 3 days    Protocols used:  Foot Pain-ADULT-[place]

## 2022-10-20 NOTE — PROGRESS NOTES
PROGRESS NOTE    SUBJECTIVE:   Óscar Miller is a 75 y.o. male seen for a follow up visit regarding   Chief Complaint   Patient presents with    Foot Pain     Complains of worsening left heel pain with increased tenderness. [facility] and they were normal. Ortho suggested plantar fasciitis. HPI:  Patient comes in today with persistent left heel pain. This has been ongoing for several months. No trauma. He has seen orthopedics at the [place]. He has had x-ray at the [place]. He has been on steroid Dosepak without significant relief. Reviewed and updated this visit by provider:  Tobacco  Allergies  Meds  Problems  Med Hx  Surg Hx  Fam Hx             Review of Systems       OBJECTIVE:  Vitals:    10/20/22 1402   BP: 132/82   Site: Left Upper Arm   Cuff Size: Large Adult   Pulse: 69   SpO2: 97%   Weight: (!) 333 lb 12.8 oz (151.4 kg)   Height: 5' 11\" (1.803 m)        Physical Exam  Constitutional:       Appearance: He is obese. He is not ill-appearing. Cardiovascular:      Rate and Rhythm: Normal rate and regular rhythm. Heart sounds: Normal heart sounds. No murmur heard. Pulmonary:      Effort: Pulmonary effort is normal.      Breath sounds: Normal breath sounds. No rales. Musculoskeletal:      Comments: Marked swelling at the left Achilles insertion, marked calcaneal hump posteriorly, exquisitely tender. Marked pain at the Achilles insertion with passive dorsiflexion of the foot   Neurological:      Mental Status: He is alert. Medical problems and test results were reviewed with the patient today.      Recent Results (from the past 672 hour(s))   Transthoracic echocardiogram (TTE) complete with contrast, bubble, strain, and 3D PRN    Collection Time: 10/14/22 10:06 AM   Result Value Ref Range    LV EDV A2C 59 mL    LV EDV A4C 161 mL    LV ESV A2C 13 mL    LV ESV A4C 50 mL    IVSd 1.3 (A) 0.6 - 1.0 cm    LVIDd 5.5 4.2 - 5.9 cm    LVIDs 3.7 cm    LVOT Diameter 2.0 cm    LVOT Mean Gradient 1 mmHg    LVOT VTI 14.5 cm    LVPWd 1.3 (A) 0.6 - 1.0 cm    LV E' Lateral Velocity 13 cm/s    LV E' Septal Velocity 11 cm/s    LV Ejection Fraction A2C 78 %    LV Ejection Fraction A4C 69 %    LVOT Area 3.1 cm2    LVOT SV 45.5 ml    LA Minor Axis 6.3 cm    LA Major Axis 7.7 cm    LA Area 2C 26.6 cm2    LA Area 4C 29.5 cm2    LA Volume BP 98 (A) 18 - 58 mL    LA Diameter 4.4 cm    AV Mean Gradient 2 mmHg    AV VTI 16.3 cm    AV Mean Velocity 0.7 m/s    AV Area by VTI 2.8 cm2    Aortic Root 3.7 cm    MV E Wave Deceleration Time 180.0 ms    MV E Velocity 0.80 m/s    RVIDd 3.1 cm    TR Max Velocity 2.36 m/s    TR Peak Gradient 22 mmHg    Body Surface Area 2.74 m2    Fractional Shortening 2D 33 28 - 44 %    LV ESV Index A4C 19 mL/m2    LV EDV Index A4C 62 mL/m2    LV ESV Index A2C 5 mL/m2    LV EDV Index A2C 23 mL/m2    LVIDd Index 2.11 cm/m2    LVIDs Index 1.42 cm/m2    LV RWT Ratio 0.47     LV Mass 2D 304.3 (A) 88 - 224 g    LV Mass 2D Index 116.6 (A) 49 - 115 g/m2    E/E' Ratio (Averaged) 6.71     E/E' Lateral 6.15     E/E' Septal 7.27     LA Volume Index BP 38 (A) 16 - 34 ml/m2    LVOT Stroke Volume Index 17.4 mL/m2    LA Size Index 1.69 cm/m2    LA/AO Root Ratio 1.19     Ao Root Index 1.42 cm/m2    LVOT:AV VTI Index 0.89     MINDI/BSA VTI 1.1 cm2/m2    RV Basal Dimension 3.6 cm    RV Mid Dimension 2.9 cm    TAPSE 1.8 1.7 cm    Est. RA Pressure 3 mmHg    RVSP 25 mmHg       No results found for any visits on 10/20/22. ASSESSMENT and PLAN    1. Acquired Andrzej's deformity of left heel  -     [facility] Radha Cuellar MD, Orthopedic Surgery, [address]  2. Tendonitis, Achilles, left  -     VU Culelar MD, Orthopedic Surgery, [address]       No follow-ups on file.        Jeronimo Mckinney MD

## 2022-10-21 ENCOUNTER — OFFICE VISIT (OUTPATIENT)
Dept: CARDIOLOGY CLINIC | Age: 67
End: 2022-10-21
Payer: MEDICARE

## 2022-10-21 VITALS
HEART RATE: 73 BPM | SYSTOLIC BLOOD PRESSURE: 130 MMHG | HEIGHT: 71 IN | WEIGHT: 315 LBS | DIASTOLIC BLOOD PRESSURE: 88 MMHG | BODY MASS INDEX: 44.1 KG/M2

## 2022-10-21 DIAGNOSIS — I48.11 LONGSTANDING PERSISTENT ATRIAL FIBRILLATION (HCC): ICD-10-CM

## 2022-10-21 DIAGNOSIS — Z95.0 PRESENCE OF CARDIAC PACEMAKER: ICD-10-CM

## 2022-10-21 DIAGNOSIS — I10 BENIGN ESSENTIAL HYPERTENSION: ICD-10-CM

## 2022-10-21 DIAGNOSIS — I50.22 CHRONIC SYSTOLIC (CONGESTIVE) HEART FAILURE (HCC): Primary | ICD-10-CM

## 2022-10-21 DIAGNOSIS — I47.29 NSVT (NONSUSTAINED VENTRICULAR TACHYCARDIA): ICD-10-CM

## 2022-10-21 PROCEDURE — 1123F ACP DISCUSS/DSCN MKR DOCD: CPT | Performed by: INTERNAL MEDICINE

## 2022-10-21 PROCEDURE — 99214 OFFICE O/P EST MOD 30 MIN: CPT | Performed by: INTERNAL MEDICINE

## 2022-10-21 PROCEDURE — 93000 ELECTROCARDIOGRAM COMPLETE: CPT | Performed by: INTERNAL MEDICINE

## 2022-10-21 NOTE — PROGRESS NOTES
[address], [address] [employer] [facility], [address] [place] [address]  PHONE: [phone]     10/21/22    NAME:  Jaden Junior  : 1955  MRN: [id number]       SUBJECTIVE:   Jaden Junior is a 75 y.o. male seen for a follow up visit regarding the following:     Chief Complaint   Patient presents with    Hypertension    Atrial Fibrillation       HPI:    Longstanding persistent Afib s/p PM and AVN ablation. [facility] . JUAN JOSE on CPAP. Lives above TR. LHC 2020: mild CAD, EDP 20      2020 admission for COVID, better now. Echo 10/2022: normal EF, LVH, normal RVSP     More feet issues, using cane. Seeing POA soon. Some pain in feet. NO new angina, CP, pressure. Some PATEL, not worsening. On daily torsemide. Edema controlled. Feeling better, more energy. On NOAC. Patient denies recent history of orthopnea, PND, excessive dizziness and/or syncope. Doing well on anticoagulation treatment as reviewed today, no bleeding issues or excessive bruising noted. Past Medical History, Past Surgical History, Family history, Social History, and Medications were all reviewed with the patient today and updated as necessary.      Current Outpatient Medications   Medication Sig Dispense Refill    colchicine (COLCRYS) 0.6 MG tablet Take 1 tablet by mouth daily 30 tablet 1    atorvastatin (LIPITOR) 10 MG tablet Take 1 tablet by mouth daily 90 tablet 3    LANTUS SOLOSTAR 100 UNIT/ML injection pen Inject 48 Units into the skin 2 times daily (Patient taking differently: Inject 54 Units into the skin 2 times daily) 90 mL 3    OZEMPIC, 1 MG/DOSE, 4 MG/3ML SOPN Inject 1 mg into the skin once a week 9 mL 3    metFORMIN (GLUCOPHAGE) 500 MG tablet Take 1 tablet by mouth 2 times daily (with meals) 180 tablet 3    pioglitazone (ACTOS) 30 MG tablet Take 0.5 tablets by mouth daily 45 tablet 3    amLODIPine (NORVASC) 5 MG tablet Take 1 tablet by mouth daily 90 tablet 3    carvedilol (COREG) 12.5 MG tablet Take 1 tablet by mouth 2 times daily (with meals) 180 tablet 3    torsemide (DEMADEX) 20 MG tablet TAKE ONE TABLET BY MOUTH TWICE A  tablet 3    acetaminophen (TYLENOL) 650 MG extended release tablet Take 650 mg by mouth every 6 hours as needed      albuterol sulfate  (90 Base) MCG/ACT inhaler Inhale into the lungs      albuterol (PROVENTIL) (2.5 MG/3ML) 0.083% nebulizer solution Inhale 2.5 mg into the lungs every 6 hours as needed      allopurinol (ZYLOPRIM) 300 MG tablet Take 300 mg by mouth daily      apixaban (ELIQUIS) 5 MG TABS tablet Take 5 mg by mouth 2 times daily      ascorbic acid (VITAMIN C) 500 MG tablet Take by mouth      diclofenac sodium (VOLTAREN) 1 % GEL Apply 4 g topically 4 times daily      ferrous sulfate (IRON 325) 325 (65 Fe) MG tablet Take by mouth as needed      fluticasone (FLONASE) 50 MCG/ACT nasal spray 2 sprays by Nasal route daily      fluticasone-salmeterol (ADVAIR) 500-50 MCG/ACT AEPB diskus inhaler Inhale 1 puff into the lungs every 12 hours      lidocaine (LIDODERM) 5 % APPLY ONE PATCH ONE TIME DAILY FOR 10 DAYS      lidocaine (XYLOCAINE) 2 % jelly Place into the urethra as needed      loratadine (CLARITIN) 10 MG tablet Take 10 mg by mouth daily      methocarbamol (ROBAXIN) 500 MG tablet Take 500 mg by mouth 4 times daily as needed (Pain)      omeprazole (PRILOSEC) 20 MG delayed release capsule Take 20 mg by mouth      ondansetron (ZOFRAN-ODT) 4 MG disintegrating tablet Take 4 mg by mouth every 8 hours as needed      potassium chloride (KLOR-CON M) 10 MEQ extended release tablet Take 10 mEq by mouth 2 times daily      tiotropium (SPIRIVA RESPIMAT) 2.5 MCG/ACT AERS inhaler Inhale 2 puffs into the lungs daily      trolamine salicylate (ASPER-FLEX) 10 % cream Apply topically as needed      ammonium lactate (LAC-HYDRIN) 12 % lotion Apply topically as needed      Camphor-Menthol-Methyl Sal 3.1-6-10 % PTCH Apply topically       No current facility-administered medications for this visit. Allergies   Allergen Reactions    Lisinopril Angioedema    Losartan Angioedema    Morphine Anaphylaxis and Swelling    Penicillins Anaphylaxis     Previous RX for cephalosporin tolerated    Tizanidine Other (See Comments)     Patient Active Problem List    Diagnosis Date Noted    Acute gastritis with hemorrhage 09/29/2022     Priority: Medium     Oct 02, 2007 Entered By: Bailey Sagastume Comment: + esophagitis 9/07      Benign colonic polyp 09/29/2022     Priority: Medium    Chronic obstructive pulmonary disease ([address] 09/29/2022     Priority: Medium     Last Assessment & Plan:   Formatting of this note might be different from the original.  - Home meds  - Not in exacerbation      Chronic rhinitis 09/29/2022     Priority: Medium    Digestive system disorder 09/29/2022     Priority: Medium    Disorder of lung 09/29/2022     Priority: Medium    Ear problem 09/29/2022     Priority: Medium    Hypertension 09/29/2022     Priority: Medium    Neuropathy 09/29/2022     Priority: Medium    Numbness 09/29/2022     Priority: Medium    Osteoarthritis 09/29/2022     Priority: Medium    Pain in unspecified foot 09/29/2022     Priority: Medium    Sleep disorder 09/29/2022     Priority: Medium    Chronic systolic (congestive) heart failure 09/19/2022     Priority: Medium    Albuminuria 08/17/2022     Priority: Medium    Acute non-recurrent ethmoidal sinusitis 03/09/2022     Priority: Medium     Last Assessment & Plan:   Formatting of this note might be different from the original.  -Patient with signs and symptoms of sinusitis. Lungs clear on exam.  Only with ethmoid frontal sinus discomfort without purulent drainage, fever, only going on for days now. -Recommended Covid and influenza testing today. Patient should quarantine per CDC guidelines until test have resulted. Will call with results and further management.   -Recommended plenty of fluids and rest, wash hands often, Tylenol and Mucinex as needed for fever, discomfort, congestion and cough, will give Atrovent nasal spray today  -We will give back pocket prescription for doxycycline and recommended to only take if has persistent symptoms greater than a week or worsening symptoms consistent with bacterial sinusitis. -Red flags and indications to seek emergent care discussed with the Patient/Guardian today. Will follow up as needed. Patient/Guardian agrees with plan. Normochromic normocytic anemia 03/09/2020     Priority: Medium     Last Assessment & Plan:   Formatting of this note might be different from the original.  This is likely hypoproliferative due to his longstanding chronic kidney disease, diabetes, and other comorbidities. He can continue oral iron but this may not be necessary. He does have microcytic indices but this appears unchanged for the last 6 years. He really has no GI complaints. Disorder of lipid metabolism 04/10/2007     Priority: Medium    Elevated transaminase level 02/12/2022    Benign essential hypertension     Obstructive sleep apnea on CPAP     Class 3 severe obesity due to excess calories with serious comorbidity and body mass index (BMI) of 45.0 to 49.9 in adult (HCC)      Low carb diet advised/reviewed. Information given. History of acute pancreatitis 01/14/2022     Hospitalized at [address] [place] [address]. Chronic atrial fibrillation ([facility] 01/14/2022 Jan 16, 2021 Entered By: Trevor SÁNCHEZ Comment: cardiac catheter   7/1/2020- mild nonobstructive disease        Asthma     COVID-19 vaccination refused 11/28/2021     HAD COVID-19. Advised immunization anyway.         High risk medication use 11/28/2021    Iron deficiency anemia, unspecified     Idiopathic chronic gout of multiple sites without tophus     Refused varicella vaccine 05/14/2021    Type 2 diabetes mellitus with stage 3a chronic kidney disease, with long-term current use of insulin ([facility] 05/14/2021     Follows with Dr. Piter Salvador Presbyopia 02/01/2021    Pulmonary hypertension ([address] 08/11/2020    History of 2019 novel coronavirus disease (COVID-19) 08/05/2020 8/5/20 at [facility] 06/17/2020    Gastroesophageal reflux disease 06/17/2020    Shortness of breath 05/22/2020    Ventral hernia without obstruction or gangrene 03/10/2020     Last Assessment & Plan:   Very large diastases recti extending into the umbilical area as well. Surgery discussed attempting to repair this at the time of his   cholecystectomy but felt that it was too large and the risks outweigh   potential benefits. Type 2 diabetes mellitus with diabetic polyneuropathy, with long-term current use of insulin ([address] 11/29/2019     Follows with Dr. Deacon Jin        History of colonic polyps 11/07/2019    Chronic right-sided low back pain without sciatica 05/10/2019    Type 2 diabetes mellitus with microalbuminuria, with long-term current use of insulin ([address] 11/16/2018     Follows with Dr. Deacon Jin        Hypercholesterolemia 06/29/2018    Seasonal allergic rhinitis due to pollen     NSVT (nonsustained ventricular tachycardia) 09/27/2016    Atrioventricular block, complete ([address] 04/14/2016    Presence of cardiac pacemaker 01/13/2016    Edema of both lower extremities due to peripheral venous insufficiency 12/29/2015     Limit salt. Elevate legs when possible. Overall weight loss would also   help.         Actinic keratoses 07/22/2015    Long term (current) use of anticoagulants     Vitamin D deficiency     Hepatic steatosis 03/25/2014    Longstanding persistent atrial fibrillation ([address] 04/10/2007      Past Surgical History:   Procedure Laterality Date    CARDIAC CATHETERIZATION  2020    CHOLECYSTECTOMY      CHOLECYSTECTOMY, LAPAROSCOPIC  2013    Dr. Renea Morales    COLONOSCOPY  09/15/2022    ESOPHAGOGASTRODUODENOSCOPY  09/15/2022    HEENT  mid 2000's    uvulopalatopharynguloplasty    HERNIA REPAIR  2013    Dr. [place] Overcast  4274'D    right hand PACEMAKER      2020    PACEMAKER PLACEMENT  10/29/2020    replaced    PACEMAKER PLACEMENT      Dr. Margie Fernandez Bilateral      Family History   Problem Relation Age of Onset    Heart Disease Sister     Diabetes Sister     No Known Problems Brother     Hypertension Sister     Heart Disease Sister     Diabetes Sister     Cancer Sister     Diabetes Father     Hypertension Father     Stroke Father     Heart Disease Father     Diabetes Mother     Hypertension Sister     Cancer Brother     Heart Disease Mother      Social History     Tobacco Use    Smoking status: Former     Packs/day: 0.25     Years: 1.00     Pack years: 0.25     Types: Cigarettes     Quit date: 1975     Years since quittin.8    Smokeless tobacco: Former    Tobacco comments:     Quit smoking: quits, but then starts back   Substance Use Topics    Alcohol use: No     Alcohol/week: 0.0 standard drinks         ROS:    No obvious pertinent positives on review of systems except for what was outlined in the HPI today. PHYSICAL EXAM:     /88   Pulse 73   Ht 5' 11\" (1.803 m)   Wt (!) 335 lb (152 kg)   BMI 46.72 kg/m²    General/Constitutional:   Alert and oriented x 3, no acute distress  HEENT:   normocephalic, atraumatic, moist mucous membranes  Neck:   No JVD or carotid bruits bilaterally  Cardiovascular:   regular rate and rhythm, no murmur/rub/gallop appreciated  Pulmonary:   clear to auscultation bilaterally, no respiratory distress  Abdomen:   soft, non-tender, non-distended  Ext:   No sig LE edema bilaterally  Skin:  warm and dry, no obvious rashes seen  Neuro:   no obvious sensory or motor deficits  Psychiatric:   normal mood and affect    EKG Today and independently reviewed by me: sinus rhythm, normal intervals and non-specific ST/T wave changes.       Lab Results   Component Value Date/Time     2022 09:35 AM    K 4.8 2022 09:35 AM     2022 09:35 AM    CO2 29 2022 09:35 AM    BUN 22 06/20/2022 09:35 AM    CREATININE 1.60 06/20/2022 09:35 AM    GLUCOSE 95 06/20/2022 09:35 AM    CALCIUM 9.2 06/20/2022 09:35 AM        Lab Results   Component Value Date    WBC 14.2 (H) 01/15/2022    HGB 12.0 (L) 01/15/2022    HCT 38.3 (L) 01/15/2022    MCV 88.5 01/15/2022     01/15/2022       Lab Results   Component Value Date    TSH 2.240 03/09/2021       Lab Results   Component Value Date    LABA1C 6.6 (H) 06/20/2022     Lab Results   Component Value Date     06/20/2022       Lab Results   Component Value Date    CHOL 117 06/20/2022    CHOL 88 01/14/2022    CHOL 120 03/09/2021     Lab Results   Component Value Date    TRIG 222 (H) 06/20/2022    TRIG 136 01/14/2022    TRIG 187 (H) 03/09/2021     Lab Results   Component Value Date    HDL 30 (L) 06/20/2022    HDL 20 (L) 01/14/2022    HDL 29 (L) 03/09/2021     Lab Results   Component Value Date    LDLCALC 42.6 06/20/2022    LDLCALC 40.8 01/14/2022    LDLCALC 60 03/09/2021     Lab Results   Component Value Date    LABVLDL 44.4 (H) 06/20/2022    LABVLDL 27.2 (H) 01/14/2022    VLDL 31 03/09/2021    VLDL 34 11/17/2020     Lab Results   Component Value Date    CHOLHDLRATIO 3.9 06/20/2022    CHOLHDLRATIO 4.4 01/14/2022           I have Independently reviewed prior care notes, any ER records available, cardiac testing, labs and results with the patient and before seeing the patient today. Also independently reviewed outside records when available. ASSESSMENT:    Sidney Irby was seen today for hypertension and atrial fibrillation. Diagnoses and all orders for this visit:    Chronic systolic (congestive) heart failure  -     EKG 12 lead    Benign essential hypertension    Longstanding persistent atrial fibrillation (HCC)    NSVT (nonsustained ventricular tachycardia)    Presence of cardiac pacemaker        PLAN:      1. CDHF/pHTN: remain on torsemide, labs ok. Follow Cr and K.     PATEL multifactorial from weight, conditioning, lung disease, DHF, pHTN. Echo ok, pHTN better now. 2. Afib: s/p AVN ablation, remain on NOAC. I have reviewed their anticoagulation treatment, instructed patient to call with any bleeding issues such as melana or other. The patient will call with any issues related to their treatment. All questions were answered with the patient voicing understanding. 3. PPM: follow. 4. HTN: remain on coreg, norvasc. Follow. 5. CAD: mild, follow. 6. JUAN JOSE: on BiPAP, remain on such now. 7. CKD: Follow labs. Patient has been instructed and agrees to call our office with any issues or other concerns related to their cardiac condition(s) and/or complaint(s). Return in about 6 months (around 4/21/2023).        SHRUTI MAYFIELD,   10/21/2022

## 2022-10-22 PROCEDURE — 93296 REM INTERROG EVL PM/IDS: CPT | Performed by: INTERNAL MEDICINE

## 2022-10-22 PROCEDURE — 93294 REM INTERROG EVL PM/LDLS PM: CPT | Performed by: INTERNAL MEDICINE

## 2022-10-26 ENCOUNTER — OFFICE VISIT (OUTPATIENT)
Dept: ORTHOPEDIC SURGERY | Age: 67
End: 2022-10-26

## 2022-10-26 DIAGNOSIS — M79.672 LEFT FOOT PAIN: Primary | ICD-10-CM

## 2022-10-26 DIAGNOSIS — M76.62 ACHILLES BURSITIS OF LEFT LOWER EXTREMITY: ICD-10-CM

## 2022-10-26 RX ORDER — LIDOCAINE 5% 5 G/100G
CREAM TOPICAL
Qty: 45 G | Refills: 2 | Status: SHIPPED | OUTPATIENT
Start: 2022-10-26

## 2022-10-26 NOTE — PROGRESS NOTES
Name: Jaleesa Finnegan  YOB: 1955  Gender: male  MRN: [id number]     CC: Left heel pain    HPI:   September 2022, he noticed posterior heel pain  [facility]  10/26/2022: He is here to assess his posterior heel pain. He feels sharp ice pick sticking    ROS/Meds/PSH/PMH/FH/SH: reviewed today    Tobacco:  reports that he quit smoking about 47 years ago. His smoking use included cigarettes. He has a 0.25 pack-year smoking history. He has quit using smokeless tobacco.   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 No obvious distress or SOB  CV: appears to have acceptable vascular color and capillary refill  Neuro: appears to have mostly intact light touch sensation   Skin: Left posterior heel enthesopathic thickening with swelling  MS: Standing: Plantigrade: Gait limited  Left = posterior heel insertional Achilles pain; no Achilles gap no plantar fascial pain  Left = full ankle/foot motion; 5/5 strength    XR: Left side: Standing AP lateral mortise ankle plus AP oblique foot taken today with hindfoot and midfoot arthritis; first MTP arthritis; narrowing calcaneonavicular cuboid region; fifth tuberosity exostosis; plantar and posterior heel enthesopathy's  XR Impression:  As above      Reviewed Test/Records/Documents:  10/05/2022: Left foot x-rays from the [facility] radiology impression: Enthesophytes and degenerative changes at the left calcaneus  10/10/2022: Dr. Nava Post reflects foot pain. Treated at the [facility] with steroid Dosepak. Diagnosed Achilles tendinitis and Andrzej's   Injection: Not applicable    Assessment:    Left calcific insertional Achilles tendinitis    Plan:   The patient and I discussed the above assessment. We explored treatment options.      We discussed conservative and surgical treatment for his Achilles  Hopefully with treatment today, he will see improvement  If no resolution, MRI scan and surgery  Advanced medical imaging: Left ankle MRI scan: Potential future    DME: Placed in a: 3D boot, heel lift, Heelbo pad  We discussed Achilles care and 3D boot boot/heel lift/Heelbo protection  PT: Prescribed at: [facility]. Please consider, per the therapist expertise, modalities such as iontophoresis, phonophoresis and dry needling       Medication - OTC meds prn: Prescribed: Boswellia, Devil's claw, Turmeric-curcumin   Magne sports topical rub with frankincense and myrrh   Topical 5% Lidocaine; 5% Neurontin; apply to affected area up to 4 x/day; 2 refills    He reports that the steroid Dosepak affected his diabetes and with his ESRD, I would like to avoid NSAIDs    Surgical discussion: We discussed Achilles tendon debridement and reconstruction but hold on today's visit  Follow up: 3 weeks  Work status: Retired    History and discussion of management with: His wife    This note was created using Dragon voice recognition software which may result in errors of speech and spelling recognition and word/phrase syntax errors.

## 2022-10-26 NOTE — LETTER
[employer]  Arthritis oral choices: Individual Turmeric-Curcumin; Creasie Spillers; Boswellia                           -or-   Combination [employer] Turmeric strength for joints that includes all 3 listed above     Magne topical Sports:   Balm or liquid Spray with frankincense/myrrh      Nerve medication options:   CBD, Alpha Lipoic acid, Lion's aletha and any other recommended neuropathic medication            Immune/healing possibilities:  Echinacea, Elderberry    As Needed: Dead sea bath salts, Essential Oils, scar cream, Gout and cramping medications    The above list of recommended medications is only a starting point. Please allow the experts at [facility] to make the final recommendations. Before using any of these medications, please make sure that you have no concerns, senstivities or allergies to the listed ingredients in each bottle/container. Also, please check with your pharmacist or your primary care physician regarding any and all possible interactions with your other daily medications. [facility] Locations:   [address] [place] [address], [address] [place], [place] [address]            Sincerely,      Marleny Wtakins MD

## 2022-10-26 NOTE — LETTER
DME Patient Authorization Form    Name: Ibrahima Hicks  : 1955  MRN: [id number]   Age: 75 y.o. Gender: male  Delivery Address: [facility]     Diagnosis:     ICD-10-CM    1. Left foot pain  M79.672 XR FOOT LEFT (2 VIEWS)     XR ANKLE LEFT (MIN 3 VIEWS)     Lewis  ()     Heel Lift ()     Amb External Referral To Physical Therapy     Heelbo (Elbow Pad) ()      2. Achilles bursitis of left lower extremity  M76.62 Walker Boot ()     Heel Lift ()     Amb External Referral To Physical Therapy     Heelbo (Elbow Pad) ()           Requested DME:  Heel Lift-  ($15.00) X 1 - left  WeeWalker Boot -  ($187.00) X 1 - left  Elbow Protector**- ($20.00) X 1 - left        Clinical Notes:     **Indicates non-covered items by insurance. Payment expected on date of service. Electronically signed by  Provider: José Miguel William MD__Date: 10/26/2022                            [facility]/[address] Tax ID # [id number]        Durable Medical Equipment and/or Orthotics Patient Consent     I understand that my physician has prescribed this medical supply as part of my treatment plan as a matter of Medical Necessity.  I understand that I have a choice in where I receive my prescribed orthopedic supplies and/or services.  I authorize [facility] to furnish this service/product and to provide my insurance carrier with any information requested in order to process for payment.  I instruct my insurance carrier to pay [facility] directly for these services/products.  I understand that my insurance carrier may deny payment for this supply because it is a non-covered item, deemed not medically necessary or considered experimental.   I understand that any cost not covered by my insurance carrier will be solely my financial responsibility.    I have received the Supplier Standards and have reviewed them.  I have received the prescribed item and have been fully instructed on the proper use of the above services/products.    ______ (Patient Initials) I understand that all DME items are non-returnable after being dispensed. Items still in sealed packaging may be returned up to 14 days after purchasing. [address] will replace items that are defective.    ______ (Patient Initials) I understand that [facility] will not file a claim with my insurance carrier for this service/product and I am waiving my right to file a claim on my own for this service/product with my insurance company as this item is NON-COVERED (Denoted by the **) by my Insurance company/policy. ______ (Patient Initials) I understand that I am responsible to bring my equipment to the hospital for any surgery. ______________________________________________  ________________________  Patient / Teresaanshul Escobedo            Thank you for considering [address] Your physician has prescribed specific medical equipment or devices for your home use. The following describes your rights and responsibilities as our customer. Right to Choose Providers: You have a choice regarding which company supplies your home medical equipment and devices, and to consult your physician in this decision. You may choose a medical supply store, a home medical equipment provider, or a specialist such as [facility]. [facility] will coordinate with your physician to provide the medical equipment or devices prescribed for your home use. Right to Service:  You have the right to considerate, respectful and nondiscriminatory care. You have the right to receive accurate and easily understood information about your health care.   If you speak a foreign language, or don't understand the discussions, assistance will be provided to allow you to make informed health care decisions. You have the right to know your treatment options and to participate in decisions about your care, including the right to accept or refuse treatment. You have the right to expect a reasonable response to your requests for treatment or service. You have the right to talk in confidence with health care providers and to have your health care information protected. You have the right to receive an explanation of your bill. You have the right to complain about the service or product you receive. Patient Responsibilities:  Please provide complete and accurate information about your health insurance benefits and make arrangements for the timely payment of your bill. [facility] will, if possible, assume responsibility for billing your insurance (Medicare, Medicaid and commercial) for the prescribed equipment or devices. If your policy does not cover the prescribed product, or only covers a portion of the bill, you are responsible for any remaining balance. Return and Exchange Policy:  [facility] will honor published  Warranties for products. [facility] will accept returns or exchanges within 14 days from the date of receipt, providin) the product must be in new condition; 2) receipt as required; and 3) used disposable and hygiene products may only be returned due to a defective product. Note: Refunds will be issued in a timely manner, please allow 4-6 weeks for processing. Complaint Procedures and DME Consumer Protection Resources:  [facility] values you as a customer, and is committed to resolving patient concerns. This commitment includes understanding and documenting your concerns, conducting a review of internal procedures, and providing you with an explanation and resolution to your concerns.   Should you have any questions about our services or billing process, please contact our office at (practice phone number). If we are unable to resolve the concern, you have the right to direct comments to the office of Consumer Protection, in the [address] or the [facility] office, without fear of repercussion. DMEPOS SUPPLIER STANDARDS    A supplier must be in compliance with all applicable Federal and [facility] and regulatory requirements. A supplier must provide complete and accurate information on the DMEPOS supplier application. Any changes to this information must be reported to the [facility] [employer] within 30 days. An authorized individual (one whose signature is binding) must sign the application for billing privileges. A supplier must fill orders from its own inventory, or must contract with other companies for the purchase of items necessary to fill the order. A supplier may not contract with any entity that is currently excluded from the Medicare program, any [facility] program, or from any other Federal procurement or Nonprocurement programs. A supplier must advise beneficiaries that they may rent or purchase inexpensive or routinely purchased durable medical equipment, and of the purchase option for capped rental equipment. A supplier must notify beneficiaries of warranty coverage and honor all warranties under applicable State Law, and repair or replace free of charge Medicare covered items that are under warranty. A supplier must maintain a physical facility on an appropriate site. A supplier must permit CMS, or its agents to conduct on-site inspections to ascertain the supplier's compliance with these standards. The supplier location must be accessible to beneficiaries during reasonable business hours, and must maintain a visible sign and posted hours of operation.   A supplier must maintain a primary business telephone listed under the name of the business in a [employer] or a toll free number available through [employer] assistance. The exclusive use of a beeper, answering machine or cell phone is prohibited. A supplier must have comprehensive liability insurance in the amount of at least $300,000 that covers both the supplier's place of business and all customers and employees of the supplier. If the supplier manufactures its own items, this insurance must also cover product liability and completed operations. A supplier must agree not to initiate telephone contact with beneficiaries, with a few exceptions allowed. This standard prohibits suppliers from calling beneficiaries in order to solicit new business. A supplier is responsible for delivery and must instruct beneficiaries on use of Medicare covered items, and maintain proof of delivery. A supplier must answer questions, and respond to complaints of the beneficiaries, and maintain documentation of such contacts. A supplier must maintain and replace at no charge or repair directly, or through a service contract with another company, Medicare covered items it has rented to beneficiaries. A supplier must accept returns of substandard (less than full quality for the particular item) or unsuitable items (inappropriate for the beneficiary at the time it was fitted and rented or sold) from beneficiaries. A supplier must disclose these supplier standards to each beneficiary to whom it supplies a Medicare-covered item. A supplier must disclose to the government any person having ownership, financial, or control interest in the supplier. A supplier must not convey or reassign a supplier number; i.e., the supplier may not sell or allow another entity to use its Medicare billing number. A supplier must have a complaint resolution protocol established to address beneficiary complaints that relate to these standards. A record of these complaints must be maintained at the physical facility.   Complaint records must include: the name, address, telephone number and health insurance claim number of the beneficiary, a summary of the complaint, and any action taken to resolve it. A supplier must agree to furnish CMS any information required by the Medicare statute and implementing regulations. A supplier of DMEPOS and other items and services must be accredited by a CMS-approved accreditation organization in order to receive and retain a supplier billing number. The accreditation must indicate the specific products and services, for which the supplier is accredited in order for the supplier to receive payment for those specific products and services. A DMEPOS supplier must notify their accreditation organization when a new DMEPOS location is opened. The accreditation organization may accredit the new supplier location for three months after it is operational without requiring a new site visit. All DMEPOS supplier locations, whether owned or subcontracted, must meet the [employer] and be separately accredited in order to bill Medicare. An accredited supplier may be denied enrollment or their enrollment may be revoked, if CMS determines that they are not in compliance with the DMEPOS quality standards. A DMEPOS supplier must disclose upon enrollment all products and services, including the addition of new product lines for which they are seeking accreditation. If a new product line is added after enrollment, the DMEPOS supplier will be responsible for notifying the accrediting body of the new product so that the DMEPOS supplier can be re-surveyed and accredited for these new products. Must meet the surety bond requirements specified in 42 C. F.R. 424.57(c). Implementation date- May 4, 2009. A supplier must obtain oxygen from a state-licensed oxygen supplier. A supplier must maintain ordering and referring documentation consistent with provisions found in 42 C. F.R. 424.516(f).   DMEPOS suppliers are prohibited from sharing a practice location with certain other Medicare providers and suppliers. DMEPOS suppliers must remain open to the public for a minimum of 30 hours per week with certain exceptions.

## 2022-10-27 NOTE — PROGRESS NOTES
The patient was prescribed a walker boot for the patient's left foot. The patient wears a size NA shoe and I fitted them with a L size boot. The patient was fitted and instructed on the use of prescribed walker boot. I explained how to fit themselves and that the plastic flexible piece should always be on the front of the boot and secured by the Velcro straps on top. The air bladder in the boot was adjusted according to proper fit and comfort. The patient walked a short distance and acknowledged satisfaction with current fit. I also explained that they need a heel lift or a higher heeled shoe for the uninvolved LE to help normalize gait and avoid excessive low back stress/strain due to leg length inequality created from walker boot. The patient was also prescribed and fitted with a heelbo heel pad for the left foot, and also a heel lift to wear in the boot for the left foot. Patient read and signed documenting they understand and agree to [facility]'s current DME return policy.

## 2022-10-31 ENCOUNTER — TELEPHONE (OUTPATIENT)
Dept: ORTHOPEDIC SURGERY | Age: 67
End: 2022-10-31

## 2022-10-31 DIAGNOSIS — M76.62 ACHILLES BURSITIS OF LEFT LOWER EXTREMITY: ICD-10-CM

## 2022-10-31 DIAGNOSIS — M79.672 LEFT FOOT PAIN: Primary | ICD-10-CM

## 2022-10-31 NOTE — TELEPHONE ENCOUNTER
He was seen last week and was supposed to go to PT but the two Dr. Amber Vázquez mentioned don't take his insurance. HE is wondering if it can be sent to [facility] in [place]. Please call to let them know. He would like to go this afternoon.

## 2022-11-01 ENCOUNTER — TELEPHONE (OUTPATIENT)
Dept: ORTHOPEDIC SURGERY | Age: 67
End: 2022-11-01

## 2022-11-01 NOTE — TELEPHONE ENCOUNTER
Faxed PT order to [facility] [place] [place] [facility] in [place] fax# [phone] 11/1/22,MB

## 2022-11-15 RX ORDER — POTASSIUM CHLORIDE 750 MG/1
TABLET, FILM COATED, EXTENDED RELEASE ORAL
Qty: 180 TABLET | Refills: 3 | Status: SHIPPED | OUTPATIENT
Start: 2022-11-15

## 2022-11-18 ENCOUNTER — OFFICE VISIT (OUTPATIENT)
Dept: ORTHOPEDIC SURGERY | Age: 67
End: 2022-11-18
Payer: MEDICARE

## 2022-11-18 ENCOUNTER — TELEPHONE (OUTPATIENT)
Dept: ORTHOPEDIC SURGERY | Age: 67
End: 2022-11-18

## 2022-11-18 VITALS — HEIGHT: 71 IN | BODY MASS INDEX: 44.1 KG/M2 | WEIGHT: 315 LBS

## 2022-11-18 DIAGNOSIS — M79.672 LEFT FOOT PAIN: ICD-10-CM

## 2022-11-18 DIAGNOSIS — M76.62 ACHILLES BURSITIS OF LEFT LOWER EXTREMITY: Primary | ICD-10-CM

## 2022-11-18 PROCEDURE — 99213 OFFICE O/P EST LOW 20 MIN: CPT | Performed by: ORTHOPAEDIC SURGERY

## 2022-11-18 PROCEDURE — 1123F ACP DISCUSS/DSCN MKR DOCD: CPT | Performed by: ORTHOPAEDIC SURGERY

## 2022-11-18 NOTE — PROGRESS NOTES
Name: Vj Vicente  YOB: 1955  Gender: male  MRN: [id number]     10/26/2022: Initial visit with me for: Left heel pain  11/18/2022: Unfortunately, he continues to have pain despite boot topical and [facility]. His sticking stabbing pain resolved but he continues to have limitations and pain despite 3D boot. HPI:   September 2022, he noticed posterior heel pain  October 2022: [place] treated  10/26/2022: He presented to assess his posterior heel pain. He feels sharp ice pick sticking    ROS/Meds/PSH/PMH/FH/SH: reviewed today    Tobacco:  reports that he quit smoking about 47 years ago. His smoking use included cigarettes. He has a 0.25 pack-year smoking history. He has quit using smokeless tobacco.   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 No obvious distress or SOB  CV: appears to have acceptable vascular color and capillary refill  Neuro: appears to have mostly intact light touch sensation   Skin: Left posterior heel enthesopathic thickening with resolved hindfoot swelling  MS: Standing: Plantigrade: Gait limited  Left = posterior heel insertional Achilles pain; no Achilles gap no plantar fascial pain  Left = full ankle/foot motion; 5/5 strength    XR: Left side: Standing AP lateral mortise ankle plus AP oblique foot taken 10/26/2022 with hindfoot and midfoot arthritis; first MTP arthritis; narrowing calcaneonavicular cuboid region; fifth tuberosity exostosis; plantar and posterior heel enthesopathies   XR Impression:  As above      Reviewed Test/Records/Documents:  10/05/2022: Left foot x-rays from the [place] radiology impression: Enthesophytes and degenerative changes at the left calcaneus  10/10/2022: Dr. Harrison Cruz reflects foot pain. Treated at the [place] with steroid Dosepak.   Diagnosed Achilles tendinitis and Andrzej's   Injection: Not applicable    Assessment:    Left calcific insertional Achilles tendinitis    Plan:   The patient and I discussed the above assessment. We explored treatment options. His Achilles pain is better with resolved knife sticking sharp acute pain but his chronic posterior heel pain persists. Despite PT, 3D boot and topical medication, he continues to have pain. We again discussed conservative and surgical treatment for his Achilles, but he has failed conservative care thus far  Plan is MRI scan and appointment with surgical partner. Advanced medical imaging: Left ankle MRI scan: Assess for insertional Achilles tendon tearing [MRI scan only if it can be done safely with his pacemaker]    We discussed continuing Achilles care and 3D boot boot/heel lift/Heelbo protection  PT: Prescribed at: [facility]. He completed    Medication - OTC meds prn: I recommend he continue prior prescribed: Boswellia, Devil's claw, Turmeric-curcumin   Magne sports topical rub with frankincense and myrrh   Topical 5% Lidocaine; 5% Neurontin; apply to affected area up to 4 x/day; 2 refills    No oral: Prednisone due to his diabetes and due to his ESRD, I would like to avoid NSAIDs    Surgical discussion: We spent time today discussing surgery. I went over the risk and complication and expected postoperative course. I went over the surgical procedure in detail. MRI scan will further define but post MRI scan follow up with surgical partner  Follow up: Surgical partner  Work status: Retired    History and discussion of management with: His wife    This note was created using Dragon voice recognition software which may result in errors of speech and spelling recognition and word/phrase syntax errors.

## 2022-11-18 NOTE — TELEPHONE ENCOUNTER
Spoke with  this patient. He will call back  to  schedule  with  Dr Ortega Alvarado once he  gets  approval  to have  a CT and gets that date set up.   He has a pacemaker and  2  titanium knees and  needs  doctor approval for a  CT along  with  authorization

## 2022-11-21 ENCOUNTER — OFFICE VISIT (OUTPATIENT)
Dept: FAMILY MEDICINE CLINIC | Facility: CLINIC | Age: 67
End: 2022-11-21
Payer: MEDICARE

## 2022-11-21 VITALS
WEIGHT: 315 LBS | HEART RATE: 73 BPM | OXYGEN SATURATION: 98 % | HEIGHT: 71 IN | BODY MASS INDEX: 44.1 KG/M2 | DIASTOLIC BLOOD PRESSURE: 66 MMHG | SYSTOLIC BLOOD PRESSURE: 118 MMHG

## 2022-11-21 DIAGNOSIS — Z79.899 ENCOUNTER FOR MONITORING ALLOPURINOL THERAPY: ICD-10-CM

## 2022-11-21 DIAGNOSIS — E11.29 TYPE 2 DIABETES MELLITUS WITH MICROALBUMINURIA, WITH LONG-TERM CURRENT USE OF INSULIN (HCC): Chronic | ICD-10-CM

## 2022-11-21 DIAGNOSIS — E11.22 TYPE 2 DIABETES MELLITUS WITH STAGE 3B CHRONIC KIDNEY DISEASE, WITH LONG-TERM CURRENT USE OF INSULIN (HCC): Chronic | ICD-10-CM

## 2022-11-21 DIAGNOSIS — E78.00 HYPERCHOLESTEROLEMIA: Chronic | ICD-10-CM

## 2022-11-21 DIAGNOSIS — E11.42 TYPE 2 DIABETES MELLITUS WITH DIABETIC POLYNEUROPATHY, WITH LONG-TERM CURRENT USE OF INSULIN (HCC): Primary | Chronic | ICD-10-CM

## 2022-11-21 DIAGNOSIS — Z79.4 TYPE 2 DIABETES MELLITUS WITH STAGE 3B CHRONIC KIDNEY DISEASE, WITH LONG-TERM CURRENT USE OF INSULIN (HCC): Chronic | ICD-10-CM

## 2022-11-21 DIAGNOSIS — Z79.4 TYPE 2 DIABETES MELLITUS WITH DIABETIC POLYNEUROPATHY, WITH LONG-TERM CURRENT USE OF INSULIN (HCC): Primary | Chronic | ICD-10-CM

## 2022-11-21 DIAGNOSIS — Z79.01 ANTICOAGULATED: ICD-10-CM

## 2022-11-21 DIAGNOSIS — N18.32 TYPE 2 DIABETES MELLITUS WITH STAGE 3B CHRONIC KIDNEY DISEASE, WITH LONG-TERM CURRENT USE OF INSULIN (HCC): Chronic | ICD-10-CM

## 2022-11-21 DIAGNOSIS — R80.9 TYPE 2 DIABETES MELLITUS WITH MICROALBUMINURIA, WITH LONG-TERM CURRENT USE OF INSULIN (HCC): Chronic | ICD-10-CM

## 2022-11-21 DIAGNOSIS — Z51.81 ENCOUNTER FOR MONITORING ALLOPURINOL THERAPY: ICD-10-CM

## 2022-11-21 DIAGNOSIS — I10 BENIGN ESSENTIAL HYPERTENSION: Chronic | ICD-10-CM

## 2022-11-21 DIAGNOSIS — Z79.4 TYPE 2 DIABETES MELLITUS WITH MICROALBUMINURIA, WITH LONG-TERM CURRENT USE OF INSULIN (HCC): Chronic | ICD-10-CM

## 2022-11-21 DIAGNOSIS — L81.9 ATYPICAL PIGMENTED SKIN LESION: ICD-10-CM

## 2022-11-21 DIAGNOSIS — Z79.899 HIGH RISK MEDICATION USE: Chronic | ICD-10-CM

## 2022-11-21 DIAGNOSIS — Z12.5 SCREENING PSA (PROSTATE SPECIFIC ANTIGEN): ICD-10-CM

## 2022-11-21 PROBLEM — J44.9 CHRONIC OBSTRUCTIVE PULMONARY DISEASE (HCC): Chronic | Status: ACTIVE | Noted: 2022-09-29

## 2022-11-21 PROBLEM — I27.20 PULMONARY HYPERTENSION (HCC): Chronic | Status: ACTIVE | Noted: 2020-08-11

## 2022-11-21 PROBLEM — I50.22 CHRONIC SYSTOLIC (CONGESTIVE) HEART FAILURE (HCC): Chronic | Status: ACTIVE | Noted: 2022-09-19

## 2022-11-21 PROBLEM — N18.31 TYPE 2 DIABETES MELLITUS WITH STAGE 3A CHRONIC KIDNEY DISEASE, WITH LONG-TERM CURRENT USE OF INSULIN (HCC): Chronic | Status: ACTIVE | Noted: 2021-05-14

## 2022-11-21 LAB
EST. AVERAGE GLUCOSE BLD GHB EST-MCNC: 157 MG/DL
HBA1C MFR BLD: 7.1 % (ref 4.8–5.6)
PSA SERPL-MCNC: 3.4 NG/ML

## 2022-11-21 PROCEDURE — 3051F HG A1C>EQUAL 7.0%<8.0%: CPT | Performed by: FAMILY MEDICINE

## 2022-11-21 PROCEDURE — 3078F DIAST BP <80 MM HG: CPT | Performed by: FAMILY MEDICINE

## 2022-11-21 PROCEDURE — 3074F SYST BP LT 130 MM HG: CPT | Performed by: FAMILY MEDICINE

## 2022-11-21 PROCEDURE — 99214 OFFICE O/P EST MOD 30 MIN: CPT | Performed by: FAMILY MEDICINE

## 2022-11-21 PROCEDURE — 1123F ACP DISCUSS/DSCN MKR DOCD: CPT | Performed by: FAMILY MEDICINE

## 2022-11-21 ASSESSMENT — ENCOUNTER SYMPTOMS
ALLERGIC/IMMUNOLOGIC NEGATIVE: 1
CONSTIPATION: 0
ABDOMINAL PAIN: 0
SHORTNESS OF BREATH: 0
NAUSEA: 0
WHEEZING: 0
DIARRHEA: 0
EYES NEGATIVE: 1
COUGH: 0
BACK PAIN: 0
VOMITING: 0
CHEST TIGHTNESS: 0

## 2022-11-21 ASSESSMENT — PATIENT HEALTH QUESTIONNAIRE - PHQ9
SUM OF ALL RESPONSES TO PHQ QUESTIONS 1-9: 0
SUM OF ALL RESPONSES TO PHQ QUESTIONS 1-9: 0
1. LITTLE INTEREST OR PLEASURE IN DOING THINGS: 0
2. FEELING DOWN, DEPRESSED OR HOPELESS: 0
SUM OF ALL RESPONSES TO PHQ QUESTIONS 1-9: 0
SUM OF ALL RESPONSES TO PHQ9 QUESTIONS 1 & 2: 0
SUM OF ALL RESPONSES TO PHQ QUESTIONS 1-9: 0

## 2022-11-21 NOTE — PATIENT INSTRUCTIONS
CHECK WITH [facility] ABOUT SHINGLES VACCINE! Ask about the GOOD [facility] vaccine at your pharmacy    Remember to ask your pharmacist about the relatively new [facility] vaccine for shingles prevention. One in three adults gets shingles in their lifetime. It is a very painful, blistering skin condition which is related to having had chickenpox earlier in life. But the shingles CAN be prevented with this highly effective vaccine! Check with your pharmacist to see if it is covered by your insurance (many insurance companies do). We do not carry the vaccine in our office due to its expense and because of recent supply issues. If your insurance does not cover the vaccine, the cost with a discount coupon like Gissel Wrighton is about $150 per shot. TWO shots are required to achieve lifetime immunity and are given at least 2 months apart. Those who have had the live vaccine in the past (Zostavax) may still get re-immunized as the new vaccine is significantly better, about 90% effective. The older vaccine was only 60% effective. If you have had the shingles in the past, you are actually MORE likely to get them again, so it is recommended for you as well. If you think that you cannot get shingles because you have never had the chickenpox, I will bet you that you have and just do not know it. I have never tested ANYONE for exposure to the chickenpox virus that it did not come out positive! Besides which, the vaccine for shingles is the same as the vaccine for chickenpox, so you will end up protected against both! And if you were to get the chickenpox as an adult, it is generally a much more severe illness. So that's even a better reason to get this vaccine ;)    Please have your pharmacist send us a notification if you get a Shingrix vaccine so we can document it in your medical record.

## 2022-11-21 NOTE — PROGRESS NOTES
Jeffery Amin DO                Diplomate of the [employer] of [facility] [place]         [phone]    Mercedes Collado is a 75 y.o. male who was seen on 11/21/2022 for   Chief Complaint   Patient presents with    Diabetes    Other     Skin problem left upper arm       Assessment & Plan     Diagnosis Orders   1. Type 2 diabetes mellitus with diabetic polyneuropathy, with long-term current use of insulin (HCC)  Hemoglobin A1C    Hemoglobin A1C    Microalbumin / Creatinine Urine Ratio    Hemoglobin A1C    Comprehensive Metabolic Panel    Recheck A1c today      2. Type 2 diabetes mellitus with microalbuminuria, with long-term current use of insulin (HCC)  Hemoglobin A1C    Hemoglobin A1C    Microalbumin / Creatinine Urine Ratio    Hemoglobin A1C    Comprehensive Metabolic Panel    Recheck A1c today      3. Type 2 diabetes mellitus with stage 3b chronic kidney disease, with long-term current use of insulin (HCC)  Hemoglobin A1C    Hemoglobin A1C    Microalbumin / Creatinine Urine Ratio    Hemoglobin A1C    Comprehensive Metabolic Panel    Recheck A1c today      4. Benign essential hypertension  Comprehensive Metabolic Panel    Well-controlled      5. Hypercholesterolemia  Lipid Panel    Comprehensive Metabolic Panel    Check lipids next visit      6. Screening PSA (prostate specific antigen)  PSA Screening    PSA Screening    Check PSA today      7. Atypical pigmented skin lesion      [facility]. [facility] for referral      8. Encounter for monitoring allopurinol therapy      Wife will send copy of uric acid level from recent [address] [facility]. Anticoagulated  CBC with Auto Differential      10. High risk medication use  Comprehensive Metabolic Panel    CBC with Auto Differential          Follow-up and Dispositions    Return in about 6 months (around 5/21/2023) for NO DAVONTE, LABS TODAY.          RECENT LABS/TESTS TO REVIEW and DISCUSS    Results for orders placed or performed in visit on 11/21/22   Hemoglobin A1C   Result Value Ref Range    Hemoglobin A1C 7.1 (H) 4.8 - 5.6 %    eAG 157 mg/dL   PSA Screening   Result Value Ref Range    PSA 3.4 <4.0 ng/mL      Latest Reference Range & Units 3/9/21 08:27 6/20/22 09:35 11/21/22 11:30   Hemoglobin A1C 4.8 - 5.6 % 6.7 (H) 6.6 (H) 7.1 (H)   (H): Data is abnormally high   Latest Reference Range & Units 11/17/20 08:23 11/15/21 09:47 11/21/22 12:03   Prostatic Specific Ag <4.0 ng/mL 1.7 2.5 3.4       Subjective    HPI:     This is a 59-year-old male patient here today for 6-month follow-up on diabetes and other medical problems. The patient did not have labs in advance of today's visit. Instead we will draw labs today or at a future date. He generally follows for his diabetes with an endocrinologist.  But he has not had a hemoglobin A1c in about 5 months. Last A1c was fairly well controlled at 6.6. Officially his goal is still less than 6.5. He has had most other necessary lab testing by other physicians. He does need a PSA test today which will be obtained. The patient reports a skin lesion on his left upper arm. He says he has had a pigmented lesion at this site for many years but only recently has the lesion begun to change or another type of lesion seems to be growing on top. I think it needs excision and biopsy and he says he will contact the dermatologist who he has seen at the [place] in the past.  If he has difficulties with this, he should let me know and I will try to get him in with one of our dermatologists. The patient is due for his annual wellness visit but we are unable to perform that today because of our recent change in electronic medical records. We will schedule follow-up for this in the near future. Reviewed and updated this visit by provider:           Review of Systems   Constitutional:  Negative for fatigue and fever. HENT: Negative. Eyes: Negative. Respiratory:  Negative for cough, chest tightness, shortness of breath and wheezing. Cardiovascular:  Negative for chest pain and leg swelling. Gastrointestinal:  Negative for abdominal pain, constipation, diarrhea, nausea and vomiting. Endocrine: Negative. Genitourinary:  Negative for difficulty urinating, dysuria and frequency. Musculoskeletal:  Negative for arthralgias, back pain and neck pain. Skin:  Negative for rash. Allergic/Immunologic: Negative. Neurological:  Negative for dizziness and light-headedness. Psychiatric/Behavioral:  Negative for dysphoric mood. The patient is not nervous/anxious. All other systems reviewed and are negative. Objective    /66 (Site: Left Upper Arm, Position: Sitting, Cuff Size: Large Adult)   Pulse 73   Ht 5' 11\" (1.803 m)   Wt (!) 330 lb (149.7 kg)   SpO2 98%   BMI 46.03 kg/m²     Physical Exam  Vitals reviewed. Constitutional:       General: He is not in acute distress. Appearance: Normal appearance. HENT:      Head: Normocephalic and atraumatic. Right Ear: Tympanic membrane, ear canal and external ear normal.      Left Ear: Tympanic membrane, ear canal and external ear normal.      Mouth/Throat:      Mouth: Mucous membranes are moist.   Eyes:      Extraocular Movements: Extraocular movements intact. Conjunctiva/sclera: Conjunctivae normal.      Pupils: Pupils are equal, round, and reactive to light. Neck:      Vascular: No carotid bruit. Cardiovascular:      Rate and Rhythm: Normal rate and regular rhythm. Heart sounds: Normal heart sounds. Pulmonary:      Effort: Pulmonary effort is normal.      Breath sounds: Normal breath sounds. Abdominal:      General: Bowel sounds are normal.      Palpations: Abdomen is soft. Musculoskeletal:      Cervical back: Neck supple. No rigidity or tenderness. Right lower le+ Pitting Edema present. Left lower le+ Pitting Edema present. Lymphadenopathy:      Cervical: No cervical adenopathy. Skin:     General: Skin is warm and dry. Findings: Lesion present. Neurological:      General: No focal deficit present. Mental Status: He is alert and oriented to person, place, and time. Psychiatric:         Mood and Affect: Mood normal.         Behavior: Behavior normal.         Thought Content: Thought content normal.         Judgment: Judgment normal.       On this date 11/21/2022 I have spent 32 minutes reviewing previous notes, lab/imaging results and face to face with the patient discussing the diagnoses and importance of compliance with the treatment plan, as well as documenting on the day of the visit.         Cuco Gould DO  [facility]

## 2022-11-26 PROBLEM — L81.9 ATYPICAL PIGMENTED SKIN LESION: Status: ACTIVE | Noted: 2022-11-26

## 2022-11-26 PROBLEM — Z51.81 ENCOUNTER FOR MONITORING ALLOPURINOL THERAPY: Status: ACTIVE | Noted: 2022-11-26

## 2022-11-26 PROBLEM — Z12.5 SCREENING PSA (PROSTATE SPECIFIC ANTIGEN): Status: ACTIVE | Noted: 2022-11-26

## 2022-11-26 PROBLEM — Z79.01 ANTICOAGULATED: Status: ACTIVE | Noted: 2022-11-26

## 2022-11-26 PROBLEM — Z79.899 ENCOUNTER FOR MONITORING ALLOPURINOL THERAPY: Status: ACTIVE | Noted: 2022-11-26

## 2022-12-01 DIAGNOSIS — Z95.0 PRESENCE OF CARDIAC PACEMAKER: ICD-10-CM

## 2022-12-01 DIAGNOSIS — I50.22 CHRONIC SYSTOLIC (CONGESTIVE) HEART FAILURE (HCC): Primary | ICD-10-CM

## 2022-12-01 DIAGNOSIS — I50.22 CHRONIC SYSTOLIC (CONGESTIVE) HEART FAILURE (HCC): ICD-10-CM

## 2022-12-01 DIAGNOSIS — I44.2 ATRIOVENTRICULAR BLOCK, COMPLETE (HCC): ICD-10-CM

## 2022-12-04 DIAGNOSIS — M10.072 ACUTE IDIOPATHIC GOUT OF LEFT ANKLE: ICD-10-CM

## 2022-12-05 ENCOUNTER — HOSPITAL ENCOUNTER (OUTPATIENT)
Dept: MRI IMAGING | Age: 67
Discharge: HOME | End: 2022-12-08
Payer: MEDICARE

## 2022-12-05 DIAGNOSIS — M79.672 LEFT FOOT PAIN: ICD-10-CM

## 2022-12-05 DIAGNOSIS — M76.62 ACHILLES BURSITIS OF LEFT LOWER EXTREMITY: ICD-10-CM

## 2022-12-05 PROCEDURE — 73721 MRI JNT OF LWR EXTRE W/O DYE: CPT

## 2022-12-05 RX ORDER — COLCHICINE 0.6 MG/1
TABLET ORAL
Qty: 30 TABLET | Refills: 1 | OUTPATIENT
Start: 2022-12-05

## 2022-12-09 ENCOUNTER — OFFICE VISIT (OUTPATIENT)
Dept: ORTHOPEDIC SURGERY | Age: 67
End: 2022-12-09
Payer: MEDICARE

## 2022-12-09 DIAGNOSIS — M76.62 ACHILLES BURSITIS OF LEFT LOWER EXTREMITY: Primary | ICD-10-CM

## 2022-12-09 PROCEDURE — 1123F ACP DISCUSS/DSCN MKR DOCD: CPT | Performed by: ORTHOPAEDIC SURGERY

## 2022-12-09 PROCEDURE — 99215 OFFICE O/P EST HI 40 MIN: CPT | Performed by: ORTHOPAEDIC SURGERY

## 2022-12-09 NOTE — PROGRESS NOTES
Name: Óscar Miller  YOB: 1955  Gender: male  MRN: [id number]    Summary: Left insertion achilles tendonitis     Proceed with left Sarabjit and left posterior ankle arthroscopy with debridement of retrocalcaneal space    He will need cardiac clearance    Supine-popliteal saphenous block       CC: left Heel pain     HPI: Óscar Miller is a 75 y.o. male who presents with increasing posterior ankle pain located at the Achilles tendon region. He states in the beginning of September he was walking around and start developing pain in the back of the Achilles. They do not remember any acute event that started. The pain is gotten worse over the past 3 months. He has been treated with prednisone, and a boot with heel lifts. He was initially seen at the [place]. There is a burning tearing sensation. He can no longer ambulate. He did physical therapy for several visits but it was too expensive and he was not getting any better. [facility] Dick Faith who has been treating him. October 26 Dr. Mcnamara Math placed into a cam walker boot, given topical ointments, and diet into physical therapy. He still continues to have pain despite therapy, topical creams, and anti-inflammatory topical solutions. He also has not responded well to the boot. An MRI was ordered which showed pathology he was referred to me discussed Achilles tendon intervention. His wife is with him today. She states he is pretty much bedbound he cannot get out of bed. The pain in the back of his Achilles is significant and he cannot do what he needs to do. There want to consider surgery    He has diabetic neuropathy, type 2 diabetes, end-stage renal disease and a pacemaker. ROS/Meds/PSH/PMH/FH/SH: I personally reviewed the patients standard intake form. Below are the pertinents    Tobacco:  reports that he quit smoking about 47 years ago. His smoking use included cigarettes.  He has a 0.25 pack-year smoking history. He has quit using smokeless tobacco.  Diabetes: Diabetic - Insulin dependent  Past Medical History:   Diagnosis Date    Acute pancreatitis 01/2022    Albuminuria     Allergic rhinitis     Asthma     Atrial fibrillation (HCC)     Benign essential hypertension     Calculous cholecystitis 03/19/2014    Chronic pain     COVID-19 08/11/2020    COVID-19 07/2022    Edema     Gastroesophageal reflux disease     Gout     Hepatic steatosis     Hypercholesteremia     Hypertriglyceridemia     Iron deficiency anemia     Normochromic normocytic anemia 03/09/2020    Last Assessment & Plan: This is likely hypoproliferative due to his longstanding chronic kidney disease, diabetes, and other comorbidities. He can continue oral iron but this may not be necessary. He does have microcytic indices but this appears unchanged for the last 6 years. He really has no GI complaints. Obstructive sleep apnea on CPAP     Osteoarthritis     Peptic ulcer disease     Sensory neuropathy     Type 2 diabetes mellitus with stage 3a chronic kidney disease, with long-term current use of insulin (HCC)     Vitamin D deficiency      Hemoglobin A1C   Date Value Ref Range Status   11/21/2022 7.1 (H) 4.8 - 5.6 % Final         Physical Examination:  left Lower Extremity: FROM actively of toes, foot, ankle, knee and hip. No instability of foot or ankle with drawer and stress. 5/5 strength to TA/EHL/GSC/Peroneals/PTib. No skin lesions, Non TTP throughout. 2+ dp pulse w/ cap refill of 5 toes <2s. Dorsiflexion of the ankle produces pain at the Achilles insertion. Hind foot alignment mild varus. There is some edema, mild erythema, and TTP at the Achilles at the same point of tenderness. + silverskoid. Achilles has no defects but it is painful. Gastroc contracture noted. Large palpable bursa noted around the Achilles insertion  Cavovarus foot posture when standing.    Talar tilt exam : normal  Anterior drawer exam w/ ankle plantarflexed at 20 deg: normal    Neuro:  normal SILT to s/s/sp/dp/t. Reflexes normal: 1+ patella reflex bilaterally, 1+ achilles reflex bilaterally, negative babinski bilaterally. no signs of hyper reflexia or absent reflex and decreased SILT in entire foot. [place] Monofilament 5.07 exam normal however    Vascular: BLE: 2+ DP pulse, toes wwp w/ BCR<2s      Imaging:   Reviewed imaging ordered by my partner:    X-rays from 10/5/2022 of the left foot reveal calcific Achilles insertional tendinitis with no signs of acute fractures. MRI Result (most recent):  MRI ANKLE LEFT WO CONTRAST 12/05/2022    Narrative  MRI ANKLE LEFT WO CONTRAST    Indication: Achilles tendinitis, left leg; Pain in left foot. Comparison: Radiographs performed October 26, 2022. Technique: Noncontrast multiplanar, multiecho imaging of the left ankle was  performed, including T1-weighted and fluid sensitive sequences. FINDINGS:    LIGAMENTS:  Anterior tibiofibular ligament: Grossly intact. Posterior tibiofibular ligament: Grossly intact. Anterior talofibular ligament: Not clearly delineated. Irregularity of the  anterior margin of the lateral malleolus suggesting sequela of prior  tear/avulsion. Calcaneofibular ligament: Intact. Posterior talofibular ligament: Intact. Deltoid ligament: Intact. Spring ligament: Attenuated, but intact. TENDONS:  Achilles: Thickening of the visualized Achilles tendon with increased  intrasubstance signal/partial tearing at the level of the superior margin of the  calcaneus. Additionally, partial tearing at the calcaneal attachment. Associated  calcaneal enthesopathic changes. Marked bone marrow edema like signal noted  throughout the posterior calcaneus. Peroneus longus and brevis: Peroneus longus tendinosis with mild tenosynovial  fluid. The peroneus brevis tendon is intact.   Posterior tibialis, flexor digitorum longus, and flexor hallucis longus: Intact  with mild tenosynovium fluid surrounding the posterior tibialis tendon. Anterior extensor tendons: Focal fluid within the region of the extensor  digitorum longus at the level of the navicular bone measuring up to 2.1 cm,  likely ganglion cyst. The extensor tendons are otherwise normal.    OTHER:  Sinus tarsi: Infiltration of the anatomic fat with mild edema. Tarsal tunnel: No mass. Plantar fascia: Plantar calcaneal these hepatic changes with thickening and  increased intrasubstance signal within the central band of the plantar fascia. Marrow and cartilage: Subchondral reactive changes are noted at the lateral  talar shoulder suggesting full-thickness chondral fissuring. Mild diffuse  chondral thinning and surface irregularity of the tibiotalar joint with mild  marginal osteophyte formation. More pronounced degenerative changes noted at the  medial gutter with subchondral marrow reactive changes. Mild degenerative change  of the subtalar joint. Moderate to advanced degenerative change at the  calcaneocuboid joint with subchondral cystic change with surrounding subchondral  marrow reactive changes. Moderate midfoot arthrosis with multifocal areas of  subchondral cystic and reactive changes at the TMT joints and navicular  cuneiform articulations. Dorsal predominant osteophyte formation at the  talonavicular joint and throughout the mid foot. No fracture. Calcaneal bone  marrow edema like signal, as described above. Other soft tissues: No significant joint effusion. Circumferential soft tissue  edema about the ankle. Diffuse atrophy and fatty infiltration as well as diffuse  myofascial edema. Impression  1. Severe distal Achilles tendinosis with intermediate grade partial at the  calcaneal attachment. Marked marrow reactive changes at the posterior calcaneus. 2.  Scattered degenerative changes, as above, most pronounced within the mid  foot.   3.  Mild peroneus longus tendinosis and possible mild tenosynovitis of the  posterior tibialis tendon. 4.  Probable ganglion cyst associated with the extensor digitorum longus tendon  measuring up to 2.1 cm.  5.  Circumferential soft tissue edema, nonspecific. 6.  Diffuse myofascial edema, likely neurogenic. Infectious etiology not  excluded by imaging. 7.  Edema and infiltration of the anatomic fat within the sinus tarsi, which may  be reactive. Correlate for signs/symptoms of sinus tarsi syndrome. I independently reviewed this MRI. In correlation with his clinical exam I am focusing on the Achilles tendon of this MRI. The rest of the findings are incidental.  The MRI shows obvious calcaneal edema with undersurface tearing of the Achilles insertion of the calcaneus. Significant amount of retrocalcaneal bursa inflammation noted as well. Assessment:   insertion Achilles Tendonitis    Plan:       Treatment at this time: Elective major surgery with procedural risk factors  Studies ordered: NO XR needed @ Next Visit    Weight-bearing status: WBAT        Return to work/work restrictions: none  No medications given    We discussed nonoperative treatment to be bracing, injections, and continued physical therapy and booting. After discussion with him he is not pleased with this idea. He is already attempted 3 months of treatment in the past 6 weeks he has been in a boot. He is not improving at all and only getting worse. He wants to proceed with a surgical discussion    With his significant medical comorbidities I do not think secondary Achilles reconstruction or any type of large incision is appropriate. I expressed the risk and benefits of surgery to him. I specifically went over his wound healing issues, diabetes and risk for infections. I would discuss surgery with him to include arthroscopic debridement of the Andrzej's and undersurface of the Achilles along with a Sarabjit gastroc lengthening.   I explained risk of non healing wounds, chronic wounds, infection, continued heel pain, achilles rupture, damage to the sural nerve, damage to the tibial nerve. we discussed the post operative protocol and how it can take up to a year to recover from this surgery. I explained how these could be life long problems. I also explained damage to the posterior tibial artery and nerve. I explained the potential need for FHL transfer and how this affects the ability to flex the great toe. I also explained the risk of popliteal saphenous nerve block with anesthesia and how this carries risk of long term neuralgia. I discussed diabetes with the patient. Diabetes is a direct risk factor for infection, wound healing difficulties and potential failure of surgery. We discussed how it is a pro inflammatory condition that directly counteracts healing. It is my recommendation that strict blood glucose levels be maintained. I recommend a HbA1C <8, and to even below 7 if possible. Currently they are on insulin. Their diabetes is not well controlled. I discussed his end-stage renal disease and how this does affect circulation. I discussed his prior smoking and how it affects circulation. I went through the how former smokers have about a 7 times higher chance of infections and complications even if it quit smoking they still have a higher chance of infection.

## 2022-12-14 ENCOUNTER — OFFICE VISIT (OUTPATIENT)
Dept: ENDOCRINOLOGY | Age: 67
End: 2022-12-14
Payer: MEDICARE

## 2022-12-14 VITALS
DIASTOLIC BLOOD PRESSURE: 80 MMHG | OXYGEN SATURATION: 94 % | HEART RATE: 73 BPM | BODY MASS INDEX: 47.56 KG/M2 | SYSTOLIC BLOOD PRESSURE: 124 MMHG | WEIGHT: 315 LBS

## 2022-12-14 DIAGNOSIS — I10 ESSENTIAL HYPERTENSION: ICD-10-CM

## 2022-12-14 DIAGNOSIS — E78.00 HYPERCHOLESTEROLEMIA: ICD-10-CM

## 2022-12-14 DIAGNOSIS — E11.65 TYPE 2 DIABETES MELLITUS WITH HYPERGLYCEMIA, WITH LONG-TERM CURRENT USE OF INSULIN (HCC): Primary | ICD-10-CM

## 2022-12-14 DIAGNOSIS — Z79.4 TYPE 2 DIABETES MELLITUS WITH HYPERGLYCEMIA, WITH LONG-TERM CURRENT USE OF INSULIN (HCC): Primary | ICD-10-CM

## 2022-12-14 DIAGNOSIS — R80.9 ALBUMINURIA: ICD-10-CM

## 2022-12-14 DIAGNOSIS — N18.31 STAGE 3A CHRONIC KIDNEY DISEASE (HCC): ICD-10-CM

## 2022-12-14 PROBLEM — M25.372 LEFT ANKLE INSTABILITY: Status: ACTIVE | Noted: 2022-12-14

## 2022-12-14 PROCEDURE — 3078F DIAST BP <80 MM HG: CPT | Performed by: INTERNAL MEDICINE

## 2022-12-14 PROCEDURE — 3051F HG A1C>EQUAL 7.0%<8.0%: CPT | Performed by: INTERNAL MEDICINE

## 2022-12-14 PROCEDURE — 1123F ACP DISCUSS/DSCN MKR DOCD: CPT | Performed by: INTERNAL MEDICINE

## 2022-12-14 PROCEDURE — 3074F SYST BP LT 130 MM HG: CPT | Performed by: INTERNAL MEDICINE

## 2022-12-14 PROCEDURE — 99214 OFFICE O/P EST MOD 30 MIN: CPT | Performed by: INTERNAL MEDICINE

## 2022-12-14 RX ORDER — ATORVASTATIN CALCIUM 10 MG/1
10 TABLET, FILM COATED ORAL DAILY
Qty: 90 TABLET | Refills: 3
Start: 2022-12-14

## 2022-12-14 RX ORDER — PIOGLITAZONEHYDROCHLORIDE 30 MG/1
15 TABLET ORAL DAILY
Qty: 45 TABLET | Refills: 3
Start: 2022-12-14

## 2022-12-14 RX ORDER — SEMAGLUTIDE 1.34 MG/ML
1 INJECTION, SOLUTION SUBCUTANEOUS WEEKLY
Qty: 9 ML | Refills: 3
Start: 2022-12-14

## 2022-12-14 RX ORDER — INSULIN GLARGINE 100 [IU]/ML
54 INJECTION, SOLUTION SUBCUTANEOUS 2 TIMES DAILY
Qty: 90 ML | Refills: 3
Start: 2022-12-14

## 2022-12-14 RX ORDER — AMLODIPINE BESYLATE 5 MG/1
5 TABLET ORAL DAILY
Qty: 90 TABLET | Refills: 3
Start: 2022-12-14

## 2022-12-14 RX ORDER — CARVEDILOL 12.5 MG/1
12.5 TABLET ORAL 2 TIMES DAILY WITH MEALS
Qty: 180 TABLET | Refills: 3
Start: 2022-12-14

## 2022-12-14 NOTE — PROGRESS NOTES
3a chronic kidney disease ([facility] [address]    5. Albuminuria  He has a history of angioedema with both ACE inhibitors and angiotensin receptor blockers. Intensive control of blood pressure is paramount. Follow-up and Dispositions    Return in about 2 months (around 2/14/2023) for as scheduled. History of Present Illness:    DIABETES MELLITUS  Todd Krishnan is here for follow-up of type 2 diabetes mellitus. He is currently treated with Ozempic 1 mg weekly, Lantus 54 units BID, Actos 15 mg daily, and metformin 500 mg BID. His provider at the [address] [place] [address] recently treated him with steroids for a gout flare (which he did not have). This resulted in very high blood glucose readings. He was not provided with any guidance about how to avoid hyperglycemia while on steroids. Date of diagnosis: type 2 diabetes mellitus in ~2005     Diet:  No specific diet     Exercise:  no regular exercise     Diabetes education: The patient has received formal diabetes education (March and April 2018). Home blood glucose monitoring frequency: 2 times per day     Blood glucose: by review of meter download              fasting 80s-140s (mostly 110s to 120s)              AC lunch not checked               AC supper not checked              Bedtime 110s-170s (mostly 110s to 150s)              30-day average 131     Hypoglycemia: rare. He had a single episode of severe hypoglycemia (BG 35, fasting, October 2021). Other than that, lowest recent BG has been in the high 70s. Hemoglobin A1c:  4/23/2015: 7.2%. 7/15/2015: 7.2%. 4/13/2016: 8.0%. 7/18/2016: 8.3%. 8/17/2016:  8.2%. 10/13/2016: 7.5%. 12/30/2016: 7.4%. 5/5/2017:  7.0%. 9/15/2017: 6.7%. 2/20/2018: 8.4%.  6/29/2018: 7.0%. 9/6/2018: 8.2%.  3/29/2019: 7.8%. 8/23/2019: 7.5%. 11/6/2020: 7.0%. 3/9/2021: 6.7%. 6/7/2021: 6.5%. 9/10/2021: 6.3%.  3/11/2022: 6.4%.  6/20/2022: 6.6%. 11/21/2022: 7.1%.      Diabetic complications:                Retinopathy: Last eye exam was 3/23/2022 and demonstrated no diabetic retinopathy. Eye care specialist is Lizabeth Anthony [facility]). Albuminuria/nephropathy:                          4/22/2016: Urine microalbumin 45.2.                          8/17/2016:  Urine microlbumin 291 (+), serum creatinine 1.3.                          4/25/2017: Urine microalbumin to creatinine ratio 100.7 (+).                          5/5/2017:  Serum creatinine 1.7 (GFR 42%). 2/20/2018: Urine microalbumin to creatinine ratio 148.7 (+), serum creatinine 1.51 (GFR 48%). 6/18/2018: Serum creatinine 1.52 (GFR 48%). 11/9/2018: Serum creatinine 1.28 (GFR 59%). 5/10/2019: Urine microalbumin creatinine ratio 85.9 (+).                          8/23/2019: Urine microalbumin 43.00 (+), serum creatinine 1.80 (GFR 38%). 8/20/2020: Serum creatinine 1.55 (GFR 48%). 3/9/2021: Urine microalbumin to creatinine ration 152 (+), serum creatinine 1.25.                          2/16/2022: Serum creatinine 1.28.    6/20/2022: Urine microalbumin to creatinine ratio 96 (+), serum creatinine 1.60 (GFR 46%). Neuropathy:  numbness, tingling, and allodynia of hands and feet     Other pertinent labs:              Lipids:                          10/13/2016: Total cholesterol 115, triglycerides 276, HDL 24, LDL 36.                          4/25/2017: Total cholesterol 100, triglycerides 245, HDL 21, LDL 30.                          2/20/2018: Total cholesterol 111, triglycerides 432, HDL 22, LDL cannot be calculated secondary to high triglycerides. 5/4/2018: Total cholesterol 119, triglycerides 247, HDL 26, LDL 44.                          5/10/2019: Total cholesterol 99, triglycerides 221, HDL 25, LDL 30.                          8/23/2019:  Total cholesterol 106, triglycerides 252, HDL 23, LDL 33.                          3/9/2021: Total cholesterol 120, triglycerides 187, HDL 29, LDL 60.                          6/7/2021: Total cholesterol 116, triglycerides 165, HDL 27, LDL 56.    6/20/2020: Total cholesterol 117, triglycerides 222, HDL 30, LDL 42.6. Thyroid:                          8/23/2019: TSH 1.44.                          3/9/2021: TSH 2.240. Review of Systems   Constitutional:  Negative for fatigue and unexpected weight change. Eyes:  Negative for visual disturbance. /80 (Site: Left Upper Arm, Position: Sitting)   Pulse 73   Wt (!) 341 lb (154.7 kg)   SpO2 94%   BMI 47.56 kg/m²   Wt Readings from Last 3 Encounters:   12/14/22 (!) 341 lb (154.7 kg)   11/21/22 (!) 330 lb (149.7 kg)   12/05/22 (!) 330 lb (149.7 kg)       Physical Exam  Constitutional:       Appearance: Normal appearance. HENT:      Head: Normocephalic. Neck:      Thyroid: No thyroid mass or thyromegaly. Cardiovascular:      Rate and Rhythm: Normal rate and regular rhythm. Pulmonary:      Effort: Pulmonary effort is normal.      Breath sounds: Normal breath sounds. Neurological:      Mental Status: He is alert. Psychiatric:         Mood and Affect: Mood normal.         Behavior: Behavior normal.       No orders of the defined types were placed in this encounter.       Current Outpatient Medications   Medication Sig Dispense Refill    LANTUS SOLOSTAR 100 UNIT/ML injection pen Inject 54 Units into the skin 2 times daily 90 mL 3    OZEMPIC, 1 MG/DOSE, 4 MG/3ML SOPN Inject 1 mg into the skin once a week 9 mL 3    metFORMIN (GLUCOPHAGE) 500 MG tablet Take 1 tablet by mouth 2 times daily (with meals) 180 tablet 3    pioglitazone (ACTOS) 30 MG tablet Take 0.5 tablets by mouth daily 45 tablet 3    atorvastatin (LIPITOR) 10 MG tablet Take 1 tablet by mouth daily 90 tablet 3    carvedilol (COREG) 12.5 MG tablet Take 1 tablet by mouth 2 times daily (with meals) 180 tablet 3    amLODIPine (NORVASC) 5 MG tablet Take 1 tablet by mouth daily 90 tablet 3    TURMERIC CURCUMIN PO Take by mouth daily      KLOR-CON 10 10 MEQ extended release tablet TAKE ONE TABLET BY MOUTH TWICE A  tablet 3    Lidocaine 5 % CREA Topical 5% Lidocaine and 5% Neurontin to apply to affected area up to 4 times/day as needed for pain 45 g 2    colchicine (COLCRYS) 0.6 MG tablet Take 1 tablet by mouth daily (Patient taking differently: Take 0.6 mg by mouth as needed) 30 tablet 1    torsemide (DEMADEX) 20 MG tablet TAKE ONE TABLET BY MOUTH TWICE A  tablet 3    acetaminophen (TYLENOL) 650 MG extended release tablet Take 650 mg by mouth every 6 hours as needed      albuterol sulfate  (90 Base) MCG/ACT inhaler Inhale into the lungs      albuterol (PROVENTIL) (2.5 MG/3ML) 0.083% nebulizer solution Inhale 2.5 mg into the lungs every 6 hours as needed      allopurinol (ZYLOPRIM) 300 MG tablet Take 300 mg by mouth daily      ammonium lactate (LAC-HYDRIN) 12 % lotion Apply topically as needed      apixaban (ELIQUIS) 5 MG TABS tablet Take 5 mg by mouth 2 times daily      ascorbic acid (VITAMIN C) 500 MG tablet Take by mouth      Camphor-Menthol-Methyl Sal 3.1-6-10 % PTCH Apply topically      diclofenac sodium (VOLTAREN) 1 % GEL Apply 4 g topically 4 times daily      ferrous sulfate (IRON 325) 325 (65 Fe) MG tablet Take by mouth as needed      fluticasone (FLONASE) 50 MCG/ACT nasal spray 2 sprays by Nasal route daily      fluticasone-salmeterol (ADVAIR) 500-50 MCG/ACT AEPB diskus inhaler Inhale 1 puff into the lungs every 12 hours      loratadine (CLARITIN) 10 MG tablet Take 10 mg by mouth daily      methocarbamol (ROBAXIN) 500 MG tablet Take 500 mg by mouth 4 times daily as needed (Pain)      omeprazole (PRILOSEC) 20 MG delayed release capsule Take 20 mg by mouth      ondansetron (ZOFRAN-ODT) 4 MG disintegrating tablet Take 4 mg by mouth every 8 hours as needed      potassium chloride (KLOR-CON M) 10 MEQ extended release tablet Take 10 mEq by mouth 2 times daily      tiotropium (SPIRIVA RESPIMAT) 2.5 MCG/ACT AERS inhaler Inhale 2 puffs into the lungs daily      trolamine salicylate (ASPER-FLEX) 10 % cream Apply topically as needed       No current facility-administered medications for this visit. Felipe Webb MD, FACE      Portions of this note were generated with the assistance of voice recognition software. As such, some errors in transcription may be present.

## 2022-12-26 PROBLEM — Z12.5 SCREENING PSA (PROSTATE SPECIFIC ANTIGEN): Status: RESOLVED | Noted: 2022-11-26 | Resolved: 2022-12-26

## 2023-01-03 ENCOUNTER — OFFICE VISIT (OUTPATIENT)
Dept: ORTHOPEDIC SURGERY | Age: 68
End: 2023-01-03

## 2023-01-03 DIAGNOSIS — M76.62 ACHILLES BURSITIS OF LEFT LOWER EXTREMITY: Primary | ICD-10-CM

## 2023-01-03 NOTE — PROGRESS NOTES
Name: Reg Dawson  YOB: 1955  Gender: male  MRN: [id number]    Summary: Left insertion achilles tendonitis     Proceed with left Sarabjit and left posterior ankle arthroscopy with debridement of retrocalcaneal space    He will need cardiac clearance by 1/11    Supine- popliteal saphenous block       CC: left Heel pain     HPI: Reg Dawson is a 75 y.o. male who presents with increasing posterior ankle pain located at the Achilles tendon region. He states in the beginning of September he was walking around and start developing pain in the back of the Achilles. They do not remember any acute event that started. The pain is gotten worse over the past 3 months. He has been treated with prednisone, and a boot with heel lifts. He was initially seen at the [address] [place] [address]. There is a burning tearing sensation. He can no longer ambulate. He did physical therapy for several visits but it was too expensive and he was not getting any better. [facility] Ruma Kingston who has been treating him. October 26 Dr. William Gregg placed into a cam walker boot, given topical ointments, and diet into physical therapy. He still continues to have pain despite therapy, topical creams, and anti-inflammatory topical solutions. He also has not responded well to the boot. An MRI was ordered which showed pathology he was referred to me discussed Achilles tendon intervention. His wife is with him today. She states he is pretty much bedbound he cannot get out of bed. The pain in the back of his Achilles is significant and he cannot do what he needs to do. There want to consider surgery    He has diabetic neuropathy, type 2 diabetes, end-stage renal disease and a pacemaker. ROS/Meds/PSH/PMH/FH/SH: I personally reviewed the patients standard intake form. Below are the pertinents    Tobacco:  reports that he quit smoking about 48 years ago. His smoking use included cigarettes. He has a 0.25 pack-year smoking history. He has quit using smokeless tobacco.  Diabetes: Diabetic - Insulin dependent  Past Medical History:   Diagnosis Date    Acute pancreatitis 01/2022    Albuminuria     Allergic rhinitis     Asthma     Atrial fibrillation (HCC)     Benign essential hypertension     Calculous cholecystitis 03/19/2014    Chronic pain     COVID-19 08/11/2020    COVID-19 07/2022    Edema     Gastroesophageal reflux disease     Gout     Hepatic steatosis     Hypercholesteremia     Hypertriglyceridemia     Iron deficiency anemia     Normochromic normocytic anemia 03/09/2020    Last Assessment & Plan: This is likely hypoproliferative due to his longstanding chronic kidney disease, diabetes, and other comorbidities. He can continue oral iron but this may not be necessary. He does have microcytic indices but this appears unchanged for the last 6 years. He really has no GI complaints. Obstructive sleep apnea on CPAP     Osteoarthritis     Peptic ulcer disease     Sensory neuropathy     Type 2 diabetes mellitus with stage 3a chronic kidney disease, with long-term current use of insulin (HCC)     Vitamin D deficiency      Hemoglobin A1C   Date Value Ref Range Status   11/21/2022 7.1 (H) 4.8 - 5.6 % Final         Physical Examination:  left Lower Extremity: FROM actively of toes, foot, ankle, knee and hip. No instability of foot or ankle with drawer and stress. 5/5 strength to TA/EHL/GSC/Peroneals/PTib. No skin lesions, Non TTP throughout. 2+ dp pulse w/ cap refill of 5 toes <2s. Dorsiflexion of the ankle produces pain at the Achilles insertion. Hind foot alignment mild varus. There is some edema, mild erythema, and TTP at the Achilles at the same point of tenderness. + silverskoid. Achilles has no defects but it is painful. Gastroc contracture noted. Large palpable bursa noted around the Achilles insertion  Cavovarus foot posture when standing.    Talar tilt exam : normal  Anterior drawer exam w/ ankle plantarflexed at 20 deg: normal    Neuro:  normal SILT to s/s/sp/dp/t. Reflexes normal: 1+ patella reflex bilaterally, 1+ achilles reflex bilaterally, negative babinski bilaterally. no signs of hyper reflexia or absent reflex and decreased SILT in entire foot. [place] Monofilament 5.07 exam normal however    Vascular: BLE: 2+ DP pulse, toes wwp w/ BCR<2s      Imaging:   Reviewed imaging ordered by my partner:    X-rays from 10/5/2022 of the left foot reveal calcific Achilles insertional tendinitis with no signs of acute fractures. MRI Result (most recent):  MRI ANKLE LEFT WO CONTRAST 12/05/2022    Narrative  MRI ANKLE LEFT WO CONTRAST    Indication: Achilles tendinitis, left leg; Pain in left foot. Comparison: Radiographs performed October 26, 2022. Technique: Noncontrast multiplanar, multiecho imaging of the left ankle was  performed, including T1-weighted and fluid sensitive sequences. FINDINGS:    LIGAMENTS:  Anterior tibiofibular ligament: Grossly intact. Posterior tibiofibular ligament: Grossly intact. Anterior talofibular ligament: Not clearly delineated. Irregularity of the  anterior margin of the lateral malleolus suggesting sequela of prior  tear/avulsion. Calcaneofibular ligament: Intact. Posterior talofibular ligament: Intact. Deltoid ligament: Intact. Spring ligament: Attenuated, but intact. TENDONS:  Achilles: Thickening of the visualized Achilles tendon with increased  intrasubstance signal/partial tearing at the level of the superior margin of the  calcaneus. Additionally, partial tearing at the calcaneal attachment. Associated  calcaneal enthesopathic changes. Marked bone marrow edema like signal noted  throughout the posterior calcaneus. Peroneus longus and brevis: Peroneus longus tendinosis with mild tenosynovial  fluid. The peroneus brevis tendon is intact.   Posterior tibialis, flexor digitorum longus, and flexor hallucis longus: Intact  with mild tenosynovium fluid surrounding the posterior tibialis tendon. Anterior extensor tendons: Focal fluid within the region of the extensor  digitorum longus at the level of the navicular bone measuring up to 2.1 cm,  likely ganglion cyst. The extensor tendons are otherwise normal.    OTHER:  Sinus tarsi: Infiltration of the anatomic fat with mild edema. Tarsal tunnel: No mass. Plantar fascia: Plantar calcaneal these hepatic changes with thickening and  increased intrasubstance signal within the central band of the plantar fascia. Marrow and cartilage: Subchondral reactive changes are noted at the lateral  talar shoulder suggesting full-thickness chondral fissuring. Mild diffuse  chondral thinning and surface irregularity of the tibiotalar joint with mild  marginal osteophyte formation. More pronounced degenerative changes noted at the  medial gutter with subchondral marrow reactive changes. Mild degenerative change  of the subtalar joint. Moderate to advanced degenerative change at the  calcaneocuboid joint with subchondral cystic change with surrounding subchondral  marrow reactive changes. Moderate midfoot arthrosis with multifocal areas of  subchondral cystic and reactive changes at the TMT joints and navicular  cuneiform articulations. Dorsal predominant osteophyte formation at the  talonavicular joint and throughout the mid foot. No fracture. Calcaneal bone  marrow edema like signal, as described above. Other soft tissues: No significant joint effusion. Circumferential soft tissue  edema about the ankle. Diffuse atrophy and fatty infiltration as well as diffuse  myofascial edema. Impression  1. Severe distal Achilles tendinosis with intermediate grade partial at the  calcaneal attachment. Marked marrow reactive changes at the posterior calcaneus. 2.  Scattered degenerative changes, as above, most pronounced within the mid  foot.   3.  Mild peroneus longus tendinosis and possible mild tenosynovitis of the  posterior tibialis tendon. 4.  Probable ganglion cyst associated with the extensor digitorum longus tendon  measuring up to 2.1 cm.  5.  Circumferential soft tissue edema, nonspecific. 6.  Diffuse myofascial edema, likely neurogenic. Infectious etiology not  excluded by imaging. 7.  Edema and infiltration of the anatomic fat within the sinus tarsi, which may  be reactive. Correlate for signs/symptoms of sinus tarsi syndrome. I independently reviewed this MRI. In correlation with his clinical exam I am focusing on the Achilles tendon of this MRI. The rest of the findings are incidental.  The MRI shows obvious calcaneal edema with undersurface tearing of the Achilles insertion of the calcaneus. Significant amount of retrocalcaneal bursa inflammation noted as well. Assessment:   insertion Achilles Tendonitis    Plan:       Treatment at this time: Elective major surgery with procedural risk factors  Studies ordered: NO XR needed @ Next Visit    Weight-bearing status: WBAT        Return to work/work restrictions: none  No medications given    We discussed nonoperative treatment to be bracing, injections, and continued physical therapy and booting. After discussion with him he is not pleased with this idea. He is already attempted 3 months of treatment in the past 6 weeks he has been in a boot. He is not improving at all and only getting worse. He wants to proceed with a surgical discussion    With his significant medical comorbidities I do not think secondary Achilles reconstruction or any type of large incision is appropriate. I expressed the risk and benefits of surgery to him. I specifically went over his wound healing issues, diabetes and risk for infections. I would discuss surgery with him to include arthroscopic debridement of the Andrzej's and undersurface of the Achilles along with a Sarabjit gastroc lengthening.   I explained risk of non healing wounds, chronic wounds, infection, continued heel pain, achilles rupture, damage to the sural nerve, damage to the tibial nerve. we discussed the post operative protocol and how it can take up to a year to recover from this surgery. I explained how these could be life long problems. I also explained damage to the posterior tibial artery and nerve. I explained the potential need for FHL transfer and how this affects the ability to flex the great toe. I also explained the risk of popliteal saphenous nerve block with anesthesia and how this carries risk of long term neuralgia. I discussed diabetes with the patient. Diabetes is a direct risk factor for infection, wound healing difficulties and potential failure of surgery. We discussed how it is a pro inflammatory condition that directly counteracts healing. It is my recommendation that strict blood glucose levels be maintained. I recommend a HbA1C <8, and to even below 7 if possible. Currently they are on insulin. Their diabetes is not well controlled. I discussed his end-stage renal disease and how this does affect circulation. I discussed his prior smoking and how it affects circulation. I went through the how former smokers have about a 7 times higher chance of infections and complications even if it quit smoking they still have a higher chance of infection.

## 2023-01-03 NOTE — H&P (VIEW-ONLY)
Name: Kandy Tai  YOB: 1955  Gender: male  MRN: [id number]    Summary: Left insertion achilles tendonitis     Proceed with left Sarabjit and left posterior ankle arthroscopy with debridement of retrocalcaneal space    He will need cardiac clearance by 1/11    Supine- popliteal saphenous block       CC: left Heel pain     HPI: Kandy Tai is a 75 y.o. male who presents with increasing posterior ankle pain located at the Achilles tendon region. He states in the beginning of September he was walking around and start developing pain in the back of the Achilles. They do not remember any acute event that started. The pain is gotten worse over the past 3 months. He has been treated with prednisone, and a boot with heel lifts. He was initially seen at the [place]. There is a burning tearing sensation. He can no longer ambulate. He did physical therapy for several visits but it was too expensive and he was not getting any better. [facility] Nehemias Neves who has been treating him. October 26 Dr. Piero Mcdowell placed into a cam walker boot, given topical ointments, and diet into physical therapy. He still continues to have pain despite therapy, topical creams, and anti-inflammatory topical solutions. He also has not responded well to the boot. An MRI was ordered which showed pathology he was referred to me discussed Achilles tendon intervention. His wife is with him today. She states he is pretty much bedbound he cannot get out of bed. The pain in the back of his Achilles is significant and he cannot do what he needs to do. There want to consider surgery    He has diabetic neuropathy, type 2 diabetes, end-stage renal disease and a pacemaker. ROS/Meds/PSH/PMH/FH/SH: I personally reviewed the patients standard intake form. Below are the pertinents    Tobacco:  reports that he quit smoking about 48 years ago. His smoking use included cigarettes. He has a 0.25 pack-year smoking history. He has quit using smokeless tobacco.  Diabetes: Diabetic - Insulin dependent  Past Medical History:   Diagnosis Date    Acute pancreatitis 01/2022    Albuminuria     Allergic rhinitis     Asthma     Atrial fibrillation (HCC)     Benign essential hypertension     Calculous cholecystitis 03/19/2014    Chronic pain     COVID-19 08/11/2020    COVID-19 07/2022    Edema     Gastroesophageal reflux disease     Gout     Hepatic steatosis     Hypercholesteremia     Hypertriglyceridemia     Iron deficiency anemia     Normochromic normocytic anemia 03/09/2020    Last Assessment & Plan: This is likely hypoproliferative due to his longstanding chronic kidney disease, diabetes, and other comorbidities. He can continue oral iron but this may not be necessary. He does have microcytic indices but this appears unchanged for the last 6 years. He really has no GI complaints. Obstructive sleep apnea on CPAP     Osteoarthritis     Peptic ulcer disease     Sensory neuropathy     Type 2 diabetes mellitus with stage 3a chronic kidney disease, with long-term current use of insulin (HCC)     Vitamin D deficiency      Hemoglobin A1C   Date Value Ref Range Status   11/21/2022 7.1 (H) 4.8 - 5.6 % Final         Physical Examination:  left Lower Extremity: FROM actively of toes, foot, ankle, knee and hip. No instability of foot or ankle with drawer and stress. 5/5 strength to TA/EHL/GSC/Peroneals/PTib. No skin lesions, Non TTP throughout. 2+ dp pulse w/ cap refill of 5 toes <2s. Dorsiflexion of the ankle produces pain at the Achilles insertion. Hind foot alignment mild varus. There is some edema, mild erythema, and TTP at the Achilles at the same point of tenderness. + silverskoid. Achilles has no defects but it is painful. Gastroc contracture noted. Large palpable bursa noted around the Achilles insertion  Cavovarus foot posture when standing.    Talar tilt exam : normal  Anterior drawer exam w/ ankle plantarflexed at 20 deg: normal    Neuro:  normal SILT to s/s/sp/dp/t. Reflexes normal: 1+ patella reflex bilaterally, 1+ achilles reflex bilaterally, negative babinski bilaterally. no signs of hyper reflexia or absent reflex and decreased SILT in entire foot. [place] Monofilament 5.07 exam normal however    Vascular: BLE: 2+ DP pulse, toes wwp w/ BCR<2s      Imaging:   Reviewed imaging ordered by my partner:    X-rays from 10/5/2022 of the left foot reveal calcific Achilles insertional tendinitis with no signs of acute fractures. MRI Result (most recent):  MRI ANKLE LEFT WO CONTRAST 12/05/2022    Narrative  MRI ANKLE LEFT WO CONTRAST    Indication: Achilles tendinitis, left leg; Pain in left foot. Comparison: Radiographs performed October 26, 2022. Technique: Noncontrast multiplanar, multiecho imaging of the left ankle was  performed, including T1-weighted and fluid sensitive sequences. FINDINGS:    LIGAMENTS:  Anterior tibiofibular ligament: Grossly intact. Posterior tibiofibular ligament: Grossly intact. Anterior talofibular ligament: Not clearly delineated. Irregularity of the  anterior margin of the lateral malleolus suggesting sequela of prior  tear/avulsion. Calcaneofibular ligament: Intact. Posterior talofibular ligament: Intact. Deltoid ligament: Intact. Spring ligament: Attenuated, but intact. TENDONS:  Achilles: Thickening of the visualized Achilles tendon with increased  intrasubstance signal/partial tearing at the level of the superior margin of the  calcaneus. Additionally, partial tearing at the calcaneal attachment. Associated  calcaneal enthesopathic changes. Marked bone marrow edema like signal noted  throughout the posterior calcaneus. Peroneus longus and brevis: Peroneus longus tendinosis with mild tenosynovial  fluid. The peroneus brevis tendon is intact.   Posterior tibialis, flexor digitorum longus, and flexor hallucis longus: Intact  with mild tenosynovium fluid surrounding the posterior tibialis tendon. Anterior extensor tendons: Focal fluid within the region of the extensor  digitorum longus at the level of the navicular bone measuring up to 2.1 cm,  likely ganglion cyst. The extensor tendons are otherwise normal.    OTHER:  Sinus tarsi: Infiltration of the anatomic fat with mild edema. Tarsal tunnel: No mass. Plantar fascia: Plantar calcaneal these hepatic changes with thickening and  increased intrasubstance signal within the central band of the plantar fascia. Marrow and cartilage: Subchondral reactive changes are noted at the lateral  talar shoulder suggesting full-thickness chondral fissuring. Mild diffuse  chondral thinning and surface irregularity of the tibiotalar joint with mild  marginal osteophyte formation. More pronounced degenerative changes noted at the  medial gutter with subchondral marrow reactive changes. Mild degenerative change  of the subtalar joint. Moderate to advanced degenerative change at the  calcaneocuboid joint with subchondral cystic change with surrounding subchondral  marrow reactive changes. Moderate midfoot arthrosis with multifocal areas of  subchondral cystic and reactive changes at the TMT joints and navicular  cuneiform articulations. Dorsal predominant osteophyte formation at the  talonavicular joint and throughout the mid foot. No fracture. Calcaneal bone  marrow edema like signal, as described above. Other soft tissues: No significant joint effusion. Circumferential soft tissue  edema about the ankle. Diffuse atrophy and fatty infiltration as well as diffuse  myofascial edema. Impression  1. Severe distal Achilles tendinosis with intermediate grade partial at the  calcaneal attachment. Marked marrow reactive changes at the posterior calcaneus. 2.  Scattered degenerative changes, as above, most pronounced within the mid  foot.   3.  Mild peroneus longus tendinosis and possible mild tenosynovitis of the  posterior tibialis tendon. 4.  Probable ganglion cyst associated with the extensor digitorum longus tendon  measuring up to 2.1 cm.  5.  Circumferential soft tissue edema, nonspecific. 6.  Diffuse myofascial edema, likely neurogenic. Infectious etiology not  excluded by imaging. 7.  Edema and infiltration of the anatomic fat within the sinus tarsi, which may  be reactive. Correlate for signs/symptoms of sinus tarsi syndrome. I independently reviewed this MRI. In correlation with his clinical exam I am focusing on the Achilles tendon of this MRI. The rest of the findings are incidental.  The MRI shows obvious calcaneal edema with undersurface tearing of the Achilles insertion of the calcaneus. Significant amount of retrocalcaneal bursa inflammation noted as well. Assessment:   insertion Achilles Tendonitis    Plan:       Treatment at this time: Elective major surgery with procedural risk factors  Studies ordered: NO XR needed @ Next Visit    Weight-bearing status: WBAT        Return to work/work restrictions: none  No medications given    We discussed nonoperative treatment to be bracing, injections, and continued physical therapy and booting. After discussion with him he is not pleased with this idea. He is already attempted 3 months of treatment in the past 6 weeks he has been in a boot. He is not improving at all and only getting worse. He wants to proceed with a surgical discussion    With his significant medical comorbidities I do not think secondary Achilles reconstruction or any type of large incision is appropriate. I expressed the risk and benefits of surgery to him. I specifically went over his wound healing issues, diabetes and risk for infections. I would discuss surgery with him to include arthroscopic debridement of the Andrzej's and undersurface of the Achilles along with a Sarabjit gastroc lengthening.   I explained risk of non healing wounds, chronic wounds, infection, continued heel pain, achilles rupture, damage to the sural nerve, damage to the tibial nerve. we discussed the post operative protocol and how it can take up to a year to recover from this surgery. I explained how these could be life long problems. I also explained damage to the posterior tibial artery and nerve. I explained the potential need for FHL transfer and how this affects the ability to flex the great toe. I also explained the risk of popliteal saphenous nerve block with anesthesia and how this carries risk of long term neuralgia. I discussed diabetes with the patient. Diabetes is a direct risk factor for infection, wound healing difficulties and potential failure of surgery. We discussed how it is a pro inflammatory condition that directly counteracts healing. It is my recommendation that strict blood glucose levels be maintained. I recommend a HbA1C <8, and to even below 7 if possible. Currently they are on insulin. Their diabetes is not well controlled. I discussed his end-stage renal disease and how this does affect circulation. I discussed his prior smoking and how it affects circulation. I went through the how former smokers have about a 7 times higher chance of infections and complications even if it quit smoking they still have a higher chance of infection.

## 2023-01-04 ENCOUNTER — CLINICAL DOCUMENTATION (OUTPATIENT)
Dept: ORTHOPEDIC SURGERY | Age: 68
End: 2023-01-04

## 2023-01-04 NOTE — PROGRESS NOTES
Scott Cortez, David Lynn  Avministerio risk, acceptable to proceed. Hold eliquis 2 days before, back on ASAP after.    Thanks

## 2023-01-05 NOTE — PERIOP NOTE
Patient verified name and . Order for consent NOT found in EHR; patient verifies procedure. Type 1B surgery, phone assessment complete. Orders NOT received. Labs per surgeon: unknown; no orders received in EHR at time of assessment. Labs per anesthesia protocol: HGB, K+, Creatinine; to be collected dos. EKG: last completed 10/21/22; result in EHR for reference. Patient followed by [facility]. All recent records and cardiac clearance found in EHR for reference. Patient answered medical/surgical history questions at their best of ability. All prior to admission medications documented in [facility]. Instructed to use 43 units of Lantus the night before and the morning of. Instructed to bring: bi-pap, inhalers, pacemaker id card. Patient instructed to take the following medications the day of surgery according to anesthesia guidelines with a small sip of water: Tylenol, Albuterol neb if needed, Albuterol inhaler if needed, Allopurinol, Amlodipine, 81 mg Aspirin, Carvedilol, Advair inhaler if needed, Claritin, Robaxin, Omeprazole, Spiriva inhaler. On the day before surgery please take Acetaminophen 1000mg in the morning and then again before bed. You may substitute for Tylenol 650 mg. Hold all vitamins 7 days prior to surgery and NSAIDS 5 days prior to surgery. Prescription meds to hold: Diclofenac gel. Instructed to hold Eliquis as instructed by surgeons office. Also instructed to take one 81 mg Aspirin daily through [address] while holding.      Patient instructed on the following:    > Arrive at [facility] Entrance, time of arrival to be called the day before by 1700  > NPO after midnight, unless otherwise indicated, including gum, mints, and ice chips  > Responsible adult must drive patient to the hospital, stay during surgery, and patient will need supervision 24 hours after anesthesia  > Use dial antibacterial soap in shower the night before surgery and on the morning of surgery  > All piercings must be removed prior to arrival.    > Leave all valuables (money and jewelry) at home but bring insurance card and ID on DOS.   > You may be required to pay a deductible or co-pay on the day of your procedure. You can pre-pay by calling [phone] if your surgery is at the [facility] or [phone] if your surgery is at the [facility]. > Do not wear make-up, nail polish, lotions, cologne, perfumes, powders, or oil on skin. Artificial nails are not permitted.

## 2023-01-10 ENCOUNTER — ANESTHESIA EVENT (OUTPATIENT)
Dept: SURGERY | Age: 68
End: 2023-01-10
Payer: MEDICARE

## 2023-01-10 DIAGNOSIS — M76.62 ACHILLES BURSITIS OF LEFT LOWER EXTREMITY: Primary | ICD-10-CM

## 2023-01-10 RX ORDER — OXYCODONE HYDROCHLORIDE AND ACETAMINOPHEN 5; 325 MG/1; MG/1
1 TABLET ORAL EVERY 6 HOURS PRN
Qty: 30 TABLET | Refills: 0 | Status: SHIPPED | OUTPATIENT
Start: 2023-01-10 | End: 2023-01-15

## 2023-01-10 RX ORDER — ONDANSETRON 4 MG/1
4 TABLET, FILM COATED ORAL DAILY PRN
Qty: 30 TABLET | Refills: 0 | Status: SHIPPED | OUTPATIENT
Start: 2023-01-10

## 2023-01-10 RX ORDER — DOCUSATE SODIUM 100 MG/1
100 CAPSULE, LIQUID FILLED ORAL DAILY PRN
Qty: 30 CAPSULE | Refills: 0 | Status: SHIPPED | OUTPATIENT
Start: 2023-01-10

## 2023-01-10 RX ORDER — ASPIRIN 81 MG/1
81 TABLET ORAL 2 TIMES DAILY
Qty: 21 TABLET | Refills: 0 | Status: SHIPPED | OUTPATIENT
Start: 2023-01-10

## 2023-01-10 NOTE — TELEPHONE ENCOUNTER
He is having surgery tomorrow and was under the impression that his meds would be called in today. Can you call and confirm with him?

## 2023-01-11 ENCOUNTER — ANESTHESIA (OUTPATIENT)
Dept: SURGERY | Age: 68
End: 2023-01-11
Payer: MEDICARE

## 2023-01-11 ENCOUNTER — APPOINTMENT (OUTPATIENT)
Dept: GENERAL RADIOLOGY | Age: 68
End: 2023-01-11
Attending: ORTHOPAEDIC SURGERY
Payer: MEDICARE

## 2023-01-11 ENCOUNTER — HOSPITAL ENCOUNTER (OUTPATIENT)
Age: 68
Setting detail: OUTPATIENT SURGERY
Discharge: HOME | End: 2023-01-11
Attending: ORTHOPAEDIC SURGERY | Admitting: ORTHOPAEDIC SURGERY
Payer: MEDICARE

## 2023-01-11 VITALS
TEMPERATURE: 97.5 F | HEIGHT: 71 IN | WEIGHT: 315 LBS | HEART RATE: 69 BPM | RESPIRATION RATE: 16 BRPM | BODY MASS INDEX: 44.1 KG/M2 | SYSTOLIC BLOOD PRESSURE: 111 MMHG | DIASTOLIC BLOOD PRESSURE: 70 MMHG | OXYGEN SATURATION: 95 %

## 2023-01-11 DIAGNOSIS — M25.372 LEFT ANKLE INSTABILITY: ICD-10-CM

## 2023-01-11 LAB
GLUCOSE BLD STRIP.AUTO-MCNC: 105 MG/DL (ref 65–100)
POTASSIUM BLD-SCNC: 3.8 MMOL/L (ref 3.5–5.1)
SERVICE CMNT-IMP: ABNORMAL

## 2023-01-11 PROCEDURE — 2580000003 HC RX 258: Performed by: ANESTHESIOLOGY

## 2023-01-11 PROCEDURE — 6360000002 HC RX W HCPCS: Performed by: ORTHOPAEDIC SURGERY

## 2023-01-11 PROCEDURE — 6360000002 HC RX W HCPCS: Performed by: ANESTHESIOLOGY

## 2023-01-11 PROCEDURE — 7100000010 HC PHASE II RECOVERY - FIRST 15 MIN: Performed by: ORTHOPAEDIC SURGERY

## 2023-01-11 PROCEDURE — 82962 GLUCOSE BLOOD TEST: CPT

## 2023-01-11 PROCEDURE — 2500000003 HC RX 250 WO HCPCS: Performed by: NURSE ANESTHETIST, CERTIFIED REGISTERED

## 2023-01-11 PROCEDURE — 3700000000 HC ANESTHESIA ATTENDED CARE: Performed by: ORTHOPAEDIC SURGERY

## 2023-01-11 PROCEDURE — 64445 NJX AA&/STRD SCIATIC NRV IMG: CPT | Performed by: ANESTHESIOLOGY

## 2023-01-11 PROCEDURE — 3700000001 HC ADD 15 MINUTES (ANESTHESIA): Performed by: ORTHOPAEDIC SURGERY

## 2023-01-11 PROCEDURE — 3600000004 HC SURGERY LEVEL 4 BASE: Performed by: ORTHOPAEDIC SURGERY

## 2023-01-11 PROCEDURE — 7100000000 HC PACU RECOVERY - FIRST 15 MIN: Performed by: ORTHOPAEDIC SURGERY

## 2023-01-11 PROCEDURE — 3600000014 HC SURGERY LEVEL 4 ADDTL 15MIN: Performed by: ORTHOPAEDIC SURGERY

## 2023-01-11 PROCEDURE — 7100000011 HC PHASE II RECOVERY - ADDTL 15 MIN: Performed by: ORTHOPAEDIC SURGERY

## 2023-01-11 PROCEDURE — 2709999900 HC NON-CHARGEABLE SUPPLY: Performed by: ORTHOPAEDIC SURGERY

## 2023-01-11 PROCEDURE — 6360000002 HC RX W HCPCS: Performed by: NURSE ANESTHETIST, CERTIFIED REGISTERED

## 2023-01-11 PROCEDURE — 64447 NJX AA&/STRD FEMORAL NRV IMG: CPT | Performed by: ANESTHESIOLOGY

## 2023-01-11 PROCEDURE — 7100000001 HC PACU RECOVERY - ADDTL 15 MIN: Performed by: ORTHOPAEDIC SURGERY

## 2023-01-11 PROCEDURE — 84132 ASSAY OF SERUM POTASSIUM: CPT

## 2023-01-11 RX ORDER — ROPIVACAINE HYDROCHLORIDE 5 MG/ML
INJECTION, SOLUTION EPIDURAL; INFILTRATION; PERINEURAL
Status: COMPLETED | OUTPATIENT
Start: 2023-01-11 | End: 2023-01-11

## 2023-01-11 RX ORDER — OXYCODONE HYDROCHLORIDE 5 MG/1
10 TABLET ORAL PRN
Status: DISCONTINUED | OUTPATIENT
Start: 2023-01-11 | End: 2023-01-11 | Stop reason: HOSPADM

## 2023-01-11 RX ORDER — DIPHENHYDRAMINE HYDROCHLORIDE 50 MG/ML
12.5 INJECTION INTRAMUSCULAR; INTRAVENOUS
Status: DISCONTINUED | OUTPATIENT
Start: 2023-01-11 | End: 2023-01-11 | Stop reason: HOSPADM

## 2023-01-11 RX ORDER — LIDOCAINE HYDROCHLORIDE 20 MG/ML
INJECTION, SOLUTION EPIDURAL; INFILTRATION; INTRACAUDAL; PERINEURAL PRN
Status: DISCONTINUED | OUTPATIENT
Start: 2023-01-11 | End: 2023-01-11 | Stop reason: SDUPTHER

## 2023-01-11 RX ORDER — HYDROMORPHONE HCL 110MG/55ML
0.25 PATIENT CONTROLLED ANALGESIA SYRINGE INTRAVENOUS EVERY 5 MIN PRN
Status: DISCONTINUED | OUTPATIENT
Start: 2023-01-11 | End: 2023-01-11 | Stop reason: HOSPADM

## 2023-01-11 RX ORDER — MIDAZOLAM HYDROCHLORIDE 2 MG/2ML
2 INJECTION, SOLUTION INTRAMUSCULAR; INTRAVENOUS
Status: COMPLETED | OUTPATIENT
Start: 2023-01-11 | End: 2023-01-11

## 2023-01-11 RX ORDER — SODIUM CHLORIDE 9 MG/ML
INJECTION, SOLUTION INTRAVENOUS PRN
Status: DISCONTINUED | OUTPATIENT
Start: 2023-01-11 | End: 2023-01-11 | Stop reason: HOSPADM

## 2023-01-11 RX ORDER — PROCHLORPERAZINE EDISYLATE 5 MG/ML
5 INJECTION INTRAMUSCULAR; INTRAVENOUS
Status: DISCONTINUED | OUTPATIENT
Start: 2023-01-11 | End: 2023-01-11 | Stop reason: HOSPADM

## 2023-01-11 RX ORDER — LIDOCAINE HYDROCHLORIDE 10 MG/ML
1 INJECTION, SOLUTION INFILTRATION; PERINEURAL
Status: DISCONTINUED | OUTPATIENT
Start: 2023-01-11 | End: 2023-01-11 | Stop reason: HOSPADM

## 2023-01-11 RX ORDER — SODIUM CHLORIDE 0.9 % (FLUSH) 0.9 %
5-40 SYRINGE (ML) INJECTION PRN
Status: DISCONTINUED | OUTPATIENT
Start: 2023-01-11 | End: 2023-01-11 | Stop reason: HOSPADM

## 2023-01-11 RX ORDER — HYDROMORPHONE HCL 110MG/55ML
0.5 PATIENT CONTROLLED ANALGESIA SYRINGE INTRAVENOUS EVERY 5 MIN PRN
Status: DISCONTINUED | OUTPATIENT
Start: 2023-01-11 | End: 2023-01-11 | Stop reason: HOSPADM

## 2023-01-11 RX ORDER — PROPOFOL 10 MG/ML
INJECTION, EMULSION INTRAVENOUS CONTINUOUS PRN
Status: DISCONTINUED | OUTPATIENT
Start: 2023-01-11 | End: 2023-01-11 | Stop reason: SDUPTHER

## 2023-01-11 RX ORDER — ONDANSETRON 2 MG/ML
INJECTION INTRAMUSCULAR; INTRAVENOUS PRN
Status: DISCONTINUED | OUTPATIENT
Start: 2023-01-11 | End: 2023-01-11 | Stop reason: SDUPTHER

## 2023-01-11 RX ORDER — SODIUM CHLORIDE 0.9 % (FLUSH) 0.9 %
5-40 SYRINGE (ML) INJECTION EVERY 12 HOURS SCHEDULED
Status: DISCONTINUED | OUTPATIENT
Start: 2023-01-11 | End: 2023-01-11 | Stop reason: HOSPADM

## 2023-01-11 RX ORDER — OXYCODONE HYDROCHLORIDE 5 MG/1
5 TABLET ORAL PRN
Status: DISCONTINUED | OUTPATIENT
Start: 2023-01-11 | End: 2023-01-11 | Stop reason: HOSPADM

## 2023-01-11 RX ORDER — HYDROMORPHONE HCL 110MG/55ML
0.25 PATIENT CONTROLLED ANALGESIA SYRINGE INTRAVENOUS EVERY 5 MIN PRN
Status: DISCONTINUED | OUTPATIENT
Start: 2023-01-11 | End: 2023-01-11

## 2023-01-11 RX ORDER — PROPOFOL 10 MG/ML
INJECTION, EMULSION INTRAVENOUS PRN
Status: DISCONTINUED | OUTPATIENT
Start: 2023-01-11 | End: 2023-01-11 | Stop reason: SDUPTHER

## 2023-01-11 RX ORDER — SODIUM CHLORIDE, SODIUM LACTATE, POTASSIUM CHLORIDE, CALCIUM CHLORIDE 600; 310; 30; 20 MG/100ML; MG/100ML; MG/100ML; MG/100ML
INJECTION, SOLUTION INTRAVENOUS CONTINUOUS
Status: DISCONTINUED | OUTPATIENT
Start: 2023-01-11 | End: 2023-01-11 | Stop reason: HOSPADM

## 2023-01-11 RX ORDER — ONDANSETRON 2 MG/ML
4 INJECTION INTRAMUSCULAR; INTRAVENOUS
Status: DISCONTINUED | OUTPATIENT
Start: 2023-01-11 | End: 2023-01-11 | Stop reason: HOSPADM

## 2023-01-11 RX ORDER — FENTANYL CITRATE 50 UG/ML
100 INJECTION, SOLUTION INTRAMUSCULAR; INTRAVENOUS
Status: COMPLETED | OUTPATIENT
Start: 2023-01-11 | End: 2023-01-11

## 2023-01-11 RX ORDER — EPHEDRINE SULFATE/0.9% NACL/PF 50 MG/5 ML
SYRINGE (ML) INTRAVENOUS PRN
Status: DISCONTINUED | OUTPATIENT
Start: 2023-01-11 | End: 2023-01-11 | Stop reason: SDUPTHER

## 2023-01-11 RX ORDER — PHENYLEPHRINE HYDROCHLORIDE 10 MG/ML
INJECTION INTRAVENOUS PRN
Status: DISCONTINUED | OUTPATIENT
Start: 2023-01-11 | End: 2023-01-11 | Stop reason: SDUPTHER

## 2023-01-11 RX ADMIN — PHENYLEPHRINE HYDROCHLORIDE 200 MCG: 10 INJECTION INTRAVENOUS at 12:15

## 2023-01-11 RX ADMIN — PHENYLEPHRINE HYDROCHLORIDE 200 MCG: 10 INJECTION INTRAVENOUS at 12:06

## 2023-01-11 RX ADMIN — MIDAZOLAM HYDROCHLORIDE 2 MG: 1 INJECTION, SOLUTION INTRAMUSCULAR; INTRAVENOUS at 10:20

## 2023-01-11 RX ADMIN — PHENYLEPHRINE HYDROCHLORIDE 200 MCG: 10 INJECTION INTRAVENOUS at 12:11

## 2023-01-11 RX ADMIN — PHENYLEPHRINE HYDROCHLORIDE 200 MCG: 10 INJECTION INTRAVENOUS at 12:38

## 2023-01-11 RX ADMIN — PROPOFOL 20 MG: 10 INJECTION, EMULSION INTRAVENOUS at 11:25

## 2023-01-11 RX ADMIN — PHENYLEPHRINE HYDROCHLORIDE 100 MCG: 10 INJECTION INTRAVENOUS at 11:43

## 2023-01-11 RX ADMIN — ONDANSETRON 4 MG: 2 INJECTION INTRAMUSCULAR; INTRAVENOUS at 12:33

## 2023-01-11 RX ADMIN — Medication 10 MG: at 11:41

## 2023-01-11 RX ADMIN — PHENYLEPHRINE HYDROCHLORIDE 100 MCG: 10 INJECTION INTRAVENOUS at 11:47

## 2023-01-11 RX ADMIN — Medication 10 MG: at 12:35

## 2023-01-11 RX ADMIN — PROPOFOL 40 MG: 10 INJECTION, EMULSION INTRAVENOUS at 11:41

## 2023-01-11 RX ADMIN — PROPOFOL 75 MCG/KG/MIN: 10 INJECTION, EMULSION INTRAVENOUS at 11:22

## 2023-01-11 RX ADMIN — FENTANYL CITRATE 50 MCG: 50 INJECTION, SOLUTION INTRAMUSCULAR; INTRAVENOUS at 10:20

## 2023-01-11 RX ADMIN — PHENYLEPHRINE HYDROCHLORIDE 200 MCG: 10 INJECTION INTRAVENOUS at 11:53

## 2023-01-11 RX ADMIN — PHENYLEPHRINE HYDROCHLORIDE 200 MCG: 10 INJECTION INTRAVENOUS at 12:00

## 2023-01-11 RX ADMIN — PHENYLEPHRINE HYDROCHLORIDE 100 MCG: 10 INJECTION INTRAVENOUS at 12:30

## 2023-01-11 RX ADMIN — Medication 10 MG: at 11:28

## 2023-01-11 RX ADMIN — MEPIVACAINE HYDROCHLORIDE 18 ML: 15 INJECTION, SOLUTION EPIDURAL; INFILTRATION at 10:20

## 2023-01-11 RX ADMIN — ROPIVACAINE HYDROCHLORIDE 10 ML: 5 INJECTION, SOLUTION EPIDURAL; INFILTRATION; PERINEURAL at 10:26

## 2023-01-11 RX ADMIN — PROPOFOL 40 MG: 10 INJECTION, EMULSION INTRAVENOUS at 11:22

## 2023-01-11 RX ADMIN — Medication 10 MG: at 11:53

## 2023-01-11 RX ADMIN — PHENYLEPHRINE HYDROCHLORIDE 200 MCG: 10 INJECTION INTRAVENOUS at 12:17

## 2023-01-11 RX ADMIN — PHENYLEPHRINE HYDROCHLORIDE 100 MCG: 10 INJECTION INTRAVENOUS at 12:23

## 2023-01-11 RX ADMIN — Medication 3000 MG: at 11:25

## 2023-01-11 RX ADMIN — Medication 10 MG: at 11:33

## 2023-01-11 RX ADMIN — LIDOCAINE HYDROCHLORIDE 100 MG: 20 INJECTION, SOLUTION EPIDURAL; INFILTRATION; INTRACAUDAL; PERINEURAL at 11:22

## 2023-01-11 RX ADMIN — Medication 10 MG: at 11:47

## 2023-01-11 RX ADMIN — SODIUM CHLORIDE, POTASSIUM CHLORIDE, SODIUM LACTATE AND CALCIUM CHLORIDE: 600; 310; 30; 20 INJECTION, SOLUTION INTRAVENOUS at 09:45

## 2023-01-11 RX ADMIN — ROPIVACAINE HYDROCHLORIDE 18 ML: 5 INJECTION, SOLUTION EPIDURAL; INFILTRATION; PERINEURAL at 10:20

## 2023-01-11 ASSESSMENT — PAIN DESCRIPTION - DESCRIPTORS: DESCRIPTORS: ACHING;THROBBING

## 2023-01-11 ASSESSMENT — PAIN SCALES - GENERAL
PAINLEVEL_OUTOF10: 0

## 2023-01-11 ASSESSMENT — PAIN - FUNCTIONAL ASSESSMENT: PAIN_FUNCTIONAL_ASSESSMENT: 0-10

## 2023-01-11 NOTE — INTERVAL H&P NOTE
Update History & Physical    The Patient's History and Physical was reviewed   I discussed the surgery and patients medical condition with the patient. The chart was reviewed with the patient and I examined the patient. There was no change. The surgical site was confirmed by the patient and me. CV: RRR  RESP: CTAB    Plan:  The risk, benefits, expected outcome, and alternative to the recommended procedure have been discussed with the patient. Patient understands and wants to proceed with the procedure.     Electronically signed by Neel Stevens MD on 01/11/23 10:33 AM

## 2023-01-11 NOTE — ANESTHESIA POSTPROCEDURE EVALUATION
Department of Anesthesiology  Postprocedure Note    Patient: Benitez Briggs  MRN: [id number]  [place]: 1955  Date of evaluation: 1/11/2023      Procedure Summary     Date: 01/11/23 Room / Location: [facility] OP OR 02 / [facility]    Anesthesia Start: 1106 Anesthesia Stop: 1259    Procedure: Left Posterior ankle scope and amber SUPINE//PACEMAKER (Left: Ankle) Diagnosis:       Left ankle instability      (Left ankle instability [M25.372])    Surgeons: Rick Varela MD Responsible Provider: Shon Goins MD    Anesthesia Type: TIVA ASA Status: 3          Anesthesia Type: No value filed.     Gisella Phase I: Gisella Score: 9    Gisella Phase II: Gisella Score: 9      Anesthesia Post Evaluation    Patient location during evaluation: PACU  Patient participation: complete - patient participated  Level of consciousness: awake  Airway patency: patent  Nausea & Vomiting: no nausea and no vomiting  Complications: no  Cardiovascular status: hemodynamically stable  Respiratory status: acceptable, nonlabored ventilation and spontaneous ventilation  Hydration status: euvolemic  Comments: /70   Pulse 69   Temp 97.5 °F (36.4 °C) (Skin)   Resp 16   Ht 5' 11\" (1.803 m)   Wt (!) 330 lb (149.7 kg)   SpO2 95%   BMI 46.03 kg/m²     Multimodal analgesia pain management approach

## 2023-01-11 NOTE — OP NOTE
Operative Note    Patient:Steve Willard  MRN: [id number]    Date Of Surgery: 1/11/2023    Surgeon: Dick Brady MD    Assistant Surgeon: None    Procedure Performed:   left  Sarabjit gastroc lengthening  Ankle scope, debridement of Andrzej's deformity. Arthroscopic debridement of posterior Achilles tendon    Pre Op Diagnosis:  Left gastrocnemius muscle contracture  Left Andrzej's deformity  Left Achilles tendon tearing    Post Op Diagnosis:   same    Implants:   * No implants in log *    Anesthesia:  Regional    Blood Loss:  10 cc    Tourniquet:  Estimated thigh 10 minutes minutes    Pre Operative Abx:   Ancef 3g    Specimens/Cultures:  -    Significant Findings:  Gastrocnemius muscle contracture was noted. Significant inflammation and a large Andrzej's deformity with large bone spurs noted throughout the posterior retrocalcaneal bursal space. There was also obvious tearing of the undersurface the Achilles tendon. Pre Operative Course:  Daina  is a 75 y.o. male who has a longstanding history of left Achilles insertional tendinitis with a Andrzej deformity and retrocalcaneal bursitis. After nonoperative treatment to consist of booting, bracing, and injections we discussed operative intervention. Due to his size I did not think a secondary Achilles reconstruction would be appropriate. The decision was made to perform a smaller surgery with a Sarabjit and then a arthroscopic debridement of his Andrzej's    Operation In Detail:  Patient was evaluated in the preoperative area. The left lower extremity was marked by me. We had a long discussion about the procedure and postoperative protocols. The patient was then brought back to the operating room suite and placed in the operating room table. A timeout was taken to identify the patient, procedure being performed, and laterality.   After this the patient was prepped and draped in the normal sterile fashion using a Betadine solution and/or a ChloraPrep solution. A timeout was then taken to identify the patient his name, date of birth, laterality, and procedure being performed. We also identified allergies and any concerns about the operation. Attention was then placed to the operative extremity. The patient had been placed with a little bit of extra rotation in order to do the [place]. Antibiotics, 3g IV ancef was given. The thigh tourniquet was inflated but it was obvious venous tourniquet was let down very quickly. The leg was elevated and the foot placed in dorsiflexion. This produced tension on the gastrocnemius and Achilles complex. I then palpated the area at the myotendinous junction. I made a longitudinal incision approximately 1.5 inches long at this area, just medial to the midline. I dissected down to the superficial compartment fascia bluntly with [employer] retractors. .  After identified the superficial compartment fascia I longitudinally incised this and exposed the gastrocnemius muscle fascia itself. Identified the sural nerve on the lateral aspect of my incision and protected it. Then using a knife I transected the fascia of the gastroc muscle belly. I made sure I got both the medial and lateral edges and the fascia in between. I also found a deep raphae of the central portion which I excised and release. This provided increased the dorsiflexion of the ankle. Irrigated out the wound, and then closed it with a nylon suture. This completed my Sarabjit gastrocnemius lengthening. I then identified the posterior aspect of the calcaneus. Identified the Andrzej's deformity on x-ray. Using a needle to localize it. I made an incision both medially and laterally just anterior to the insertion of the Achilles tendon. I inserted a scope through the medial portal and then shaver the lateral portal.  Using arthroscopy identified the posterior tibial tendon and the calcaneus.   Starting with a shaver into the bone I removed a large amount of inflamed bursa and inflamed tissue from the retrocalcaneal bursa region. I then took the bur and removed bone off the Andrzej's deformity to stop some of the pressure on the underside of the Achilles tendon. I then identified the Achilles tendon more. It was obvious tearing of the undersurface of the Achilles tendon with frayed fibers. Using a shaver and forward mode only I debrided the undersurface of the Achilles tendon and remove the frayed and damaged edges. After debridement of this tendon I paid attention to the retrocalcaneal bursa and inflamed tissue. There is a significant amount of inflamed tissue still. Using suction I removed all the bone fragments and continued debridement of the Achilles tendon and some more of the Andrzej deformity. After adequate decompression of the retrocalcaneal bursal space to include the Achilles tendon and the Andrzej's deformity I withdrew the instrumentation. The portals were closed with a 2-0 nylon suture. A sterile dressing was then applied to the leg and Posterior slab splint with Stirrups. They were awoken from anesthesia and returned to the PACU without difficulty.     Post Operative Plan:   1- WB status: Non-Weight Bearing   2- Follow Up: 2-3 weeks  3- Immobilization/assistive devices: brace/splint  4- DVT px: ASA 81 mg BID  5- Pain Medication: Percocet 5 mg

## 2023-01-11 NOTE — ANESTHESIA PROCEDURE NOTES
Peripheral Block    Patient location during procedure: procedure area  Reason for block: post-op pain management and at surgeon's request  Start time: 1/11/2023 10:26 AM  End time: 1/11/2023 10:28 AM  Staffing  Performed: anesthesiologist   Anesthesiologist: Kenia Ibarra MD  Preanesthetic Checklist  Completed: patient identified, IV checked, site marked, risks and benefits discussed, surgical/procedural consents, equipment checked, pre-op evaluation, timeout performed, anesthesia consent given, oxygen available and monitors applied/VS acknowledged  Peripheral Block   Patient position: supine  Prep: ChloraPrep  Provider prep: mask  Patient monitoring: cardiac monitor, continuous pulse ox, frequent blood pressure checks, IV access, oxygen and responsive to questions  Block type: Saphenous  Laterality: left  Injection technique: single-shot  Guidance: ultrasound guided    Needle   Needle gauge: 25g. Needle localization: anatomical landmarks  Needle length: 5 cm  Assessment   Injection assessment: negative aspiration for heme, no paresthesia on injection and no intravascular symptoms  Slow fractionated injection: yes  Hemodynamics: stableno  Outcomes: uncomplicated and patient tolerated procedure well    Additional Notes  Mixed 1:1 Ropivicaine 0.5% 10ml and 1.5% Mepivicaine 10ml, 15ml injected at the proximal tibia and 5ml injected at the ankle.   Medications Administered  mepivacaine 1.5 % - Perineural   10 mL - 1/11/2023 10:26:00 AM  ropivacaine (NAROPIN) injection 0.5% - Perineural   10 mL - 1/11/2023 10:26:00 AM

## 2023-01-11 NOTE — ANESTHESIA PROCEDURE NOTES
Peripheral Block    Patient location during procedure: procedure area  Reason for block: post-op pain management and at surgeon's request  Start time: 1/11/2023 10:20 AM  End time: 1/11/2023 10:26 AM  Staffing  Performed: anesthesiologist   Anesthesiologist: Nomi Garces MD  Preanesthetic Checklist  Completed: patient identified, IV checked, site marked, risks and benefits discussed, surgical/procedural consents, pre-op evaluation, timeout performed, anesthesia consent given, oxygen available and monitors applied/VS acknowledged  Peripheral Block   Patient position: supine  Prep: ChloraPrep  Provider prep: mask  Patient monitoring: cardiac monitor, continuous pulse ox, frequent blood pressure checks, IV access, oxygen and responsive to questions  Block type: Sciatic  Popliteal  Laterality: left  Injection technique: single-shot  Guidance: ultrasound guided    Needle   Needle type: insulated echogenic nerve stimulator needle   Needle gauge: 20 G  Needle localization: ultrasound guidance  Needle length: 10 cm  Assessment   Injection assessment: negative aspiration for heme, no paresthesia on injection, local visualized surrounding nerve on ultrasound and no intravascular symptoms  Slow fractionated injection: yes  Hemodynamics: stable  Real-time US image taken/store: yes  Outcomes: uncomplicated and patient tolerated procedure well    Additional Notes  Active ultrasound used to identify the location of the sciatic nerve in the popliteal fossa and also surrounding structures, and the block was performed using real time ultrasound, and permanent image saved.     Medications Administered  ropivacaine (NAROPIN) injection 0.5% - Perineural   18 mL - 1/11/2023 10:20:00 AM  mepivacaine 1.5 % - Perineural   18 mL - 1/11/2023 10:20:00 AM

## 2023-01-11 NOTE — ANESTHESIA PRE PROCEDURE
Department of Anesthesiology  Preprocedure Note       Name:  Sam Hart   Age:  75 y.o.  :  1955                                          MRN:  [id number]         Date:  2023      Surgeon: Sam Joshi):  Nolen Buerger, MD    Procedure: Procedure(s):  Left Posterior ankle scope and amber SUPINE//PACEMAKER    Medications prior to admission:   Prior to Admission medications    Medication Sig Start Date End Date Taking? Authorizing Provider   Omega-3 Fatty Acids (FISH OIL PO) Take by mouth   Yes Historical Provider, MD   oxyCODONE-acetaminophen (PERCOCET) 5-325 MG per tablet Take 1 tablet by mouth every 6 hours as needed for Pain for up to 5 days. Intended supply: 5 days.  Take lowest dose possible to manage pain Max Daily Amount: 4 tablets 1/10/23 1/15/23  Nolen Buerger, MD   ondansetron [facility] 4 MG tablet Take 1 tablet by mouth daily as needed for Nausea or Vomiting 1/10/23   Nolen Buerger, MD   docusate sodium (COLACE) 100 MG capsule Take 1 capsule by mouth daily as needed for Constipation 1/10/23   Nolen Buerger, MD   aspirin (ASPIRIN 81) 81 MG EC tablet Take 1 tablet by mouth in the morning and at bedtime 1/10/23   Nolen Buerger, MD   LANTUS SOLOSTAR 100 UNIT/ML injection pen Inject 54 Units into the skin 2 times daily 22   Gerardo Arias MD   [facility], 1 MG/DOSE, 4 MG/3ML SOPN Inject 1 mg into the skin once a week  Patient taking differently: Inject 1 mg into the skin every 7 days 22   Gerardo Arias MD   metFORMIN (GLUCOPHAGE) 500 MG tablet Take 1 tablet by mouth 2 times daily (with meals) 22   Gerardo Arias MD   pioglitazone (ACTOS) 30 MG tablet Take 0.5 tablets by mouth daily 22   Gerardo Arias MD   atorvastatin (LIPITOR) 10 MG tablet Take 1 tablet by mouth daily  Patient taking differently: Take 10 mg by mouth at bedtime 22   Gerardo Arias MD   carvedilol (COREG) 12.5 MG tablet Take 1 tablet by mouth 2 times daily (with meals) 12/14/22   Berta Boogie MD   amLODIPine (NORVASC) 5 MG tablet Take 1 tablet by mouth daily 12/14/22   Berta Boogie MD   TURMERIC CURCUMIN PO Take 1 tablet by mouth in the morning and at bedtime    Historical Provider, MD   KLOR-CON 10 10 MEQ extended release tablet TAKE ONE TABLET BY MOUTH TWICE A DAY 11/15/22   Cristy Yeager,    Lidocaine 5 % CREA Topical 5% Lidocaine and 5% Neurontin to apply to affected area up to 4 times/day as needed for pain 10/26/22   Pamela Kan MD   colchicine (COLCRYS) 0.6 MG tablet Take 1 tablet by mouth daily  Patient taking differently: Take 0.6 mg by mouth as needed 9/29/22   Tatyana Virk MD   torsemide (DEMADEX) 20 MG tablet TAKE ONE TABLET BY MOUTH TWICE A DAY 6/17/22   Cristy Yeager DO   acetaminophen (TYLENOL) 650 MG extended release tablet Take 1,300 mg by mouth in the morning and 1,300 mg in the evening.     Ar Automatic Reconciliation   albuterol sulfate  (90 Base) MCG/ACT inhaler Inhale 2 puffs into the lungs every 4 hours as needed for Wheezing or Shortness of Breath    Ar Automatic Reconciliation   albuterol (PROVENTIL) (2.5 MG/3ML) 0.083% nebulizer solution Inhale 2.5 mg into the lungs every 6 hours as needed 2/10/22   Ar Automatic Reconciliation   allopurinol (ZYLOPRIM) 300 MG tablet Take 300 mg by mouth daily 1/9/18   Ar Automatic Reconciliation   ammonium lactate (LAC-HYDRIN) 12 % lotion Apply topically as needed    Ar Automatic Reconciliation   apixaban (ELIQUIS) 5 MG TABS tablet Take 5 mg by mouth 2 times daily    Ar Automatic Reconciliation   ascorbic acid (VITAMIN C) 500 MG tablet Take by mouth    Ar Automatic Reconciliation   Camphor-Menthol-Methyl Sal 3.1-6-10 % PTCH Apply topically    Ar Automatic Reconciliation   diclofenac sodium (VOLTAREN) 1 % GEL Apply 4 g topically 4 times daily as needed 12/27/19   Ar Automatic Reconciliation   ferrous sulfate (IRON 325) 325 (65 Fe) MG tablet Take by mouth as needed    Ar Automatic Reconciliation   fluticasone (FLONASE) 50 MCG/ACT nasal spray 2 sprays by Nasal route as needed    Ar Automatic Reconciliation   fluticasone-salmeterol (ADVAIR) 500-50 MCG/ACT AEPB diskus inhaler Inhale 1 puff into the lungs 2 times daily as needed    Ar Automatic Reconciliation   loratadine (CLARITIN) 10 MG tablet Take 10 mg by mouth daily 12/9/16   Ar Automatic Reconciliation   methocarbamol (ROBAXIN) 500 MG tablet Take 500 mg by mouth in the morning and at bedtime    Ar Automatic Reconciliation   omeprazole (PRILOSEC) 20 MG delayed release capsule Take 20 mg by mouth Daily    Ar Automatic Reconciliation   ondansetron (ZOFRAN-ODT) 4 MG disintegrating tablet Take 4 mg by mouth every 8 hours as needed for Nausea or Vomiting 1/15/22   Ar Automatic Reconciliation   tiotropium (SPIRIVA RESPIMAT) 2.5 MCG/ACT AERS inhaler Inhale 2 puffs into the lungs daily    Ar Automatic Reconciliation   trolamine salicylate (ASPER-FLEX) 10 % cream Apply topically as needed    Ar Automatic Reconciliation       Current medications:    Current Facility-Administered Medications   Medication Dose Route Frequency Provider Last Rate Last Admin    lidocaine 1 % injection 1 mL  1 mL IntraDERmal Once PRN Patricio Espinosa IV, MD        lactated ringers infusion   IntraVENous Continuous Patricio Espinosa IV,  mL/hr at 01/11/23 0945 New Bag at 01/11/23 0945    sodium chloride flush 0.9 % injection 5-40 mL  5-40 mL IntraVENous 2 times per day Patricio Espinosa IV, MD        sodium chloride flush 0.9 % injection 5-40 mL  5-40 mL IntraVENous PRN Patricio Espinosa IV, MD        0.9 % sodium chloride infusion   IntraVENous PRN Patricio Espinosa IV, MD        ceFAZolin (ANCEF) 3000 mg in sterile water 30 mL IV syringe  3,000 mg IntraVENous On Call to [address], MD        sodium chloride flush 0.9 % injection 5-40 mL  5-40 mL IntraVENous 2 times per day Gloria Buchanan MD        sodium chloride flush 0.9 % injection 5-40 mL  5-40 mL IntraVENous PRN Zuleika Perez MD        0.9 % sodium chloride infusion   IntraVENous PRN Zuleika Perez MD           Allergies: Allergies   Allergen Reactions    Lisinopril Angioedema    Losartan Angioedema    Morphine Anaphylaxis and Swelling    Penicillins Anaphylaxis     Previous RX for cephalosporin tolerated    Tizanidine Other (See Comments)       Problem List:    Patient Active Problem List   Diagnosis Code    Atrioventricular block, complete (HCC) I44.2    NSVT (nonsustained ventricular tachycardia) I47.29    Benign essential hypertension I10    Edema of both lower extremities due to peripheral venous insufficiency I87.2    Elevated transaminase level R74.01    History of 2019 novel coronavirus disease (COVID-19) Z86.16    Gout M10.9    Obstructive sleep apnea on CPAP G47.33, Z99.89    Ventral hernia without obstruction or gangrene K43.9    COVID-19 vaccination refused Z28.21    Pulmonary hypertension (HCC) I27.20    Seasonal allergic rhinitis due to pollen J30.1    Longstanding persistent atrial fibrillation (HCC) I48.11    Iron deficiency anemia, unspecified D50.9    Class 3 severe obesity due to excess calories with serious comorbidity and body mass index (BMI) of 45.0 to 49.9 in adult ([facility] [address] E66.01, Z68.42    Long term (current) use of anticoagulants Z79.01    Actinic keratoses L57.0    Presence of cardiac pacemaker Z95.0    History of colonic polyps Z86.010    Hypercholesterolemia E78.00    Hepatic steatosis K76.0    Type 2 diabetes mellitus with diabetic polyneuropathy, with long-term current use of insulin (HCC) E11.42, Z79.4    History of acute pancreatitis Z87.19    Gastroesophageal reflux disease K21.9    Asthma J45.909    Idiopathic chronic gout of multiple sites without tophus M1A. 200X0    High risk medication use Z79.899    Shortness of breath R06.02    Refused varicella vaccine Z28.21    Vitamin D deficiency E55.9    Chronic right-sided low back pain without sciatica M54.50, G89.29    Type 2 diabetes mellitus with stage 3b chronic kidney disease, with long-term current use of insulin (HCC) E11.22, N18.32, Z79.4    Presbyopia H52.4    Type 2 diabetes mellitus with microalbuminuria, with long-term current use of insulin (HCC) E11.29, R80.9, Z79.4    Albuminuria R80.9    Chronic systolic (congestive) heart failure I50.22    Acute gastritis with hemorrhage K29.01    Acute non-recurrent ethmoidal sinusitis J01.20    Benign colonic polyp K63.5    Chronic obstructive pulmonary disease (HCC) J44.9    Chronic rhinitis J31.0    Digestive system disorder K92.9    Disorder of lung J98.4    Neuropathy G62.9    Normochromic normocytic anemia D64.9    Numbness R20.0    Osteoarthritis M19.90    Encounter for monitoring allopurinol therapy Z51.81, Z79.899    Atypical pigmented skin lesion L81.9    Anticoagulated Z79.01    Left ankle instability M25.372       Past Medical History:        Diagnosis Date    Acute pancreatitis 01/2022    Albuminuria     Allergic rhinitis     Asthma     [facility]ed by [facility]; daily and rescue inhalers    Atrial fibrillation ([facility] [address]     followed by [facility]; on Eliquis    Benign essential hypertension     on med for control    BMI 45.0-49.9, adult ([address]     Calculous cholecystitis 03/19/2014    Cellulitis of arm     Chronic lower back pain     Chronic pain     Chronic systolic (congestive) heart failure (HCC)     CKD (chronic kidney disease)     stage 3    COVID-19 08/11/2020    10 days in hospital    COVID-19 07/2022    not hospitalized    Edema     Former smoker     Gastroesophageal reflux disease     on med for control    Gout     Hepatic steatosis     History of blood transfusion     2007?     Hypercholesteremia     Hypertriglyceridemia     Iron deficiency anemia     takes oral iron \"sometimes\"    Melanoma of left upper arm (HCC)     Normochromic normocytic anemia 03/09/2020    Last Assessment & Plan: This is likely hypoproliferative due to his longstanding chronic kidney disease, diabetes, and other comorbidities. He can continue oral iron but this may not be necessary. He does have microcytic indices but this appears unchanged for the last 6 years. He really has no GI complaints.     Obstructive sleep apnea on CPAP     bi-pap nightly; instructed to bring dos    Osteoarthritis     Pacemaker     [employer] biventricular pacemaker    Peptic ulcer disease     Sensory neuropathy     Type 2 diabetes mellitus with stage 3a chronic kidney disease, with long-term current use of insulin (HCC)     checks bs bid; oral and insulin meds; average bs 150 or less; A1c-7.1 on 22    Vitamin D deficiency        Past Surgical History:        Procedure Laterality Date    CARDIAC CATHETERIZATION      mild nonobstructive    CHOLECYSTECTOMY, LAPAROSCOPIC      Dr. Evelyn Mckeon  09/15/2022    ESOPHAGOGASTRODUODENOSCOPY  09/15/2022    HERNIA REPAIR      Dr. Kari Maldonado Bilateral     multiple knee surgeries prior to 's    ORTHOPEDIC SURGERY      right hand    PACEMAKER PLACEMENT  2020    [employer] biventricular pacemaker    PACEMAKER PLACEMENT      Dr. Jamee Lobo Bilateral     UPPP UVULOPALATOPHARYGOPLASTY  mid        Social History:    Social History     Tobacco Use    Smoking status: Former     Packs/day: 0.25     Years: 1.00     Pack years: 0.25     Types: Cigarettes     Quit date: 1975     Years since quittin.0    Smokeless tobacco: Former     Types: Chew   Substance Use Topics    Alcohol use: No     Alcohol/week: 0.0 standard drinks                                Counseling given: Not Answered      Vital Signs (Current):   Vitals:    23 0936 23 1028 23 1033 23 1038   BP: 138/78 138/76 (!) 141/75 (!) 145/81   Pulse: 70 70 73 72   Resp:  17    Temp: 97.7 °F (36.5 °C) TempSrc: Oral      SpO2: 98% 99% 99% 99%   Weight:       Height:                                                  BP Readings from Last 3 Encounters:   01/11/23 (!) 145/81   12/14/22 124/80   11/21/22 118/66       NPO Status: Time of last liquid consumption: 2200                        Time of last solid consumption: 2200                        Date of last liquid consumption: 01/10/23                        Date of last solid food consumption: 01/10/23    BMI:   Wt Readings from Last 3 Encounters:   01/05/23 (!) 330 lb (149.7 kg)   12/14/22 (!) 341 lb (154.7 kg)   11/21/22 (!) 330 lb (149.7 kg)     Body mass index is 46.03 kg/m². CBC:   Lab Results   Component Value Date/Time    WBC 14.2 01/15/2022 04:44 AM    RBC 4.33 01/15/2022 04:44 AM    HGB 12.0 01/15/2022 04:44 AM    HCT 38.3 01/15/2022 04:44 AM    MCV 88.5 01/15/2022 04:44 AM    RDW 15.9 01/15/2022 04:44 AM     01/15/2022 04:44 AM       CMP:   Lab Results   Component Value Date/Time     06/20/2022 09:35 AM    K 4.8 06/20/2022 09:35 AM     06/20/2022 09:35 AM    CO2 29 06/20/2022 09:35 AM    BUN 22 06/20/2022 09:35 AM    CREATININE 1.60 06/20/2022 09:35 AM    GFRAA 56 06/20/2022 09:35 AM    AGRATIO 1.3 02/16/2022 11:43 AM    LABGLOM 46 06/20/2022 09:35 AM    GLUCOSE 95 06/20/2022 09:35 AM    PROT 8.1 06/20/2022 09:35 AM    CALCIUM 9.2 06/20/2022 09:35 AM    BILITOT 0.5 06/20/2022 09:35 AM    ALKPHOS 122 06/20/2022 09:35 AM    ALKPHOS 139 02/16/2022 11:43 AM    AST 21 06/20/2022 09:35 AM    ALT 21 06/20/2022 09:35 AM       POC Tests:   Recent Labs     01/11/23 0917 01/11/23 0919   POCGLU  --  105*   POCK 3.8  --        Coags:   Lab Results   Component Value Date/Time    PROTIME 14.8 12/29/2020 09:13 AM    INR 1.1 12/29/2020 09:13 AM       HCG (If Applicable): No results found for: PREGTESTUR, PREGSERUM, HCG, HCGQUANT     ABGs: No results found for: PHART, PO2ART, GZA4KMU, DQC1GJT, BEART, D0AZGQVD     Type & Screen (If Applicable):   No results found for: Jolene Santiago    Drug/Infectious Status (If Applicable):  Lab Results   Component Value Date/Time    HEPCAB NONREACTIVE 01/14/2022 05:03 AM       COVID-19 Screening (If Applicable):   Lab Results   Component Value Date/Time    COVID19 Not detected 01/13/2022 11:58 PM    COVID19 Not Detected 12/23/2021 08:44 AM    COVID19 Performed 12/23/2021 08:44 AM           Anesthesia Evaluation  Patient summary reviewed and Nursing notes reviewed  Airway: Mallampati: II  TM distance: >3 FB   Neck ROM: full  Comment: S/p UPPP  Mouth opening: > = 3 FB   Dental:    (+) caps      Pulmonary: breath sounds clear to auscultation  (+) sleep apnea (BIPAP compliant):  asthma:                            Cardiovascular:  Exercise tolerance: Limited by orthopedic issues  (+) hypertension:, pacemaker:,         Rhythm: regular  Rate: normal                 ROS comment: Diagnosis of CHF, but has normal LV function and reported normal RV systolic pressures. He does have LE edema from venous insufficiency. AV sclerosis without stenosis     Neuro/Psych:               GI/Hepatic/Renal:   (+) GERD: well controlled, liver disease (hepatic steatosis):,           Endo/Other:    (+) DiabetesType II DM, well controlled, using insulin, . Abdominal:             Vascular: Other Findings:           Anesthesia Plan      TIVA     ASA 3       Induction: intravenous. Anesthetic plan and risks discussed with patient and spouse.               Post-op pain plan if not by surgeon: single peripheral nerve block            Marck Baker MD   1/11/2023

## 2023-01-11 NOTE — DISCHARGE INSTRUCTIONS
INSTRUCTIONS FOLLOWING FOOT SURGERY    ACTIVITY    1.)No weight bearing. Use crutches or knee walker until seen in follow up appointment    2.)  Elevate foot. No ice. Blood clot prevention:  As instructed by Dr Talha Sanchez: Take 81mg aspirin twice daily if ok with your medical doctor and you have no GI Ulcer. Get up and out of bed frequently. While in bed move the legs as much as possible. DRESSING CARE Keep dry and in place until follow up appointment. Cover with cast bag or plastic bag when showering. Let the office know if dressing gets saturated with water. Don't put anything into the splint to relieve itching etc.     CALL YOUR DOCTOR IF YOU HAVE  Excessive bleeding that does not stop after holding mild pressure over the area. Temperature of 101 degrees or above. Redness, excessive swelling or bruising, and/or green or yellow, smelly discharge from incision. Loss of sensation - cold, white or blue toes. DIET  Day of Surgery: Clear liquids until no nausea or vomiting; then light, bland diet (Baked chicken, plain rice, grits, scrambled egg, toast). Nothing Greasy, fried or spicy today  Advance to regular diet on second day, unless your doctor orders otherwise. PAIN  Take pain medications as directed by your doctor. Call your doctor if pain is NOT relieved by medication. MEDICATION INTERACTION:  During your procedure you potentially received a medication or medications which may reduce the effectiveness of oral contraceptives. Please consider other forms of contraception for 1 month following your procedure if you are currently using oral contraceptives as your primary form of birth control.  In addition to this, we recommend continuing your oral contraceptive as prescribed, unless otherwise instructed by your physician, during this time    After general anesthesia or intravenous sedation, for 24 hours or while taking prescription Narcotics:  Limit your activities  A responsible adult needs to be with you for the next 24 hours  Do not drive and operate hazardous machinery  Do not make important personal or business decisions  Do not drink alcoholic beverages  If you have not urinated within 8 hours after discharge, and you are experiencing discomfort from urinary retention, please go to the nearest ED. If you have sleep apnea and have a CPAP machine, please use it for all naps and sleeping. Please use caution when taking narcotics and any of your home medications that may cause drowsiness. *  Please give a list of your current medications to your Primary Care Provider. *  Please update this list whenever your medications are discontinued, doses are      changed, or new medications (including over-the-counter products) are added. *  Please carry medication information at all times in case of emergency situations. These are general instructions for a healthy lifestyle:  No smoking/ No tobacco products/ Avoid exposure to second hand smoke  Surgeon General's Warning:  Quitting smoking now greatly reduces serious risk to your health. Obesity, smoking, and sedentary lifestyle greatly increases your risk for illness  A healthy diet, regular physical exercise & weight monitoring are important for maintaining a healthy lifestyle    You may be retaining fluid if you have a history of heart failure or if you experience any of the following symptoms:  Weight gain of 3 pounds or more overnight or 5 pounds in a week, increased swelling in our hands or feet or shortness of breath while lying flat in bed. Please call your doctor as soon as you notice any of these symptoms; do not wait until your next office visit. Learning About How to Use Crutches  Your Care Instructions  Crutches can help you walk when you have an injured hip, leg, knee, ankle, or foot. Your doctor will tell you how much weight--if any--you can put on your leg. Be sure your crutches fit you.  When you stand up in your normal posture, there should be space for two or three fingers between the top of the crutch and your armpit. When you let your hands hang down, the hand  should be at your wrists. When you put your hands on the hand , your elbows should be slightly bent. To stay safe when using crutches:  Look straight ahead, not down at your feet. Clear away small rugs, cords, or anything else that could cause you to trip, slip, or fall. Be very careful around pets and small children. They can get in your path when you least expect it. Be sure the rubber tips on your crutches are clean and in good condition to help prevent slipping. Avoid slick conditions, such as wet floors and snowy or icy driveways. In bad weather, be especially careful on curbs and steps. How to use crutches  Getting ready to walk    [facility] your elbows slightly. Press the padded top parts of the crutches against your sides, under your armpits. If you have been told not to put any weight on your injured leg, keep that leg bent and off the ground. Walking with crutches    Put both crutches about 12 inches in front of you. Put your weight on the handgrips, not on the pads under your arms. (Constant pressure against your underarms can cause numbness.) Swing your body forward. (If you have been told not to put any weight on your injured leg, keep that leg bent and off the ground.)  To complete the step, put your weight on the strong leg. Move your crutches about 12 inches in front of you, and start the next step. When you're confident using the crutches, you can move the crutches and your injured leg at the same time. Then push straight down on the crutches as you step past the crutches with your strong leg, as you would in normal walking. Take small steps. Use ramps and elevators when you can. Sitting down    To sit, back up to the chair. Use one hand to hold both crutches by the handgrips, beside your injured leg.  With the other hand, hold onto the seat and slowly lower yourself onto the chair. Lay the crutches on the ground near your chair. If you prop them up, they may fall over. Getting up from a chair    To get up from a chair,  the crutches and put them in one hand beside your injured leg. Put your weight on the handgrips of the crutches and on your strong leg to stand up. Walking up stairs    To go up stairs, step up with your strong leg and then bring the crutches and your injured leg to the upper step. For stairs that have handrails: Put both crutches under the arm opposite the handrail. Use the hand opposite the handrail to hold both crutches by the handgrips. Hold onto the handrail as you go up. Put your strong leg on the step first when you go up. Walking down stairs    To go down stairs, put your crutches and injured leg on the lower step. Bring your strong leg to the lower step. This saying may help you remember: \"Up with the good, down with the bad. \"  For stairs that have handrails: Put both crutches under the arm opposite the handrail. Use the hand opposite the handrail to hold both crutches by the handgrips. Hold onto the handrail as you go down. Follow the same process you use for stairs: Lead with your crutches and injured leg on the way down.

## 2023-01-25 LAB
AVERAGE GLUCOSE: ABNORMAL
HBA1C MFR BLD: 6.4 %
PROSTATE SPECIFIC ANTIGEN: 4.76 NG/ML

## 2023-02-07 ENCOUNTER — OFFICE VISIT (OUTPATIENT)
Dept: ORTHOPEDIC SURGERY | Age: 68
End: 2023-02-07

## 2023-02-07 DIAGNOSIS — M76.62 ACHILLES BURSITIS OF LEFT LOWER EXTREMITY: Primary | ICD-10-CM

## 2023-02-07 PROCEDURE — 99024 POSTOP FOLLOW-UP VISIT: CPT | Performed by: ORTHOPAEDIC SURGERY

## 2023-02-07 NOTE — PROGRESS NOTES
Name: Brent Carrion  YOB: 1955  Gender: male  MRN: [id number]      Procedure: Left Posterior ankle scope and amber SUPINE//PACEMAKER - Left    Surgery Date: 1/11/2023  POD -   POV -    Subjective: Denies fevers chills or infection. Denies any signs of spreading erythema. Exam: Wound healing appropriately. No signs of dehiscence or infection. No signs of erythema or drainage. With toes warm and well-perfused. Wounds: appears to be healing well with good reapproximation, healing well , sutures in place and were removed without difficulty  Edema/swelling: mild at the incision and surgical site  Tenderness: mild at the incision and surgical site        Neuro:  decreased SILT in entire foot.   [place] Monofilament 5.07 exam normal however    Vascular: BLE: 2+ DP pulse, toes wwp w/ BCR<2s    Imaging:   No imaging reviewed      Complications post op: none    Assessment/Plan:    Weight-bear as tolerated in cam boot  Follow-up in 4 weeks  Transition to normal shoes at that time

## 2023-02-17 ENCOUNTER — OFFICE VISIT (OUTPATIENT)
Dept: ENDOCRINOLOGY | Age: 68
End: 2023-02-17
Payer: MEDICARE

## 2023-02-17 VITALS — DIASTOLIC BLOOD PRESSURE: 88 MMHG | SYSTOLIC BLOOD PRESSURE: 112 MMHG

## 2023-02-17 DIAGNOSIS — Z79.4 TYPE 2 DIABETES MELLITUS WITH HYPERGLYCEMIA, WITH LONG-TERM CURRENT USE OF INSULIN (HCC): Primary | ICD-10-CM

## 2023-02-17 DIAGNOSIS — I10 ESSENTIAL HYPERTENSION: ICD-10-CM

## 2023-02-17 DIAGNOSIS — N18.31 STAGE 3A CHRONIC KIDNEY DISEASE (HCC): ICD-10-CM

## 2023-02-17 DIAGNOSIS — E11.65 TYPE 2 DIABETES MELLITUS WITH HYPERGLYCEMIA, WITH LONG-TERM CURRENT USE OF INSULIN (HCC): Primary | ICD-10-CM

## 2023-02-17 DIAGNOSIS — R80.9 ALBUMINURIA: ICD-10-CM

## 2023-02-17 DIAGNOSIS — E78.00 HYPERCHOLESTEROLEMIA: ICD-10-CM

## 2023-02-17 PROCEDURE — 99214 OFFICE O/P EST MOD 30 MIN: CPT | Performed by: INTERNAL MEDICINE

## 2023-02-17 PROCEDURE — 3074F SYST BP LT 130 MM HG: CPT | Performed by: INTERNAL MEDICINE

## 2023-02-17 PROCEDURE — 1123F ACP DISCUSS/DSCN MKR DOCD: CPT | Performed by: INTERNAL MEDICINE

## 2023-02-17 PROCEDURE — 3079F DIAST BP 80-89 MM HG: CPT | Performed by: INTERNAL MEDICINE

## 2023-02-17 RX ORDER — CARVEDILOL 12.5 MG/1
12.5 TABLET ORAL 2 TIMES DAILY WITH MEALS
Qty: 180 TABLET | Refills: 3
Start: 2023-02-17

## 2023-02-17 RX ORDER — SEMAGLUTIDE 1.34 MG/ML
1 INJECTION, SOLUTION SUBCUTANEOUS WEEKLY
Qty: 9 ML | Refills: 3
Start: 2023-02-17

## 2023-02-17 RX ORDER — INSULIN GLARGINE 100 [IU]/ML
54 INJECTION, SOLUTION SUBCUTANEOUS 2 TIMES DAILY
Qty: 90 ML | Refills: 3
Start: 2023-02-17

## 2023-02-17 RX ORDER — AMLODIPINE BESYLATE 5 MG/1
5 TABLET ORAL DAILY
Qty: 90 TABLET | Refills: 3
Start: 2023-02-17

## 2023-02-17 RX ORDER — ATORVASTATIN CALCIUM 10 MG/1
10 TABLET, FILM COATED ORAL DAILY
Qty: 90 TABLET | Refills: 3
Start: 2023-02-17

## 2023-02-17 RX ORDER — PIOGLITAZONEHYDROCHLORIDE 30 MG/1
15 TABLET ORAL DAILY
Qty: 45 TABLET | Refills: 3
Start: 2023-02-17

## 2023-02-17 NOTE — PROGRESS NOTES
Mildred Paniagua MD, [address] [facility]            Reason for visit: follow-up of type 2 diabetes mellitus      ASSESSMENT AND PLAN:    1. Type 2 diabetes mellitus with hyperglycemia, with long-term current use of insulin (HCC)  Glycemic control is quite good. No changes. - LANTUS SOLOSTAR 100 UNIT/ML injection pen; Inject 54 Units into the skin 2 times daily  Dispense: 90 mL; Refill: 3  - OZEMPIC, 1 MG/DOSE, 4 MG/3ML SOPN; Inject 1 mg into the skin once a week  Dispense: 9 mL; Refill: 3  - metFORMIN (GLUCOPHAGE) 500 MG tablet; Take 1 tablet by mouth 2 times daily (with meals)  Dispense: 180 tablet; Refill: 3  - pioglitazone (ACTOS) 30 MG tablet; Take 0.5 tablets by mouth daily  Dispense: 45 tablet; Refill: 3    2. Hypercholesterolemia  LDL is at target. - atorvastatin (LIPITOR) 10 MG tablet; Take 1 tablet by mouth daily  Dispense: 90 tablet; Refill: 3    3. Essential hypertension  BP Readings from Last 3 Encounters:   02/17/23 112/88   01/11/23 111/70   12/14/22 124/80     - amLODIPine (NORVASC) 5 MG tablet; Take 1 tablet by mouth daily  Dispense: 90 tablet; Refill: 3  - carvedilol (COREG) 12.5 MG tablet; Take 1 tablet by mouth 2 times daily (with meals)  Dispense: 180 tablet; Refill: 3    4. Stage 3a chronic kidney disease ([facility] [address]    5. Albuminuria  He has a history of angioedema with both ACE inhibitors and angiotensin receptor blockers. Intensive control of blood pressure is paramount. Follow-up and Dispositions    Return for 4-6 months. History of Present Illness:    DIABETES MELLITUS  Blaze Flores is here for follow-up of type 2 diabetes mellitus. He is currently treated with Ozempic 1 mg weekly, Lantus 54 units BID, Actos 15 mg daily, and metformin 500 mg BID.        Date of diagnosis: type 2 diabetes mellitus in ~2005     Diet:  No specific diet     Exercise:  no regular exercise     Diabetes education: The patient has received formal diabetes education (March and April 2018). Home blood glucose monitoring frequency: 2 times per day     Blood glucose: by recall              fasting 80s-130s              AC lunch not checked               AC supper not checked              Bedtime 90s-170s (mostly 110s to 150s)              30-day average ? Hypoglycemia: rare. He had a single episode of severe hypoglycemia (BG 35, fasting, October 2021). Other than that, lowest recent BG has been in the 80s. Hemoglobin A1c:  4/23/2015: 7.2%. 7/15/2015: 7.2%. 4/13/2016: 8.0%. 7/18/2016: 8.3%. 8/17/2016:  8.2%. 10/13/2016: 7.5%. 12/30/2016: 7.4%. 5/5/2017:  7.0%. 9/15/2017: 6.7%. 2/20/2018: 8.4%.  6/29/2018: 7.0%. 9/6/2018: 8.2%.  3/29/2019: 7.8%. 8/23/2019: 7.5%. 11/6/2020: 7.0%. 3/9/2021: 6.7%. 6/7/2021: 6.5%. 9/10/2021: 6.3%.  3/11/2022: 6.4%.  6/20/2022: 6.6%. 11/21/2022: 7.1%.  1/25/2023: 6.4%. Diabetic complications:                Retinopathy: Last eye exam was 3/23/2022 and demonstrated no diabetic retinopathy. Eye care specialist is VERONICA [facility]). Albuminuria/nephropathy:                          4/22/2016: Urine microalbumin 45.2.                          8/17/2016:  Urine microlbumin 291 (+), serum creatinine 1.3.                          4/25/2017: Urine microalbumin to creatinine ratio 100.7 (+).                          5/5/2017:  Serum creatinine 1.7 (GFR 42%). 2/20/2018: Urine microalbumin to creatinine ratio 148.7 (+), serum creatinine 1.51 (GFR 48%). 6/18/2018: Serum creatinine 1.52 (GFR 48%). 11/9/2018: Serum creatinine 1.28 (GFR 59%). 5/10/2019: Urine microalbumin creatinine ratio 85.9 (+).                          8/23/2019: Urine microalbumin 43.00 (+), serum creatinine 1.80 (GFR 38%). 8/20/2020: Serum creatinine 1.55 (GFR 48%). 3/9/2021: Urine microalbumin to creatinine ration 152 (+), serum creatinine 1.25.                          2/16/2022: Serum creatinine 1.28.    6/20/2022: Urine microalbumin to creatinine ratio 96 (+), serum creatinine 1.60 (GFR 46%). 1/25/2023: Serum creatinine 1.30 (GFR 60%). Neuropathy:  numbness, tingling, and allodynia of hands and feet     Other pertinent labs:              Lipids:                          10/13/2016: Total cholesterol 115, triglycerides 276, HDL 24, LDL 36.                          4/25/2017: Total cholesterol 100, triglycerides 245, HDL 21, LDL 30.                          2/20/2018: Total cholesterol 111, triglycerides 432, HDL 22, LDL cannot be calculated secondary to high triglycerides. 5/4/2018: Total cholesterol 119, triglycerides 247, HDL 26, LDL 44.                          5/10/2019: Total cholesterol 99, triglycerides 221, HDL 25, LDL 30.                          8/23/2019: Total cholesterol 106, triglycerides 252, HDL 23, LDL 33.                          3/9/2021: Total cholesterol 120, triglycerides 187, HDL 29, LDL 60.                          6/7/2021: Total cholesterol 116, triglycerides 165, HDL 27, LDL 56.    6/20/2020: Total cholesterol 117, triglycerides 222, HDL 30, LDL 42.6.    1/25/2023: Total cholesterol 145, triglycerides 208, HDL 32, LDL 71. Thyroid:                          8/23/2019: TSH 1.44.                          3/9/2021: TSH 2.240.    1/25/2023: TSH 1.98, free T4 0.88. Review of Systems   Constitutional:  Negative for fatigue and unexpected weight change. Eyes:  Negative for visual disturbance. /88 (Site: Left Upper Arm, Position: Sitting)   Wt Readings from Last 3 Encounters:   01/05/23 (!) 330 lb (149.7 kg)   12/14/22 (!) 341 lb (154.7 kg)   11/21/22 (!) 330 lb (149.7 kg)       Physical Exam  Constitutional:       Appearance: Normal appearance.    HENT: Head: Normocephalic. Neck:      Thyroid: No thyroid mass or thyromegaly. Cardiovascular:      Rate and Rhythm: Normal rate and regular rhythm. Pulmonary:      Effort: Pulmonary effort is normal.      Breath sounds: Normal breath sounds. Neurological:      Mental Status: He is alert. Psychiatric:         Mood and Affect: Mood normal.         Behavior: Behavior normal.       No orders of the defined types were placed in this encounter.       Current Outpatient Medications   Medication Sig Dispense Refill    LANTUS SOLOSTAR 100 UNIT/ML injection pen Inject 54 Units into the skin 2 times daily 90 mL 3    metFORMIN (GLUCOPHAGE) 500 MG tablet Take 1 tablet by mouth 2 times daily (with meals) 180 tablet 3    OZEMPIC, 1 MG/DOSE, 4 MG/3ML SOPN Inject 1 mg into the skin once a week 9 mL 3    pioglitazone (ACTOS) 30 MG tablet Take 0.5 tablets by mouth daily 45 tablet 3    atorvastatin (LIPITOR) 10 MG tablet Take 1 tablet by mouth daily 90 tablet 3    carvedilol (COREG) 12.5 MG tablet Take 1 tablet by mouth 2 times daily (with meals) 180 tablet 3    amLODIPine (NORVASC) 5 MG tablet Take 1 tablet by mouth daily 90 tablet 3    ondansetron (ZOFRAN) 4 MG tablet Take 1 tablet by mouth daily as needed for Nausea or Vomiting 30 tablet 0    docusate sodium (COLACE) 100 MG capsule Take 1 capsule by mouth daily as needed for Constipation 30 capsule 0    aspirin (ASPIRIN 81) 81 MG EC tablet Take 1 tablet by mouth in the morning and at bedtime 21 tablet 0    Omega-3 Fatty Acids (FISH OIL PO) Take by mouth      TURMERIC CURCUMIN PO Take 1 tablet by mouth in the morning and at bedtime      KLOR-CON 10 10 MEQ extended release tablet TAKE ONE TABLET BY MOUTH TWICE A  tablet 3    Lidocaine 5 % CREA Topical 5% Lidocaine and 5% Neurontin to apply to affected area up to 4 times/day as needed for pain 45 g 2    colchicine (COLCRYS) 0.6 MG tablet Take 1 tablet by mouth daily (Patient taking differently: Take 0.6 mg by mouth as needed) 30 tablet 1    torsemide (DEMADEX) 20 MG tablet TAKE ONE TABLET BY MOUTH TWICE A  tablet 3    acetaminophen (TYLENOL) 650 MG extended release tablet Take 1,300 mg by mouth in the morning and 1,300 mg in the evening. albuterol sulfate  (90 Base) MCG/ACT inhaler Inhale 2 puffs into the lungs every 4 hours as needed for Wheezing or Shortness of Breath      albuterol (PROVENTIL) (2.5 MG/3ML) 0.083% nebulizer solution Inhale 2.5 mg into the lungs every 6 hours as needed      allopurinol (ZYLOPRIM) 300 MG tablet Take 300 mg by mouth daily      ammonium lactate (LAC-HYDRIN) 12 % lotion Apply topically as needed      apixaban (ELIQUIS) 5 MG TABS tablet Take 5 mg by mouth 2 times daily      ascorbic acid (VITAMIN C) 500 MG tablet Take by mouth      Camphor-Menthol-Methyl Sal 3.1-6-10 % PTCH Apply topically      diclofenac sodium (VOLTAREN) 1 % GEL Apply 4 g topically 4 times daily as needed      ferrous sulfate (IRON 325) 325 (65 Fe) MG tablet Take by mouth as needed      fluticasone (FLONASE) 50 MCG/ACT nasal spray 2 sprays by Nasal route as needed      fluticasone-salmeterol (ADVAIR) 500-50 MCG/ACT AEPB diskus inhaler Inhale 1 puff into the lungs 2 times daily as needed      loratadine (CLARITIN) 10 MG tablet Take 10 mg by mouth daily      methocarbamol (ROBAXIN) 500 MG tablet Take 500 mg by mouth in the morning and at bedtime      omeprazole (PRILOSEC) 20 MG delayed release capsule Take 20 mg by mouth Daily      ondansetron (ZOFRAN-ODT) 4 MG disintegrating tablet Take 4 mg by mouth every 8 hours as needed for Nausea or Vomiting      tiotropium (SPIRIVA RESPIMAT) 2.5 MCG/ACT AERS inhaler Inhale 2 puffs into the lungs daily      trolamine salicylate (ASPER-FLEX) 10 % cream Apply topically as needed       No current facility-administered medications for this visit. Gavin Quigley MD, FACE      Portions of this note were generated with the assistance of voice recognition software.   As such, some errors in transcription may be present.

## 2023-03-02 DIAGNOSIS — I44.2 ATRIOVENTRICULAR BLOCK, COMPLETE (HCC): ICD-10-CM

## 2023-03-02 DIAGNOSIS — I50.22 CHRONIC SYSTOLIC (CONGESTIVE) HEART FAILURE (HCC): ICD-10-CM

## 2023-03-02 DIAGNOSIS — Z95.0 PRESENCE OF CARDIAC PACEMAKER: ICD-10-CM

## 2023-03-07 ENCOUNTER — OFFICE VISIT (OUTPATIENT)
Dept: ORTHOPEDIC SURGERY | Age: 68
End: 2023-03-07

## 2023-03-07 DIAGNOSIS — M76.62 ACHILLES BURSITIS OF LEFT LOWER EXTREMITY: Primary | ICD-10-CM

## 2023-03-07 PROCEDURE — 99024 POSTOP FOLLOW-UP VISIT: CPT | Performed by: ORTHOPAEDIC SURGERY

## 2023-03-07 NOTE — PROGRESS NOTES
Name: Reg Dawson  YOB: 1955  Gender: male  MRN: [id number]      Procedure: Left Posterior ankle scope and amber SUPINE//PACEMAKER - Left    Surgery Date: 1/11/2023  POD -   POV -    Subjective: Denies fevers chills or infection. Denies any signs of spreading erythema. States he is doing very well with no pain. He is having some venous stasis disease in his legs. His melanoma was removed from his left arm. He does see his dermatologist next week. He still in a lot of pain in his left arm. Exam: Wound healing appropriately. No signs of dehiscence or infection. No signs of erythema or drainage. With toes warm and well-perfused. Wounds: appears to be healing well with good reapproximation, healing well ,   Edema/swelling: mild at the incision and surgical site  Tenderness: mild at the incision and surgical site    Great range of motion with no pain today. No signs of infection. He does have venous stasis disease. Neuro:  decreased SILT in entire foot.   [place] Monofilament 5.07 exam normal however    Vascular: BLE: 2+ DP pulse, toes wwp w/ BCR<2s    Imaging:   No imaging reviewed      Complications post op: none    Assessment/Plan:    Transition to extra-depth shoes with custom inserts  Follow-up in 2 months  No x-rays needed

## 2023-03-13 ENCOUNTER — OFFICE VISIT (OUTPATIENT)
Dept: SURGERY | Age: 68
End: 2023-03-13
Payer: MEDICARE

## 2023-03-13 VITALS
HEART RATE: 70 BPM | WEIGHT: 315 LBS | BODY MASS INDEX: 44.1 KG/M2 | SYSTOLIC BLOOD PRESSURE: 120 MMHG | OXYGEN SATURATION: 90 % | DIASTOLIC BLOOD PRESSURE: 72 MMHG | HEIGHT: 71 IN

## 2023-03-13 DIAGNOSIS — C43.62 MELANOMA OF SHOULDER, LEFT (HCC): ICD-10-CM

## 2023-03-13 DIAGNOSIS — C43.62 MALIGNANT MELANOMA OF LEFT UPPER EXTREMITY INCLUDING SHOULDER (HCC): Primary | ICD-10-CM

## 2023-03-13 PROCEDURE — 99204 OFFICE O/P NEW MOD 45 MIN: CPT | Performed by: SURGERY

## 2023-03-13 PROCEDURE — 1123F ACP DISCUSS/DSCN MKR DOCD: CPT | Performed by: SURGERY

## 2023-03-13 PROCEDURE — 3074F SYST BP LT 130 MM HG: CPT | Performed by: SURGERY

## 2023-03-13 PROCEDURE — 3078F DIAST BP <80 MM HG: CPT | Performed by: SURGERY

## 2023-03-13 RX ORDER — CLARITHROMYCIN 500 MG/1
500 TABLET, COATED ORAL 2 TIMES DAILY
COMMUNITY

## 2023-03-13 RX ORDER — METRONIDAZOLE 500 MG/1
500 TABLET ORAL 3 TIMES DAILY
COMMUNITY

## 2023-03-13 NOTE — PROGRESS NOTES
H&P/Consult Note/Progress Note/Office Note:   Edith Galarza  MRN: [id number]  :1955  Age:67 y.o.    HPI: Edith Galarza is a 75 y.o. male who had a recent shave biopsy of the left anterior shoulder pigmented lesion which was growing significantly in size since 2022 and was found to have malignant melanoma as shown below    pT4b  Breslow 6.6 mm, IV, +ulceration and LVI, 14 mitoses/sq mm, -Regression or microsatellitosis    He weighs 330 pounds and has a BMI>46    He has a pacemaker and defibrillator  No headaches, seizures, hemoptysis, frequent respiratory infections, weight loss.           Past Medical History:   Diagnosis Date    Acute pancreatitis 2022    Albuminuria     Allergic rhinitis     Asthma     followed by [facility]; daily and rescue inhalers    Atrial fibrillation ([facility] [address]     followed by [facility]; on Eliquis    Benign essential hypertension     on med for control    BMI 45.0-49.9, adult ([facility] [address]     Calculous cholecystitis 2014    Cellulitis of arm     Chronic lower back pain     Chronic pain     Chronic systolic (congestive) heart failure (HCC)     CKD (chronic kidney disease)     stage 3    COVID-19 2020    10 days in hospital    COVID-19 2022    not hospitalized    Edema     Former smoker     Gastroesophageal reflux disease     Gout     Hepatic steatosis     History of blood transfusion     Hypercholesteremia     Hypertriglyceridemia     Iron deficiency anemia     Melanoma of left upper arm (HCC)     Normochromic normocytic anemia     Obstructive sleep apnea on CPAP     Osteoarthritis     Pacemaker     [employer] biventricular pacemaker    Peptic ulcer disease     pT4b Melanoma of left shoulder/upper arm 3/13/2023    Sensory neuropathy     Type 2 diabetes mellitus with stage 3a chronic kidney disease, with long-term current use of insulin (HCC)     Vitamin D deficiency      Past Surgical History:   Procedure Laterality Date    ACHILLES TENDON SURGERY Left 01/2023    CARDIAC CATHETERIZATION  2020    mild nonobstructive    CHOLECYSTECTOMY, LAPAROSCOPIC  2014    Dr. Cherie Mario    COLONOSCOPY  09/15/2022    ESOPHAGOGASTRODUODENOSCOPY  09/15/2022    GASTROCNEMIUS RECESSION Left 01/11/2023    Left Posterior ankle scope and amber SUPINE//PACEMAKER performed by Marco A Wilson MD at [address]  2014    Dr. Salina Awan Bilateral     multiple knee surgeries prior to TKA's    ORTHOPEDIC SURGERY  1990's    right hand    PACEMAKER PLACEMENT  12/29/2020    [employer] biventricular pacemaker    PACEMAKER PLACEMENT  2014    Dr. Jax Medel Bilateral     UPPP UVULOPALATOPHARYGOPLASTY  mid 2000's     Current Outpatient Medications   Medication Sig    metroNIDAZOLE (FLAGYL) 500 MG tablet Take 500 mg by mouth 3 times daily    clarithromycin (BIAXIN) 500 MG tablet Take 500 mg by mouth 2 times daily    LANTUS SOLOSTAR 100 UNIT/ML injection pen Inject 54 Units into the skin 2 times daily    metFORMIN (GLUCOPHAGE) 500 MG tablet Take 1 tablet by mouth 2 times daily (with meals)    OZEMPIC, 1 MG/DOSE, 4 MG/3ML SOPN Inject 1 mg into the skin once a week    pioglitazone (ACTOS) 30 MG tablet Take 0.5 tablets by mouth daily    atorvastatin (LIPITOR) 10 MG tablet Take 1 tablet by mouth daily    carvedilol (COREG) 12.5 MG tablet Take 1 tablet by mouth 2 times daily (with meals)    amLODIPine (NORVASC) 5 MG tablet Take 1 tablet by mouth daily    ondansetron (ZOFRAN) 4 MG tablet Take 1 tablet by mouth daily as needed for Nausea or Vomiting    docusate sodium (COLACE) 100 MG capsule Take 1 capsule by mouth daily as needed for Constipation    aspirin (ASPIRIN 81) 81 MG EC tablet Take 1 tablet by mouth in the morning and at bedtime    Omega-3 Fatty Acids (FISH OIL PO) Take by mouth    TURMERIC CURCUMIN PO Take 1 tablet by mouth in the morning and at bedtime    KLOR-CON 10 10 MEQ extended release tablet TAKE ONE TABLET BY MOUTH TWICE A DAY    Lidocaine 5 % CREA Topical 5% Lidocaine and 5% Neurontin to apply to affected area up to 4 times/day as needed for pain    colchicine (COLCRYS) 0.6 MG tablet Take 1 tablet by mouth daily (Patient taking differently: Take 0.6 mg by mouth as needed)    torsemide (DEMADEX) 20 MG tablet TAKE ONE TABLET BY MOUTH TWICE A DAY    acetaminophen (TYLENOL) 650 MG extended release tablet Take 1,300 mg by mouth in the morning and 1,300 mg in the evening.     albuterol sulfate  (90 Base) MCG/ACT inhaler Inhale 2 puffs into the lungs every 4 hours as needed for Wheezing or Shortness of Breath    albuterol (PROVENTIL) (2.5 MG/3ML) 0.083% nebulizer solution Inhale 2.5 mg into the lungs every 6 hours as needed    allopurinol (ZYLOPRIM) 300 MG tablet Take 300 mg by mouth daily    ammonium lactate (LAC-HYDRIN) 12 % lotion Apply topically as needed    apixaban (ELIQUIS) 5 MG TABS tablet Take 5 mg by mouth 2 times daily    ascorbic acid (VITAMIN C) 500 MG tablet Take by mouth    Camphor-Menthol-Methyl Sal 3.1-6-10 % PTCH Apply topically    diclofenac sodium (VOLTAREN) 1 % GEL Apply 4 g topically 4 times daily as needed    ferrous sulfate (IRON 325) 325 (65 Fe) MG tablet Take by mouth as needed    fluticasone (FLONASE) 50 MCG/ACT nasal spray 2 sprays by Nasal route as needed    fluticasone-salmeterol (ADVAIR) 500-50 MCG/ACT AEPB diskus inhaler Inhale 1 puff into the lungs 2 times daily as needed    loratadine (CLARITIN) 10 MG tablet Take 10 mg by mouth daily    methocarbamol (ROBAXIN) 500 MG tablet Take 500 mg by mouth in the morning and at bedtime    omeprazole (PRILOSEC) 20 MG delayed release capsule Take 20 mg by mouth Daily    ondansetron (ZOFRAN-ODT) 4 MG disintegrating tablet Take 4 mg by mouth every 8 hours as needed for Nausea or Vomiting    tiotropium (SPIRIVA RESPIMAT) 2.5 MCG/ACT AERS inhaler Inhale 2 puffs into the lungs daily    trolamine salicylate (ASPER-FLEX) 10 % cream Apply topically as needed     No current facility-administered medications for this visit. ALLERGIES:  Lisinopril, Losartan, Morphine, Penicillins, and Tizanidine    Social History     Socioeconomic History    Marital status:      Spouse name: None    Number of children: None    Years of education: None    Highest education level: None   Tobacco Use    Smoking status: Former     Packs/day: 0.25     Years: 1.00     Pack years: 0.25     Types: Cigarettes     Quit date: 1975     Years since quittin.2    Smokeless tobacco: Former     Types: Chew   Vaping Use    Vaping Use: Never used   Substance and Sexual Activity    Alcohol use: No     Alcohol/week: 0.0 standard drinks    Drug use: No     Social Determinants of Health     Financial Resource Strain: Low Risk     Difficulty of Paying Living Expenses: Not hard at all   Food Insecurity: No Food Insecurity    Worried About Running Out of Food in the Last Year: Never true    Rogelio of Food in the Last Year: Never true     Social History     Tobacco Use   Smoking Status Former    Packs/day: 0.25    Years: 1.00    Pack years: 0.25    Types: Cigarettes    Quit date: 1975    Years since quittin.2   Smokeless Tobacco Former    Types: [de-identified]     Family History   Problem Relation Age of Onset    Heart Disease Sister     Diabetes Sister     No Known Problems Brother     Hypertension Sister     Heart Disease Sister     Diabetes Sister     Cancer Sister     Diabetes Father     Hypertension Father     Stroke Father     Heart Disease Father     Diabetes Mother     Hypertension Sister     Cancer Brother     Heart Disease Mother      ROS: The patient has no difficulty with chest pain or shortness of breath. No fever or chills. Comprehensive review of systems was otherwise unremarkable except as noted above.     Physical Exam:   /72   Pulse 70   Ht 5' 11\" (1.803 m)   Wt (!) 330 lb (149.7 kg)   SpO2 90%   BMI 46.03 kg/m²   Vitals:    23 0824   BP: 120/72   Pulse: 70   SpO2: 90% Weight: (!) 330 lb (149.7 kg)   Height: 5' 11\" (1.803 m)     [unfilled]  [unfilled]    Constitutional: Alert, oriented, cooperative patient in no acute distress; appears stated age    Eyes:Sclera are clear. EOMs intact  ENMT: no external lesions gross hearing normal; no obvious neck masses, no ear or lip lesions, nares normal  CV: RRR. Normal perfusion  Resp: No JVD. Breathing is  non-labored; no audible wheezing. GI: soft and non-distended       Healing shave biopsy site left anterior shoulder/upper arm region  No satellite nodules  His left axilla is full with a lot of adipose tissue. His BMI is over 46. Exam is very difficult. Musculoskeletal: unremarkable with normal function. No embolic signs or cyanosis. Neuro:  Oriented; moves all 4; no focal deficits  Psychiatric: normal affect and mood, no memory impairment    Recent vitals (if inpt):  @IPVITALS(24:)@    Amount and/or Complexity of Data Reviewed and Analyzed:  I reviewed and analyzed all of the unique labs and radiologic studies that are shown below as well as any that are in the HPI, and any that are in the expanded problem list below  *Each unique test, order, or document contributes to the combination of 2 or combination of 3 in Category 1 below. For this visit I also reviewed old records and prior notes. No results for input(s): WBC, HGB, PLT, NA, K, CL, CO2, BUN, CREA, GLU, INR, APTT, ALT, AML, AML, LCAD, PCO2, PO2, HCO3 in the last 72 hours.     Invalid input(s): PTP, TBIL, TBILI, CBIL, SGOT, GPT, AP, LPSE, NH4, TROPT, TROIQ,  PH  Review of most recent CBC  Lab Results   Component Value Date    WBC 14.2 (H) 01/15/2022    HGB 12.0 (L) 01/15/2022    HCT 38.3 (L) 01/15/2022    MCV 88.5 01/15/2022     01/15/2022       Review of most recent BMP  Lab Results   Component Value Date/Time     06/20/2022 09:35 AM    K 4.8 06/20/2022 09:35 AM     06/20/2022 09:35 AM    CO2 29 06/20/2022 09:35 AM    BUN 22 06/20/2022 09:35 AM    CREATININE 1.60 06/20/2022 09:35 AM    GLUCOSE 95 06/20/2022 09:35 AM    CALCIUM 9.2 06/20/2022 09:35 AM        Review of most recent LFTs (and lipase if done)  Lab Results   Component Value Date    ALT 21 06/20/2022    AST 21 06/20/2022    ALKPHOS 122 06/20/2022    BILITOT 0.5 06/20/2022     Lab Results   Component Value Date    LIPASE 491 (H) 01/15/2022       Lab Results   Component Value Date/Time    INR 1.1 12/29/2020 09:13 AM       Review of most recent HgbA1c  Hemoglobin A1C   Date Value Ref Range Status   01/25/2023 6.4 % Final       Nutritional assessment screen for wound healing issues:  Lab Results   Component Value Date    LABALBU 3.8 06/20/2022       @lastcovr@    Xray Result (most recent):  XR ANKLE LEFT (MIN 3 VIEWS) 10/26/2022    Narrative  AUTOMATIC ADMINISTRATIVE RESULT    The result for this exam can be found in the Progress note in the chart. Impression  See Progress note in the chart. CT Result (most recent):  CT ABDOMEN PELVIS W IV CONTRAST 01/13/2022    Narrative  EXAM: CT abdomen and pelvis with IV contrast.    INDICATION: Abdominal pain. COMPARISON: Prior CT abdomen and pelvis on May 1, 2020. TECHNIQUE: Axial CT images of the abdomen and pelvis were obtained after the  intravenous injection of 100 mL Isovue 370 CT contrast. Radiation dose reduction  techniques were used for this study. Our CT scanners use one or all of the  following:  Automated exposure control, adjustment of the mA or kV according to  patient size, iterative reconstruction. FINDINGS:    - Liver: The liver has a somewhat lobulated contour, raising the possibility of  underlying cirrhosis. No focal liver mass is seen. - Gallbladder and bile ducts: The gallbladder is absent, with no biliary  dilatation.  - Spleen: Within normal limits. - Urinary tract: Within normal limits. - Adrenals: Within normal limits. - Pancreas:  There is mild stranding around the pancreatic head and tail, suspect  for acute pancreatitis. The pancreas is otherwise normal in appearance. No  pancreatic necrosis, ductal dilatation or pseudocyst is seen.  - Gastrointestinal tract: Colonic diverticulosis, without evidence of acute  diverticulitis. Small bowel and appendix are normal.  - Retroperitoneum: Mild abdominal aortic atherosclerosis, with no aneurysmal  dilatation or dissection.  - Peritoneal cavity and abdominal wall: No ascites or free intraperitoneal air.  There is a 3.5 cm fat-containing umbilical hernia.  - Pelvis: Within normal limits.  - Spine/bones: No acute process.  - Other comments: There is mild atelectasis or scarring in the left lung base.    Impression  1. Mild hazy stranding in the peripancreatic fat, suspect for acute  pancreatitis.  2. Prior cholecystectomy.  3. The liver has a somewhat lobulated contour, possibly due to underlying  cirrhosis.  4. Colonic diverticulosis, without evidence of acute diverticulitis.    US Result (most recent):  US DUP LOWER EXTREMITY LEFT HYACINTH 01/08/2022    Narrative  Exam: Left lower extremity venous ultrasound  Indication:  Pain and swelling.  Comparison: None.    Doppler ultrasound of the left lower extremity was performed.    FINDINGS:  There is normal flow in the common femoral, superficial femoral, and  popliteal veins. Normal compression and augmentation is demonstrated. The  proximal calf veins are also patent.    Impression  1. No evidence of deep venous thrombosis in the left lower extremity.      Admission date (for inpatients): (Not on file)   * No surgery found *  * No surgery found *        ASSESSMENT/PLAN:  [unfilled]  Active Problems:    * No active hospital problems. *  Resolved Problems:    * No resolved hospital problems. *     Patient Active Problem List    Diagnosis Date Noted    pT4b Melanoma of left shoulder/upper arm 03/13/2023     Priority: Medium    Encounter for monitoring allopurinol therapy 11/26/2022     Priority: Medium     Check uric acid level next  visit      Atypical pigmented skin lesion 11/26/2022     Priority: Medium     [facility]. [facility] OR [facility] for [facility]      Anticoagulated 11/26/2022     Priority: Medium    Acute gastritis with hemorrhage 09/29/2022     Priority: Medium     Oct 02, 2007 Entered By: Hans Birch Comment: + esophagitis 9/07      Benign colonic polyp 09/29/2022     Priority: Medium    Chronic obstructive pulmonary disease ([address] 09/29/2022     Priority: Medium     Last Assessment & Plan:   Formatting of this note might be different from the original.  - Home meds  - Not in exacerbation      Chronic rhinitis 09/29/2022     Priority: Medium    Digestive system disorder 09/29/2022     Priority: Medium    Disorder of lung 09/29/2022     Priority: Medium    Neuropathy 09/29/2022     Priority: Medium    Numbness 09/29/2022     Priority: Medium    Osteoarthritis 09/29/2022     Priority: Medium    Chronic systolic (congestive) heart failure 09/19/2022     Priority: Medium    Albuminuria 08/17/2022     Priority: Medium    Acute non-recurrent ethmoidal sinusitis 03/09/2022     Priority: Medium     Last Assessment & Plan:   Formatting of this note might be different from the original.  -Patient with signs and symptoms of sinusitis. Lungs clear on exam.  Only with ethmoid frontal sinus discomfort without purulent drainage, fever, only going on for days now. -Recommended Covid and influenza testing today. Patient should quarantine per CDC guidelines until test have resulted. Will call with results and further management. -Recommended plenty of fluids and rest, wash hands often, Tylenol and Mucinex as needed for fever, discomfort, congestion and cough, will give Atrovent nasal spray today  -We will give back pocket prescription for doxycycline and recommended to only take if has persistent symptoms greater than a week or worsening symptoms consistent with bacterial sinusitis.   -Red flags and indications to seek emergent care discussed with the Patient/Guardian today. Will follow up as needed. Patient/Guardian agrees with plan. Normochromic normocytic anemia 03/09/2020     Priority: Medium     Last Assessment & Plan:   Formatting of this note might be different from the original.  This is likely hypoproliferative due to his longstanding chronic kidney disease, diabetes, and other comorbidities. He can continue oral iron but this may not be necessary. He does have microcytic indices but this appears unchanged for the last 6 years. He really has no GI complaints. Left ankle instability 12/14/2022     Added automatically from request for surgery [id number]      Elevated transaminase level 02/12/2022    Benign essential hypertension     Obstructive sleep apnea on CPAP     Class 3 severe obesity due to excess calories with serious comorbidity and body mass index (BMI) of 45.0 to 49.9 in adult (HCC)      Low carb diet advised/reviewed. Information given. History of acute pancreatitis 01/14/2022     Hospitalized at [facility]. Asthma     COVID-19 vaccination refused 11/28/2021     HAD COVID-19. Advised immunization anyway. High risk medication use 11/28/2021    Iron deficiency anemia, unspecified     Idiopathic chronic gout of multiple sites without tophus     Refused varicella vaccine 05/14/2021    Type 2 diabetes mellitus with stage 3b chronic kidney disease, with long-term current use of insulin ([facility] [address] 05/14/2021     Follows with Dr. Mackie Ganser 02/01/2021    Pulmonary hypertension ([facility] [address] 08/11/2020    History of 2019 novel coronavirus disease (COVID-19) 08/05/2020 8/5/20 at [facility]        Gout 06/17/2020    Gastroesophageal reflux disease 06/17/2020    Shortness of breath 05/22/2020    Ventral hernia without obstruction or gangrene 03/10/2020     Last Assessment & Plan:   Very large diastases recti extending into the umbilical area as well.     Surgery discussed attempting to repair this at the time of his   cholecystectomy but felt that it was too large and the risks outweigh   potential benefits. Type 2 diabetes mellitus with diabetic polyneuropathy, with long-term current use of insulin ([facility] [address] 11/29/2019     Follows with Dr. Grace Haas        History of colonic polyps 11/07/2019    Chronic right-sided low back pain without sciatica 05/10/2019    Type 2 diabetes mellitus with microalbuminuria, with long-term current use of insulin ([address] 11/16/2018     Follows with Dr. Grace Haas        Hypercholesterolemia 06/29/2018    Seasonal allergic rhinitis due to pollen     NSVT (nonsustained ventricular tachycardia) 09/27/2016    Atrioventricular block, complete ([address] 04/14/2016    Presence of cardiac pacemaker 01/13/2016    Edema of both lower extremities due to peripheral venous insufficiency 12/29/2015     Limit salt. Elevate legs when possible. Overall weight loss would also   help. Actinic keratoses 07/22/2015    Long term (current) use of anticoagulants     Vitamin D deficiency     Hepatic steatosis 03/25/2014    Longstanding persistent atrial fibrillation ([facility] [address] 04/10/2007          Number and Complexity of Problems addressed and   Risks of complications and/or morbidity of management        pT4b malignant melanoma of left anterior shoulder/upper arm  His left axilla feels full. He is morbidly obese with BMI greater than 46  I requested PET/CT imaging  He will see me back after to regroup. We discussed wide excision of the primary melanoma site with STSG and possible sentinel node excision following intraoperative mapping and identification if his PET CT is unremarkable for distant metastasis. Surgical risks discussed including prolonged wound healing, recurrence, infection, etc.    We discussed his increased risk given that his melanoma was T4b.           Level of MDM (2/3 elements below)  Number and Complexity of Problems Addressed Amount and/or Complexity of Data to be Reviewed and Analyzed  *Each unique test, order, or document contributes to the combination of 2 or combination of 3 in Category 1 below. Risk of Complications and/or Morbidity or Mortality of pt Management     12900  99288 SF Minimal  ?1self-limited or minor problem Minimal or none Minimal risk of morbidity from additional diagnostic testing or Rx   31249  [id number] Low Low  ? 2or more self-limited or minor problems;    or  ? 1stable chronic illness;    or  ?1acute, uncomplicated illness or injury   Limited  (Must meet the requirements of at least 1 of the 2 categories)  Category 1: Tests and documents   ? Any combination of 2 from the following:  ?Review of prior external note(s) from each unique source*;  ?review of the result(s) of each unique test*;   ?ordering of each unique test*    or   Category 2: Assessment requiring an independent historian(s)  (For the categories of independent interpretation of tests and discussion of management or test interpretation, see moderate or high) Low risk of morbidity from additional diagnostic testing or treatment     83680  06328 Mod Moderate  ? 1or more chronic illnesses with exacerbation, progression, or side effects of treatment;    or  ?2or more stable chronic illnesses;    or  ?1undiagnosed new problem with uncertain prognosis;    or  ?1acute illness with systemic symptoms;    or  ?1acute complicated injury   Moderate  (Must meet the requirements of at least 1 out of 3 categories)  Category 1: Tests, documents, or independent historian(s)  ? Any combination of 3 from the following:   ?Review of prior external note(s) from each unique source*;  ?Review of the result(s) of each unique test*;  ?Ordering of each unique test*;  ?Assessment requiring an independent historian(s)    or  Category 2: Independent interpretation of tests   ? Independent interpretation of a test performed by another physician/other qualified health care professional (not separately reported); or  Category 3: Discussion of management or test interpretation  ? Discussion of management or test interpretation with external physician/other qualified health care professional/appropriate source (not separately reported)   Moderate risk of morbidity from additional diagnostic testing or treatment  Examples only:  ?Prescription drug management   ? Decision regarding minor surgery with identified patient or procedure risk factors  ? Decision regarding elective major surgery without identified patient or procedure risk factors   ? Diagnosis or treatment significantly limited by social determinants of health       16596  09985 High High  ? 1or more chronic illnesses with severe exacerbation, progression, or side effects of treatment;    or  ?1 acute or chronic illness or injury that poses a threat to life or bodily function   Extensive  (Must meet the requirements of at least 2 out of 3 categories)  Category 1: Tests, documents, or independent historian(s)  ? Any combination of 3 from the following:   ?Review of prior external note(s) from each unique source*;  ?Review of the result(s) of each unique test*;   ?Ordering of each unique test*;   ?Assessment requiring an independent historian(s)    or   Category 2: Independent interpretation of tests   ? Independent interpretation of a test performed by another physician/other qualified health care professional (not separately reported);     or  Category 3: Discussion of management or test interpretation  ? Discussion of management or test interpretation with external physician/other qualified health care professional/appropriate source (not separately reported)   High risk of morbidity from additional diagnostic testing or treatment  Examples only:  ?Drug therapy requiring intensive monitoring for toxicity  ? Decision regarding elective major surgery with identified patient or procedure risk factors  ? Decision regarding emergency major surgery  ? Decision regarding hospitalization  ? Decision not to resuscitate or to de-escalate care because of poor prognosis      Parenteral controlled substances             I have personally performed a face-to-face diagnostic evaluation and management  service on this patient. I have independently seen the patient. I have independently obtained the above history from the patient/family. I have independently examined the patient with above findings. I have independently reviewed data/labs for this patient and developed the above plan of care (MDM).       Signed: Sara Barrett MD  3/13/2023    8:53 AM

## 2023-03-28 ENCOUNTER — HOSPITAL ENCOUNTER (OUTPATIENT)
Dept: PET IMAGING | Age: 68
Discharge: HOME | End: 2023-03-31
Payer: MEDICARE

## 2023-03-28 DIAGNOSIS — C43.62 MALIGNANT MELANOMA OF LEFT UPPER EXTREMITY INCLUDING SHOULDER (HCC): ICD-10-CM

## 2023-03-28 LAB
GLUCOSE BLD STRIP.AUTO-MCNC: 99 MG/DL (ref 65–100)
SERVICE CMNT-IMP: NORMAL

## 2023-03-28 PROCEDURE — 82962 GLUCOSE BLOOD TEST: CPT

## 2023-03-28 PROCEDURE — A9552 F18 FDG: HCPCS | Performed by: SURGERY

## 2023-03-28 PROCEDURE — 78816 PET IMAGE W/CT FULL BODY: CPT

## 2023-03-28 PROCEDURE — 6360000004 HC RX CONTRAST MEDICATION: Performed by: SURGERY

## 2023-03-28 PROCEDURE — 3430000000 HC RX DIAGNOSTIC RADIOPHARMACEUTICAL: Performed by: SURGERY

## 2023-03-28 PROCEDURE — 2580000003 HC RX 258: Performed by: SURGERY

## 2023-03-28 RX ORDER — SODIUM CHLORIDE 0.9 % (FLUSH) 0.9 %
20 SYRINGE (ML) INJECTION AS NEEDED
Status: DISCONTINUED | OUTPATIENT
Start: 2023-03-28 | End: 2023-04-01 | Stop reason: HOSPADM

## 2023-03-28 RX ORDER — FLUDEOXYGLUCOSE F 18 200 MCI/ML
11.37 INJECTION, SOLUTION INTRAVENOUS
Status: COMPLETED | OUTPATIENT
Start: 2023-03-28 | End: 2023-03-28

## 2023-03-28 RX ADMIN — FLUDEOXYGLUCOSE F 18 11.37 MILLICURIE: 200 INJECTION, SOLUTION INTRAVENOUS at 11:44

## 2023-03-28 RX ADMIN — SODIUM CHLORIDE, PRESERVATIVE FREE 20 ML: 5 INJECTION INTRAVENOUS at 11:44

## 2023-03-28 RX ADMIN — DIATRIZOATE MEGLUMINE AND DIATRIZOATE SODIUM 10 ML: 660; 100 LIQUID ORAL; RECTAL at 11:44

## 2023-04-10 ENCOUNTER — PREP FOR PROCEDURE (OUTPATIENT)
Dept: SURGERY | Age: 68
End: 2023-04-10

## 2023-04-10 ENCOUNTER — TELEPHONE (OUTPATIENT)
Dept: CARDIOLOGY CLINIC | Age: 68
End: 2023-04-10

## 2023-04-11 RX ORDER — SODIUM CHLORIDE 0.9 % (FLUSH) 0.9 %
5-40 SYRINGE (ML) INJECTION PRN
Status: CANCELLED | OUTPATIENT
Start: 2023-04-11

## 2023-04-11 RX ORDER — SODIUM CHLORIDE 9 MG/ML
INJECTION, SOLUTION INTRAVENOUS PRN
Status: CANCELLED | OUTPATIENT
Start: 2023-04-11

## 2023-04-11 RX ORDER — SODIUM CHLORIDE 0.9 % (FLUSH) 0.9 %
5-40 SYRINGE (ML) INJECTION EVERY 12 HOURS SCHEDULED
Status: CANCELLED | OUTPATIENT
Start: 2023-04-11

## 2023-04-15 NOTE — PROGRESS NOTES
TRANSFER - IN REPORT:    Verbal report received from [address], RN on [facility] being received from EP LAB for routine progression of care      Report consisted of patients Situation, Background, Assessment and Recommendations(SBAR). Information from the following report(s) Procedure Summary was reviewed with the receiving nurse. Opportunity for questions and clarification was provided. Assessment completed upon patients arrival to unit and care assumed. (2) everted (after stimulation)

## 2023-04-20 PROCEDURE — 93296 REM INTERROG EVL PM/IDS: CPT | Performed by: INTERNAL MEDICINE

## 2023-04-20 PROCEDURE — 93294 REM INTERROG EVL PM/LDLS PM: CPT | Performed by: INTERNAL MEDICINE

## 2023-04-24 ENCOUNTER — ANESTHESIA EVENT (OUTPATIENT)
Dept: SURGERY | Age: 68
End: 2023-04-24
Payer: OTHER GOVERNMENT

## 2023-04-24 NOTE — H&P
H&P/Consult Note/Progress Note/Office Note:   Sam Hart  MRN: [id number]  :1955  Age:68 y.o.    HPI: Sam Hart is a 70 y.o. male who is here for WLE left shoulder melanoma and  left axillary sentinel node excision on 23 for a cT4b melanoma of the left shoulder. Prior to surgery he had a recent shave biopsy of the left anterior shoulder pigmented lesion   which was growing significantly in size since 2022 and was found to have malignant melanoma as shown below    pT4b  Breslow 6.6 mm, IV, +ulceration and LVI, 14 mitoses/sq mm, -Regression or microsatellitosis    He weighs 330lbs and has a BMI>46  He has a pacemaker and defibrillator  No headaches, seizures, hemoptysis, frequent respiratory infections, weight loss. 3/28/23 PET/CT  FINDINGS: Normal physiologic uptake is identified within the salivary glands, myocardium, kidneys, ureters and bladder. Scattered areas of physiologic bowel uptake are also present. NECK: Lymph nodes: No pathologic activity. No enlarged lymph nodes. Additional findings: None. CHEST: Lungs: No pathologic activity. Respiratory motion artifact. Lungs are grossly clear. No pleural effusions. Lymph nodes: No pathologic activity. No enlarged lymph nodes. Additional findings: Implantable cardiac device left chest noted with 3 leads in position. Focus of increased FDG activity noted in the region of the left shoulder consistent with recent melanoma resection. This area measures an SUV max 10.3. ABDOMEN/PELVIS: Hepatobiliary: No pathologic activity. Cholecystectomy. Spleen: No pathologic activity. Pancreas: No pathologic activity. Kidneys: No pathologic activity. No urinary tract calculi or hydronephrosis. Adrenals: No pathologic activity. Reproductive: No pathologic activity. The prostate is normal in size. Gastrointestinal: No pathologic activity. No bowel obstruction.      Lymph nodes: No

## 2023-04-24 NOTE — PERIOP NOTE
Preop department called to notify patient of arrival time for scheduled procedure. Instructions given to   - Arrive at [address] - Remain NPO after midnight, unless otherwise indicated, including gum, mints, and ice chips. - Have a responsible adult to drive patient to the hospital, stay during surgery, and patient will need supervision 24 hours after anesthesia. - Use antibacterial soap in shower the night before surgery and on the morning of surgery.        Was patient contacted: yes  Voicemail left:   Numbers contacted: [phone]   Arrival time: 0700 Spouse would like a call back to discuss billing for 6/26/21 appt for Labs. She stated Medicare is not paying for LAB to check PSA level because it was too soon.      She stated she already spoke to billing and they advised her to call Dr Castillo's office to discuss.

## 2023-04-24 NOTE — PERIOP NOTE
Preop department called to notify patient of arrival time for scheduled procedure. Instructions given to   - Arrive at [address]. - Remain NPO after midnight, unless otherwise indicated, including gum, mints, and ice chips. - Have a responsible adult to drive patient to the hospital, stay during surgery, and patient will need supervision 24 hours after anesthesia. - Use antibacterial soap in shower the night before surgery and on the morning of surgery.        Was patient contacted: No  Voicemail left: Yes  Numbers contacted: [phone]   Arrival time: 0700

## 2023-04-25 ENCOUNTER — HOSPITAL ENCOUNTER (OUTPATIENT)
Age: 68
Setting detail: OUTPATIENT SURGERY
Discharge: HOME | End: 2023-04-25
Attending: SURGERY | Admitting: SURGERY
Payer: OTHER GOVERNMENT

## 2023-04-25 ENCOUNTER — APPOINTMENT (OUTPATIENT)
Dept: GENERAL RADIOLOGY | Age: 68
End: 2023-04-25
Attending: SURGERY
Payer: OTHER GOVERNMENT

## 2023-04-25 ENCOUNTER — ANESTHESIA (OUTPATIENT)
Dept: SURGERY | Age: 68
End: 2023-04-25
Payer: OTHER GOVERNMENT

## 2023-04-25 ENCOUNTER — APPOINTMENT (OUTPATIENT)
Dept: NUCLEAR MEDICINE | Age: 68
End: 2023-04-25
Attending: SURGERY
Payer: OTHER GOVERNMENT

## 2023-04-25 VITALS
HEART RATE: 80 BPM | BODY MASS INDEX: 44.1 KG/M2 | RESPIRATION RATE: 18 BRPM | OXYGEN SATURATION: 95 % | TEMPERATURE: 97.2 F | HEIGHT: 71 IN | DIASTOLIC BLOOD PRESSURE: 86 MMHG | SYSTOLIC BLOOD PRESSURE: 149 MMHG | WEIGHT: 315 LBS

## 2023-04-25 DIAGNOSIS — C43.62 MELANOMA OF SHOULDER, LEFT (HCC): Primary | ICD-10-CM

## 2023-04-25 DIAGNOSIS — C43.62 MALIGNANT MELANOMA OF LEFT UPPER EXTREMITY INCLUDING SHOULDER (HCC): ICD-10-CM

## 2023-04-25 LAB
ALBUMIN SERPL-MCNC: 3.1 G/DL (ref 3.2–4.6)
ALBUMIN/GLOB SERPL: 0.7 (ref 0.4–1.6)
ALP SERPL-CCNC: 121 U/L (ref 50–136)
ALT SERPL-CCNC: 23 U/L (ref 12–65)
ANION GAP SERPL CALC-SCNC: 7 MMOL/L (ref 2–11)
AST SERPL-CCNC: 20 U/L (ref 15–37)
BILIRUB SERPL-MCNC: 0.4 MG/DL (ref 0.2–1.1)
BUN SERPL-MCNC: 21 MG/DL (ref 8–23)
CALCIUM SERPL-MCNC: 8.7 MG/DL (ref 8.3–10.4)
CHLORIDE SERPL-SCNC: 107 MMOL/L (ref 101–110)
CO2 SERPL-SCNC: 24 MMOL/L (ref 21–32)
CREAT SERPL-MCNC: 1.4 MG/DL (ref 0.8–1.5)
ERYTHROCYTE [DISTWIDTH] IN BLOOD BY AUTOMATED COUNT: 16.5 % (ref 11.9–14.6)
GLOBULIN SER CALC-MCNC: 4.6 G/DL (ref 2.8–4.5)
GLUCOSE BLD STRIP.AUTO-MCNC: 150 MG/DL (ref 65–100)
GLUCOSE SERPL-MCNC: 143 MG/DL (ref 65–100)
HCT VFR BLD AUTO: 40 % (ref 41.1–50.3)
HGB BLD-MCNC: 12.3 G/DL (ref 13.6–17.2)
MCH RBC QN AUTO: 26 PG (ref 26.1–32.9)
MCHC RBC AUTO-ENTMCNC: 30.8 G/DL (ref 31.4–35)
MCV RBC AUTO: 84.6 FL (ref 82–102)
NRBC # BLD: 0 K/UL (ref 0–0.2)
PLATELET # BLD AUTO: 319 K/UL (ref 150–450)
PMV BLD AUTO: 10.7 FL (ref 9.4–12.3)
POTASSIUM SERPL-SCNC: 3.6 MMOL/L (ref 3.5–5.1)
PROT SERPL-MCNC: 7.7 G/DL (ref 6.3–8.2)
RBC # BLD AUTO: 4.73 M/UL (ref 4.23–5.6)
SERVICE CMNT-IMP: ABNORMAL
SODIUM SERPL-SCNC: 138 MMOL/L (ref 133–143)
WBC # BLD AUTO: 8.2 K/UL (ref 4.3–11.1)

## 2023-04-25 PROCEDURE — A9541 TC99M SULFUR COLLOID: HCPCS | Performed by: SURGERY

## 2023-04-25 PROCEDURE — 88305 TISSUE EXAM BY PATHOLOGIST: CPT

## 2023-04-25 PROCEDURE — 88304 TISSUE EXAM BY PATHOLOGIST: CPT

## 2023-04-25 PROCEDURE — 6360000002 HC RX W HCPCS: Performed by: NURSE ANESTHETIST, CERTIFIED REGISTERED

## 2023-04-25 PROCEDURE — 7100000011 HC PHASE II RECOVERY - ADDTL 15 MIN: Performed by: SURGERY

## 2023-04-25 PROCEDURE — 2709999900 HC NON-CHARGEABLE SUPPLY: Performed by: SURGERY

## 2023-04-25 PROCEDURE — 88307 TISSUE EXAM BY PATHOLOGIST: CPT

## 2023-04-25 PROCEDURE — 2500000003 HC RX 250 WO HCPCS: Performed by: SURGERY

## 2023-04-25 PROCEDURE — 7100000010 HC PHASE II RECOVERY - FIRST 15 MIN: Performed by: SURGERY

## 2023-04-25 PROCEDURE — 3430000000 HC RX DIAGNOSTIC RADIOPHARMACEUTICAL: Performed by: SURGERY

## 2023-04-25 PROCEDURE — 7100000000 HC PACU RECOVERY - FIRST 15 MIN: Performed by: SURGERY

## 2023-04-25 PROCEDURE — 2580000003 HC RX 258: Performed by: ANESTHESIOLOGY

## 2023-04-25 PROCEDURE — 2500000003 HC RX 250 WO HCPCS: Performed by: NURSE ANESTHETIST, CERTIFIED REGISTERED

## 2023-04-25 PROCEDURE — 3600000012 HC SURGERY LEVEL 2 ADDTL 15MIN: Performed by: SURGERY

## 2023-04-25 PROCEDURE — 2580000003 HC RX 258: Performed by: NURSE ANESTHETIST, CERTIFIED REGISTERED

## 2023-04-25 PROCEDURE — 7100000001 HC PACU RECOVERY - ADDTL 15 MIN: Performed by: SURGERY

## 2023-04-25 PROCEDURE — 80053 COMPREHEN METABOLIC PANEL: CPT

## 2023-04-25 PROCEDURE — 78195 LYMPH SYSTEM IMAGING: CPT

## 2023-04-25 PROCEDURE — 99024 POSTOP FOLLOW-UP VISIT: CPT | Performed by: SURGERY

## 2023-04-25 PROCEDURE — 71046 X-RAY EXAM CHEST 2 VIEWS: CPT

## 2023-04-25 PROCEDURE — 3600000002 HC SURGERY LEVEL 2 BASE: Performed by: SURGERY

## 2023-04-25 PROCEDURE — 85027 COMPLETE CBC AUTOMATED: CPT

## 2023-04-25 PROCEDURE — 3700000001 HC ADD 15 MINUTES (ANESTHESIA): Performed by: SURGERY

## 2023-04-25 PROCEDURE — 82962 GLUCOSE BLOOD TEST: CPT

## 2023-04-25 PROCEDURE — 3700000000 HC ANESTHESIA ATTENDED CARE: Performed by: SURGERY

## 2023-04-25 RX ORDER — FENTANYL CITRATE 50 UG/ML
INJECTION, SOLUTION INTRAMUSCULAR; INTRAVENOUS PRN
Status: DISCONTINUED | OUTPATIENT
Start: 2023-04-25 | End: 2023-04-25 | Stop reason: SDUPTHER

## 2023-04-25 RX ORDER — SODIUM CHLORIDE 0.9 % (FLUSH) 0.9 %
5-40 SYRINGE (ML) INJECTION PRN
Status: DISCONTINUED | OUTPATIENT
Start: 2023-04-25 | End: 2023-04-25 | Stop reason: HOSPADM

## 2023-04-25 RX ORDER — SODIUM CHLORIDE 0.9 % (FLUSH) 0.9 %
5-40 SYRINGE (ML) INJECTION EVERY 12 HOURS SCHEDULED
Status: DISCONTINUED | OUTPATIENT
Start: 2023-04-25 | End: 2023-04-25 | Stop reason: HOSPADM

## 2023-04-25 RX ORDER — DEXTROSE MONOHYDRATE 100 MG/ML
INJECTION, SOLUTION INTRAVENOUS CONTINUOUS PRN
Status: DISCONTINUED | OUTPATIENT
Start: 2023-04-25 | End: 2023-04-25 | Stop reason: HOSPADM

## 2023-04-25 RX ORDER — SODIUM CHLORIDE, SODIUM LACTATE, POTASSIUM CHLORIDE, CALCIUM CHLORIDE 600; 310; 30; 20 MG/100ML; MG/100ML; MG/100ML; MG/100ML
INJECTION, SOLUTION INTRAVENOUS CONTINUOUS
Status: DISCONTINUED | OUTPATIENT
Start: 2023-04-25 | End: 2023-04-25 | Stop reason: HOSPADM

## 2023-04-25 RX ORDER — HYDROCODONE BITARTRATE AND ACETAMINOPHEN 5; 325 MG/1; MG/1
1-2 TABLET ORAL EVERY 8 HOURS PRN
Qty: 28 TABLET | Refills: 0 | Status: SHIPPED | OUTPATIENT
Start: 2023-04-25 | End: 2023-05-02

## 2023-04-25 RX ORDER — PROCHLORPERAZINE EDISYLATE 5 MG/ML
5 INJECTION INTRAMUSCULAR; INTRAVENOUS
Status: DISCONTINUED | OUTPATIENT
Start: 2023-04-25 | End: 2023-04-25 | Stop reason: HOSPADM

## 2023-04-25 RX ORDER — OXYCODONE HYDROCHLORIDE 5 MG/1
5 TABLET ORAL
Status: DISCONTINUED | OUTPATIENT
Start: 2023-04-25 | End: 2023-04-25 | Stop reason: HOSPADM

## 2023-04-25 RX ORDER — SODIUM CHLORIDE 9 MG/ML
INJECTION, SOLUTION INTRAVENOUS PRN
Status: DISCONTINUED | OUTPATIENT
Start: 2023-04-25 | End: 2023-04-25 | Stop reason: HOSPADM

## 2023-04-25 RX ORDER — FENTANYL CITRATE 50 UG/ML
100 INJECTION, SOLUTION INTRAMUSCULAR; INTRAVENOUS
Status: DISCONTINUED | OUTPATIENT
Start: 2023-04-25 | End: 2023-04-25 | Stop reason: HOSPADM

## 2023-04-25 RX ORDER — DIPHENHYDRAMINE HYDROCHLORIDE 50 MG/ML
12.5 INJECTION INTRAMUSCULAR; INTRAVENOUS
Status: DISCONTINUED | OUTPATIENT
Start: 2023-04-25 | End: 2023-04-25 | Stop reason: HOSPADM

## 2023-04-25 RX ORDER — DEXAMETHASONE SODIUM PHOSPHATE 4 MG/ML
INJECTION, SOLUTION INTRA-ARTICULAR; INTRALESIONAL; INTRAMUSCULAR; INTRAVENOUS; SOFT TISSUE PRN
Status: DISCONTINUED | OUTPATIENT
Start: 2023-04-25 | End: 2023-04-25 | Stop reason: SDUPTHER

## 2023-04-25 RX ORDER — PROPOFOL 10 MG/ML
INJECTION, EMULSION INTRAVENOUS PRN
Status: DISCONTINUED | OUTPATIENT
Start: 2023-04-25 | End: 2023-04-25 | Stop reason: SDUPTHER

## 2023-04-25 RX ORDER — LIDOCAINE HYDROCHLORIDE 10 MG/ML
1 INJECTION, SOLUTION INFILTRATION; PERINEURAL
Status: DISCONTINUED | OUTPATIENT
Start: 2023-04-25 | End: 2023-04-25 | Stop reason: HOSPADM

## 2023-04-25 RX ORDER — HYDROMORPHONE HYDROCHLORIDE 2 MG/ML
0.5 INJECTION, SOLUTION INTRAMUSCULAR; INTRAVENOUS; SUBCUTANEOUS EVERY 10 MIN PRN
Status: DISCONTINUED | OUTPATIENT
Start: 2023-04-25 | End: 2023-04-25 | Stop reason: HOSPADM

## 2023-04-25 RX ORDER — ROCURONIUM BROMIDE 10 MG/ML
INJECTION, SOLUTION INTRAVENOUS PRN
Status: DISCONTINUED | OUTPATIENT
Start: 2023-04-25 | End: 2023-04-25 | Stop reason: SDUPTHER

## 2023-04-25 RX ORDER — SUCCINYLCHOLINE CHLORIDE 20 MG/ML
INJECTION INTRAMUSCULAR; INTRAVENOUS PRN
Status: DISCONTINUED | OUTPATIENT
Start: 2023-04-25 | End: 2023-04-25 | Stop reason: SDUPTHER

## 2023-04-25 RX ORDER — LIDOCAINE HYDROCHLORIDE 20 MG/ML
INJECTION, SOLUTION EPIDURAL; INFILTRATION; INTRACAUDAL; PERINEURAL PRN
Status: DISCONTINUED | OUTPATIENT
Start: 2023-04-25 | End: 2023-04-25 | Stop reason: SDUPTHER

## 2023-04-25 RX ORDER — MIDAZOLAM HYDROCHLORIDE 2 MG/2ML
2 INJECTION, SOLUTION INTRAMUSCULAR; INTRAVENOUS
Status: DISCONTINUED | OUTPATIENT
Start: 2023-04-25 | End: 2023-04-25 | Stop reason: HOSPADM

## 2023-04-25 RX ORDER — KETAMINE HYDROCHLORIDE 50 MG/ML
INJECTION, SOLUTION, CONCENTRATE INTRAMUSCULAR; INTRAVENOUS PRN
Status: DISCONTINUED | OUTPATIENT
Start: 2023-04-25 | End: 2023-04-25 | Stop reason: SDUPTHER

## 2023-04-25 RX ORDER — CLINDAMYCIN PHOSPHATE 900 MG/50ML
900 INJECTION INTRAVENOUS ONCE
Status: COMPLETED | OUTPATIENT
Start: 2023-04-25 | End: 2023-04-25

## 2023-04-25 RX ORDER — ONDANSETRON 2 MG/ML
INJECTION INTRAMUSCULAR; INTRAVENOUS PRN
Status: DISCONTINUED | OUTPATIENT
Start: 2023-04-25 | End: 2023-04-25 | Stop reason: SDUPTHER

## 2023-04-25 RX ORDER — ACETAMINOPHEN 500 MG
1000 TABLET ORAL ONCE
Status: DISCONTINUED | OUTPATIENT
Start: 2023-04-25 | End: 2023-04-25 | Stop reason: HOSPADM

## 2023-04-25 RX ADMIN — LIDOCAINE HYDROCHLORIDE 100 MG: 20 INJECTION, SOLUTION EPIDURAL; INFILTRATION; INTRACAUDAL; PERINEURAL at 10:06

## 2023-04-25 RX ADMIN — SUGAMMADEX 400 MG: 100 INJECTION, SOLUTION INTRAVENOUS at 11:20

## 2023-04-25 RX ADMIN — PROPOFOL 50 MG: 10 INJECTION, EMULSION INTRAVENOUS at 11:00

## 2023-04-25 RX ADMIN — CLINDAMYCIN PHOSPHATE 900 MG: 900 INJECTION, SOLUTION INTRAVENOUS at 10:10

## 2023-04-25 RX ADMIN — DEXAMETHASONE SODIUM PHOSPHATE 4 MG: 4 INJECTION, SOLUTION INTRAMUSCULAR; INTRAVENOUS at 10:20

## 2023-04-25 RX ADMIN — PHENYLEPHRINE HYDROCHLORIDE 200 MCG: 10 INJECTION INTRAVENOUS at 11:19

## 2023-04-25 RX ADMIN — ROCURONIUM BROMIDE 10 MG: 50 INJECTION, SOLUTION INTRAVENOUS at 11:00

## 2023-04-25 RX ADMIN — PHENYLEPHRINE HYDROCHLORIDE 100 MCG: 10 INJECTION INTRAVENOUS at 10:26

## 2023-04-25 RX ADMIN — PHENYLEPHRINE HYDROCHLORIDE 150 MCG: 10 INJECTION INTRAVENOUS at 11:12

## 2023-04-25 RX ADMIN — PROPOFOL 300 MG: 10 INJECTION, EMULSION INTRAVENOUS at 10:06

## 2023-04-25 RX ADMIN — ROCURONIUM BROMIDE 40 MG: 50 INJECTION, SOLUTION INTRAVENOUS at 10:18

## 2023-04-25 RX ADMIN — PHENYLEPHRINE HYDROCHLORIDE 100 MCG: 10 INJECTION INTRAVENOUS at 10:51

## 2023-04-25 RX ADMIN — PHENYLEPHRINE HYDROCHLORIDE 150 MCG: 10 INJECTION INTRAVENOUS at 10:36

## 2023-04-25 RX ADMIN — ONDANSETRON 4 MG: 2 INJECTION INTRAMUSCULAR; INTRAVENOUS at 10:20

## 2023-04-25 RX ADMIN — SODIUM CHLORIDE, POTASSIUM CHLORIDE, SODIUM LACTATE AND CALCIUM CHLORIDE: 600; 310; 30; 20 INJECTION, SOLUTION INTRAVENOUS at 07:17

## 2023-04-25 RX ADMIN — Medication 200 MG: at 10:06

## 2023-04-25 RX ADMIN — TECHNETIUM TC 99M SULFUR COLLOID 0.4 MILLICURIE: KIT at 08:00

## 2023-04-25 RX ADMIN — KETAMINE HYDROCHLORIDE 40 MG: 50 INJECTION, SOLUTION INTRAMUSCULAR; INTRAVENOUS at 10:14

## 2023-04-25 RX ADMIN — PHENYLEPHRINE HYDROCHLORIDE 150 MCG: 10 INJECTION INTRAVENOUS at 10:30

## 2023-04-25 RX ADMIN — FENTANYL CITRATE 100 MCG: 50 INJECTION, SOLUTION INTRAMUSCULAR; INTRAVENOUS at 10:06

## 2023-04-25 ASSESSMENT — ENCOUNTER SYMPTOMS: SHORTNESS OF BREATH: 1

## 2023-04-25 NOTE — ANESTHESIA PROCEDURE NOTES
Airway  Date/Time: 4/25/2023 10:08 AM  Urgency: elective    Difficult airway    General Information and Staff    Patient location during procedure: OR  Anesthesiologist: Brunilda Stewart MD  Resident/CRNA: QUE Ennis - CRNA  Performed: resident/CRNA     Indications and Patient Condition  Indications for airway management: anesthesia  Spontaneous ventilation: present  Sedation level: deep  Preoxygenated: yes  Patient position: sniffing  MILS not maintained throughout  Mask difficulty assessment: not attempted    Final Airway Details  Final airway type: endotracheal airway      Successful airway: ETT  Cuffed: yes   Successful intubation technique: video laryngoscopy  Facilitating devices/methods: intubating stylet  Endotracheal tube insertion site: oral  Blade size: #3  ETT size (mm): 8.0  Cormack-Lehane Classification: grade I - full view of glottis  Placement verified by: chest auscultation and capnometry   Inital cuff pressure (cm H2O): 8  Measured from: lips  ETT to lips (cm): 24  Number of attempts at approach: 1  Ventilation between attempts: bag mask  Number of other approaches attempted: 0    no

## 2023-04-25 NOTE — ANESTHESIA POSTPROCEDURE EVALUATION
Department of Anesthesiology  Postprocedure Note    Patient: Jere Aldana  MRN: [id number]  YOB: 1955  Date of evaluation: 4/25/2023      Procedure Summary     Date: 04/25/23 Room / Location: [facility] OP OR 01 / [facility]    Anesthesia Start: 0190 Anesthesia Stop: 1149    Procedures:       WIDE LOCAL EXCISION LEFT SHOULDER MELANOMA, & SENTINEL NODE EXCISION (Left: Shoulder)      LEFT AXILLARY SENTINEL LYMPH NODE BIOPSY EXCISION (Axilla) Diagnosis:       Malignant melanoma of left upper extremity including shoulder ([address]      (Malignant melanoma of left upper extremity including shoulder ([address] [C43.62])    Surgeons: Nikki Conrad MD Responsible Provider: Fan Dickerson MD    Anesthesia Type: general ASA Status: 3          Anesthesia Type: No value filed. Gisella Phase I: Gisella Score: 8    Gisella Phase II: Gisella Score: 10      Anesthesia Post Evaluation    Patient location during evaluation: PACU  Patient participation: complete - patient participated  Level of consciousness: awake and alert  Airway patency: patent  Nausea: well controlled. Complications: no  Cardiovascular status: acceptable.   Respiratory status: acceptable  Hydration status: stable  Comments: Pacer rep checked and reprogrammed pacemaker prior to discharge

## 2023-04-25 NOTE — ANESTHESIA PRE PROCEDURE
Department of Anesthesiology  Preprocedure Note       Name:  Jaleesa Finnegan   Age:  70 y.o.  :  1955                                          MRN:  [id number]         Date:  2023      Surgeon: Katey Adame):  Donovan Juárez MD    Procedure: Procedure(s):  WIDE LOCAL EXCISION LEFT SHOULDER MELANOMA, & SENTINEL NODE EXCISION & POSSIBLE SPLIT THICKNESS SKIN GRAFT FROM LEFT THIGH-PACEMAKER/ABBOTT REP DOS  SKIN GRAFT SPLIT THICKNESS  INGUINAL SENTINEL LYMPH NODE BIOPSY EXCISION/ LYMPHO @0800    Medications prior to admission:   Prior to Admission medications    Medication Sig Start Date End Date Taking? Authorizing Provider   HYDROcodone-acetaminophen (NORCO) 5-325 MG per tablet Take 1-2 tablets by mouth every 8 hours as needed for Pain for up to 7 days.  Max Daily Amount: 6 tablets 23  Donovan Juárez MD   LANTUS SOLOSTAR 100 UNIT/ML injection pen Inject 54 Units into the skin 2 times daily 23   Farhana Lundy MD   metFORMIN (GLUCOPHAGE) 500 MG tablet Take 1 tablet by mouth 2 times daily (with meals) 23   Farhana Lundy MD   [facility], 1 MG/DOSE, 4 MG/3ML SOPN Inject 1 mg into the skin once a week 23   Farhana Lundy MD   pioglitazone (ACTOS) 30 MG tablet Take 0.5 tablets by mouth daily 23   Farhana Lundy MD   atorvastatin (LIPITOR) 10 MG tablet Take 1 tablet by mouth daily 23   Farhana Lundy MD   carvedilol (COREG) 12.5 MG tablet Take 1 tablet by mouth 2 times daily (with meals) 23   Farhana Lundy MD   amLODIPine (NORVASC) 5 MG tablet Take 1 tablet by mouth daily 23   Farhana Lundy MD   ondansetron (ZOFRAN) 4 MG tablet Take 1 tablet by mouth daily as needed for Nausea or Vomiting 1/10/23   Lorenzo Enciso MD   docusate sodium (COLACE) 100 MG capsule Take 1 capsule by mouth daily as needed for Constipation 1/10/23   Lorenzo Enciso MD   aspirin (ASPIRIN 81) 81 MG EC tablet Take 1 tablet by mouth in the morning and at bedtime

## 2023-04-25 NOTE — DISCHARGE INSTRUCTIONS
Dressings/Wound Care  Leave dressings alone until follow-up. Try to keep incisions as dry as possible to lower risk of infection. Activity  No heavy lifting (>5lbs) for 6 weeks to reduce risk of developing swelling. No driving until you are off pain meds for 24hrs and have no pain with movements associated with driving. OK to resume Eliquis tomorrow evening Wednesday 4/26/23  Pain prescription (Gómez Schaeffer) electronically sent to your pharmacy      Follow-up with Dr Yao Ellis in 13 days on a Monday in the office  [address], [address]  (Call for an appt time unless one is already made for you by discharge nurse which is preferred->[phone])    Diet  Resume prior diet      ACTIVITY  As tolerated and as directed by your doctor. Bathe or shower as directed by your doctor. DIET  Clear liquids until no nausea or vomiting; then light diet for the first day. Advance to regular diet on second day, unless your doctor orders otherwise. If nausea and vomiting continues, call your doctor. PAIN  Take pain medication as directed by your doctor. Call your doctor if pain is NOT relieved by medication. DO NOT take aspirin of blood thinners unless directed by your doctor. MEDICATION INTERACTION:During your procedure you potentially received a medication or medications which may reduce the effectiveness of oral contraceptives. Please consider other forms of contraception for 1 month following your procedure if you are currently using oral contraceptives as your primary form of birth control.  In addition to this, we recommend continuing your oral contraceptive as prescribed, unless otherwise instructed by your physician, during this time        After general anesthesia or intravenous sedation, for 24 hours or while taking prescription Narcotics:  Limit your activities  A responsible adult needs to be with you for the next 24 hours  Do not drive and operate hazardous

## 2023-04-27 PROBLEM — C77.9 METASTATIC MELANOMA TO LYMPH NODE (HCC): Status: ACTIVE | Noted: 2023-04-27

## 2023-04-27 NOTE — OP NOTE
this wide excision site without the need for skin grafting. The closure, however, was very tight and required retention sutures of 2-0 nylon and skin clips. A dressing was placed at the end of the case consisting of 4x4s and Tegaderms. Attention was then turned towards the left axillary sentinel node. The patient was injected with tracer material consisting of technetium-labeled sulfur colloid prior to surgery. The Neoprobe was brought up into the field and used to zachary the site of her incision. We made an oblique incision in the left axilla and used fresh instruments to dissect deep into the left axillary space. We used the Neoprobe to guide our dissection and intraoperatively map and identify the left axillary sentinel node. We dissected into the deep left axillary space. Lymphatic and venous tributaries were clipped. There was a very large node present which was possibly a cluster of more than one node. This was removed as one specimen. It had a count of 105. It was sent to Pathology for review, marked left axillary sentinel node. Following the node excision, the background neoprobe count in the axilla was 0. There was no evidence of gross or palpable disease remaining. Irrigation was used. Hemostasis was confirmed. A local anesthetic was given. The incision was closed with interrupted deep dermal 3-0 Vicryl sutures. The skin was closed with clips and a sterile dressing was placed consisting of 4x4s and Tegaderms. The patient was taken to recovery in good condition. He tolerated the procedure well.       Guillermina Merchant MD [id number]  D:  04/27/2023 7:17  T:  04/27/2023 9:55  JOB #:  [id number]

## 2023-05-08 ENCOUNTER — OFFICE VISIT (OUTPATIENT)
Dept: SURGERY | Age: 68
End: 2023-05-08

## 2023-05-08 VITALS — WEIGHT: 315 LBS | HEIGHT: 71 IN | BODY MASS INDEX: 44.1 KG/M2

## 2023-05-08 DIAGNOSIS — C43.62 MELANOMA OF SHOULDER, LEFT (HCC): Primary | ICD-10-CM

## 2023-05-08 PROCEDURE — 99024 POSTOP FOLLOW-UP VISIT: CPT | Performed by: SURGERY

## 2023-05-08 NOTE — PROGRESS NOTES
COVID-19 vaccination refused 11/28/2021     HAD COVID-19. Advised immunization anyway. High risk medication use 11/28/2021    Iron deficiency anemia, unspecified     Idiopathic chronic gout of multiple sites without tophus     Refused varicella vaccine 05/14/2021    Type 2 diabetes mellitus with stage 3b chronic kidney disease, with long-term current use of insulin ([facility] 05/14/2021     Follows with Dr. Nii Baez 02/01/2021    Pulmonary hypertension ([facility] 08/11/2020    History of 2019 novel coronavirus disease (COVID-19) 08/05/2020 8/5/20 at [facility]        Gout 06/17/2020    Gastroesophageal reflux disease 06/17/2020    Shortness of breath 05/22/2020    Ventral hernia without obstruction or gangrene 03/10/2020     Last Assessment & Plan:   Very large diastases recti extending into the umbilical area as well. Surgery discussed attempting to repair this at the time of his   cholecystectomy but felt that it was too large and the risks outweigh   potential benefits. Type 2 diabetes mellitus with diabetic polyneuropathy, with long-term current use of insulin ([facility] 11/29/2019     Follows with Dr. Otilia García        History of colonic polyps 11/07/2019    Chronic right-sided low back pain without sciatica 05/10/2019    Type 2 diabetes mellitus with microalbuminuria, with long-term current use of insulin ([facility] 11/16/2018     Follows with Dr. tOilia García        Hypercholesterolemia 06/29/2018    Seasonal allergic rhinitis due to pollen     NSVT (nonsustained ventricular tachycardia) ([address] 09/27/2016    Atrioventricular block, complete ([address] 04/14/2016    Presence of cardiac pacemaker 01/13/2016    Edema of both lower extremities due to peripheral venous insufficiency 12/29/2015     Limit salt. Elevate legs when possible. Overall weight loss would also   help.         Actinic keratoses 07/22/2015    Long term (current) use of anticoagulants     Vitamin D deficiency     Hepatic steatosis 03/25/2014

## 2023-05-09 ENCOUNTER — OFFICE VISIT (OUTPATIENT)
Dept: ORTHOPEDIC SURGERY | Age: 68
End: 2023-05-09
Payer: MEDICARE

## 2023-05-09 DIAGNOSIS — M76.62 ACHILLES BURSITIS OF LEFT LOWER EXTREMITY: Primary | ICD-10-CM

## 2023-05-09 PROCEDURE — 1123F ACP DISCUSS/DSCN MKR DOCD: CPT | Performed by: ORTHOPAEDIC SURGERY

## 2023-05-09 PROCEDURE — 99213 OFFICE O/P EST LOW 20 MIN: CPT | Performed by: ORTHOPAEDIC SURGERY

## 2023-05-09 NOTE — PROGRESS NOTES
Name: Ondina Rollins  YOB: 1955  Gender: male  MRN: [id number]    Summary: Doing great after after his January 11 arthroscopic Andrzej's debridement and Sarabjit. Weight-bear as tolerated with heel lift. Follow-up on as-needed basis. Subjective: Denies fevers chills or infection. Denies any signs of spreading erythema. States he is doing very well with no pain. His melanoma is still leaking he is still seeing the dermatologist.  His axillary node dissection came back negative this is a good sign for things to come. Regarding his ankle he is walking well and he states no pain. Exam: Wound healing appropriately. No signs of dehiscence or infection. No signs of erythema or drainage. With toes warm and well-perfused. Wounds: appears to be healing well with good reapproximation, healing well ,   He has significant cellulitis throughout the leg but no signs of infection. His ankle looks great. No tenderness throughout the ankle. Great range of motion with no pain today. No signs of infection. He does have venous stasis disease. Neuro:  decreased SILT in entire foot. Cohagen Monofilament 5.07 exam normal however    Vascular: BLE: 2+ DP pulse, toes wwp w/ BCR<2s    Imaging:   No imaging reviewed      Complications post op: none    Assessment/Plan:    He is doing very well with his ankle and his foot. Having very minimal pain. Regarding his left arm he is seeing a dermatologist recently had a large melanoma removed. The node dissection was negative we will continue follow-up with that.     With me he can follow-up on an as-needed basis

## 2023-05-17 ENCOUNTER — OFFICE VISIT (OUTPATIENT)
Dept: SURGERY | Age: 68
End: 2023-05-17

## 2023-05-17 DIAGNOSIS — C43.62 MELANOMA OF SHOULDER, LEFT (HCC): Primary | ICD-10-CM

## 2023-05-17 PROCEDURE — 99024 POSTOP FOLLOW-UP VISIT: CPT | Performed by: SURGERY

## 2023-05-17 NOTE — PROGRESS NOTES
H&P/Consult Note/Progress Note/Office Note:   Galina David  MRN: [id number]  :1955  Age:68 y.o.    HPI: Galina David is a 70 y.o. male who is s/p WLE left shoulder melanoma and left axillary sentinel node excision on 23   for a pT4bN0 melanoma of the left shoulder. His surgical margins were clear and his left axillary sentinel node was negative for malignancy. Prior to surgery he had a recent shave biopsy of the left anterior shoulder pigmented lesion   which was growing significantly in size since 2022 and was found to have malignant melanoma as shown below    pT4b  Breslow 6.6 mm, IV, +ulceration and LVI, 14 mitoses/sq mm, -Regression or microsatellitosis    He weighs 330lbs and has a BMI>46  He has a pacemaker and defibrillator  No headaches, seizures, hemoptysis, frequent respiratory infections, weight loss. 3/28/23 PET/CT  FINDINGS: Normal physiologic uptake is identified within the salivary glands, myocardium, kidneys, ureters and bladder. Scattered areas of physiologic bowel uptake are also present. NECK: Lymph nodes: No pathologic activity. No enlarged lymph nodes. Additional findings: None. CHEST: Lungs: No pathologic activity. Respiratory motion artifact. Lungs are grossly clear. No pleural effusions. Lymph nodes: No pathologic activity. No enlarged lymph nodes. Additional findings: Implantable cardiac device left chest noted with 3 leads in position. Focus of increased FDG activity noted in the region of the left shoulder consistent with recent melanoma resection. This area measures an SUV max 10.3. ABDOMEN/PELVIS: Hepatobiliary: No pathologic activity. Cholecystectomy. Spleen: No pathologic activity. Pancreas: No pathologic activity. Kidneys: No pathologic activity. No urinary tract calculi or hydronephrosis. Adrenals: No pathologic activity. Reproductive: No pathologic activity.  The prostate is normal in

## 2023-05-23 ASSESSMENT — ENCOUNTER SYMPTOMS
BACK PAIN: 0
EYES NEGATIVE: 1
VOMITING: 0
COUGH: 0
ABDOMINAL PAIN: 0
CHEST TIGHTNESS: 0
SHORTNESS OF BREATH: 0
DIARRHEA: 0
NAUSEA: 0
WHEEZING: 0
ALLERGIC/IMMUNOLOGIC NEGATIVE: 1
CONSTIPATION: 0

## 2023-05-23 NOTE — PROGRESS NOTES
advised/reviewed. Information given. History of acute pancreatitis 01/14/2022     Hospitalized at [facility]. Asthma     COVID-19 vaccination refused 11/28/2021     HAD COVID-19. Advised immunization anyway. High risk medication use 11/28/2021    Iron deficiency anemia, unspecified     Idiopathic chronic gout of multiple sites without tophus     Refused varicella vaccine 05/14/2021    Type 2 diabetes mellitus with stage 3b chronic kidney disease, with long-term current use of insulin ([address] 05/14/2021     Follows with Dr. Alma Sparrow 02/01/2021    Pulmonary hypertension ([address] 08/11/2020    History of 2019 novel coronavirus disease (COVID-19) 08/05/2020 8/5/20 at [facility] 06/17/2020    Gastroesophageal reflux disease 06/17/2020    Shortness of breath 05/22/2020    Ventral hernia without obstruction or gangrene 03/10/2020     Last Assessment & Plan:   Very large diastases recti extending into the umbilical area as well. Surgery discussed attempting to repair this at the time of his   cholecystectomy but felt that it was too large and the risks outweigh   potential benefits. Type 2 diabetes mellitus with diabetic polyneuropathy, with long-term current use of insulin ([address] 11/29/2019     Follows with Dr. Chava Stafford        History of colonic polyps 11/07/2019    Chronic right-sided low back pain without sciatica 05/10/2019    Type 2 diabetes mellitus with microalbuminuria, with long-term current use of insulin ([address] 11/16/2018     Follows with Dr. Chava Stafford        Hypercholesterolemia 06/29/2018    Seasonal allergic rhinitis due to pollen     NSVT (nonsustained ventricular tachycardia) ([address] 09/27/2016    Atrioventricular block, complete ([address] 04/14/2016    Presence of cardiac pacemaker 01/13/2016    Edema of both lower extremities due to peripheral venous insufficiency 12/29/2015     Limit salt. Elevate legs when possible.   Overall weight loss would also

## 2023-05-24 ENCOUNTER — OFFICE VISIT (OUTPATIENT)
Dept: SURGERY | Age: 68
End: 2023-05-24

## 2023-05-24 DIAGNOSIS — C43.62 MELANOMA OF SHOULDER, LEFT (HCC): Primary | ICD-10-CM

## 2023-05-24 RX ORDER — CEFPODOXIME PROXETIL 100 MG/1
100 TABLET, FILM COATED ORAL 2 TIMES DAILY
Qty: 20 TABLET | Refills: 0 | Status: SHIPPED | OUTPATIENT
Start: 2023-05-24 | End: 2023-06-03

## 2023-05-26 ENCOUNTER — OFFICE VISIT (OUTPATIENT)
Dept: FAMILY MEDICINE CLINIC | Facility: CLINIC | Age: 68
End: 2023-05-26
Payer: OTHER GOVERNMENT

## 2023-05-26 VITALS
HEIGHT: 71 IN | HEART RATE: 70 BPM | WEIGHT: 315 LBS | DIASTOLIC BLOOD PRESSURE: 64 MMHG | BODY MASS INDEX: 44.1 KG/M2 | SYSTOLIC BLOOD PRESSURE: 130 MMHG | OXYGEN SATURATION: 98 %

## 2023-05-26 DIAGNOSIS — Z79.4 TYPE 2 DIABETES MELLITUS WITH DIABETIC POLYNEUROPATHY, WITH LONG-TERM CURRENT USE OF INSULIN (HCC): Primary | Chronic | ICD-10-CM

## 2023-05-26 DIAGNOSIS — Z79.4 TYPE 2 DIABETES MELLITUS WITH STAGE 3B CHRONIC KIDNEY DISEASE, WITH LONG-TERM CURRENT USE OF INSULIN (HCC): Chronic | ICD-10-CM

## 2023-05-26 DIAGNOSIS — E66.01 CLASS 3 SEVERE OBESITY DUE TO EXCESS CALORIES WITH SERIOUS COMORBIDITY AND BODY MASS INDEX (BMI) OF 45.0 TO 49.9 IN ADULT (HCC): Chronic | ICD-10-CM

## 2023-05-26 DIAGNOSIS — E11.29 TYPE 2 DIABETES MELLITUS WITH MICROALBUMINURIA, WITH LONG-TERM CURRENT USE OF INSULIN (HCC): Chronic | ICD-10-CM

## 2023-05-26 DIAGNOSIS — Z79.4 TYPE 2 DIABETES MELLITUS WITH MICROALBUMINURIA, WITH LONG-TERM CURRENT USE OF INSULIN (HCC): Chronic | ICD-10-CM

## 2023-05-26 DIAGNOSIS — E11.42 TYPE 2 DIABETES MELLITUS WITH DIABETIC POLYNEUROPATHY, WITH LONG-TERM CURRENT USE OF INSULIN (HCC): Primary | Chronic | ICD-10-CM

## 2023-05-26 DIAGNOSIS — C43.62 MELANOMA OF SHOULDER, LEFT (HCC): ICD-10-CM

## 2023-05-26 DIAGNOSIS — N18.32 TYPE 2 DIABETES MELLITUS WITH STAGE 3B CHRONIC KIDNEY DISEASE, WITH LONG-TERM CURRENT USE OF INSULIN (HCC): Chronic | ICD-10-CM

## 2023-05-26 DIAGNOSIS — E11.22 TYPE 2 DIABETES MELLITUS WITH STAGE 3B CHRONIC KIDNEY DISEASE, WITH LONG-TERM CURRENT USE OF INSULIN (HCC): Chronic | ICD-10-CM

## 2023-05-26 DIAGNOSIS — E78.00 HYPERCHOLESTEROLEMIA: Chronic | ICD-10-CM

## 2023-05-26 DIAGNOSIS — R80.9 TYPE 2 DIABETES MELLITUS WITH MICROALBUMINURIA, WITH LONG-TERM CURRENT USE OF INSULIN (HCC): Chronic | ICD-10-CM

## 2023-05-26 LAB
CHOLEST SERPL-MCNC: 116 MG/DL
CREAT UR-MCNC: 60 MG/DL
EST. AVERAGE GLUCOSE BLD GHB EST-MCNC: 154 MG/DL
HBA1C MFR BLD: 7 % (ref 4.8–5.6)
HDLC SERPL-MCNC: 31 MG/DL (ref 40–60)
HDLC SERPL: 3.7
LDLC SERPL CALC-MCNC: 60.8 MG/DL
MICROALBUMIN UR-MCNC: 5.58 MG/DL
MICROALBUMIN/CREAT UR-RTO: 93 MG/G (ref 0–30)
TRIGL SERPL-MCNC: 121 MG/DL (ref 35–150)
VLDLC SERPL CALC-MCNC: 24.2 MG/DL (ref 6–23)

## 2023-05-26 PROCEDURE — 3078F DIAST BP <80 MM HG: CPT | Performed by: FAMILY MEDICINE

## 2023-05-26 PROCEDURE — 3074F SYST BP LT 130 MM HG: CPT | Performed by: FAMILY MEDICINE

## 2023-05-26 PROCEDURE — 99214 OFFICE O/P EST MOD 30 MIN: CPT | Performed by: FAMILY MEDICINE

## 2023-05-26 PROCEDURE — 3051F HG A1C>EQUAL 7.0%<8.0%: CPT | Performed by: FAMILY MEDICINE

## 2023-05-26 PROCEDURE — 1123F ACP DISCUSS/DSCN MKR DOCD: CPT | Performed by: FAMILY MEDICINE

## 2023-05-26 SDOH — ECONOMIC STABILITY: FOOD INSECURITY: WITHIN THE PAST 12 MONTHS, YOU WORRIED THAT YOUR FOOD WOULD RUN OUT BEFORE YOU GOT MONEY TO BUY MORE.: NEVER TRUE

## 2023-05-26 SDOH — ECONOMIC STABILITY: HOUSING INSECURITY
IN THE LAST 12 MONTHS, WAS THERE A TIME WHEN YOU DID NOT HAVE A STEADY PLACE TO SLEEP OR SLEPT IN A SHELTER (INCLUDING NOW)?: NO

## 2023-05-26 SDOH — ECONOMIC STABILITY: INCOME INSECURITY: HOW HARD IS IT FOR YOU TO PAY FOR THE VERY BASICS LIKE FOOD, HOUSING, MEDICAL CARE, AND HEATING?: NOT HARD AT ALL

## 2023-05-26 SDOH — ECONOMIC STABILITY: FOOD INSECURITY: WITHIN THE PAST 12 MONTHS, THE FOOD YOU BOUGHT JUST DIDN'T LAST AND YOU DIDN'T HAVE MONEY TO GET MORE.: NEVER TRUE

## 2023-06-02 RX ORDER — TORSEMIDE 20 MG/1
TABLET ORAL
Qty: 180 TABLET | Refills: 3 | Status: SHIPPED | OUTPATIENT
Start: 2023-06-02

## 2023-06-07 PROBLEM — C43.62 MELANOMA OF SHOULDER, LEFT (HCC): Chronic | Status: ACTIVE | Noted: 2023-03-13

## 2023-06-21 DIAGNOSIS — I44.2 ATRIOVENTRICULAR BLOCK, COMPLETE (HCC): ICD-10-CM

## 2023-06-21 DIAGNOSIS — I50.22 CHRONIC SYSTOLIC (CONGESTIVE) HEART FAILURE (HCC): ICD-10-CM

## 2023-06-21 DIAGNOSIS — Z95.0 PRESENCE OF CARDIAC PACEMAKER: ICD-10-CM

## 2023-06-23 NOTE — PROGRESS NOTES
New Patient Abstract    Reason for Referral: Melanoma of shoulder, left    Referring Provider: Benita Foster MD (6/12)    Primary Care Provider:  Candis Carrillo DO    Family History of Cancer/Hematologic Disorders: Family history significant for sister with unknown cancer and brother with unknown cancer. Presenting Symptoms: s/p WLE left shoulder melanoma    Narrative with recent with Results/Procedures/Biopsies and Dates completed:      21-year-old white male     2/21/23 - Recent shave biopsy of left anterior shoulder pigmented lesion  Growing significantly in size since August 2022  Found to have malignant melanoma (pT4b Breslow 6.6 mm, IV, +ulceration and LVI, 14 mitoses/sq mm, -Regression or microsatellitosis) (EPIC)    3/13/23 - Met with General Surgeon, Deidra Elizalde MD.  Discussed wide excision of the primary melanoma site with STSG and possible sentinel node excision following intraoperative mapping and identification if his PET CT is unremarkable for distant metastasis. Surgical risks discussed. 3/28/23 - PET/CT on 3/28/23 showed no evidence of metastatic disease      4/25/23 - Wide radical soft tissue resection of left shoulder invasive malignancy, greater than 5 cm and left axillary sentinel node excision following intraoperative mapping and identification. 5/8/23 - Pathology shows surgical margins were clear and his left axillary sentinel node was negative for malignancy. 6/12/23 - Met with Dr. Geovanny Marquez for follow-up visit.   A referral has been placed to [facility] for a Medical Oncology consultation and treatment.      (EPIC)   Latest Reference Range & Units 04/25/23 07:02 05/26/23 08:44   Sodium 133 - 143 mmol/L 138    Potassium 3.5 - 5.1 mmol/L 3.6    Chloride 101 - 110 mmol/L 107    CO2 21 - 32 mmol/L 24    BUN,BUNPL 8 - 23 MG/DL 21    Creatinine 0.8 - 1.5 MG/DL 1.40    Anion Gap 2 - 11 mmol/L 7    Est, Glom Filt Rate >60 ml/min/1.73m2 54 (L)    Glucose, Random 65 - 100 mg/dL 143 (H)

## 2023-06-26 ENCOUNTER — HOSPITAL ENCOUNTER (EMERGENCY)
Age: 68
Discharge: HOME | End: 2023-06-26
Attending: EMERGENCY MEDICINE
Payer: MEDICARE

## 2023-06-26 ENCOUNTER — OFFICE VISIT (OUTPATIENT)
Dept: ONCOLOGY | Age: 68
End: 2023-06-26
Payer: MEDICARE

## 2023-06-26 ENCOUNTER — CLINICAL DOCUMENTATION (OUTPATIENT)
Dept: ONCOLOGY | Age: 68
End: 2023-06-26

## 2023-06-26 ENCOUNTER — APPOINTMENT (OUTPATIENT)
Dept: GENERAL RADIOLOGY | Age: 68
End: 2023-06-26
Payer: MEDICARE

## 2023-06-26 VITALS
HEART RATE: 68 BPM | SYSTOLIC BLOOD PRESSURE: 138 MMHG | BODY MASS INDEX: 44.1 KG/M2 | HEIGHT: 71 IN | DIASTOLIC BLOOD PRESSURE: 87 MMHG | WEIGHT: 315 LBS | TEMPERATURE: 98 F | OXYGEN SATURATION: 98 % | RESPIRATION RATE: 18 BRPM

## 2023-06-26 VITALS
BODY MASS INDEX: 44.1 KG/M2 | DIASTOLIC BLOOD PRESSURE: 62 MMHG | WEIGHT: 315 LBS | HEART RATE: 68 BPM | OXYGEN SATURATION: 94 % | TEMPERATURE: 98.1 F | HEIGHT: 71 IN | RESPIRATION RATE: 14 BRPM | SYSTOLIC BLOOD PRESSURE: 104 MMHG

## 2023-06-26 DIAGNOSIS — Z11.59 ENCOUNTER FOR SCREENING FOR OTHER VIRAL DISEASES: ICD-10-CM

## 2023-06-26 DIAGNOSIS — R68.89 OTHER GENERAL SYMPTOMS AND SIGNS: ICD-10-CM

## 2023-06-26 DIAGNOSIS — C43.62 MELANOMA OF SHOULDER, LEFT (HCC): Primary | ICD-10-CM

## 2023-06-26 DIAGNOSIS — C43.9 MALIGNANT MELANOMA, UNSPECIFIED SITE (HCC): ICD-10-CM

## 2023-06-26 DIAGNOSIS — S99.922A FOOT INJURY, LEFT, INITIAL ENCOUNTER: Primary | ICD-10-CM

## 2023-06-26 PROCEDURE — 3074F SYST BP LT 130 MM HG: CPT | Performed by: INTERNAL MEDICINE

## 2023-06-26 PROCEDURE — 3078F DIAST BP <80 MM HG: CPT | Performed by: INTERNAL MEDICINE

## 2023-06-26 PROCEDURE — 1123F ACP DISCUSS/DSCN MKR DOCD: CPT | Performed by: INTERNAL MEDICINE

## 2023-06-26 PROCEDURE — 73630 X-RAY EXAM OF FOOT: CPT

## 2023-06-26 PROCEDURE — 99205 OFFICE O/P NEW HI 60 MIN: CPT | Performed by: INTERNAL MEDICINE

## 2023-06-26 PROCEDURE — 99283 EMERGENCY DEPT VISIT LOW MDM: CPT

## 2023-06-26 ASSESSMENT — PATIENT HEALTH QUESTIONNAIRE - PHQ9
2. FEELING DOWN, DEPRESSED OR HOPELESS: 0
SUM OF ALL RESPONSES TO PHQ QUESTIONS 1-9: 0
1. LITTLE INTEREST OR PLEASURE IN DOING THINGS: 0
SUM OF ALL RESPONSES TO PHQ QUESTIONS 1-9: 0
SUM OF ALL RESPONSES TO PHQ QUESTIONS 1-9: 0
SUM OF ALL RESPONSES TO PHQ9 QUESTIONS 1 & 2: 0
SUM OF ALL RESPONSES TO PHQ QUESTIONS 1-9: 0

## 2023-06-26 ASSESSMENT — PAIN DESCRIPTION - LOCATION: LOCATION: FOOT

## 2023-06-26 ASSESSMENT — PAIN - FUNCTIONAL ASSESSMENT
PAIN_FUNCTIONAL_ASSESSMENT: 0-10
PAIN_FUNCTIONAL_ASSESSMENT: NONE - DENIES PAIN

## 2023-06-26 ASSESSMENT — ENCOUNTER SYMPTOMS
ABDOMINAL PAIN: 0
PHOTOPHOBIA: 0
TROUBLE SWALLOWING: 0
SHORTNESS OF BREATH: 0
FACIAL SWELLING: 0
VOMITING: 0
COUGH: 0

## 2023-06-26 ASSESSMENT — PAIN SCALES - GENERAL: PAINLEVEL_OUTOF10: 4

## 2023-06-26 ASSESSMENT — LIFESTYLE VARIABLES
HOW OFTEN DO YOU HAVE A DRINK CONTAINING ALCOHOL: NEVER
HOW MANY STANDARD DRINKS CONTAINING ALCOHOL DO YOU HAVE ON A TYPICAL DAY: PATIENT DOES NOT DRINK

## 2023-06-27 ENCOUNTER — HOSPITAL ENCOUNTER (OUTPATIENT)
Dept: LAB | Age: 68
Discharge: HOME | End: 2023-06-30
Payer: MEDICARE

## 2023-06-27 ENCOUNTER — OFFICE VISIT (OUTPATIENT)
Dept: FAMILY MEDICINE CLINIC | Facility: CLINIC | Age: 68
End: 2023-06-27
Payer: MEDICARE

## 2023-06-27 VITALS
DIASTOLIC BLOOD PRESSURE: 68 MMHG | HEART RATE: 66 BPM | OXYGEN SATURATION: 94 % | WEIGHT: 315 LBS | BODY MASS INDEX: 44.1 KG/M2 | HEIGHT: 71 IN | SYSTOLIC BLOOD PRESSURE: 126 MMHG

## 2023-06-27 DIAGNOSIS — C43.62 MELANOMA OF SHOULDER, LEFT (HCC): ICD-10-CM

## 2023-06-27 DIAGNOSIS — R89.7 ABNORMAL HISTOLOGICAL FINDINGS IN SPECIMENS FROM OTHER ORGANS, SYSTEMS AND TISSUES: ICD-10-CM

## 2023-06-27 DIAGNOSIS — C43.62 MELANOMA OF SHOULDER, LEFT (HCC): Primary | ICD-10-CM

## 2023-06-27 DIAGNOSIS — L30.9 DERMATITIS: ICD-10-CM

## 2023-06-27 DIAGNOSIS — S97.102A CRUSHING INJURY OF UNSPECIFIED LEFT TOE(S), INITIAL ENCOUNTER: Primary | ICD-10-CM

## 2023-06-27 LAB
ALBUMIN SERPL-MCNC: 3.3 G/DL (ref 3.2–4.6)
ALBUMIN/GLOB SERPL: 0.7 (ref 0.4–1.6)
ALP SERPL-CCNC: 125 U/L (ref 50–136)
ALT SERPL-CCNC: 20 U/L (ref 12–65)
ANION GAP SERPL CALC-SCNC: 5 MMOL/L (ref 2–11)
AST SERPL-CCNC: 19 U/L (ref 15–37)
BASOPHILS # BLD: 0 K/UL (ref 0–0.2)
BASOPHILS NFR BLD: 0 % (ref 0–2)
BILIRUB SERPL-MCNC: 0.5 MG/DL (ref 0.2–1.1)
BUN SERPL-MCNC: 18 MG/DL (ref 8–23)
CALCIUM SERPL-MCNC: 8.7 MG/DL (ref 8.3–10.4)
CHLORIDE SERPL-SCNC: 108 MMOL/L (ref 101–110)
CO2 SERPL-SCNC: 26 MMOL/L (ref 21–32)
CREAT SERPL-MCNC: 1.4 MG/DL (ref 0.8–1.5)
DIFFERENTIAL METHOD BLD: ABNORMAL
EOSINOPHIL # BLD: 0.4 K/UL (ref 0–0.8)
EOSINOPHIL NFR BLD: 4 % (ref 0.5–7.8)
ERYTHROCYTE [DISTWIDTH] IN BLOOD BY AUTOMATED COUNT: 17.1 % (ref 11.9–14.6)
FERRITIN SERPL-MCNC: 10 NG/ML (ref 8–388)
FOLATE SERPL-MCNC: 9 NG/ML (ref 3.1–17.5)
GLOBULIN SER CALC-MCNC: 4.7 G/DL (ref 2.8–4.5)
GLUCOSE SERPL-MCNC: 132 MG/DL (ref 65–100)
HCT VFR BLD AUTO: 39.2 % (ref 41.1–50.3)
HGB BLD-MCNC: 11.8 G/DL (ref 13.6–17.2)
IMM GRANULOCYTES # BLD AUTO: 0 K/UL (ref 0–0.5)
IMM GRANULOCYTES NFR BLD AUTO: 0 % (ref 0–5)
IRON SATN MFR SERPL: 10 %
IRON SERPL-MCNC: 40 UG/DL (ref 35–150)
LDH SERPL L TO P-CCNC: 178 U/L (ref 110–210)
LYMPHOCYTES # BLD: 3 K/UL (ref 0.5–4.6)
LYMPHOCYTES NFR BLD: 31 % (ref 13–44)
MCH RBC QN AUTO: 25.1 PG (ref 26.1–32.9)
MCHC RBC AUTO-ENTMCNC: 30.1 G/DL (ref 31.4–35)
MCV RBC AUTO: 83.4 FL (ref 82–102)
MONOCYTES # BLD: 0.6 K/UL (ref 0.1–1.3)
MONOCYTES NFR BLD: 7 % (ref 4–12)
NEUTS SEG # BLD: 5.5 K/UL (ref 1.7–8.2)
NEUTS SEG NFR BLD: 58 % (ref 43–78)
NRBC # BLD: 0 K/UL (ref 0–0.2)
PLATELET # BLD AUTO: 285 K/UL (ref 150–450)
PMV BLD AUTO: 10 FL (ref 9.4–12.3)
POTASSIUM SERPL-SCNC: 3.8 MMOL/L (ref 3.5–5.1)
PROT SERPL-MCNC: 8 G/DL (ref 6.3–8.2)
RBC # BLD AUTO: 4.7 M/UL (ref 4.23–5.6)
SODIUM SERPL-SCNC: 139 MMOL/L (ref 133–143)
TIBC SERPL-MCNC: 383 UG/DL (ref 250–450)
VIT B12 SERPL-MCNC: 332 PG/ML (ref 193–986)
WBC # BLD AUTO: 9.6 K/UL (ref 4.3–11.1)

## 2023-06-27 PROCEDURE — 3078F DIAST BP <80 MM HG: CPT | Performed by: FAMILY MEDICINE

## 2023-06-27 PROCEDURE — 36415 COLL VENOUS BLD VENIPUNCTURE: CPT

## 2023-06-27 PROCEDURE — 3074F SYST BP LT 130 MM HG: CPT | Performed by: FAMILY MEDICINE

## 2023-06-27 PROCEDURE — 83540 ASSAY OF IRON: CPT

## 2023-06-27 PROCEDURE — 82728 ASSAY OF FERRITIN: CPT

## 2023-06-27 PROCEDURE — 99214 OFFICE O/P EST MOD 30 MIN: CPT | Performed by: FAMILY MEDICINE

## 2023-06-27 PROCEDURE — 1123F ACP DISCUSS/DSCN MKR DOCD: CPT | Performed by: FAMILY MEDICINE

## 2023-06-27 PROCEDURE — 85025 COMPLETE CBC W/AUTO DIFF WBC: CPT

## 2023-06-27 PROCEDURE — 83550 IRON BINDING TEST: CPT

## 2023-06-27 PROCEDURE — 83615 LACTATE (LD) (LDH) ENZYME: CPT

## 2023-06-27 PROCEDURE — 80053 COMPREHEN METABOLIC PANEL: CPT

## 2023-06-27 PROCEDURE — 82746 ASSAY OF FOLIC ACID SERUM: CPT

## 2023-06-27 PROCEDURE — 82607 VITAMIN B-12: CPT

## 2023-06-27 RX ORDER — PREDNISONE 10 MG/1
TABLET ORAL
Qty: 1 EACH | Refills: 0 | Status: SHIPPED | OUTPATIENT
Start: 2023-06-27

## 2023-06-29 ENCOUNTER — TELEPHONE (OUTPATIENT)
Dept: ONCOLOGY | Age: 68
End: 2023-06-29

## 2023-06-30 ENCOUNTER — TELEPHONE (OUTPATIENT)
Dept: INTERVENTIONAL RADIOLOGY/VASCULAR | Age: 68
End: 2023-06-30

## 2023-06-30 ENCOUNTER — TELEPHONE (OUTPATIENT)
Dept: ENDOCRINOLOGY | Age: 68
End: 2023-06-30

## 2023-06-30 NOTE — TELEPHONE ENCOUNTER
[] Phone call to: Patient    [] Number used to reach this person: [phone]    [] Voicemail reached: N/A     [] Appointment date:  7/10    [] Arrival time:  0800    [] Location given: yes    [] AM Medicine with a small sip of water: Yes    [] Patient is NPO: Yes    [] Need for : Yes    [] Anesthetic Moderate Sedation    [] Blood thinners held: Yes    [] Education on Hold requirements prior to procedure: Pt states he has permission to hold Eliquis for 48 hrs before procedure. [] Allergies: OTHER:      [x] Reviewed    [] Latex Allergy: No    [] Lidocaine Allergy: No    [] CPAP at night: Yes, pt aware to bring home bipap with him to appt.     [] Any recent infections: No    [] Diabetes: Yes    [] Additional information:        [] Time taken to answer all questions    [] Call back phone number of [phone] given

## 2023-07-03 ASSESSMENT — ENCOUNTER SYMPTOMS
NAUSEA: 0
CONSTIPATION: 0
SHORTNESS OF BREATH: 0
VOMITING: 0
COUGH: 0
CHEST TIGHTNESS: 0
ROS SKIN COMMENTS: ITCHING
BACK PAIN: 0
ABDOMINAL PAIN: 0
WHEEZING: 0
ALLERGIC/IMMUNOLOGIC NEGATIVE: 1
EYES NEGATIVE: 1
DIARRHEA: 0

## 2023-07-03 NOTE — PROGRESS NOTES
Zoie Alston DO                Diplomate of the [employer] Board of [facility] [place]         [phone]    Luis A Zurita is a 70 y.o. male who was seen on 6/27/2023 for   Chief Complaint   Patient presents with    Follow-up     6/26/2023 left foot injury    Skin Problem     All over  itching  sx x 1-2 weeks       Assessment & Plan     Diagnosis Orders   1. Crushing injury of unspecified left toe(s), initial encounter      Improving. Follow-up if this does not continue. 2. Dermatitis  predniSONE 10 MG (21) TBPK    Prednisone Dosepak as directed          Follow-up and Dispositions    Return if symptoms worsen or fail to improve. On this date 06/27/2023 I have spent 31 minutes reviewing previous notes, lab/imaging results and face to face with the patient discussing the diagnoses and importance of compliance with the treatment plan, as well as documenting on the day of the visit. RECENT LABS/TESTS TO REVIEW and DISCUSS    No results found for this visit on 06/27/23. PHQ-9  6/26/2023 11/21/2022 10/20/2022   Little interest or pleasure in doing things 0 0 0   Little interest or pleasure in doing things - - -   Feeling down, depressed, or hopeless 0 0 0   PHQ-2 Score 0 0 0   Total Score PHQ 2 - - -   PHQ-9 Total Score 0 0 0     Emergency Department Provider Note       PCP: Zoie Alston DO   Age: 70 y.o. Sex: male      DISPOSITION Decision To Discharge 06/26/2023 06:22:35 PM         ICD-10-CM     1. Foot injury, left, initial encounter  A49.675Y               Medical Decision Making      Complexity of Problems Addressed:  1 or more acute uncomplicated illness or injury     Data Reviewed and Analyzed:  Category 1:   I independently ordered and reviewed each unique test.     The patients assessment required an independent historian: Patient spouse.   The reason they were needed is important historical information

## 2023-07-05 ENCOUNTER — TELEPHONE (OUTPATIENT)
Dept: ONCOLOGY | Age: 68
End: 2023-07-05

## 2023-07-05 ENCOUNTER — OFFICE VISIT (OUTPATIENT)
Dept: ONCOLOGY | Age: 68
End: 2023-07-05
Payer: OTHER GOVERNMENT

## 2023-07-05 VITALS
TEMPERATURE: 97.9 F | BODY MASS INDEX: 45.1 KG/M2 | SYSTOLIC BLOOD PRESSURE: 127 MMHG | HEIGHT: 70 IN | WEIGHT: 315 LBS | RESPIRATION RATE: 12 BRPM | HEART RATE: 70 BPM | OXYGEN SATURATION: 97 % | DIASTOLIC BLOOD PRESSURE: 82 MMHG

## 2023-07-05 DIAGNOSIS — C43.62 MELANOMA OF SHOULDER, LEFT (HCC): Primary | ICD-10-CM

## 2023-07-05 PROCEDURE — 99214 OFFICE O/P EST MOD 30 MIN: CPT | Performed by: NURSE PRACTITIONER

## 2023-07-05 PROCEDURE — 3074F SYST BP LT 130 MM HG: CPT | Performed by: NURSE PRACTITIONER

## 2023-07-05 PROCEDURE — 1123F ACP DISCUSS/DSCN MKR DOCD: CPT | Performed by: NURSE PRACTITIONER

## 2023-07-05 PROCEDURE — 3079F DIAST BP 80-89 MM HG: CPT | Performed by: NURSE PRACTITIONER

## 2023-07-05 RX ORDER — LIDOCAINE AND PRILOCAINE 25; 25 MG/G; MG/G
CREAM TOPICAL
Qty: 1 EACH | Refills: 1 | Status: SHIPPED | OUTPATIENT
Start: 2023-07-05

## 2023-07-05 RX ORDER — PROCHLORPERAZINE MALEATE 10 MG
10 TABLET ORAL EVERY 6 HOURS PRN
Qty: 60 TABLET | Refills: 1 | Status: SHIPPED | OUTPATIENT
Start: 2023-07-05

## 2023-07-05 RX ORDER — ONDANSETRON HYDROCHLORIDE 8 MG/1
8 TABLET, FILM COATED ORAL EVERY 8 HOURS PRN
Qty: 90 TABLET | Refills: 1 | Status: SHIPPED | OUTPATIENT
Start: 2023-07-05

## 2023-07-06 ENCOUNTER — HOSPITAL ENCOUNTER (OUTPATIENT)
Dept: MRI IMAGING | Age: 68
Discharge: HOME | End: 2023-07-09
Attending: INTERNAL MEDICINE

## 2023-07-06 ENCOUNTER — CLINICAL DOCUMENTATION (OUTPATIENT)
Dept: INFUSION THERAPY | Age: 68
End: 2023-07-06

## 2023-07-06 DIAGNOSIS — C43.62 MELANOMA OF SHOULDER, LEFT (HCC): ICD-10-CM

## 2023-07-06 NOTE — PROGRESS NOTES
Patient Assistance    Met with: Avelino Hyde    Navigator Type:  Infusion  Documentation Type: New Patient  Contact Type: In-person  Navigation Status: New Patient  Status of Patient Insurance Coverage: Patient has active coverage          Additional notes: free drug    Drug Name: Nik Steiner  Form of PAP Assistance: Free Drug

## 2023-07-07 ENCOUNTER — CLINICAL DOCUMENTATION (OUTPATIENT)
Facility: HOSPITAL | Age: 68
End: 2023-07-07

## 2023-07-07 ENCOUNTER — HOSPITAL ENCOUNTER (OUTPATIENT)
Dept: INFUSION THERAPY | Age: 68
Discharge: HOME | End: 2023-07-07
Payer: OTHER GOVERNMENT

## 2023-07-07 ENCOUNTER — HOSPITAL ENCOUNTER (OUTPATIENT)
Dept: LAB | Age: 68
Discharge: HOME | End: 2023-07-07
Payer: OTHER GOVERNMENT

## 2023-07-07 ENCOUNTER — OFFICE VISIT (OUTPATIENT)
Dept: ONCOLOGY | Age: 68
End: 2023-07-07
Payer: MEDICARE

## 2023-07-07 VITALS
HEART RATE: 80 BPM | OXYGEN SATURATION: 97 % | DIASTOLIC BLOOD PRESSURE: 69 MMHG | WEIGHT: 315 LBS | SYSTOLIC BLOOD PRESSURE: 116 MMHG | TEMPERATURE: 97.7 F | RESPIRATION RATE: 16 BRPM | BODY MASS INDEX: 45.1 KG/M2 | HEIGHT: 70 IN

## 2023-07-07 DIAGNOSIS — C43.62 MELANOMA OF SHOULDER, LEFT (HCC): Primary | ICD-10-CM

## 2023-07-07 DIAGNOSIS — R68.89 OTHER GENERAL SYMPTOMS AND SIGNS: ICD-10-CM

## 2023-07-07 DIAGNOSIS — C43.62 MELANOMA OF SHOULDER, LEFT (HCC): ICD-10-CM

## 2023-07-07 DIAGNOSIS — C43.9 MALIGNANT MELANOMA, UNSPECIFIED SITE (HCC): Primary | ICD-10-CM

## 2023-07-07 DIAGNOSIS — Z11.59 ENCOUNTER FOR SCREENING FOR OTHER VIRAL DISEASES: ICD-10-CM

## 2023-07-07 LAB
ALBUMIN SERPL-MCNC: 3.3 G/DL (ref 3.2–4.6)
ALBUMIN/GLOB SERPL: 0.8 (ref 0.4–1.6)
ALP SERPL-CCNC: 122 U/L (ref 50–136)
ALT SERPL-CCNC: 27 U/L (ref 12–65)
ANION GAP SERPL CALC-SCNC: 5 MMOL/L (ref 2–11)
AST SERPL-CCNC: 16 U/L (ref 15–37)
BASOPHILS # BLD: 0.1 K/UL (ref 0–0.2)
BASOPHILS NFR BLD: 1 % (ref 0–2)
BILIRUB SERPL-MCNC: 0.4 MG/DL (ref 0.2–1.1)
BUN SERPL-MCNC: 23 MG/DL (ref 8–23)
CALCIUM SERPL-MCNC: 8.4 MG/DL (ref 8.3–10.4)
CHLORIDE SERPL-SCNC: 106 MMOL/L (ref 101–110)
CO2 SERPL-SCNC: 26 MMOL/L (ref 21–32)
CREAT SERPL-MCNC: 1.7 MG/DL (ref 0.8–1.5)
DIFFERENTIAL METHOD BLD: ABNORMAL
EOSINOPHIL # BLD: 0.5 K/UL (ref 0–0.8)
EOSINOPHIL NFR BLD: 5 % (ref 0.5–7.8)
ERYTHROCYTE [DISTWIDTH] IN BLOOD BY AUTOMATED COUNT: 16.9 % (ref 11.9–14.6)
GLOBULIN SER CALC-MCNC: 4.4 G/DL (ref 2.8–4.5)
GLUCOSE SERPL-MCNC: 117 MG/DL (ref 65–100)
HCT VFR BLD AUTO: 38.6 % (ref 41.1–50.3)
HGB BLD-MCNC: 11.8 G/DL (ref 13.6–17.2)
IMM GRANULOCYTES # BLD AUTO: 0 K/UL (ref 0–0.5)
IMM GRANULOCYTES NFR BLD AUTO: 0 % (ref 0–5)
LYMPHOCYTES # BLD: 3.6 K/UL (ref 0.5–4.6)
LYMPHOCYTES NFR BLD: 33 % (ref 13–44)
MCH RBC QN AUTO: 25.2 PG (ref 26.1–32.9)
MCHC RBC AUTO-ENTMCNC: 30.6 G/DL (ref 31.4–35)
MCV RBC AUTO: 82.3 FL (ref 82–102)
MONOCYTES # BLD: 0.9 K/UL (ref 0.1–1.3)
MONOCYTES NFR BLD: 8 % (ref 4–12)
NEUTS SEG # BLD: 5.8 K/UL (ref 1.7–8.2)
NEUTS SEG NFR BLD: 53 % (ref 43–78)
NRBC # BLD: 0 K/UL (ref 0–0.2)
PLATELET # BLD AUTO: 360 K/UL (ref 150–450)
PMV BLD AUTO: 10.2 FL (ref 9.4–12.3)
POTASSIUM SERPL-SCNC: 3.6 MMOL/L (ref 3.5–5.1)
PROT SERPL-MCNC: 7.7 G/DL (ref 6.3–8.2)
RBC # BLD AUTO: 4.69 M/UL (ref 4.23–5.6)
SODIUM SERPL-SCNC: 137 MMOL/L (ref 133–143)
TSH, 3RD GENERATION: 1.13 UIU/ML (ref 0.36–3)
WBC # BLD AUTO: 10.8 K/UL (ref 4.3–11.1)

## 2023-07-07 PROCEDURE — 86706 HEP B SURFACE ANTIBODY: CPT

## 2023-07-07 PROCEDURE — 80053 COMPREHEN METABOLIC PANEL: CPT

## 2023-07-07 PROCEDURE — 6360000002 HC RX W HCPCS: Performed by: INTERNAL MEDICINE

## 2023-07-07 PROCEDURE — 36415 COLL VENOUS BLD VENIPUNCTURE: CPT

## 2023-07-07 PROCEDURE — 87340 HEPATITIS B SURFACE AG IA: CPT

## 2023-07-07 PROCEDURE — 3078F DIAST BP <80 MM HG: CPT | Performed by: NURSE PRACTITIONER

## 2023-07-07 PROCEDURE — 84443 ASSAY THYROID STIM HORMONE: CPT

## 2023-07-07 PROCEDURE — 86704 HEP B CORE ANTIBODY TOTAL: CPT

## 2023-07-07 PROCEDURE — 1123F ACP DISCUSS/DSCN MKR DOCD: CPT | Performed by: NURSE PRACTITIONER

## 2023-07-07 PROCEDURE — 85025 COMPLETE CBC W/AUTO DIFF WBC: CPT

## 2023-07-07 PROCEDURE — 3075F SYST BP GE 130 - 139MM HG: CPT | Performed by: NURSE PRACTITIONER

## 2023-07-07 PROCEDURE — 99214 OFFICE O/P EST MOD 30 MIN: CPT | Performed by: NURSE PRACTITIONER

## 2023-07-07 PROCEDURE — 2580000003 HC RX 258: Performed by: INTERNAL MEDICINE

## 2023-07-07 PROCEDURE — 96413 CHEMO IV INFUSION 1 HR: CPT

## 2023-07-07 RX ORDER — MEPERIDINE HYDROCHLORIDE 25 MG/ML
12.5 INJECTION INTRAMUSCULAR; INTRAVENOUS; SUBCUTANEOUS PRN
Status: DISCONTINUED | OUTPATIENT
Start: 2023-07-07 | End: 2023-07-08 | Stop reason: HOSPADM

## 2023-07-07 RX ORDER — EPINEPHRINE 1 MG/ML
0.3 INJECTION, SOLUTION, CONCENTRATE INTRAVENOUS PRN
Status: DISCONTINUED | OUTPATIENT
Start: 2023-07-07 | End: 2023-07-08 | Stop reason: HOSPADM

## 2023-07-07 RX ORDER — ONDANSETRON 2 MG/ML
8 INJECTION INTRAMUSCULAR; INTRAVENOUS
Status: DISCONTINUED | OUTPATIENT
Start: 2023-07-07 | End: 2023-07-08 | Stop reason: HOSPADM

## 2023-07-07 RX ORDER — ALBUTEROL SULFATE 90 UG/1
4 AEROSOL, METERED RESPIRATORY (INHALATION) PRN
Status: DISCONTINUED | OUTPATIENT
Start: 2023-07-07 | End: 2023-07-08 | Stop reason: HOSPADM

## 2023-07-07 RX ORDER — ACETAMINOPHEN 325 MG/1
650 TABLET ORAL
Status: DISCONTINUED | OUTPATIENT
Start: 2023-07-07 | End: 2023-07-08 | Stop reason: HOSPADM

## 2023-07-07 RX ORDER — DIPHENHYDRAMINE HYDROCHLORIDE 50 MG/ML
50 INJECTION INTRAMUSCULAR; INTRAVENOUS
Status: DISCONTINUED | OUTPATIENT
Start: 2023-07-07 | End: 2023-07-08 | Stop reason: HOSPADM

## 2023-07-07 RX ORDER — HEPARIN SODIUM 100 [USP'U]/ML
500 INJECTION, SOLUTION INTRAVENOUS PRN
OUTPATIENT
Start: 2023-07-07

## 2023-07-07 RX ORDER — SODIUM CHLORIDE 9 MG/ML
5-250 INJECTION, SOLUTION INTRAVENOUS PRN
OUTPATIENT
Start: 2023-07-07

## 2023-07-07 RX ORDER — SODIUM CHLORIDE 0.9 % (FLUSH) 0.9 %
5-40 SYRINGE (ML) INJECTION PRN
Status: DISCONTINUED | OUTPATIENT
Start: 2023-07-07 | End: 2023-07-08 | Stop reason: HOSPADM

## 2023-07-07 RX ORDER — ONDANSETRON 2 MG/ML
8 INJECTION INTRAMUSCULAR; INTRAVENOUS
OUTPATIENT
Start: 2023-07-07

## 2023-07-07 RX ORDER — SODIUM CHLORIDE 9 MG/ML
INJECTION, SOLUTION INTRAVENOUS CONTINUOUS
OUTPATIENT
Start: 2023-07-07

## 2023-07-07 RX ORDER — SODIUM CHLORIDE 9 MG/ML
5-250 INJECTION, SOLUTION INTRAVENOUS PRN
Status: DISCONTINUED | OUTPATIENT
Start: 2023-07-07 | End: 2023-07-08 | Stop reason: HOSPADM

## 2023-07-07 RX ADMIN — SODIUM CHLORIDE, PRESERVATIVE FREE 10 ML: 5 INJECTION INTRAVENOUS at 15:14

## 2023-07-07 RX ADMIN — SODIUM CHLORIDE 50 ML/HR: 9 INJECTION, SOLUTION INTRAVENOUS at 15:15

## 2023-07-07 RX ADMIN — SODIUM CHLORIDE 200 MG: 9 INJECTION, SOLUTION INTRAVENOUS at 15:34

## 2023-07-07 NOTE — PROGRESS NOTES
Arrived to the [address] [place] [address]. D1C1 keytruda completed. Patient tolerated well. Patient observed for 30 minutes post infusion. Any issues or concerns during appointment: none. Patient aware of next infusion appointment on 7/28/2023 (date) at 0930 (time). Patient aware of next lab and [facility] office visit on 7/28/2023 (date) at 0830 (time). Patient instructed to call provider with temperature of 100.4 or greater or nausea/vomiting/ diarrhea or pain not controlled by medications  Discharged ambulatory.

## 2023-07-07 NOTE — PROGRESS NOTES
Financial Navigator spoke with patient in reference to PAP for Keytruda. Patient aware of eligibility and approval.  Patient acknowledge understanding of information provided and to call FN if he had any additional questions.

## 2023-07-08 LAB
HBV CORE AB SERPL QL IA: NEGATIVE
HBV SURFACE AB SERPL IA-ACNC: <3.1 MIU/ML
HBV SURFACE AG SER QL: NONREACTIVE

## 2023-07-10 ENCOUNTER — TELEPHONE (OUTPATIENT)
Dept: FAMILY MEDICINE CLINIC | Facility: CLINIC | Age: 68
End: 2023-07-10

## 2023-07-10 ENCOUNTER — HOSPITAL ENCOUNTER (OUTPATIENT)
Dept: INTERVENTIONAL RADIOLOGY/VASCULAR | Age: 68
Discharge: HOME | End: 2023-07-13
Attending: INTERNAL MEDICINE
Payer: OTHER GOVERNMENT

## 2023-07-10 VITALS
HEART RATE: 70 BPM | TEMPERATURE: 97.8 F | OXYGEN SATURATION: 96 % | HEIGHT: 70 IN | RESPIRATION RATE: 18 BRPM | DIASTOLIC BLOOD PRESSURE: 69 MMHG | WEIGHT: 315 LBS | SYSTOLIC BLOOD PRESSURE: 133 MMHG | BODY MASS INDEX: 45.1 KG/M2

## 2023-07-10 DIAGNOSIS — C43.62 MELANOMA OF SHOULDER, LEFT (HCC): ICD-10-CM

## 2023-07-10 LAB
GLUCOSE BLD STRIP.AUTO-MCNC: 121 MG/DL (ref 65–100)
SERVICE CMNT-IMP: ABNORMAL

## 2023-07-10 PROCEDURE — 6360000002 HC RX W HCPCS: Performed by: RADIOLOGY

## 2023-07-10 PROCEDURE — 82962 GLUCOSE BLOOD TEST: CPT

## 2023-07-10 PROCEDURE — 6360000002 HC RX W HCPCS: Performed by: PHYSICIAN ASSISTANT

## 2023-07-10 PROCEDURE — 76937 US GUIDE VASCULAR ACCESS: CPT

## 2023-07-10 PROCEDURE — 2500000003 HC RX 250 WO HCPCS: Performed by: PHYSICIAN ASSISTANT

## 2023-07-10 RX ORDER — MIDAZOLAM HYDROCHLORIDE 2 MG/2ML
INJECTION, SOLUTION INTRAMUSCULAR; INTRAVENOUS PRN
Status: COMPLETED | OUTPATIENT
Start: 2023-07-10 | End: 2023-07-10

## 2023-07-10 RX ORDER — DIPHENHYDRAMINE HYDROCHLORIDE 50 MG/ML
INJECTION INTRAMUSCULAR; INTRAVENOUS PRN
Status: COMPLETED | OUTPATIENT
Start: 2023-07-10 | End: 2023-07-10

## 2023-07-10 RX ORDER — HYDRALAZINE HYDROCHLORIDE 20 MG/ML
INJECTION INTRAMUSCULAR; INTRAVENOUS PRN
Status: COMPLETED | OUTPATIENT
Start: 2023-07-10 | End: 2023-07-10

## 2023-07-10 RX ORDER — LIDOCAINE HYDROCHLORIDE AND EPINEPHRINE BITARTRATE 20; .01 MG/ML; MG/ML
INJECTION, SOLUTION SUBCUTANEOUS PRN
Status: COMPLETED | OUTPATIENT
Start: 2023-07-10 | End: 2023-07-10

## 2023-07-10 RX ORDER — FENTANYL CITRATE 50 UG/ML
INJECTION, SOLUTION INTRAMUSCULAR; INTRAVENOUS PRN
Status: COMPLETED | OUTPATIENT
Start: 2023-07-10 | End: 2023-07-10

## 2023-07-10 RX ORDER — HEPARIN SODIUM 100 [USP'U]/ML
INJECTION, SOLUTION INTRAVENOUS PRN
Status: COMPLETED | OUTPATIENT
Start: 2023-07-10 | End: 2023-07-10

## 2023-07-10 RX ADMIN — DIPHENHYDRAMINE HYDROCHLORIDE 25 MG: 50 INJECTION, SOLUTION INTRAMUSCULAR; INTRAVENOUS at 09:58

## 2023-07-10 RX ADMIN — MIDAZOLAM HYDROCHLORIDE 0.5 MG: 1 INJECTION, SOLUTION INTRAMUSCULAR; INTRAVENOUS at 09:58

## 2023-07-10 RX ADMIN — HEPARIN 5 ML: 100 SYRINGE at 10:14

## 2023-07-10 RX ADMIN — MIDAZOLAM HYDROCHLORIDE 0.5 MG: 1 INJECTION, SOLUTION INTRAMUSCULAR; INTRAVENOUS at 10:05

## 2023-07-10 RX ADMIN — HYDRALAZINE HYDROCHLORIDE 10 MG: 20 INJECTION INTRAMUSCULAR; INTRAVENOUS at 10:08

## 2023-07-10 RX ADMIN — LIDOCAINE HYDROCHLORIDE,EPINEPHRINE BITARTRATE 10 ML: 20; .01 INJECTION, SOLUTION INFILTRATION; PERINEURAL at 10:05

## 2023-07-10 RX ADMIN — Medication 3000 MG: at 10:06

## 2023-07-10 RX ADMIN — FENTANYL CITRATE 25 MCG: 50 INJECTION, SOLUTION INTRAMUSCULAR; INTRAVENOUS at 09:58

## 2023-07-10 RX ADMIN — FENTANYL CITRATE 25 MCG: 50 INJECTION, SOLUTION INTRAMUSCULAR; INTRAVENOUS at 10:05

## 2023-07-10 ASSESSMENT — PAIN - FUNCTIONAL ASSESSMENT: PAIN_FUNCTIONAL_ASSESSMENT: NONE - DENIES PAIN

## 2023-07-10 NOTE — BRIEF OP NOTE
Department of Interventional Radiology  [phone]        Interventional Radiology Brief Procedure Note    Patient: Ann Putnam MRN: [id number]  SSN: [id number]    YOB: 1955  Age: 70 y.o. Sex: male      Date of Procedure: 7/10/2023    Pre-Procedure Diagnosis: malignant melanoma    Post-Procedure Diagnosis: SAME    Procedure(s): Venous Chest Port Placement    Brief Description of Procedure: as above    Performed By: Sulema Quinones PA-C     Assistants: None    Anesthesia:Moderate Sedation per SHANIQUE Schmitt MD    Estimated Blood Loss: Less than 10ml    Specimens:  None    Implants:  Subcutaneous Port    Findings: catheter tip in right atrium     Complications: None    Recommendations: ok to use port     Follow Up: prn    Signed By: Sulema Quinones PA-C     July 10, 2023

## 2023-07-10 NOTE — TELEPHONE ENCOUNTER
[facility] radiology scanning dept can not complete MRI for brain due to pt having a pace maker. Order needs to be completed somewhere else.

## 2023-07-10 NOTE — H&P
Ar Automatic Reconciliation   Camphor-Menthol-Methyl Sal 3.1-6-10 % PTCH Apply topically    Ar Automatic Reconciliation   diclofenac sodium (VOLTAREN) 1 % GEL Apply 4 g topically 4 times daily as needed 12/27/19   Ar Automatic Reconciliation   ferrous sulfate (IRON 325) 325 (65 Fe) MG tablet Take by mouth as needed    Ar Automatic Reconciliation   fluticasone (FLONASE) 50 MCG/ACT nasal spray 2 sprays by Nasal route as needed    Ar Automatic Reconciliation   fluticasone-salmeterol (ADVAIR) 500-50 MCG/ACT AEPB diskus inhaler Inhale 1 puff into the lungs 2 times daily as needed    Ar Automatic Reconciliation   loratadine (CLARITIN) 10 MG tablet Take 1 tablet by mouth daily 12/9/16   Ar Automatic Reconciliation   methocarbamol (ROBAXIN) 500 MG tablet Take 1 tablet by mouth in the morning and at bedtime    Ar Automatic Reconciliation   omeprazole (PRILOSEC) 20 MG delayed release capsule Take 1 capsule by mouth Daily    Ar Automatic Reconciliation   tiotropium (SPIRIVA RESPIMAT) 2.5 MCG/ACT AERS inhaler Inhale 2 puffs into the lungs daily    Ar Automatic Reconciliation   trolamine salicylate (ASPER-FLEX) 10 % cream Apply topically as needed    Ar Automatic Reconciliation        Allergies   Allergen Reactions    Lisinopril Angioedema    Losartan Angioedema    Morphine Anaphylaxis and Swelling    Penicillins Anaphylaxis     Previous RX for cephalosporin tolerated    Tizanidine Other (See Comments)     Pancreatitis       Family History   Problem Relation Age of Onset    Heart Disease Sister     Diabetes Sister     No Known Problems Brother     Hypertension Sister     Heart Disease Sister     Diabetes Sister     Cancer Sister     Diabetes Father     Hypertension Father     Stroke Father     Heart Disease Father     Diabetes Mother     Hypertension Sister     Cancer Brother     Heart Disease Mother      Social History     Tobacco Use    Smoking status: Former     Packs/day: 0.25     Years: 1.00     Pack years: 0.25     Types:

## 2023-07-10 NOTE — PRE SEDATION
Sedation Pre-Procedure Note    Patient Name: Ryann Carrion   YOB: 1955  Room/Bed: Room/bed info not found  Medical Record Number: [id number]  Date: 7/10/2023   Time: 10:48 AM       Indication:  malignant melanoma    Consent: I have discussed with the patient and/or the patient representative the indication, alternatives, and the possible risks and/or complications of the planned procedure and the anesthesia methods. The patient and/or patient representative appear to understand and agree to proceed. Vital Signs:   Vitals:    07/10/23 1046   BP: (!) 155/72   Pulse: 70   Resp: 18   Temp:    SpO2: 93%       Past Medical History:   has a past medical history of Acute pancreatitis, Albuminuria, Allergic rhinitis, Asthma, Atrial fibrillation ([address]), Benign essential hypertension, BMI 45.0-49.9, adult ([address]), Calculous cholecystitis, Cellulitis of arm, Chronic lower back pain, Chronic pain, Chronic systolic (congestive) heart failure ([address]), CKD (chronic kidney disease), COVID-19, COVID-19, Edema, Former smoker, Gastroesophageal reflux disease, Gout, Hepatic steatosis, History of blood transfusion, Hx of transesophageal echocardiography ([facility]) for monitoring, Hypercholesteremia, Hypertriglyceridemia, Iron deficiency anemia, Melanoma of left upper arm (HCC), Normochromic normocytic anemia, Obstructive sleep apnea on CPAP, Osteoarthritis, Pacemaker, Peptic ulcer disease, pT4b Melanoma of left shoulder/upper arm, Sensory neuropathy, Type 2 diabetes mellitus with stage 3a chronic kidney disease, with long-term current use of insulin ([address]), and Vitamin D deficiency. Past Surgical History:   has a past surgical history that includes UPPP (mid 2000's); orthopedic surgery (1990's); Total knee arthroplasty (Bilateral); pacemaker placement (12/29/2020); Cholecystectomy, laparoscopic (2014); hernia repair (2014); pacemaker placement (2014); Cardiac catheterization (2020);  Colonoscopy (09/15/2022);

## 2023-07-10 NOTE — OR NURSING
Recovery period without difficulty. Pt alert and oriented and denies pain. Dressing is clean, dry, and intact. Reviewed discharge instructions with patient and spouse, both verbalized understanding. Pt escorted to lobby discharge area via wheelchair. Vital signs and Gisella score completed.

## 2023-07-17 ENCOUNTER — TELEPHONE (OUTPATIENT)
Dept: ONCOLOGY | Age: 68
End: 2023-07-17

## 2023-07-17 DIAGNOSIS — D50.9 IRON DEFICIENCY ANEMIA, UNSPECIFIED IRON DEFICIENCY ANEMIA TYPE: ICD-10-CM

## 2023-07-17 DIAGNOSIS — C43.9 MALIGNANT MELANOMA, UNSPECIFIED SITE (HCC): Primary | ICD-10-CM

## 2023-07-17 RX ORDER — SODIUM CHLORIDE 9 MG/ML
5-250 INJECTION, SOLUTION INTRAVENOUS PRN
Status: CANCELLED | OUTPATIENT
Start: 2023-07-24

## 2023-07-17 RX ORDER — FAMOTIDINE 10 MG/ML
20 INJECTION, SOLUTION INTRAVENOUS
Status: CANCELLED | OUTPATIENT
Start: 2023-07-24

## 2023-07-17 RX ORDER — SODIUM CHLORIDE 9 MG/ML
INJECTION, SOLUTION INTRAVENOUS CONTINUOUS
Status: CANCELLED | OUTPATIENT
Start: 2023-07-24

## 2023-07-17 RX ORDER — SODIUM CHLORIDE 0.9 % (FLUSH) 0.9 %
5-40 SYRINGE (ML) INJECTION PRN
Status: CANCELLED | OUTPATIENT
Start: 2023-07-24

## 2023-07-17 RX ORDER — ACETAMINOPHEN 325 MG/1
650 TABLET ORAL
Status: CANCELLED | OUTPATIENT
Start: 2023-07-24

## 2023-07-17 RX ORDER — EPINEPHRINE 1 MG/ML
0.3 INJECTION, SOLUTION, CONCENTRATE INTRAVENOUS PRN
Status: CANCELLED | OUTPATIENT
Start: 2023-07-24

## 2023-07-17 RX ORDER — HEPARIN SODIUM (PORCINE) LOCK FLUSH IV SOLN 100 UNIT/ML 100 UNIT/ML
500 SOLUTION INTRAVENOUS PRN
Status: CANCELLED | OUTPATIENT
Start: 2023-07-24

## 2023-07-17 RX ORDER — ONDANSETRON 2 MG/ML
8 INJECTION INTRAMUSCULAR; INTRAVENOUS
Status: CANCELLED | OUTPATIENT
Start: 2023-07-24

## 2023-07-17 RX ORDER — ALBUTEROL SULFATE 90 UG/1
4 AEROSOL, METERED RESPIRATORY (INHALATION) PRN
Status: CANCELLED | OUTPATIENT
Start: 2023-07-24

## 2023-07-17 RX ORDER — DIPHENHYDRAMINE HYDROCHLORIDE 50 MG/ML
50 INJECTION INTRAMUSCULAR; INTRAVENOUS
Status: CANCELLED | OUTPATIENT
Start: 2023-07-24

## 2023-07-17 NOTE — TELEPHONE ENCOUNTER
Physician provider:Elba  Reason for today's call: Concerns  Last office visit: n/a       Pt's Spouse called to address 3 concerns: 1st-MRI facility that will perform procedure for Pt w/Pacemaker,   2nd-Pt \"is very, very, very weak x1 week post immuno therapy session\", and   3rd-Pt is waiting for auth. for Lidocaine medication so he can begin port treatments.

## 2023-07-17 NOTE — TELEPHONE ENCOUNTER
Subjective    Chief Complaint:   1. MRI - states [facility] has informed pt that they cannot do MRI. Would need to be completed at a facility that has cath lab nearby. 2. Prior auth needed for Emla cream      3. Fatigue and SOB w/ exertion - inquiring about IV iron infusions. 4. Itching/rash - pt has rash on shoulders, arms, back, and stomach. Pt states it is very itchy. Describes it as skin colored bumps on areas mentioned. Pt developed rash prior to starting Keytruda which was reviewed w/ PCP - PCP put patient on a course of prednisone which  Cleared the rash but it is now back. Pt would like to avoid being on long-term steroids for rash but is willing to do whatever he needs to. Has been applying hydrocortisone cream to rash. Denies any changes in laundry detergent, diet, etc.      Objective    Date of last Office Visit: 7/10/23   Date of last labs: 7/7/23   Date of last imaging: PET 3/28/23  Date of last treatment, if applicable:7/7 M8Q1 keytruda      Plan/Intervention    Proposed Plan:   1. MRI - msg sent to clinical staff to fax order to [facility]      2. Prior auth for Emla cream - msg routed to benefit specialists pool     3. Fatigue and SOB w/ exertion - Arrange IV iron infusions? Ferritin was 10 on 6/27/23      4. Itching/rash - take OTC benadryl for rash/itching, follow up w/ dermatology - any additional  Recommendations?      Patient verbalized understanding of plan: YES/NO: Yes  Patient voiced intended compliance with plan: Verbalized/ Refused: Verbalized intended compliance

## 2023-07-17 NOTE — TELEPHONE ENCOUNTER
Orders entered for Feraheme x2 - msg sent to infusion schedulers to arrange and notify pt of apt dates/times. Wife will try sending picture of rash in MyChart this evening - will review w/ NP tomorrow once received. Advised to go ahead and contact dermatology for possible apt.      Informed wife that MRI order has been faxed to [facility] and msg routed to benefit specialists regarding PA needed for port cream.

## 2023-07-19 RX ORDER — ALBUTEROL SULFATE 90 UG/1
4 AEROSOL, METERED RESPIRATORY (INHALATION) PRN
OUTPATIENT
Start: 2023-07-24

## 2023-07-19 RX ORDER — ACETAMINOPHEN 325 MG/1
650 TABLET ORAL
OUTPATIENT
Start: 2023-07-24

## 2023-07-19 RX ORDER — HEPARIN SODIUM (PORCINE) LOCK FLUSH IV SOLN 100 UNIT/ML 100 UNIT/ML
500 SOLUTION INTRAVENOUS PRN
OUTPATIENT
Start: 2023-07-24

## 2023-07-19 RX ORDER — SODIUM CHLORIDE 9 MG/ML
5-250 INJECTION, SOLUTION INTRAVENOUS PRN
OUTPATIENT
Start: 2023-07-24

## 2023-07-19 RX ORDER — EPINEPHRINE 1 MG/ML
0.3 INJECTION, SOLUTION, CONCENTRATE INTRAVENOUS PRN
OUTPATIENT
Start: 2023-07-24

## 2023-07-19 RX ORDER — FAMOTIDINE 10 MG/ML
20 INJECTION, SOLUTION INTRAVENOUS
OUTPATIENT
Start: 2023-07-24

## 2023-07-19 RX ORDER — DIPHENHYDRAMINE HYDROCHLORIDE 50 MG/ML
50 INJECTION INTRAMUSCULAR; INTRAVENOUS
OUTPATIENT
Start: 2023-07-24

## 2023-07-19 RX ORDER — SODIUM CHLORIDE 0.9 % (FLUSH) 0.9 %
5-40 SYRINGE (ML) INJECTION PRN
OUTPATIENT
Start: 2023-07-24

## 2023-07-19 RX ORDER — ONDANSETRON 2 MG/ML
8 INJECTION INTRAMUSCULAR; INTRAVENOUS
OUTPATIENT
Start: 2023-07-24

## 2023-07-19 RX ORDER — SODIUM CHLORIDE 9 MG/ML
INJECTION, SOLUTION INTRAVENOUS CONTINUOUS
OUTPATIENT
Start: 2023-07-24

## 2023-07-20 ENCOUNTER — OFFICE VISIT (OUTPATIENT)
Age: 68
End: 2023-07-20
Payer: OTHER GOVERNMENT

## 2023-07-20 ENCOUNTER — NURSE ONLY (OUTPATIENT)
Age: 68
End: 2023-07-20

## 2023-07-20 VITALS
HEART RATE: 72 BPM | DIASTOLIC BLOOD PRESSURE: 72 MMHG | BODY MASS INDEX: 45.1 KG/M2 | WEIGHT: 315 LBS | SYSTOLIC BLOOD PRESSURE: 132 MMHG | HEIGHT: 70 IN

## 2023-07-20 DIAGNOSIS — Z79.01 ANTICOAGULATED: ICD-10-CM

## 2023-07-20 DIAGNOSIS — I50.22 CHRONIC SYSTOLIC (CONGESTIVE) HEART FAILURE (HCC): Primary | Chronic | ICD-10-CM

## 2023-07-20 DIAGNOSIS — I27.20 PULMONARY HYPERTENSION (HCC): Chronic | ICD-10-CM

## 2023-07-20 DIAGNOSIS — I87.2 EDEMA OF BOTH LOWER EXTREMITIES DUE TO PERIPHERAL VENOUS INSUFFICIENCY: Chronic | ICD-10-CM

## 2023-07-20 DIAGNOSIS — I47.29 NSVT (NONSUSTAINED VENTRICULAR TACHYCARDIA) (HCC): ICD-10-CM

## 2023-07-20 DIAGNOSIS — Z95.0 PRESENCE OF CARDIAC PACEMAKER: ICD-10-CM

## 2023-07-20 DIAGNOSIS — Z79.01 LONG TERM (CURRENT) USE OF ANTICOAGULANTS: Chronic | ICD-10-CM

## 2023-07-20 DIAGNOSIS — I48.11 LONGSTANDING PERSISTENT ATRIAL FIBRILLATION (HCC): Chronic | ICD-10-CM

## 2023-07-20 DIAGNOSIS — G47.33 OBSTRUCTIVE SLEEP APNEA ON CPAP: Chronic | ICD-10-CM

## 2023-07-20 DIAGNOSIS — I10 BENIGN ESSENTIAL HYPERTENSION: Chronic | ICD-10-CM

## 2023-07-20 DIAGNOSIS — Z99.89 OBSTRUCTIVE SLEEP APNEA ON CPAP: Chronic | ICD-10-CM

## 2023-07-20 PROCEDURE — 1123F ACP DISCUSS/DSCN MKR DOCD: CPT | Performed by: INTERNAL MEDICINE

## 2023-07-20 PROCEDURE — 3078F DIAST BP <80 MM HG: CPT | Performed by: INTERNAL MEDICINE

## 2023-07-20 PROCEDURE — 93296 REM INTERROG EVL PM/IDS: CPT | Performed by: INTERNAL MEDICINE

## 2023-07-20 PROCEDURE — 99214 OFFICE O/P EST MOD 30 MIN: CPT | Performed by: INTERNAL MEDICINE

## 2023-07-20 PROCEDURE — 3075F SYST BP GE 130 - 139MM HG: CPT | Performed by: INTERNAL MEDICINE

## 2023-07-20 PROCEDURE — 93294 REM INTERROG EVL PM/LDLS PM: CPT | Performed by: INTERNAL MEDICINE

## 2023-07-20 RX ORDER — DOXYCYCLINE HYCLATE 100 MG/1
CAPSULE ORAL
COMMUNITY
Start: 2023-07-19

## 2023-07-20 RX ORDER — HYDROXYZINE HYDROCHLORIDE 25 MG/1
TABLET, FILM COATED ORAL
COMMUNITY
Start: 2023-07-19

## 2023-07-20 NOTE — PROGRESS NOTES
[address] [facility], [facility], Ramy Nair  PHONE: [phone]     23    NAME:  Rachel Wen  : 1955  MRN: [id number]       SUBJECTIVE:   Rachel Wen is a 70 y.o. male seen for a follow up visit regarding the following:     Chief Complaint   Patient presents with    Congestive Heart Failure       HPI:   Longstanding persistent Afib s/p PM and AVN ablation. [facility] . JUAN JOSE on CPAP. Lives above TR. LHC 2020: mild CAD, EDP 20      2020 admission for COVID, better now. Echo 10/2022: normal EF, LVH, normal RVSP     Has melanoma, getting treatment via port. More tired on treatment. NO new angina, CP, pressure. Some PATEL, not worsening. On daily torsemide. Edema controlled. Feeling better, more energy. On NOAC. Patient denies recent history of orthopnea, PND, excessive dizziness and/or syncope. Doing well on anticoagulation treatment as reviewed today, no bleeding issues or excessive bruising noted. Past Medical History, Past Surgical History, Family history, Social History, and Medications were all reviewed with the patient today and updated as necessary. Current Outpatient Medications   Medication Sig Dispense Refill    hydrOXYzine HCl (ATARAX) 25 MG tablet       doxycycline hyclate (VIBRAMYCIN) 100 MG capsule       ondansetron (ZOFRAN) 8 MG tablet Take 1 tablet by mouth every 8 hours as needed for Nausea or Vomiting 90 tablet 1    prochlorperazine (COMPAZINE) 10 MG tablet Take 1 tablet by mouth every 6 hours as needed (nausea/vomiting) 60 tablet 1    lidocaine-prilocaine (EMLA) 2.5-2.5 % cream Apply topically as needed to port site 30-60 minutes prior to being accessed with needle.  Cover with saran wrap. 1 each 1    torsemide (DEMADEX) 20 MG tablet TAKE ONE TABLET BY MOUTH TWICE A  tablet 3    LANTUS SOLOSTAR 100 UNIT/ML injection pen Inject 54 Units into the skin 2 times daily 90 mL 3    metFORMIN (GLUCOPHAGE) 500

## 2023-07-24 ENCOUNTER — CLINICAL DOCUMENTATION (OUTPATIENT)
Facility: HOSPITAL | Age: 68
End: 2023-07-24

## 2023-07-24 ENCOUNTER — TELEPHONE (OUTPATIENT)
Dept: ONCOLOGY | Age: 68
End: 2023-07-24

## 2023-07-24 NOTE — PROGRESS NOTES
Financial Navigator spoke with patient for verification of VA benefits as primary coverage for Keytruda, and all infusion medication. Information verified by billing and all billing will be directed to [facility] for verification. Patient acknowledge understanding of information provided.

## 2023-07-26 DIAGNOSIS — Z79.899 HIGH RISK MEDICATION USE: ICD-10-CM

## 2023-07-26 DIAGNOSIS — Z29.8 ENCOUNTER FOR IMMUNOTHERAPY: ICD-10-CM

## 2023-07-26 DIAGNOSIS — C43.62 MELANOMA OF SHOULDER, LEFT (HCC): Primary | ICD-10-CM

## 2023-07-28 ENCOUNTER — HOSPITAL ENCOUNTER (OUTPATIENT)
Dept: INFUSION THERAPY | Age: 68
Discharge: HOME | End: 2023-07-28
Payer: OTHER GOVERNMENT

## 2023-07-28 ENCOUNTER — OFFICE VISIT (OUTPATIENT)
Dept: ONCOLOGY | Age: 68
End: 2023-07-28
Payer: OTHER GOVERNMENT

## 2023-07-28 VITALS
HEIGHT: 70 IN | OXYGEN SATURATION: 98 % | BODY MASS INDEX: 45.1 KG/M2 | HEART RATE: 89 BPM | SYSTOLIC BLOOD PRESSURE: 121 MMHG | WEIGHT: 315 LBS | DIASTOLIC BLOOD PRESSURE: 77 MMHG | RESPIRATION RATE: 18 BRPM | TEMPERATURE: 97.8 F

## 2023-07-28 VITALS — HEART RATE: 78 BPM | DIASTOLIC BLOOD PRESSURE: 79 MMHG | SYSTOLIC BLOOD PRESSURE: 118 MMHG | OXYGEN SATURATION: 97 %

## 2023-07-28 DIAGNOSIS — Z79.899 HIGH RISK MEDICATION USE: ICD-10-CM

## 2023-07-28 DIAGNOSIS — D50.9 IRON DEFICIENCY ANEMIA, UNSPECIFIED IRON DEFICIENCY ANEMIA TYPE: Primary | ICD-10-CM

## 2023-07-28 DIAGNOSIS — C43.9 MALIGNANT MELANOMA, UNSPECIFIED SITE (HCC): ICD-10-CM

## 2023-07-28 DIAGNOSIS — C43.62 MELANOMA OF SHOULDER, LEFT (HCC): ICD-10-CM

## 2023-07-28 DIAGNOSIS — Z29.8 ENCOUNTER FOR IMMUNOTHERAPY: ICD-10-CM

## 2023-07-28 LAB
ALBUMIN SERPL-MCNC: 3.2 G/DL (ref 3.2–4.6)
ALBUMIN/GLOB SERPL: 0.7 (ref 0.4–1.6)
ALP SERPL-CCNC: 117 U/L (ref 50–136)
ALT SERPL-CCNC: 20 U/L (ref 12–65)
ANION GAP SERPL CALC-SCNC: 8 MMOL/L (ref 2–11)
AST SERPL-CCNC: 18 U/L (ref 15–37)
BASOPHILS # BLD: 0.1 K/UL (ref 0–0.2)
BASOPHILS NFR BLD: 1 % (ref 0–2)
BILIRUB SERPL-MCNC: 0.4 MG/DL (ref 0.2–1.1)
BUN SERPL-MCNC: 27 MG/DL (ref 8–23)
CALCIUM SERPL-MCNC: 8.9 MG/DL (ref 8.3–10.4)
CHLORIDE SERPL-SCNC: 107 MMOL/L (ref 101–110)
CO2 SERPL-SCNC: 26 MMOL/L (ref 21–32)
CREAT SERPL-MCNC: 1.7 MG/DL (ref 0.8–1.5)
DIFFERENTIAL METHOD BLD: ABNORMAL
EOSINOPHIL # BLD: 0.7 K/UL (ref 0–0.8)
EOSINOPHIL NFR BLD: 7 % (ref 0.5–7.8)
ERYTHROCYTE [DISTWIDTH] IN BLOOD BY AUTOMATED COUNT: 17.2 % (ref 11.9–14.6)
GLOBULIN SER CALC-MCNC: 4.5 G/DL (ref 2.8–4.5)
GLUCOSE SERPL-MCNC: 145 MG/DL (ref 65–100)
HCT VFR BLD AUTO: 38.4 % (ref 41.1–50.3)
HGB BLD-MCNC: 11.8 G/DL (ref 13.6–17.2)
IMM GRANULOCYTES # BLD AUTO: 0 K/UL (ref 0–0.5)
IMM GRANULOCYTES NFR BLD AUTO: 0 % (ref 0–5)
LYMPHOCYTES # BLD: 3.3 K/UL (ref 0.5–4.6)
LYMPHOCYTES NFR BLD: 34 % (ref 13–44)
MAGNESIUM SERPL-MCNC: 1.9 MG/DL (ref 1.8–2.4)
MCH RBC QN AUTO: 25.3 PG (ref 26.1–32.9)
MCHC RBC AUTO-ENTMCNC: 30.7 G/DL (ref 31.4–35)
MCV RBC AUTO: 82.2 FL (ref 82–102)
MONOCYTES # BLD: 0.8 K/UL (ref 0.1–1.3)
MONOCYTES NFR BLD: 9 % (ref 4–12)
NEUTS SEG # BLD: 4.7 K/UL (ref 1.7–8.2)
NEUTS SEG NFR BLD: 49 % (ref 43–78)
NRBC # BLD: 0 K/UL (ref 0–0.2)
PLATELET # BLD AUTO: 451 K/UL (ref 150–450)
PMV BLD AUTO: 9.6 FL (ref 9.4–12.3)
POTASSIUM SERPL-SCNC: 3.4 MMOL/L (ref 3.5–5.1)
PROT SERPL-MCNC: 7.7 G/DL (ref 6.3–8.2)
RBC # BLD AUTO: 4.67 M/UL (ref 4.23–5.6)
SODIUM SERPL-SCNC: 141 MMOL/L (ref 133–143)
TSH, 3RD GENERATION: 1.95 UIU/ML (ref 0.36–3)
WBC # BLD AUTO: 9.7 K/UL (ref 4.3–11.1)

## 2023-07-28 PROCEDURE — 85025 COMPLETE CBC W/AUTO DIFF WBC: CPT

## 2023-07-28 PROCEDURE — 2580000003 HC RX 258: Performed by: INTERNAL MEDICINE

## 2023-07-28 PROCEDURE — 6360000002 HC RX W HCPCS

## 2023-07-28 PROCEDURE — 36591 DRAW BLOOD OFF VENOUS DEVICE: CPT

## 2023-07-28 PROCEDURE — 83735 ASSAY OF MAGNESIUM: CPT

## 2023-07-28 PROCEDURE — 96413 CHEMO IV INFUSION 1 HR: CPT

## 2023-07-28 PROCEDURE — 1123F ACP DISCUSS/DSCN MKR DOCD: CPT | Performed by: INTERNAL MEDICINE

## 2023-07-28 PROCEDURE — 84443 ASSAY THYROID STIM HORMONE: CPT

## 2023-07-28 PROCEDURE — 3074F SYST BP LT 130 MM HG: CPT | Performed by: INTERNAL MEDICINE

## 2023-07-28 PROCEDURE — 6360000002 HC RX W HCPCS: Performed by: INTERNAL MEDICINE

## 2023-07-28 PROCEDURE — 2580000003 HC RX 258

## 2023-07-28 PROCEDURE — 3078F DIAST BP <80 MM HG: CPT | Performed by: INTERNAL MEDICINE

## 2023-07-28 PROCEDURE — 80053 COMPREHEN METABOLIC PANEL: CPT

## 2023-07-28 PROCEDURE — 96375 TX/PRO/DX INJ NEW DRUG ADDON: CPT

## 2023-07-28 PROCEDURE — 99215 OFFICE O/P EST HI 40 MIN: CPT | Performed by: INTERNAL MEDICINE

## 2023-07-28 RX ORDER — ACETAMINOPHEN 325 MG/1
650 TABLET ORAL
Status: CANCELLED | OUTPATIENT
Start: 2023-07-28

## 2023-07-28 RX ORDER — SODIUM CHLORIDE 0.9 % (FLUSH) 0.9 %
5-40 SYRINGE (ML) INJECTION PRN
Status: DISCONTINUED | OUTPATIENT
Start: 2023-07-28 | End: 2023-07-29 | Stop reason: HOSPADM

## 2023-07-28 RX ORDER — MINOCYCLINE HYDROCHLORIDE 100 MG/1
CAPSULE ORAL
COMMUNITY
Start: 2023-07-25

## 2023-07-28 RX ORDER — ONDANSETRON 2 MG/ML
8 INJECTION INTRAMUSCULAR; INTRAVENOUS
Status: CANCELLED | OUTPATIENT
Start: 2023-07-28

## 2023-07-28 RX ORDER — SODIUM CHLORIDE 9 MG/ML
5-250 INJECTION, SOLUTION INTRAVENOUS PRN
Status: CANCELLED | OUTPATIENT
Start: 2023-07-28

## 2023-07-28 RX ORDER — FAMOTIDINE 10 MG/ML
20 INJECTION, SOLUTION INTRAVENOUS
Status: CANCELLED | OUTPATIENT
Start: 2023-07-28

## 2023-07-28 RX ORDER — ACETAMINOPHEN 325 MG/1
650 TABLET ORAL
Status: DISCONTINUED | OUTPATIENT
Start: 2023-07-28 | End: 2023-07-29 | Stop reason: HOSPADM

## 2023-07-28 RX ORDER — EPINEPHRINE 1 MG/ML
0.3 INJECTION, SOLUTION, CONCENTRATE INTRAVENOUS PRN
Status: DISCONTINUED | OUTPATIENT
Start: 2023-07-28 | End: 2023-07-29 | Stop reason: HOSPADM

## 2023-07-28 RX ORDER — SODIUM CHLORIDE 9 MG/ML
INJECTION, SOLUTION INTRAVENOUS CONTINUOUS
Status: CANCELLED | OUTPATIENT
Start: 2023-07-28

## 2023-07-28 RX ORDER — HEPARIN SODIUM (PORCINE) LOCK FLUSH IV SOLN 100 UNIT/ML 100 UNIT/ML
500 SOLUTION INTRAVENOUS PRN
Status: CANCELLED | OUTPATIENT
Start: 2023-07-28

## 2023-07-28 RX ORDER — SODIUM CHLORIDE 0.9 % (FLUSH) 0.9 %
5-40 SYRINGE (ML) INJECTION PRN
Status: CANCELLED | OUTPATIENT
Start: 2023-07-28

## 2023-07-28 RX ORDER — SODIUM CHLORIDE 9 MG/ML
5-250 INJECTION, SOLUTION INTRAVENOUS PRN
Status: DISCONTINUED | OUTPATIENT
Start: 2023-07-28 | End: 2023-07-29 | Stop reason: HOSPADM

## 2023-07-28 RX ORDER — MEPERIDINE HYDROCHLORIDE 25 MG/ML
12.5 INJECTION INTRAMUSCULAR; INTRAVENOUS; SUBCUTANEOUS PRN
Status: DISCONTINUED | OUTPATIENT
Start: 2023-07-28 | End: 2023-07-29 | Stop reason: HOSPADM

## 2023-07-28 RX ORDER — DIPHENHYDRAMINE HYDROCHLORIDE 50 MG/ML
50 INJECTION INTRAMUSCULAR; INTRAVENOUS
Status: CANCELLED | OUTPATIENT
Start: 2023-07-28

## 2023-07-28 RX ORDER — MEPERIDINE HYDROCHLORIDE 50 MG/ML
12.5 INJECTION INTRAMUSCULAR; INTRAVENOUS; SUBCUTANEOUS PRN
Status: CANCELLED | OUTPATIENT
Start: 2023-07-28

## 2023-07-28 RX ORDER — EPINEPHRINE 1 MG/ML
0.3 INJECTION, SOLUTION, CONCENTRATE INTRAVENOUS PRN
Status: CANCELLED | OUTPATIENT
Start: 2023-07-28

## 2023-07-28 RX ORDER — SODIUM CHLORIDE 9 MG/ML
5-250 INJECTION, SOLUTION INTRAVENOUS PRN
OUTPATIENT
Start: 2023-07-28

## 2023-07-28 RX ORDER — ONDANSETRON 2 MG/ML
8 INJECTION INTRAMUSCULAR; INTRAVENOUS
Status: DISCONTINUED | OUTPATIENT
Start: 2023-07-28 | End: 2023-07-29 | Stop reason: HOSPADM

## 2023-07-28 RX ORDER — ALBUTEROL SULFATE 90 UG/1
4 AEROSOL, METERED RESPIRATORY (INHALATION) PRN
Status: DISCONTINUED | OUTPATIENT
Start: 2023-07-28 | End: 2023-07-29 | Stop reason: HOSPADM

## 2023-07-28 RX ORDER — DIPHENHYDRAMINE HYDROCHLORIDE 50 MG/ML
50 INJECTION INTRAMUSCULAR; INTRAVENOUS
Status: DISCONTINUED | OUTPATIENT
Start: 2023-07-28 | End: 2023-07-29 | Stop reason: HOSPADM

## 2023-07-28 RX ORDER — HEPARIN 100 UNIT/ML
500 SYRINGE INTRAVENOUS PRN
OUTPATIENT
Start: 2023-07-28

## 2023-07-28 RX ORDER — ALBUTEROL SULFATE 90 UG/1
4 AEROSOL, METERED RESPIRATORY (INHALATION) PRN
Status: CANCELLED | OUTPATIENT
Start: 2023-07-28

## 2023-07-28 RX ADMIN — IRON SUCROSE 200 MG: 20 INJECTION, SOLUTION INTRAVENOUS at 10:59

## 2023-07-28 RX ADMIN — SODIUM CHLORIDE 125 ML/HR: 9 INJECTION, SOLUTION INTRAVENOUS at 10:00

## 2023-07-28 RX ADMIN — SODIUM CHLORIDE, PRESERVATIVE FREE 10 ML: 5 INJECTION INTRAVENOUS at 08:06

## 2023-07-28 RX ADMIN — SODIUM CHLORIDE 200 MG: 9 INJECTION, SOLUTION INTRAVENOUS at 10:14

## 2023-07-28 RX ADMIN — SODIUM CHLORIDE, PRESERVATIVE FREE 10 ML: 5 INJECTION INTRAVENOUS at 09:59

## 2023-07-28 RX ADMIN — SODIUM CHLORIDE, PRESERVATIVE FREE 10 ML: 5 INJECTION INTRAVENOUS at 10:49

## 2023-07-28 ASSESSMENT — PATIENT HEALTH QUESTIONNAIRE - PHQ9
SUM OF ALL RESPONSES TO PHQ QUESTIONS 1-9: 0
SUM OF ALL RESPONSES TO PHQ9 QUESTIONS 1 & 2: 0
2. FEELING DOWN, DEPRESSED OR HOPELESS: 0
1. LITTLE INTEREST OR PLEASURE IN DOING THINGS: 0

## 2023-07-28 NOTE — PATIENT INSTRUCTIONS
Patient Instructions from Today's Visit    Reason for Visit:  Follow up visit for melanoma/QUINTON      Plan:    Keytruda and dose #1 Venofer today. MRI of the brain to be done at [facility] on 8/3. Keep an eye on the rash.     Follow Up:  - weekly Venofer as scheduled  - 3 weeks labs/OV/infusion    Recent Lab Results:  Hospital Outpatient Visit on 07/28/2023   Component Date Value Ref Range Status    WBC 07/28/2023 9.7  4.3 - 11.1 K/uL Final    RBC 07/28/2023 4.67  4.23 - 5.6 M/uL Final    Hemoglobin 07/28/2023 11.8 (L)  13.6 - 17.2 g/dL Final    Hematocrit 07/28/2023 38.4 (L)  41.1 - 50.3 % Final    MCV 07/28/2023 82.2  82.0 - 102.0 FL Final    MCH 07/28/2023 25.3 (L)  26.1 - 32.9 PG Final    MCHC 07/28/2023 30.7 (L)  31.4 - 35.0 g/dL Final    RDW 07/28/2023 17.2 (H)  11.9 - 14.6 % Final    Platelets 47/14/2164 451 (H)  150 - 450 K/uL Final    MPV 07/28/2023 9.6  9.4 - 12.3 FL Final    nRBC 07/28/2023 0.00  0.0 - 0.2 K/uL Final    **Note: Absolute NRBC parameter is now reported with Hemogram**    Neutrophils % 07/28/2023 49  43 - 78 % Final    Lymphocytes % 07/28/2023 34  13 - 44 % Final    Monocytes % 07/28/2023 9  4.0 - 12.0 % Final    Eosinophils % 07/28/2023 7  0.5 - 7.8 % Final    Basophils % 07/28/2023 1  0.0 - 2.0 % Final    Immature Granulocytes 07/28/2023 0  0.0 - 5.0 % Final    Neutrophils Absolute 07/28/2023 4.7  1.7 - 8.2 K/UL Final    Lymphocytes Absolute 07/28/2023 3.3  0.5 - 4.6 K/UL Final    Monocytes Absolute 07/28/2023 0.8  0.1 - 1.3 K/UL Final    Eosinophils Absolute 07/28/2023 0.7  0.0 - 0.8 K/UL Final    Basophils Absolute 07/28/2023 0.1  0.0 - 0.2 K/UL Final    Absolute Immature Granulocyte 07/28/2023 0.0  0.0 - 0.5 K/UL Final    Differential Type 07/28/2023 AUTOMATED    Final    Sodium 07/28/2023 141  133 - 143 mmol/L Final    Potassium 07/28/2023 3.4 (L)  3.5 - 5.1 mmol/L Final    Chloride 07/28/2023 107  101 - 110 mmol/L Final    CO2 07/28/2023 26  21 - 32 mmol/L Final    Anion Gap 07/28/2023 8

## 2023-07-28 NOTE — PROGRESS NOTES
Patient arrived to [address] [facility] for [facility]. Assessment completed. No needs voiced at this time. Patient tolerated infusion well and is aware of next appointment on 8/5/2023 @0900. Patient observed 30 mins post infusion. Patient discharged ambulatory.

## 2023-07-28 NOTE — PROGRESS NOTES
Patient arrived to port lab for port access and lab draw   [address] accessed and labs drawn per protocol   *Port remains accessed   Patient discharged from port lab ambulatory

## 2023-08-05 ENCOUNTER — HOSPITAL ENCOUNTER (OUTPATIENT)
Dept: INFUSION THERAPY | Age: 68
Discharge: HOME | End: 2023-08-05
Payer: OTHER GOVERNMENT

## 2023-08-05 VITALS
HEART RATE: 71 BPM | DIASTOLIC BLOOD PRESSURE: 68 MMHG | RESPIRATION RATE: 18 BRPM | OXYGEN SATURATION: 97 % | SYSTOLIC BLOOD PRESSURE: 115 MMHG | BODY MASS INDEX: 46.89 KG/M2 | TEMPERATURE: 97.7 F | WEIGHT: 315 LBS

## 2023-08-05 DIAGNOSIS — D50.9 IRON DEFICIENCY ANEMIA, UNSPECIFIED IRON DEFICIENCY ANEMIA TYPE: Primary | ICD-10-CM

## 2023-08-05 PROCEDURE — 6360000002 HC RX W HCPCS

## 2023-08-05 PROCEDURE — 2580000003 HC RX 258

## 2023-08-05 PROCEDURE — 96374 THER/PROPH/DIAG INJ IV PUSH: CPT

## 2023-08-05 RX ORDER — SODIUM CHLORIDE 9 MG/ML
INJECTION, SOLUTION INTRAVENOUS CONTINUOUS
Status: CANCELLED | OUTPATIENT
Start: 2023-08-05

## 2023-08-05 RX ORDER — EPINEPHRINE 1 MG/ML
0.3 INJECTION, SOLUTION, CONCENTRATE INTRAVENOUS PRN
Status: CANCELLED | OUTPATIENT
Start: 2023-08-05

## 2023-08-05 RX ORDER — DIPHENHYDRAMINE HYDROCHLORIDE 50 MG/ML
50 INJECTION INTRAMUSCULAR; INTRAVENOUS
Status: CANCELLED | OUTPATIENT
Start: 2023-08-05

## 2023-08-05 RX ORDER — SODIUM CHLORIDE 0.9 % (FLUSH) 0.9 %
5-40 SYRINGE (ML) INJECTION PRN
Status: CANCELLED | OUTPATIENT
Start: 2023-08-05

## 2023-08-05 RX ORDER — SODIUM CHLORIDE 0.9 % (FLUSH) 0.9 %
5-40 SYRINGE (ML) INJECTION PRN
Status: DISCONTINUED | OUTPATIENT
Start: 2023-08-05 | End: 2023-08-06 | Stop reason: HOSPADM

## 2023-08-05 RX ORDER — SODIUM CHLORIDE 9 MG/ML
5-250 INJECTION, SOLUTION INTRAVENOUS PRN
Status: DISCONTINUED | OUTPATIENT
Start: 2023-08-05 | End: 2023-08-06 | Stop reason: HOSPADM

## 2023-08-05 RX ORDER — ALBUTEROL SULFATE 90 UG/1
4 AEROSOL, METERED RESPIRATORY (INHALATION) PRN
Status: DISCONTINUED | OUTPATIENT
Start: 2023-08-05 | End: 2023-08-06 | Stop reason: HOSPADM

## 2023-08-05 RX ORDER — HEPARIN 100 UNIT/ML
500 SYRINGE INTRAVENOUS PRN
Status: CANCELLED | OUTPATIENT
Start: 2023-08-05

## 2023-08-05 RX ORDER — EPINEPHRINE 1 MG/ML
0.3 INJECTION, SOLUTION, CONCENTRATE INTRAVENOUS PRN
Status: DISCONTINUED | OUTPATIENT
Start: 2023-08-05 | End: 2023-08-06 | Stop reason: HOSPADM

## 2023-08-05 RX ORDER — SODIUM CHLORIDE 9 MG/ML
5-250 INJECTION, SOLUTION INTRAVENOUS PRN
Status: CANCELLED | OUTPATIENT
Start: 2023-08-05

## 2023-08-05 RX ORDER — ALBUTEROL SULFATE 90 UG/1
4 AEROSOL, METERED RESPIRATORY (INHALATION) PRN
Status: CANCELLED | OUTPATIENT
Start: 2023-08-05

## 2023-08-05 RX ORDER — ACETAMINOPHEN 325 MG/1
650 TABLET ORAL
Status: CANCELLED | OUTPATIENT
Start: 2023-08-05

## 2023-08-05 RX ORDER — SODIUM CHLORIDE 9 MG/ML
INJECTION, SOLUTION INTRAVENOUS CONTINUOUS
Status: DISCONTINUED | OUTPATIENT
Start: 2023-08-05 | End: 2023-08-06 | Stop reason: HOSPADM

## 2023-08-05 RX ORDER — ONDANSETRON 2 MG/ML
8 INJECTION INTRAMUSCULAR; INTRAVENOUS
Status: CANCELLED | OUTPATIENT
Start: 2023-08-05

## 2023-08-05 RX ORDER — ONDANSETRON 2 MG/ML
8 INJECTION INTRAMUSCULAR; INTRAVENOUS
Status: DISCONTINUED | OUTPATIENT
Start: 2023-08-05 | End: 2023-08-06 | Stop reason: HOSPADM

## 2023-08-05 RX ORDER — ACETAMINOPHEN 325 MG/1
650 TABLET ORAL
Status: DISCONTINUED | OUTPATIENT
Start: 2023-08-05 | End: 2023-08-06 | Stop reason: HOSPADM

## 2023-08-05 RX ORDER — DIPHENHYDRAMINE HYDROCHLORIDE 50 MG/ML
50 INJECTION INTRAMUSCULAR; INTRAVENOUS
Status: DISCONTINUED | OUTPATIENT
Start: 2023-08-05 | End: 2023-08-06 | Stop reason: HOSPADM

## 2023-08-05 RX ADMIN — SODIUM CHLORIDE, PRESERVATIVE FREE 10 ML: 5 INJECTION INTRAVENOUS at 09:27

## 2023-08-05 RX ADMIN — SODIUM CHLORIDE, PRESERVATIVE FREE 10 ML: 5 INJECTION INTRAVENOUS at 10:40

## 2023-08-05 RX ADMIN — IRON SUCROSE 200 MG: 20 INJECTION, SOLUTION INTRAVENOUS at 09:54

## 2023-08-05 RX ADMIN — SODIUM CHLORIDE 25 ML/HR: 9 INJECTION, SOLUTION INTRAVENOUS at 09:28

## 2023-08-05 NOTE — PROGRESS NOTES
Arrived to the [address] [place] [address]. [facility]. Iron completed. Patient tolerated well. Observed patient x 30 minutes, no reactions. Any issues or concerns during appointment: None. Patient aware of next infusion appointment on 8/12 (date) at 0930 (time). Patient aware of next lab and [facility] office visit on 8/18 (date) at 56 (time). Patient instructed to call provider with temperature of 100.4 or greater or nausea/vomiting/ diarrhea or pain not controlled by medications  Discharged ambulatory in stable condition.

## 2023-08-10 NOTE — PROGRESS NOTES
prochlorperazine (COMPAZINE) 10 MG tablet Take 1 tablet by mouth every 6 hours as needed (nausea/vomiting) 60 tablet 1    lidocaine-prilocaine (EMLA) 2.5-2.5 % cream Apply topically as needed to port site 30-60 minutes prior to being accessed with needle.  Cover with saran wrap. 1 each 1    torsemide (DEMADEX) 20 MG tablet TAKE ONE TABLET BY MOUTH TWICE A  tablet 3    Omega-3 Fatty Acids (FISH OIL PO) Take by mouth      KLOR-CON 10 10 MEQ extended release tablet TAKE ONE TABLET BY MOUTH TWICE A  tablet 3    Lidocaine 5 % CREA Topical 5% Lidocaine and 5% Neurontin to apply to affected area up to 4 times/day as needed for pain 45 g 2    colchicine (COLCRYS) 0.6 MG tablet Take 1 tablet by mouth daily 30 tablet 1    albuterol sulfate  (90 Base) MCG/ACT inhaler Inhale 2 puffs into the lungs every 4 hours as needed for Wheezing or Shortness of Breath      albuterol (PROVENTIL) (2.5 MG/3ML) 0.083% nebulizer solution Inhale 3 mLs into the lungs every 6 hours as needed      allopurinol (ZYLOPRIM) 300 MG tablet Take 1 tablet by mouth daily      ammonium lactate (LAC-HYDRIN) 12 % lotion Apply topically as needed      apixaban (ELIQUIS) 5 MG TABS tablet Take 1 tablet by mouth 2 times daily      ascorbic acid (VITAMIN C) 500 MG tablet Take by mouth      Camphor-Menthol-Methyl Sal 3.1-6-10 % PTCH Apply topically      diclofenac sodium (VOLTAREN) 1 % GEL Apply 4 g topically 4 times daily as needed      ferrous sulfate (IRON 325) 325 (65 Fe) MG tablet Take by mouth three times a week      fluticasone (FLONASE) 50 MCG/ACT nasal spray 2 sprays by Nasal route as needed      fluticasone-salmeterol (ADVAIR) 500-50 MCG/ACT AEPB diskus inhaler Inhale 1 puff into the lungs 2 times daily as needed      loratadine (CLARITIN) 10 MG tablet Take 1 tablet by mouth daily      methocarbamol (ROBAXIN) 500 MG tablet Take 1 tablet by mouth in the morning and at bedtime      omeprazole (PRILOSEC) 20 MG delayed release capsule Take

## 2023-08-11 ENCOUNTER — OFFICE VISIT (OUTPATIENT)
Dept: ENDOCRINOLOGY | Age: 68
End: 2023-08-11
Payer: MEDICARE

## 2023-08-11 VITALS
HEIGHT: 71 IN | SYSTOLIC BLOOD PRESSURE: 127 MMHG | HEART RATE: 70 BPM | OXYGEN SATURATION: 94 % | BODY MASS INDEX: 44.1 KG/M2 | DIASTOLIC BLOOD PRESSURE: 72 MMHG | WEIGHT: 315 LBS

## 2023-08-11 DIAGNOSIS — I10 ESSENTIAL HYPERTENSION: ICD-10-CM

## 2023-08-11 DIAGNOSIS — E11.65 TYPE 2 DIABETES MELLITUS WITH HYPERGLYCEMIA, WITH LONG-TERM CURRENT USE OF INSULIN (HCC): Primary | ICD-10-CM

## 2023-08-11 DIAGNOSIS — N18.31 STAGE 3A CHRONIC KIDNEY DISEASE (HCC): ICD-10-CM

## 2023-08-11 DIAGNOSIS — E78.00 HYPERCHOLESTEROLEMIA: ICD-10-CM

## 2023-08-11 DIAGNOSIS — Z79.4 TYPE 2 DIABETES MELLITUS WITH HYPERGLYCEMIA, WITH LONG-TERM CURRENT USE OF INSULIN (HCC): Primary | ICD-10-CM

## 2023-08-11 DIAGNOSIS — R80.9 ALBUMINURIA: ICD-10-CM

## 2023-08-11 PROCEDURE — 3074F SYST BP LT 130 MM HG: CPT | Performed by: INTERNAL MEDICINE

## 2023-08-11 PROCEDURE — 3078F DIAST BP <80 MM HG: CPT | Performed by: INTERNAL MEDICINE

## 2023-08-11 PROCEDURE — 1123F ACP DISCUSS/DSCN MKR DOCD: CPT | Performed by: INTERNAL MEDICINE

## 2023-08-11 PROCEDURE — 99214 OFFICE O/P EST MOD 30 MIN: CPT | Performed by: INTERNAL MEDICINE

## 2023-08-11 PROCEDURE — 3051F HG A1C>EQUAL 7.0%<8.0%: CPT | Performed by: INTERNAL MEDICINE

## 2023-08-11 RX ORDER — PIOGLITAZONEHYDROCHLORIDE 30 MG/1
15 TABLET ORAL DAILY
Qty: 45 TABLET | Refills: 3
Start: 2023-08-11

## 2023-08-11 RX ORDER — AMLODIPINE BESYLATE 5 MG/1
5 TABLET ORAL DAILY
Qty: 90 TABLET | Refills: 3
Start: 2023-08-11

## 2023-08-11 RX ORDER — SEMAGLUTIDE 1.34 MG/ML
1 INJECTION, SOLUTION SUBCUTANEOUS WEEKLY
Qty: 9 ML | Refills: 3
Start: 2023-08-11

## 2023-08-11 RX ORDER — INSULIN GLARGINE 100 [IU]/ML
54 INJECTION, SOLUTION SUBCUTANEOUS 2 TIMES DAILY
Qty: 90 ML | Refills: 3
Start: 2023-08-11

## 2023-08-11 RX ORDER — CARVEDILOL 12.5 MG/1
12.5 TABLET ORAL 2 TIMES DAILY WITH MEALS
Qty: 180 TABLET | Refills: 3
Start: 2023-08-11

## 2023-08-11 RX ORDER — ATORVASTATIN CALCIUM 10 MG/1
10 TABLET, FILM COATED ORAL DAILY
Qty: 90 TABLET | Refills: 3
Start: 2023-08-11

## 2023-08-12 ENCOUNTER — HOSPITAL ENCOUNTER (OUTPATIENT)
Dept: INFUSION THERAPY | Age: 68
Discharge: HOME | End: 2023-08-12
Payer: OTHER GOVERNMENT

## 2023-08-12 VITALS
HEART RATE: 68 BPM | DIASTOLIC BLOOD PRESSURE: 71 MMHG | SYSTOLIC BLOOD PRESSURE: 132 MMHG | OXYGEN SATURATION: 96 % | TEMPERATURE: 97.9 F | RESPIRATION RATE: 17 BRPM

## 2023-08-12 DIAGNOSIS — D50.9 IRON DEFICIENCY ANEMIA, UNSPECIFIED IRON DEFICIENCY ANEMIA TYPE: Primary | ICD-10-CM

## 2023-08-12 PROCEDURE — 2580000003 HC RX 258

## 2023-08-12 PROCEDURE — 96374 THER/PROPH/DIAG INJ IV PUSH: CPT

## 2023-08-12 PROCEDURE — 6360000002 HC RX W HCPCS

## 2023-08-12 RX ORDER — SODIUM CHLORIDE 9 MG/ML
5-250 INJECTION, SOLUTION INTRAVENOUS PRN
Status: CANCELLED | OUTPATIENT
Start: 2023-08-12

## 2023-08-12 RX ORDER — DIPHENHYDRAMINE HYDROCHLORIDE 50 MG/ML
50 INJECTION INTRAMUSCULAR; INTRAVENOUS
Status: DISCONTINUED | OUTPATIENT
Start: 2023-08-12 | End: 2023-08-13 | Stop reason: HOSPADM

## 2023-08-12 RX ORDER — EPINEPHRINE 1 MG/ML
0.3 INJECTION, SOLUTION, CONCENTRATE INTRAVENOUS PRN
Status: CANCELLED | OUTPATIENT
Start: 2023-08-12

## 2023-08-12 RX ORDER — ALBUTEROL SULFATE 90 UG/1
4 AEROSOL, METERED RESPIRATORY (INHALATION) PRN
Status: DISCONTINUED | OUTPATIENT
Start: 2023-08-12 | End: 2023-08-13 | Stop reason: HOSPADM

## 2023-08-12 RX ORDER — DIPHENHYDRAMINE HYDROCHLORIDE 50 MG/ML
50 INJECTION INTRAMUSCULAR; INTRAVENOUS
Status: CANCELLED | OUTPATIENT
Start: 2023-08-12

## 2023-08-12 RX ORDER — ALBUTEROL SULFATE 90 UG/1
4 AEROSOL, METERED RESPIRATORY (INHALATION) PRN
Status: CANCELLED | OUTPATIENT
Start: 2023-08-12

## 2023-08-12 RX ORDER — SODIUM CHLORIDE 9 MG/ML
INJECTION, SOLUTION INTRAVENOUS CONTINUOUS
Status: CANCELLED | OUTPATIENT
Start: 2023-08-12

## 2023-08-12 RX ORDER — ACETAMINOPHEN 325 MG/1
650 TABLET ORAL
Status: CANCELLED | OUTPATIENT
Start: 2023-08-12

## 2023-08-12 RX ORDER — ONDANSETRON 2 MG/ML
8 INJECTION INTRAMUSCULAR; INTRAVENOUS
Status: CANCELLED | OUTPATIENT
Start: 2023-08-12

## 2023-08-12 RX ORDER — ONDANSETRON 2 MG/ML
8 INJECTION INTRAMUSCULAR; INTRAVENOUS
Status: DISCONTINUED | OUTPATIENT
Start: 2023-08-12 | End: 2023-08-13 | Stop reason: HOSPADM

## 2023-08-12 RX ORDER — SODIUM CHLORIDE 0.9 % (FLUSH) 0.9 %
5-40 SYRINGE (ML) INJECTION PRN
Status: CANCELLED | OUTPATIENT
Start: 2023-08-12

## 2023-08-12 RX ORDER — SODIUM CHLORIDE 9 MG/ML
INJECTION, SOLUTION INTRAVENOUS CONTINUOUS
Status: DISCONTINUED | OUTPATIENT
Start: 2023-08-12 | End: 2023-08-13 | Stop reason: HOSPADM

## 2023-08-12 RX ORDER — SODIUM CHLORIDE 9 MG/ML
5-250 INJECTION, SOLUTION INTRAVENOUS PRN
Status: DISCONTINUED | OUTPATIENT
Start: 2023-08-12 | End: 2023-08-13 | Stop reason: HOSPADM

## 2023-08-12 RX ORDER — HEPARIN 100 UNIT/ML
500 SYRINGE INTRAVENOUS PRN
Status: CANCELLED | OUTPATIENT
Start: 2023-08-12

## 2023-08-12 RX ORDER — ACETAMINOPHEN 325 MG/1
650 TABLET ORAL
Status: DISCONTINUED | OUTPATIENT
Start: 2023-08-12 | End: 2023-08-13 | Stop reason: HOSPADM

## 2023-08-12 RX ORDER — EPINEPHRINE 1 MG/ML
0.3 INJECTION, SOLUTION, CONCENTRATE INTRAVENOUS PRN
Status: DISCONTINUED | OUTPATIENT
Start: 2023-08-12 | End: 2023-08-13 | Stop reason: HOSPADM

## 2023-08-12 RX ORDER — SODIUM CHLORIDE 0.9 % (FLUSH) 0.9 %
5-40 SYRINGE (ML) INJECTION PRN
Status: DISCONTINUED | OUTPATIENT
Start: 2023-08-12 | End: 2023-08-13 | Stop reason: HOSPADM

## 2023-08-12 RX ADMIN — SODIUM CHLORIDE, PRESERVATIVE FREE 10 ML: 5 INJECTION INTRAVENOUS at 09:15

## 2023-08-12 RX ADMIN — IRON SUCROSE 200 MG: 20 INJECTION, SOLUTION INTRAVENOUS at 09:23

## 2023-08-12 RX ADMIN — SODIUM CHLORIDE 50 ML/HR: 9 INJECTION, SOLUTION INTRAVENOUS at 09:16

## 2023-08-12 NOTE — PROGRESS NOTES
Arrived to the [address] [place] [address]. Venofer completed. Patient tolerated well. Any issues or concerns during appointment: none. Patient aware of next infusion appointment on 8/18/2023 (date) at [address] (time). Patient aware of next lab and [facility] office visit on 8/18/2023 (date) at [phone] (time). Patient instructed to call provider with temperature of 100.4 or greater or nausea/vomiting/ diarrhea or pain not controlled by medications  Discharged ambulatory.

## 2023-08-18 ENCOUNTER — HOSPITAL ENCOUNTER (OUTPATIENT)
Dept: INFUSION THERAPY | Age: 68
Discharge: HOME | End: 2023-08-18
Payer: OTHER GOVERNMENT

## 2023-08-18 ENCOUNTER — OFFICE VISIT (OUTPATIENT)
Dept: ONCOLOGY | Age: 68
End: 2023-08-18
Payer: OTHER GOVERNMENT

## 2023-08-18 VITALS
TEMPERATURE: 97.6 F | RESPIRATION RATE: 16 BRPM | HEART RATE: 70 BPM | SYSTOLIC BLOOD PRESSURE: 136 MMHG | OXYGEN SATURATION: 96 % | DIASTOLIC BLOOD PRESSURE: 80 MMHG

## 2023-08-18 VITALS
TEMPERATURE: 97.5 F | SYSTOLIC BLOOD PRESSURE: 125 MMHG | DIASTOLIC BLOOD PRESSURE: 79 MMHG | RESPIRATION RATE: 18 BRPM | HEIGHT: 70 IN | BODY MASS INDEX: 45.1 KG/M2 | HEART RATE: 70 BPM | OXYGEN SATURATION: 97 % | WEIGHT: 315 LBS

## 2023-08-18 DIAGNOSIS — R53.82 CHRONIC FATIGUE: ICD-10-CM

## 2023-08-18 DIAGNOSIS — D50.9 IRON DEFICIENCY ANEMIA, UNSPECIFIED IRON DEFICIENCY ANEMIA TYPE: Primary | ICD-10-CM

## 2023-08-18 DIAGNOSIS — C43.9 MALIGNANT MELANOMA, UNSPECIFIED SITE (HCC): Primary | ICD-10-CM

## 2023-08-18 DIAGNOSIS — C43.62 MELANOMA OF SHOULDER, LEFT (HCC): ICD-10-CM

## 2023-08-18 DIAGNOSIS — Z79.899 HIGH RISK MEDICATION USE: ICD-10-CM

## 2023-08-18 DIAGNOSIS — D50.9 IRON DEFICIENCY ANEMIA, UNSPECIFIED IRON DEFICIENCY ANEMIA TYPE: ICD-10-CM

## 2023-08-18 DIAGNOSIS — R21 RASH AND NONSPECIFIC SKIN ERUPTION: ICD-10-CM

## 2023-08-18 DIAGNOSIS — C43.9 MALIGNANT MELANOMA, UNSPECIFIED SITE (HCC): ICD-10-CM

## 2023-08-18 LAB
ALBUMIN SERPL-MCNC: 3 G/DL (ref 3.2–4.6)
ALBUMIN/GLOB SERPL: 0.7 (ref 0.4–1.6)
ALP SERPL-CCNC: 121 U/L (ref 50–136)
ALT SERPL-CCNC: 18 U/L (ref 12–65)
ANION GAP SERPL CALC-SCNC: 11 MMOL/L (ref 2–11)
AST SERPL-CCNC: 19 U/L (ref 15–37)
BASOPHILS # BLD: 0.1 K/UL (ref 0–0.2)
BASOPHILS NFR BLD: 1 % (ref 0–2)
BILIRUB SERPL-MCNC: 0.5 MG/DL (ref 0.2–1.1)
BUN SERPL-MCNC: 25 MG/DL (ref 8–23)
CALCIUM SERPL-MCNC: 8.6 MG/DL (ref 8.3–10.4)
CHLORIDE SERPL-SCNC: 106 MMOL/L (ref 101–110)
CO2 SERPL-SCNC: 25 MMOL/L (ref 21–32)
CREAT SERPL-MCNC: 1.4 MG/DL (ref 0.8–1.5)
DIFFERENTIAL METHOD BLD: ABNORMAL
EOSINOPHIL # BLD: 0.2 K/UL (ref 0–0.8)
EOSINOPHIL NFR BLD: 3 % (ref 0.5–7.8)
ERYTHROCYTE [DISTWIDTH] IN BLOOD BY AUTOMATED COUNT: 18.4 % (ref 11.9–14.6)
GLOBULIN SER CALC-MCNC: 4.4 G/DL (ref 2.8–4.5)
GLUCOSE SERPL-MCNC: 146 MG/DL (ref 65–100)
HCT VFR BLD AUTO: 35.9 % (ref 41.1–50.3)
HGB BLD-MCNC: 11.2 G/DL (ref 13.6–17.2)
IMM GRANULOCYTES # BLD AUTO: 0 K/UL (ref 0–0.5)
IMM GRANULOCYTES NFR BLD AUTO: 1 % (ref 0–5)
LYMPHOCYTES # BLD: 2.8 K/UL (ref 0.5–4.6)
LYMPHOCYTES NFR BLD: 38 % (ref 13–44)
MAGNESIUM SERPL-MCNC: 1.9 MG/DL (ref 1.8–2.4)
MCH RBC QN AUTO: 25.5 PG (ref 26.1–32.9)
MCHC RBC AUTO-ENTMCNC: 31.2 G/DL (ref 31.4–35)
MCV RBC AUTO: 81.8 FL (ref 82–102)
MONOCYTES # BLD: 1.1 K/UL (ref 0.1–1.3)
MONOCYTES NFR BLD: 14 % (ref 4–12)
NEUTS SEG # BLD: 3.3 K/UL (ref 1.7–8.2)
NEUTS SEG NFR BLD: 45 % (ref 43–78)
NRBC # BLD: 0 K/UL (ref 0–0.2)
PLATELET # BLD AUTO: 229 K/UL (ref 150–450)
PMV BLD AUTO: 10.5 FL (ref 9.4–12.3)
POTASSIUM SERPL-SCNC: 3.7 MMOL/L (ref 3.5–5.1)
PROT SERPL-MCNC: 7.4 G/DL (ref 6.3–8.2)
RBC # BLD AUTO: 4.39 M/UL (ref 4.23–5.6)
SODIUM SERPL-SCNC: 142 MMOL/L (ref 133–143)
TSH, 3RD GENERATION: 2.36 UIU/ML (ref 0.36–3.74)
WBC # BLD AUTO: 7.5 K/UL (ref 4.3–11.1)

## 2023-08-18 PROCEDURE — 6360000002 HC RX W HCPCS: Performed by: NURSE PRACTITIONER

## 2023-08-18 PROCEDURE — 2580000003 HC RX 258

## 2023-08-18 PROCEDURE — 2580000003 HC RX 258: Performed by: NURSE PRACTITIONER

## 2023-08-18 PROCEDURE — 3074F SYST BP LT 130 MM HG: CPT | Performed by: NURSE PRACTITIONER

## 2023-08-18 PROCEDURE — 99214 OFFICE O/P EST MOD 30 MIN: CPT | Performed by: NURSE PRACTITIONER

## 2023-08-18 PROCEDURE — 96375 TX/PRO/DX INJ NEW DRUG ADDON: CPT

## 2023-08-18 PROCEDURE — 83735 ASSAY OF MAGNESIUM: CPT

## 2023-08-18 PROCEDURE — 6360000002 HC RX W HCPCS

## 2023-08-18 PROCEDURE — 3078F DIAST BP <80 MM HG: CPT | Performed by: NURSE PRACTITIONER

## 2023-08-18 PROCEDURE — 36591 DRAW BLOOD OFF VENOUS DEVICE: CPT

## 2023-08-18 PROCEDURE — 96413 CHEMO IV INFUSION 1 HR: CPT

## 2023-08-18 PROCEDURE — 85025 COMPLETE CBC W/AUTO DIFF WBC: CPT

## 2023-08-18 PROCEDURE — 84443 ASSAY THYROID STIM HORMONE: CPT

## 2023-08-18 PROCEDURE — 2580000003 HC RX 258: Performed by: INTERNAL MEDICINE

## 2023-08-18 PROCEDURE — 1123F ACP DISCUSS/DSCN MKR DOCD: CPT | Performed by: NURSE PRACTITIONER

## 2023-08-18 PROCEDURE — 80053 COMPREHEN METABOLIC PANEL: CPT

## 2023-08-18 RX ORDER — SODIUM CHLORIDE 9 MG/ML
5-250 INJECTION, SOLUTION INTRAVENOUS PRN
Status: CANCELLED | OUTPATIENT
Start: 2023-08-18

## 2023-08-18 RX ORDER — ONDANSETRON 2 MG/ML
8 INJECTION INTRAMUSCULAR; INTRAVENOUS
Status: CANCELLED | OUTPATIENT
Start: 2023-08-18

## 2023-08-18 RX ORDER — ALBUTEROL SULFATE 90 UG/1
4 AEROSOL, METERED RESPIRATORY (INHALATION) PRN
Status: CANCELLED | OUTPATIENT
Start: 2023-08-18

## 2023-08-18 RX ORDER — FAMOTIDINE 10 MG/ML
20 INJECTION, SOLUTION INTRAVENOUS
Status: CANCELLED | OUTPATIENT
Start: 2023-08-18

## 2023-08-18 RX ORDER — EPINEPHRINE 1 MG/ML
0.3 INJECTION, SOLUTION, CONCENTRATE INTRAVENOUS PRN
Status: CANCELLED | OUTPATIENT
Start: 2023-08-18

## 2023-08-18 RX ORDER — EPINEPHRINE 1 MG/ML
0.3 INJECTION, SOLUTION, CONCENTRATE INTRAVENOUS PRN
Status: DISCONTINUED | OUTPATIENT
Start: 2023-08-18 | End: 2023-08-19 | Stop reason: HOSPADM

## 2023-08-18 RX ORDER — SODIUM CHLORIDE 9 MG/ML
5-250 INJECTION, SOLUTION INTRAVENOUS PRN
Status: DISCONTINUED | OUTPATIENT
Start: 2023-08-18 | End: 2023-08-19 | Stop reason: HOSPADM

## 2023-08-18 RX ORDER — HEPARIN 100 UNIT/ML
500 SYRINGE INTRAVENOUS PRN
Status: CANCELLED | OUTPATIENT
Start: 2023-08-18

## 2023-08-18 RX ORDER — MEPERIDINE HYDROCHLORIDE 25 MG/ML
12.5 INJECTION INTRAMUSCULAR; INTRAVENOUS; SUBCUTANEOUS PRN
Status: DISCONTINUED | OUTPATIENT
Start: 2023-08-18 | End: 2023-08-19 | Stop reason: HOSPADM

## 2023-08-18 RX ORDER — SODIUM CHLORIDE 9 MG/ML
INJECTION, SOLUTION INTRAVENOUS CONTINUOUS
Status: CANCELLED | OUTPATIENT
Start: 2023-08-18

## 2023-08-18 RX ORDER — ACETAMINOPHEN 325 MG/1
650 TABLET ORAL
Status: DISCONTINUED | OUTPATIENT
Start: 2023-08-18 | End: 2023-08-19 | Stop reason: HOSPADM

## 2023-08-18 RX ORDER — DIPHENHYDRAMINE HYDROCHLORIDE 50 MG/ML
50 INJECTION INTRAMUSCULAR; INTRAVENOUS
Status: DISCONTINUED | OUTPATIENT
Start: 2023-08-18 | End: 2023-08-19 | Stop reason: HOSPADM

## 2023-08-18 RX ORDER — MEPERIDINE HYDROCHLORIDE 50 MG/ML
12.5 INJECTION INTRAMUSCULAR; INTRAVENOUS; SUBCUTANEOUS PRN
Status: CANCELLED | OUTPATIENT
Start: 2023-08-18

## 2023-08-18 RX ORDER — SODIUM CHLORIDE 0.9 % (FLUSH) 0.9 %
5-40 SYRINGE (ML) INJECTION PRN
Status: CANCELLED | OUTPATIENT
Start: 2023-08-18

## 2023-08-18 RX ORDER — SODIUM CHLORIDE 0.9 % (FLUSH) 0.9 %
5-40 SYRINGE (ML) INJECTION PRN
Status: DISCONTINUED | OUTPATIENT
Start: 2023-08-18 | End: 2023-08-19 | Stop reason: HOSPADM

## 2023-08-18 RX ORDER — DIPHENHYDRAMINE HYDROCHLORIDE 50 MG/ML
50 INJECTION INTRAMUSCULAR; INTRAVENOUS
Status: CANCELLED | OUTPATIENT
Start: 2023-08-18

## 2023-08-18 RX ORDER — HEPARIN SODIUM (PORCINE) LOCK FLUSH IV SOLN 100 UNIT/ML 100 UNIT/ML
500 SOLUTION INTRAVENOUS PRN
Status: CANCELLED | OUTPATIENT
Start: 2023-08-18

## 2023-08-18 RX ORDER — ACETAMINOPHEN 325 MG/1
650 TABLET ORAL
Status: CANCELLED | OUTPATIENT
Start: 2023-08-18

## 2023-08-18 RX ORDER — ONDANSETRON 2 MG/ML
8 INJECTION INTRAMUSCULAR; INTRAVENOUS
Status: DISCONTINUED | OUTPATIENT
Start: 2023-08-18 | End: 2023-08-19 | Stop reason: HOSPADM

## 2023-08-18 RX ORDER — ALBUTEROL SULFATE 90 UG/1
4 AEROSOL, METERED RESPIRATORY (INHALATION) PRN
Status: DISCONTINUED | OUTPATIENT
Start: 2023-08-18 | End: 2023-08-19 | Stop reason: HOSPADM

## 2023-08-18 RX ORDER — SODIUM CHLORIDE 0.9 % (FLUSH) 0.9 %
10 SYRINGE (ML) INJECTION PRN
Status: DISCONTINUED | OUTPATIENT
Start: 2023-08-18 | End: 2023-08-19 | Stop reason: HOSPADM

## 2023-08-18 RX ADMIN — SODIUM CHLORIDE 200 MG: 9 INJECTION, SOLUTION INTRAVENOUS at 11:53

## 2023-08-18 RX ADMIN — SODIUM CHLORIDE 25 ML/HR: 9 INJECTION, SOLUTION INTRAVENOUS at 11:41

## 2023-08-18 RX ADMIN — SODIUM CHLORIDE, PRESERVATIVE FREE 10 ML: 5 INJECTION INTRAVENOUS at 10:29

## 2023-08-18 RX ADMIN — SODIUM CHLORIDE, PRESERVATIVE FREE 10 ML: 5 INJECTION INTRAVENOUS at 11:41

## 2023-08-18 RX ADMIN — IRON SUCROSE 200 MG: 20 INJECTION, SOLUTION INTRAVENOUS at 12:29

## 2023-08-18 ASSESSMENT — PATIENT HEALTH QUESTIONNAIRE - PHQ9
SUM OF ALL RESPONSES TO PHQ QUESTIONS 1-9: 0
SUM OF ALL RESPONSES TO PHQ QUESTIONS 1-9: 0
2. FEELING DOWN, DEPRESSED OR HOPELESS: 0
1. LITTLE INTEREST OR PLEASURE IN DOING THINGS: 0
SUM OF ALL RESPONSES TO PHQ9 QUESTIONS 1 & 2: 0
SUM OF ALL RESPONSES TO PHQ QUESTIONS 1-9: 0
SUM OF ALL RESPONSES TO PHQ QUESTIONS 1-9: 0

## 2023-08-18 NOTE — PROGRESS NOTES
black or bloody stools. He reports diabetic neuropathy involving his feet, it is not painful nor does it cause issues with balance. Interval history:  Patient returns for toxicity evaluation prior to receiving cycle 3 of pembrolizumab. He continues to do well overall. He has no new issues or concerns. He continues to have a maculopapular rash - most prevalent on trunk and thighs. He saw dermatology and they adjusted medications - on antibiotic, steroid cream and hydroxyzine for pruritis. This rash predated the start of immunotherapy. He denies having any abdominal cramping or diarrhea. No headaches or changes in vision. His dyspnea remains at baseline. He denies cough wheezing. Chronic fatigue remains stable. He has had no fever or infectious symptoms. Labs reviewed and acceptable to proceed with next pembro. He will receive another dose of Venofer as well for his QUINTON. TSH remains normal.       Review of Systems:  14 point ROS was negative except as per HPI      ECOG PERFORMANCE STATUS - 1- Restricted in physically strenuous activity but ambulatory and able to carry out work of a light or sedentary nature such as light house work, office work. Pain - /10. None/Minimal pain - not affecting QOL     Fatigue - No flowsheet data found. Distress - No flowsheet data found.          Reviewed and updated this visit by provider:  Tobacco  Allergies  Meds  Problems  Med Hx  Surg Hx  Fam Hx          Current Outpatient Medications   Medication Sig Dispense Refill    LANTUS SOLOSTAR 100 UNIT/ML injection pen Inject 54 Units into the skin 2 times daily 90 mL 3    metFORMIN (GLUCOPHAGE) 500 MG tablet Take 1 tablet by mouth 2 times daily (with meals) 180 tablet 3    OZEMPIC, 1 MG/DOSE, 4 MG/3ML SOPN Inject 1 mg into the skin once a week 9 mL 3    pioglitazone (ACTOS) 30 MG tablet Take 0.5 tablets by mouth daily 45 tablet 3    atorvastatin (LIPITOR) 10 MG tablet Take 1 tablet by mouth daily 90 tablet 3

## 2023-08-18 NOTE — PROGRESS NOTES
Arrived to the [address] [place] [address]. Keytruda, 15 min Venofer completed. Patient tolerated well. Any issues or concerns during appointment: none. Patient aware of next infusion appointment on 8/26/23 (date) at 9:30 AM (time). Patient instructed to call provider with temperature of 100.4 or greater or nausea/vomiting/diarrhea or pain not controlled by medications  Discharged ambulatory with wife.

## 2023-08-26 ENCOUNTER — HOSPITAL ENCOUNTER (OUTPATIENT)
Dept: INFUSION THERAPY | Age: 68
Discharge: HOME | End: 2023-08-26
Payer: OTHER GOVERNMENT

## 2023-08-26 VITALS
OXYGEN SATURATION: 97 % | SYSTOLIC BLOOD PRESSURE: 105 MMHG | HEART RATE: 75 BPM | DIASTOLIC BLOOD PRESSURE: 56 MMHG | RESPIRATION RATE: 18 BRPM | TEMPERATURE: 98 F

## 2023-08-26 DIAGNOSIS — D50.9 IRON DEFICIENCY ANEMIA, UNSPECIFIED IRON DEFICIENCY ANEMIA TYPE: Primary | ICD-10-CM

## 2023-08-26 PROCEDURE — 96374 THER/PROPH/DIAG INJ IV PUSH: CPT

## 2023-08-26 PROCEDURE — 6360000002 HC RX W HCPCS

## 2023-08-26 PROCEDURE — 2580000003 HC RX 258

## 2023-08-26 RX ORDER — EPINEPHRINE 1 MG/ML
0.3 INJECTION, SOLUTION, CONCENTRATE INTRAVENOUS PRN
OUTPATIENT
Start: 2023-08-26

## 2023-08-26 RX ORDER — HEPARIN 100 UNIT/ML
500 SYRINGE INTRAVENOUS PRN
Status: DISCONTINUED | OUTPATIENT
Start: 2023-08-26 | End: 2023-08-27 | Stop reason: HOSPADM

## 2023-08-26 RX ORDER — SODIUM CHLORIDE 9 MG/ML
INJECTION, SOLUTION INTRAVENOUS CONTINUOUS
OUTPATIENT
Start: 2023-08-26

## 2023-08-26 RX ORDER — HEPARIN 100 UNIT/ML
500 SYRINGE INTRAVENOUS PRN
OUTPATIENT
Start: 2023-08-26

## 2023-08-26 RX ORDER — SODIUM CHLORIDE 0.9 % (FLUSH) 0.9 %
5-40 SYRINGE (ML) INJECTION PRN
Status: DISCONTINUED | OUTPATIENT
Start: 2023-08-26 | End: 2023-08-27 | Stop reason: HOSPADM

## 2023-08-26 RX ORDER — SODIUM CHLORIDE 9 MG/ML
5-250 INJECTION, SOLUTION INTRAVENOUS PRN
Status: DISCONTINUED | OUTPATIENT
Start: 2023-08-26 | End: 2023-08-27 | Stop reason: HOSPADM

## 2023-08-26 RX ORDER — SODIUM CHLORIDE 9 MG/ML
5-250 INJECTION, SOLUTION INTRAVENOUS PRN
OUTPATIENT
Start: 2023-08-26

## 2023-08-26 RX ORDER — ONDANSETRON 2 MG/ML
8 INJECTION INTRAMUSCULAR; INTRAVENOUS
OUTPATIENT
Start: 2023-08-26

## 2023-08-26 RX ORDER — DIPHENHYDRAMINE HYDROCHLORIDE 50 MG/ML
50 INJECTION INTRAMUSCULAR; INTRAVENOUS
OUTPATIENT
Start: 2023-08-26

## 2023-08-26 RX ORDER — ONDANSETRON 2 MG/ML
8 INJECTION INTRAMUSCULAR; INTRAVENOUS
Status: DISCONTINUED | OUTPATIENT
Start: 2023-08-26 | End: 2023-08-27 | Stop reason: HOSPADM

## 2023-08-26 RX ORDER — ACETAMINOPHEN 325 MG/1
650 TABLET ORAL
Status: DISCONTINUED | OUTPATIENT
Start: 2023-08-26 | End: 2023-08-27 | Stop reason: HOSPADM

## 2023-08-26 RX ORDER — DIPHENHYDRAMINE HYDROCHLORIDE 50 MG/ML
50 INJECTION INTRAMUSCULAR; INTRAVENOUS
Status: DISCONTINUED | OUTPATIENT
Start: 2023-08-26 | End: 2023-08-27 | Stop reason: HOSPADM

## 2023-08-26 RX ORDER — SODIUM CHLORIDE 0.9 % (FLUSH) 0.9 %
5-40 SYRINGE (ML) INJECTION PRN
OUTPATIENT
Start: 2023-08-26

## 2023-08-26 RX ORDER — SODIUM CHLORIDE 9 MG/ML
INJECTION, SOLUTION INTRAVENOUS CONTINUOUS
Status: ACTIVE | OUTPATIENT
Start: 2023-08-26 | End: 2023-08-26

## 2023-08-26 RX ORDER — ACETAMINOPHEN 325 MG/1
650 TABLET ORAL
OUTPATIENT
Start: 2023-08-26

## 2023-08-26 RX ORDER — ALBUTEROL SULFATE 90 UG/1
4 AEROSOL, METERED RESPIRATORY (INHALATION) PRN
Status: DISCONTINUED | OUTPATIENT
Start: 2023-08-26 | End: 2023-08-27 | Stop reason: HOSPADM

## 2023-08-26 RX ORDER — EPINEPHRINE 1 MG/ML
0.3 INJECTION, SOLUTION, CONCENTRATE INTRAVENOUS PRN
Status: DISCONTINUED | OUTPATIENT
Start: 2023-08-26 | End: 2023-08-27 | Stop reason: HOSPADM

## 2023-08-26 RX ORDER — ALBUTEROL SULFATE 90 UG/1
4 AEROSOL, METERED RESPIRATORY (INHALATION) PRN
OUTPATIENT
Start: 2023-08-26

## 2023-08-26 RX ADMIN — SODIUM CHLORIDE 100 ML/HR: 9 INJECTION, SOLUTION INTRAVENOUS at 09:04

## 2023-08-26 RX ADMIN — IRON SUCROSE 200 MG: 20 INJECTION, SOLUTION INTRAVENOUS at 09:21

## 2023-08-26 NOTE — PROGRESS NOTES
Arrived to the [address] [place] [address]. [facility]. Venofer completed. Patient tolerated well. Any issues or concerns during appointment: none. Patient aware of next infusion appointment on 9/8  Patient instructed to call provider with temperature of 100.4 or greater or nausea/vomiting/ diarrhea or pain not controlled by medications  Discharged ambulatory with spouse.

## 2023-08-28 ENCOUNTER — TELEPHONE (OUTPATIENT)
Dept: ONCOLOGY | Age: 68
End: 2023-08-28

## 2023-08-28 NOTE — TELEPHONE ENCOUNTER
Subjective    Chief Complaint: fatigue  Details of Complaint: Patient's spouse reported increased fatigue. Confirmed patient has h/o SOB. Has not increased. Takes inhalers as needed. Spouse reports unsteady gait @ times. Confirmed patient drinking adequately with electrolyte replacements. Confirmed appetite is decreased. Reviewed hgb and informed stable. Confirmed patient has follow-up with dermatology to address the rash. Informed patient's follow-up appointment date and time with Dr. Kristopher Reese.   .      Objective    Date of last Office Visit: 8/18/23   Date of last labs: 8/18/23   Date of last imaging: 3/28/23  Date of last treatment, if applicable:8/18/23      Plan/Intervention    Proposed Plan: Instructed wife if symptoms increase, mentation changes, inability to ambulate and/or fall, to present patient to ER  Patient verbalized understanding of plan: YES/NO: Yes  Patient voiced intended compliance with plan: Verbalized/ Refused: Verbalized intended compliance

## 2023-08-28 NOTE — TELEPHONE ENCOUNTER
Physician provider: Kadie Ocasio MD  Reason for today's call: Hemoglobin level, Rash  Last office visit: n/a    Spouse called regarding Pt's Hemoglobin level. She expressed concern that it was low and not rising much after an infusion. Spouse also stated Pt has developed a rash that they are concerned about. Spouse would like to receive a call back to address both issues.

## 2023-09-07 DIAGNOSIS — C43.9 MALIGNANT MELANOMA, UNSPECIFIED SITE (HCC): Primary | ICD-10-CM

## 2023-09-07 DIAGNOSIS — I10 ESSENTIAL HYPERTENSION: ICD-10-CM

## 2023-09-07 DIAGNOSIS — E78.00 HYPERCHOLESTEROLEMIA: ICD-10-CM

## 2023-09-07 DIAGNOSIS — Z79.899 HIGH RISK MEDICATION USE: ICD-10-CM

## 2023-09-07 RX ORDER — ATORVASTATIN CALCIUM 10 MG/1
TABLET, FILM COATED ORAL
Qty: 90 TABLET | Refills: 3 | OUTPATIENT
Start: 2023-09-07

## 2023-09-08 ENCOUNTER — HOSPITAL ENCOUNTER (OUTPATIENT)
Dept: LAB | Age: 68
Discharge: HOME | End: 2023-09-08
Payer: OTHER GOVERNMENT

## 2023-09-08 ENCOUNTER — HOSPITAL ENCOUNTER (OUTPATIENT)
Dept: INFUSION THERAPY | Age: 68
Discharge: HOME | End: 2023-09-08
Payer: OTHER GOVERNMENT

## 2023-09-08 ENCOUNTER — OFFICE VISIT (OUTPATIENT)
Dept: ONCOLOGY | Age: 68
End: 2023-09-08
Payer: MEDICARE

## 2023-09-08 VITALS
RESPIRATION RATE: 12 BRPM | HEART RATE: 70 BPM | OXYGEN SATURATION: 97 % | BODY MASS INDEX: 45.1 KG/M2 | WEIGHT: 315 LBS | DIASTOLIC BLOOD PRESSURE: 70 MMHG | HEIGHT: 70 IN | SYSTOLIC BLOOD PRESSURE: 127 MMHG | TEMPERATURE: 97.7 F

## 2023-09-08 DIAGNOSIS — D64.9 ANEMIA, UNSPECIFIED TYPE: Primary | ICD-10-CM

## 2023-09-08 DIAGNOSIS — Z79.899 HIGH RISK MEDICATION USE: ICD-10-CM

## 2023-09-08 DIAGNOSIS — D64.9 ANEMIA, UNSPECIFIED TYPE: ICD-10-CM

## 2023-09-08 DIAGNOSIS — C43.9 MALIGNANT MELANOMA, UNSPECIFIED SITE (HCC): Primary | ICD-10-CM

## 2023-09-08 DIAGNOSIS — C43.9 MALIGNANT MELANOMA, UNSPECIFIED SITE (HCC): ICD-10-CM

## 2023-09-08 LAB
ALBUMIN SERPL-MCNC: 3.2 G/DL (ref 3.2–4.6)
ALBUMIN/GLOB SERPL: 0.8 (ref 0.4–1.6)
ALP SERPL-CCNC: 114 U/L (ref 50–136)
ALT SERPL-CCNC: 25 U/L (ref 12–65)
ANION GAP SERPL CALC-SCNC: 6 MMOL/L (ref 2–11)
AST SERPL-CCNC: 27 U/L (ref 15–37)
BASOPHILS # BLD: 0.1 K/UL (ref 0–0.2)
BASOPHILS NFR BLD: 1 % (ref 0–2)
BILIRUB SERPL-MCNC: 1 MG/DL (ref 0.2–1.1)
BUN SERPL-MCNC: 32 MG/DL (ref 8–23)
CALCIUM SERPL-MCNC: 8.4 MG/DL (ref 8.3–10.4)
CHLORIDE SERPL-SCNC: 107 MMOL/L (ref 101–110)
CO2 SERPL-SCNC: 26 MMOL/L (ref 21–32)
CREAT SERPL-MCNC: 1.7 MG/DL (ref 0.8–1.5)
DIFFERENTIAL METHOD BLD: ABNORMAL
EOSINOPHIL # BLD: 0 K/UL (ref 0–0.8)
EOSINOPHIL NFR BLD: 0 % (ref 0.5–7.8)
ERYTHROCYTE [DISTWIDTH] IN BLOOD BY AUTOMATED COUNT: 18.9 % (ref 11.9–14.6)
FERRITIN SERPL-MCNC: 322 NG/ML (ref 8–388)
GLOBULIN SER CALC-MCNC: 4.2 G/DL (ref 2.8–4.5)
GLUCOSE SERPL-MCNC: 157 MG/DL (ref 65–100)
HCT VFR BLD AUTO: 32.3 % (ref 41.1–50.3)
HGB BLD-MCNC: 10.1 G/DL (ref 13.6–17.2)
IMM GRANULOCYTES # BLD AUTO: 0 K/UL (ref 0–0.5)
IMM GRANULOCYTES NFR BLD AUTO: 1 % (ref 0–5)
IRON SATN MFR SERPL: 41 %
IRON SERPL-MCNC: 119 UG/DL (ref 35–150)
LYMPHOCYTES # BLD: 2.4 K/UL (ref 0.5–4.6)
LYMPHOCYTES NFR BLD: 39 % (ref 13–44)
MAGNESIUM SERPL-MCNC: 2 MG/DL (ref 1.8–2.4)
MCH RBC QN AUTO: 25.3 PG (ref 26.1–32.9)
MCHC RBC AUTO-ENTMCNC: 31.3 G/DL (ref 31.4–35)
MCV RBC AUTO: 80.8 FL (ref 82–102)
MONOCYTES # BLD: 1 K/UL (ref 0.1–1.3)
MONOCYTES NFR BLD: 15 % (ref 4–12)
NEUTS SEG # BLD: 2.8 K/UL (ref 1.7–8.2)
NEUTS SEG NFR BLD: 45 % (ref 43–78)
NRBC # BLD: 0 K/UL (ref 0–0.2)
PLATELET # BLD AUTO: 236 K/UL (ref 150–450)
PMV BLD AUTO: 10.1 FL (ref 9.4–12.3)
POTASSIUM SERPL-SCNC: 3.7 MMOL/L (ref 3.5–5.1)
PROT SERPL-MCNC: 7.4 G/DL (ref 6.3–8.2)
RBC # BLD AUTO: 4 M/UL (ref 4.23–5.6)
SODIUM SERPL-SCNC: 139 MMOL/L (ref 133–143)
TIBC SERPL-MCNC: 288 UG/DL (ref 250–450)
TSH, 3RD GENERATION: 2.77 UIU/ML (ref 0.36–3)
WBC # BLD AUTO: 6.3 K/UL (ref 4.3–11.1)

## 2023-09-08 PROCEDURE — 80053 COMPREHEN METABOLIC PANEL: CPT

## 2023-09-08 PROCEDURE — 96413 CHEMO IV INFUSION 1 HR: CPT

## 2023-09-08 PROCEDURE — 84443 ASSAY THYROID STIM HORMONE: CPT

## 2023-09-08 PROCEDURE — 83540 ASSAY OF IRON: CPT

## 2023-09-08 PROCEDURE — 83735 ASSAY OF MAGNESIUM: CPT

## 2023-09-08 PROCEDURE — 36591 DRAW BLOOD OFF VENOUS DEVICE: CPT

## 2023-09-08 PROCEDURE — 3078F DIAST BP <80 MM HG: CPT | Performed by: INTERNAL MEDICINE

## 2023-09-08 PROCEDURE — 2580000003 HC RX 258: Performed by: INTERNAL MEDICINE

## 2023-09-08 PROCEDURE — 6360000002 HC RX W HCPCS: Performed by: INTERNAL MEDICINE

## 2023-09-08 PROCEDURE — 3074F SYST BP LT 130 MM HG: CPT | Performed by: INTERNAL MEDICINE

## 2023-09-08 PROCEDURE — 99215 OFFICE O/P EST HI 40 MIN: CPT | Performed by: INTERNAL MEDICINE

## 2023-09-08 PROCEDURE — 83550 IRON BINDING TEST: CPT

## 2023-09-08 PROCEDURE — 1123F ACP DISCUSS/DSCN MKR DOCD: CPT | Performed by: INTERNAL MEDICINE

## 2023-09-08 PROCEDURE — 82728 ASSAY OF FERRITIN: CPT

## 2023-09-08 PROCEDURE — 85025 COMPLETE CBC W/AUTO DIFF WBC: CPT

## 2023-09-08 RX ORDER — DIPHENHYDRAMINE HYDROCHLORIDE 50 MG/ML
50 INJECTION INTRAMUSCULAR; INTRAVENOUS
Status: DISCONTINUED | OUTPATIENT
Start: 2023-09-08 | End: 2023-09-09 | Stop reason: HOSPADM

## 2023-09-08 RX ORDER — SODIUM CHLORIDE 9 MG/ML
5-250 INJECTION, SOLUTION INTRAVENOUS PRN
Status: DISCONTINUED | OUTPATIENT
Start: 2023-09-08 | End: 2023-09-09 | Stop reason: HOSPADM

## 2023-09-08 RX ORDER — ALBUTEROL SULFATE 90 UG/1
4 AEROSOL, METERED RESPIRATORY (INHALATION) PRN
Status: CANCELLED | OUTPATIENT
Start: 2023-09-08

## 2023-09-08 RX ORDER — SODIUM CHLORIDE 0.9 % (FLUSH) 0.9 %
5-40 SYRINGE (ML) INJECTION PRN
Status: CANCELLED | OUTPATIENT
Start: 2023-09-08

## 2023-09-08 RX ORDER — ALBUTEROL SULFATE 90 UG/1
4 AEROSOL, METERED RESPIRATORY (INHALATION) PRN
Status: DISCONTINUED | OUTPATIENT
Start: 2023-09-08 | End: 2023-09-09 | Stop reason: HOSPADM

## 2023-09-08 RX ORDER — ONDANSETRON 2 MG/ML
8 INJECTION INTRAMUSCULAR; INTRAVENOUS
Status: CANCELLED | OUTPATIENT
Start: 2023-09-08

## 2023-09-08 RX ORDER — SODIUM CHLORIDE 9 MG/ML
INJECTION, SOLUTION INTRAVENOUS CONTINUOUS
Status: CANCELLED | OUTPATIENT
Start: 2023-09-08

## 2023-09-08 RX ORDER — ONDANSETRON 2 MG/ML
8 INJECTION INTRAMUSCULAR; INTRAVENOUS
Status: DISCONTINUED | OUTPATIENT
Start: 2023-09-08 | End: 2023-09-09 | Stop reason: HOSPADM

## 2023-09-08 RX ORDER — ACETAMINOPHEN 325 MG/1
650 TABLET ORAL
Status: DISCONTINUED | OUTPATIENT
Start: 2023-09-08 | End: 2023-09-09 | Stop reason: HOSPADM

## 2023-09-08 RX ORDER — MEPERIDINE HYDROCHLORIDE 25 MG/ML
12.5 INJECTION INTRAMUSCULAR; INTRAVENOUS; SUBCUTANEOUS PRN
Status: DISCONTINUED | OUTPATIENT
Start: 2023-09-08 | End: 2023-09-09 | Stop reason: HOSPADM

## 2023-09-08 RX ORDER — ACETAMINOPHEN 325 MG/1
650 TABLET ORAL
Status: CANCELLED | OUTPATIENT
Start: 2023-09-08

## 2023-09-08 RX ORDER — SODIUM CHLORIDE 0.9 % (FLUSH) 0.9 %
5-40 SYRINGE (ML) INJECTION PRN
Status: DISCONTINUED | OUTPATIENT
Start: 2023-09-08 | End: 2023-09-12 | Stop reason: HOSPADM

## 2023-09-08 RX ORDER — FAMOTIDINE 10 MG/ML
20 INJECTION, SOLUTION INTRAVENOUS
Status: CANCELLED | OUTPATIENT
Start: 2023-09-08

## 2023-09-08 RX ORDER — SODIUM CHLORIDE 9 MG/ML
5-250 INJECTION, SOLUTION INTRAVENOUS PRN
Status: CANCELLED | OUTPATIENT
Start: 2023-09-08

## 2023-09-08 RX ORDER — DIPHENHYDRAMINE HYDROCHLORIDE 50 MG/ML
50 INJECTION INTRAMUSCULAR; INTRAVENOUS
Status: CANCELLED | OUTPATIENT
Start: 2023-09-08

## 2023-09-08 RX ORDER — SODIUM CHLORIDE 0.9 % (FLUSH) 0.9 %
5-40 SYRINGE (ML) INJECTION PRN
Status: DISCONTINUED | OUTPATIENT
Start: 2023-09-08 | End: 2023-09-09 | Stop reason: HOSPADM

## 2023-09-08 RX ORDER — HEPARIN SODIUM (PORCINE) LOCK FLUSH IV SOLN 100 UNIT/ML 100 UNIT/ML
500 SOLUTION INTRAVENOUS PRN
Status: CANCELLED | OUTPATIENT
Start: 2023-09-08

## 2023-09-08 RX ORDER — EPINEPHRINE 1 MG/ML
0.3 INJECTION, SOLUTION, CONCENTRATE INTRAVENOUS PRN
Status: CANCELLED | OUTPATIENT
Start: 2023-09-08

## 2023-09-08 RX ORDER — EPINEPHRINE 1 MG/ML
0.3 INJECTION, SOLUTION, CONCENTRATE INTRAVENOUS PRN
Status: DISCONTINUED | OUTPATIENT
Start: 2023-09-08 | End: 2023-09-09 | Stop reason: HOSPADM

## 2023-09-08 RX ORDER — MEPERIDINE HYDROCHLORIDE 50 MG/ML
12.5 INJECTION INTRAMUSCULAR; INTRAVENOUS; SUBCUTANEOUS PRN
Status: CANCELLED | OUTPATIENT
Start: 2023-09-08

## 2023-09-08 RX ADMIN — SODIUM CHLORIDE 200 MG: 9 INJECTION, SOLUTION INTRAVENOUS at 12:42

## 2023-09-08 RX ADMIN — SODIUM CHLORIDE, PRESERVATIVE FREE 10 ML: 5 INJECTION INTRAVENOUS at 13:12

## 2023-09-08 RX ADMIN — SODIUM CHLORIDE, PRESERVATIVE FREE 10 ML: 5 INJECTION INTRAVENOUS at 10:06

## 2023-09-08 RX ADMIN — SODIUM CHLORIDE 25 ML/HR: 9 INJECTION, SOLUTION INTRAVENOUS at 12:42

## 2023-09-08 ASSESSMENT — PATIENT HEALTH QUESTIONNAIRE - PHQ9
SUM OF ALL RESPONSES TO PHQ9 QUESTIONS 1 & 2: 0
SUM OF ALL RESPONSES TO PHQ QUESTIONS 1-9: 0
1. LITTLE INTEREST OR PLEASURE IN DOING THINGS: 0
SUM OF ALL RESPONSES TO PHQ QUESTIONS 1-9: 0
SUM OF ALL RESPONSES TO PHQ QUESTIONS 1-9: 0
2. FEELING DOWN, DEPRESSED OR HOPELESS: 0
SUM OF ALL RESPONSES TO PHQ QUESTIONS 1-9: 0

## 2023-09-08 NOTE — PROGRESS NOTES
Arrived to the [address] [facility]. Keytruda completed. Patient tolerated without problems. Any issues or concerns during appointment: no.  Patient aware of next infusion appointment on 9/29/23 (date) at [id number]   Patient instructed to call provider with temperature of 100.4 or greater or nausea/vomiting/ diarrhea or pain not controlled by medications  Discharged ambulatory.

## 2023-09-08 NOTE — PATIENT INSTRUCTIONS
Patient Instructions from Today's Visit    Reason for Visit:  Follow up - Melanoma    Diagnosis Information:  [email or web address] [email or web address]-us/[email or web address]-answers-patient-education-materials/arjo-zszjpyk-[email or web address]-sheets    Plan:  Proceed to infusion for Keytruda. Will repeat CT scan in approximately 2 weeks. Please call us if you have any questions or concerns before your next visit. Follow Up:  As scheduled.      Recent Lab Results:  Hospital Outpatient Visit on 09/08/2023   Component Date Value Ref Range Status    WBC 09/08/2023 6.3  4.3 - 11.1 K/uL Final    RBC 09/08/2023 4.00 (L)  4.23 - 5.6 M/uL Final    Hemoglobin 09/08/2023 10.1 (L)  13.6 - 17.2 g/dL Final    Hematocrit 09/08/2023 32.3 (L)  41.1 - 50.3 % Final    MCV 09/08/2023 80.8 (L)  82.0 - 102.0 FL Final    MCH 09/08/2023 25.3 (L)  26.1 - 32.9 PG Final    MCHC 09/08/2023 31.3 (L)  31.4 - 35.0 g/dL Final    RDW 09/08/2023 18.9 (H)  11.9 - 14.6 % Final    Platelets 97/17/1649 236  150 - 450 K/uL Final    MPV 09/08/2023 10.1  9.4 - 12.3 FL Final    nRBC 09/08/2023 0.00  0.0 - 0.2 K/uL Final    **Note: Absolute NRBC parameter is now reported with Hemogram**    Differential Type 09/08/2023 AUTOMATED    Final    Neutrophils % 09/08/2023 45  43 - 78 % Final    Lymphocytes % 09/08/2023 39  13 - 44 % Final    Monocytes % 09/08/2023 15 (H)  4.0 - 12.0 % Final    Eosinophils % 09/08/2023 0 (L)  0.5 - 7.8 % Final    Basophils % 09/08/2023 1  0.0 - 2.0 % Final    Immature Granulocytes 09/08/2023 1  0.0 - 5.0 % Final    Neutrophils Absolute 09/08/2023 2.8  1.7 - 8.2 K/UL Final    Lymphocytes Absolute 09/08/2023 2.4  0.5 - 4.6 K/UL Final    Monocytes Absolute 09/08/2023 1.0  0.1 - 1.3 K/UL Final    Eosinophils Absolute 09/08/2023 0.0  0.0 - 0.8 K/UL Final    Basophils Absolute 09/08/2023 0.1  0.0 - 0.2 K/UL Final    Absolute Immature Granulocyte 09/08/2023 0.0  0.0 - 0.5 K/UL Final         Treatment Summary has been discussed and given to patient:

## 2023-09-12 RX ORDER — AMLODIPINE BESYLATE 5 MG/1
TABLET ORAL
Qty: 90 TABLET | Refills: 3 | Status: SHIPPED | OUTPATIENT
Start: 2023-09-12

## 2023-09-12 NOTE — TELEPHONE ENCOUNTER
Patient called back and said yes he needed that medication refilled. He said he will be home all day today if you need to call him back.

## 2023-09-18 ENCOUNTER — APPOINTMENT (OUTPATIENT)
Dept: CT IMAGING | Age: 68
End: 2023-09-18
Payer: OTHER GOVERNMENT

## 2023-09-18 ENCOUNTER — OFFICE VISIT (OUTPATIENT)
Dept: SURGERY | Age: 68
End: 2023-09-18
Payer: OTHER GOVERNMENT

## 2023-09-18 ENCOUNTER — HOSPITAL ENCOUNTER (EMERGENCY)
Age: 68
Discharge: HOME | End: 2023-09-18
Attending: EMERGENCY MEDICINE | Admitting: EMERGENCY MEDICINE
Payer: OTHER GOVERNMENT

## 2023-09-18 VITALS
HEIGHT: 70 IN | BODY MASS INDEX: 44.81 KG/M2 | RESPIRATION RATE: 18 BRPM | TEMPERATURE: 97.5 F | SYSTOLIC BLOOD PRESSURE: 125 MMHG | WEIGHT: 313 LBS | HEART RATE: 73 BPM | OXYGEN SATURATION: 97 % | DIASTOLIC BLOOD PRESSURE: 71 MMHG

## 2023-09-18 VITALS — WEIGHT: 313 LBS | HEIGHT: 70 IN | BODY MASS INDEX: 44.81 KG/M2

## 2023-09-18 DIAGNOSIS — C43.9 MALIGNANT MELANOMA, UNSPECIFIED SITE (HCC): Primary | ICD-10-CM

## 2023-09-18 DIAGNOSIS — K85.90 ACUTE PANCREATITIS, UNSPECIFIED COMPLICATION STATUS, UNSPECIFIED PANCREATITIS TYPE: Primary | ICD-10-CM

## 2023-09-18 DIAGNOSIS — C43.62 MELANOMA OF SHOULDER, LEFT (HCC): Primary | Chronic | ICD-10-CM

## 2023-09-18 DIAGNOSIS — D50.9 IRON DEFICIENCY ANEMIA, UNSPECIFIED IRON DEFICIENCY ANEMIA TYPE: ICD-10-CM

## 2023-09-18 LAB
ALBUMIN SERPL-MCNC: 3.2 G/DL (ref 3.2–4.6)
ALBUMIN/GLOB SERPL: 0.8 (ref 0.4–1.6)
ALP SERPL-CCNC: 119 U/L (ref 50–136)
ALT SERPL-CCNC: 22 U/L (ref 12–65)
ANION GAP SERPL CALC-SCNC: 7 MMOL/L (ref 2–11)
AST SERPL-CCNC: 28 U/L (ref 15–37)
BASOPHILS # BLD: 0.1 K/UL (ref 0–0.2)
BASOPHILS NFR BLD: 1 % (ref 0–2)
BILIRUB SERPL-MCNC: 1 MG/DL (ref 0.2–1.1)
BILIRUB UR QL: NEGATIVE
BUN SERPL-MCNC: 30 MG/DL (ref 8–23)
CALCIUM SERPL-MCNC: 8.7 MG/DL (ref 8.3–10.4)
CHLORIDE SERPL-SCNC: 106 MMOL/L (ref 101–110)
CO2 SERPL-SCNC: 26 MMOL/L (ref 21–32)
CREAT SERPL-MCNC: 1.8 MG/DL (ref 0.8–1.5)
DIFFERENTIAL METHOD BLD: ABNORMAL
EKG ATRIAL RATE: 82 BPM
EKG DIAGNOSIS: NORMAL
EKG P-R INTERVAL: 360 MS
EKG Q-T INTERVAL: 144 MS
EKG QRS DURATION: 4 MS
EKG QTC CALCULATION (BAZETT): 224 MS
EKG R AXIS: 0 DEGREES
EKG T AXIS: 244 DEGREES
EKG VENTRICULAR RATE: 146 BPM
EOSINOPHIL # BLD: 0.1 K/UL (ref 0–0.8)
EOSINOPHIL NFR BLD: 1 % (ref 0.5–7.8)
ERYTHROCYTE [DISTWIDTH] IN BLOOD BY AUTOMATED COUNT: 22.9 % (ref 11.9–14.6)
GLOBULIN SER CALC-MCNC: 4.2 G/DL (ref 2.8–4.5)
GLUCOSE SERPL-MCNC: 86 MG/DL (ref 65–100)
GLUCOSE UR QL STRIP.AUTO: NEGATIVE MG/DL
HCT VFR BLD AUTO: 35.1 % (ref 41.1–50.3)
HGB BLD-MCNC: 10.8 G/DL (ref 13.6–17.2)
IMM GRANULOCYTES # BLD AUTO: 0 K/UL (ref 0–0.5)
IMM GRANULOCYTES NFR BLD AUTO: 1 % (ref 0–5)
KETONES UR-MCNC: NEGATIVE MG/DL
LACTATE SERPL-SCNC: 1.5 MMOL/L (ref 0.4–2)
LEUKOCYTE ESTERASE UR QL STRIP: NEGATIVE
LIPASE SERPL-CCNC: 694 U/L (ref 73–393)
LYMPHOCYTES # BLD: 3.1 K/UL (ref 0.5–4.6)
LYMPHOCYTES NFR BLD: 36 % (ref 13–44)
MCH RBC QN AUTO: 26.5 PG (ref 26.1–32.9)
MCHC RBC AUTO-ENTMCNC: 30.8 G/DL (ref 31.4–35)
MCV RBC AUTO: 86.2 FL (ref 82–102)
MONOCYTES # BLD: 1.3 K/UL (ref 0.1–1.3)
MONOCYTES NFR BLD: 14 % (ref 4–12)
NEUTS SEG # BLD: 4.2 K/UL (ref 1.7–8.2)
NEUTS SEG NFR BLD: 48 % (ref 43–78)
NITRITE UR QL: NEGATIVE
NRBC # BLD: 0 K/UL (ref 0–0.2)
PH UR: 5.5 (ref 5–9)
PLATELET # BLD AUTO: 219 K/UL (ref 150–450)
PMV BLD AUTO: 9.7 FL (ref 9.4–12.3)
POTASSIUM SERPL-SCNC: 4.7 MMOL/L (ref 3.5–5.1)
PROT SERPL-MCNC: 7.4 G/DL (ref 6.3–8.2)
PROT UR QL: NEGATIVE MG/DL
RBC # BLD AUTO: 4.07 M/UL (ref 4.23–5.6)
RBC # UR STRIP: NEGATIVE
SERVICE CMNT-IMP: NORMAL
SODIUM SERPL-SCNC: 139 MMOL/L (ref 133–143)
SP GR UR: 1.01 (ref 1–1.02)
UROBILINOGEN UR QL: 0.2 EU/DL (ref 0.2–1)
WBC # BLD AUTO: 8.8 K/UL (ref 4.3–11.1)

## 2023-09-18 PROCEDURE — 74177 CT ABD & PELVIS W/CONTRAST: CPT

## 2023-09-18 PROCEDURE — 83605 ASSAY OF LACTIC ACID: CPT

## 2023-09-18 PROCEDURE — 99214 OFFICE O/P EST MOD 30 MIN: CPT | Performed by: SURGERY

## 2023-09-18 PROCEDURE — 85025 COMPLETE CBC W/AUTO DIFF WBC: CPT

## 2023-09-18 PROCEDURE — 93005 ELECTROCARDIOGRAM TRACING: CPT | Performed by: EMERGENCY MEDICINE

## 2023-09-18 PROCEDURE — 99285 EMERGENCY DEPT VISIT HI MDM: CPT

## 2023-09-18 PROCEDURE — 2580000003 HC RX 258: Performed by: PHYSICIAN ASSISTANT

## 2023-09-18 PROCEDURE — 83690 ASSAY OF LIPASE: CPT

## 2023-09-18 PROCEDURE — 80053 COMPREHEN METABOLIC PANEL: CPT

## 2023-09-18 PROCEDURE — 6360000004 HC RX CONTRAST MEDICATION: Performed by: PHYSICIAN ASSISTANT

## 2023-09-18 PROCEDURE — 81003 URINALYSIS AUTO W/O SCOPE: CPT

## 2023-09-18 PROCEDURE — 1123F ACP DISCUSS/DSCN MKR DOCD: CPT | Performed by: SURGERY

## 2023-09-18 RX ORDER — 0.9 % SODIUM CHLORIDE 0.9 %
100 INTRAVENOUS SOLUTION INTRAVENOUS
Status: COMPLETED | OUTPATIENT
Start: 2023-09-18 | End: 2023-09-18

## 2023-09-18 RX ORDER — SODIUM CHLORIDE 0.9 % (FLUSH) 0.9 %
10 SYRINGE (ML) INJECTION
Status: COMPLETED | OUTPATIENT
Start: 2023-09-18 | End: 2023-09-18

## 2023-09-18 RX ADMIN — IOPAMIDOL 100 ML: 755 INJECTION, SOLUTION INTRAVENOUS at 11:49

## 2023-09-18 RX ADMIN — SODIUM CHLORIDE 100 ML: 9 INJECTION, SOLUTION INTRAVENOUS at 11:49

## 2023-09-18 RX ADMIN — SODIUM CHLORIDE, PRESERVATIVE FREE 10 ML: 5 INJECTION INTRAVENOUS at 11:49

## 2023-09-18 ASSESSMENT — PAIN - FUNCTIONAL ASSESSMENT: PAIN_FUNCTIONAL_ASSESSMENT: 0-10

## 2023-09-18 ASSESSMENT — PAIN SCALES - GENERAL: PAINLEVEL_OUTOF10: 8

## 2023-09-18 NOTE — DISCHARGE INSTRUCTIONS
You will be doing a clear liquid diet for the next several days. Use your at home pain medications if you are having severe abdominal pain. Follow-up with your PCP this week. If you have any worsening signs or symptoms that develop then please return here promptly for reevaluation.

## 2023-09-18 NOTE — ED TRIAGE NOTES
Per patient intermittent left flank pain x14 days. Patient states of dark urine. Denies n/v/d. Denies fever/chills.

## 2023-09-18 NOTE — ED PROVIDER NOTES
Emergency Department Provider Note       PCP: Edgard Arias DO   Age: 70 y.o. Sex: male     DISPOSITION Decision To Discharge 09/18/2023 01:02:34 PM       ICD-10-CM    1. Acute pancreatitis, unspecified complication status, unspecified pancreatitis type  K85.90           Medical Decision Making     Complexity of Problems Addressed:    1 or more acute illnesses that pose a threat to life or bodily function. Data Reviewed and Analyzed:   I independently ordered and reviewed each unique test.  I reviewed external records: ED visit note from an outside group. I reviewed external records: provider visit note from PCP. I reviewed external records: provider visit note from outside specialist.  I reviewed external records: previous lab results from outside ED. I interpreted the CT Scan no acute pneumoperitoneum. Discussion of management or test interpretation. 74-year-old male here with wife for chief complaint of intermittent left flank pain for the last 14 days. States a history of dark urine that is also intermittent in its occurrence. Denies any fever or chills. Lab work was notable today for an elevated lipase at 694. Other lab work grossly within normal limits. Hemoglobin at baseline as well as his creatinine. CT scan did not reveal any acute findings. I had an in-depth discussion with the patient including the risks and benefits regarding his elevated lipase and admission versus discharge home. Advised patient that the treatment for pancreatitis typically is pain control and clear liquid diet. Patient has pain medications at home and he has elected to go home and treat at home instead of being admitted to the hospital service. I felt this is a fair decision given that his pancreatitis today is fairly mild in regards to his lipase only being double the upper limit of normal.  He understands he may return here if symptoms worsen at home.     Risk of Complications and/or Morbidity of

## 2023-09-18 NOTE — PROGRESS NOTES
diastases recti extending into the umbilical area as well. Surgery discussed attempting to repair this at the time of his   cholecystectomy but felt that it was too large and the risks outweigh   potential benefits. Type 2 diabetes mellitus with diabetic polyneuropathy, with long-term current use of insulin ([address]) 11/29/2019     Follows with Dr. Loreta Estrada        History of colonic polyps 11/07/2019    Chronic right-sided low back pain without sciatica 05/10/2019    Type 2 diabetes mellitus with microalbuminuria, with long-term current use of insulin ([address]) 11/16/2018     Follows with Dr. Loreta Estrada        Hypercholesterolemia 06/29/2018    Seasonal allergic rhinitis due to pollen     NSVT (nonsustained ventricular tachycardia) ([address]) 09/27/2016    Atrioventricular block, complete ([address]) 04/14/2016    Presence of cardiac pacemaker 01/13/2016    Edema of both lower extremities due to peripheral venous insufficiency 12/29/2015     Limit salt. Elevate legs when possible. Overall weight loss would also   help. Actinic keratoses 07/22/2015    Long term (current) use of anticoagulants     Vitamin D deficiency     Hepatic steatosis 03/25/2014    Longstanding persistent atrial fibrillation ([address]) 04/10/2007          Number and Complexity of Problems addressed and   Risks of complications and/or morbidity of management        pT4bN0 malignant melanoma of left anterior shoulder/upper arm  He is s/p WLE left shoulder melanoma and left axillary sentinel  node excision on 4/25/23 for a pT4bN0 melanoma of the left shoulder  This was done off Eliquis and anesthesia was alerted to his PM and BIPAP use pre-op    His surgical margins were clear and his left axillary sentinel node was negative for malignancy. He started pembrolizumab with Dr Tamara Delarosa approx July 2023      He is morbidly obese with BMI >45.   Pre-op PET/CT on 3/28/23 showed no evidence of metastatic disease     4/25/23 pre-op LFTs normal  4/25/23 Pre-op CXR

## 2023-09-21 ENCOUNTER — CARE COORDINATION (OUTPATIENT)
Dept: CARE COORDINATION | Facility: CLINIC | Age: 68
End: 2023-09-21

## 2023-09-21 NOTE — CARE COORDINATION
Patient outreach today on behalf of [facility] to discuss Ambulatory Care Management enrollment. [facility] services explained. Patient declines services. Provided this LPN CC contact information for future needs.

## 2023-09-22 ENCOUNTER — HOSPITAL ENCOUNTER (OUTPATIENT)
Dept: CT IMAGING | Age: 68
Discharge: HOME | End: 2023-09-22
Attending: INTERNAL MEDICINE

## 2023-09-22 ENCOUNTER — CLINICAL DOCUMENTATION (OUTPATIENT)
Dept: ONCOLOGY | Age: 68
End: 2023-09-22

## 2023-09-22 DIAGNOSIS — C43.9 MALIGNANT MELANOMA, UNSPECIFIED SITE (HCC): ICD-10-CM

## 2023-09-22 LAB — CREAT BLD-MCNC: 1.45 MG/DL (ref 0.8–1.5)

## 2023-09-22 PROCEDURE — 6360000004 HC RX CONTRAST MEDICATION: Performed by: INTERNAL MEDICINE

## 2023-09-22 PROCEDURE — 82565 ASSAY OF CREATININE: CPT

## 2023-09-22 PROCEDURE — 71260 CT THORAX DX C+: CPT

## 2023-09-22 PROCEDURE — 2580000003 HC RX 258: Performed by: INTERNAL MEDICINE

## 2023-09-22 RX ORDER — 0.9 % SODIUM CHLORIDE 0.9 %
100 INTRAVENOUS SOLUTION INTRAVENOUS ONCE
Status: COMPLETED | OUTPATIENT
Start: 2023-09-22 | End: 2023-09-22

## 2023-09-22 RX ORDER — SODIUM CHLORIDE 0.9 % (FLUSH) 0.9 %
10 SYRINGE (ML) INJECTION
Status: COMPLETED | OUTPATIENT
Start: 2023-09-22 | End: 2023-09-22

## 2023-09-22 RX ADMIN — IOPAMIDOL 70 ML: 755 INJECTION, SOLUTION INTRAVENOUS at 11:09

## 2023-09-22 RX ADMIN — SODIUM CHLORIDE, PRESERVATIVE FREE 10 ML: 5 INJECTION INTRAVENOUS at 11:10

## 2023-09-22 RX ADMIN — SODIUM CHLORIDE 100 ML: 9 INJECTION, SOLUTION INTRAVENOUS at 11:10

## 2023-09-25 DIAGNOSIS — Z79.899 HIGH RISK MEDICATION USE: ICD-10-CM

## 2023-09-25 DIAGNOSIS — C43.9 MALIGNANT MELANOMA, UNSPECIFIED SITE (HCC): Primary | ICD-10-CM

## 2023-09-29 ENCOUNTER — OFFICE VISIT (OUTPATIENT)
Dept: ONCOLOGY | Age: 68
End: 2023-09-29

## 2023-09-29 ENCOUNTER — HOSPITAL ENCOUNTER (OUTPATIENT)
Dept: LAB | Age: 68
Discharge: HOME | End: 2023-09-29
Payer: OTHER GOVERNMENT

## 2023-09-29 ENCOUNTER — HOSPITAL ENCOUNTER (OUTPATIENT)
Dept: INFUSION THERAPY | Age: 68
Discharge: HOME | End: 2023-09-29
Payer: OTHER GOVERNMENT

## 2023-09-29 ENCOUNTER — TELEPHONE (OUTPATIENT)
Dept: ONCOLOGY | Age: 68
End: 2023-09-29

## 2023-09-29 VITALS
HEART RATE: 83 BPM | SYSTOLIC BLOOD PRESSURE: 101 MMHG | HEIGHT: 70 IN | OXYGEN SATURATION: 95 % | DIASTOLIC BLOOD PRESSURE: 56 MMHG | BODY MASS INDEX: 44.8 KG/M2 | TEMPERATURE: 97.6 F | WEIGHT: 312.9 LBS | RESPIRATION RATE: 16 BRPM

## 2023-09-29 DIAGNOSIS — Z79.899 HIGH RISK MEDICATION USE: ICD-10-CM

## 2023-09-29 DIAGNOSIS — C43.9 MALIGNANT MELANOMA, UNSPECIFIED SITE (HCC): ICD-10-CM

## 2023-09-29 DIAGNOSIS — Z87.19 HISTORY OF PANCREATITIS: Primary | ICD-10-CM

## 2023-09-29 DIAGNOSIS — C43.9 MALIGNANT MELANOMA, UNSPECIFIED SITE (HCC): Primary | ICD-10-CM

## 2023-09-29 LAB
ALBUMIN SERPL-MCNC: 3.1 G/DL (ref 3.2–4.6)
ALBUMIN/GLOB SERPL: 0.8 (ref 0.4–1.6)
ALP SERPL-CCNC: 116 U/L (ref 50–136)
ALT SERPL-CCNC: 21 U/L (ref 12–65)
ANION GAP SERPL CALC-SCNC: 9 MMOL/L (ref 2–11)
AST SERPL-CCNC: 21 U/L (ref 15–37)
BASOPHILS # BLD: 0.1 K/UL (ref 0–0.2)
BASOPHILS NFR BLD: 1 % (ref 0–2)
BILIRUB SERPL-MCNC: 0.8 MG/DL (ref 0.2–1.1)
BUN SERPL-MCNC: 35 MG/DL (ref 8–23)
CALCIUM SERPL-MCNC: 8.3 MG/DL (ref 8.3–10.4)
CHLORIDE SERPL-SCNC: 105 MMOL/L (ref 101–110)
CO2 SERPL-SCNC: 24 MMOL/L (ref 21–32)
CREAT SERPL-MCNC: 1.9 MG/DL (ref 0.8–1.5)
DIFFERENTIAL METHOD BLD: ABNORMAL
EOSINOPHIL # BLD: 0.2 K/UL (ref 0–0.8)
EOSINOPHIL NFR BLD: 2 % (ref 0.5–7.8)
ERYTHROCYTE [DISTWIDTH] IN BLOOD BY AUTOMATED COUNT: 22.9 % (ref 11.9–14.6)
GLOBULIN SER CALC-MCNC: 3.9 G/DL (ref 2.8–4.5)
GLUCOSE SERPL-MCNC: 161 MG/DL (ref 65–100)
HCT VFR BLD AUTO: 29.6 % (ref 41.1–50.3)
HGB BLD-MCNC: 9.5 G/DL (ref 13.6–17.2)
IMM GRANULOCYTES # BLD AUTO: 0 K/UL (ref 0–0.5)
IMM GRANULOCYTES NFR BLD AUTO: 0 % (ref 0–5)
LIPASE SERPL-CCNC: 2006 U/L (ref 73–393)
LYMPHOCYTES # BLD: 3.4 K/UL (ref 0.5–4.6)
LYMPHOCYTES NFR BLD: 44 % (ref 13–44)
MCH RBC QN AUTO: 26.8 PG (ref 26.1–32.9)
MCHC RBC AUTO-ENTMCNC: 32.1 G/DL (ref 31.4–35)
MCV RBC AUTO: 83.4 FL (ref 82–102)
MONOCYTES # BLD: 1.4 K/UL (ref 0.1–1.3)
MONOCYTES NFR BLD: 17 % (ref 4–12)
NEUTS SEG # BLD: 2.9 K/UL (ref 1.7–8.2)
NEUTS SEG NFR BLD: 36 % (ref 43–78)
NRBC # BLD: 0 K/UL (ref 0–0.2)
PLATELET # BLD AUTO: 223 K/UL (ref 150–450)
PLATELET COMMENT: ADEQUATE
PMV BLD AUTO: 9.9 FL (ref 9.4–12.3)
POTASSIUM SERPL-SCNC: 3.9 MMOL/L (ref 3.5–5.1)
PROT SERPL-MCNC: 7 G/DL (ref 6.3–8.2)
RBC # BLD AUTO: 3.55 M/UL (ref 4.23–5.6)
RBC MORPH BLD: ABNORMAL
SODIUM SERPL-SCNC: 138 MMOL/L (ref 133–143)
TSH, 3RD GENERATION: 6.16 UIU/ML (ref 0.36–3)
WBC # BLD AUTO: 8 K/UL (ref 4.3–11.1)
WBC MORPH BLD: ABNORMAL

## 2023-09-29 PROCEDURE — 80053 COMPREHEN METABOLIC PANEL: CPT

## 2023-09-29 PROCEDURE — 84443 ASSAY THYROID STIM HORMONE: CPT

## 2023-09-29 PROCEDURE — 85025 COMPLETE CBC W/AUTO DIFF WBC: CPT

## 2023-09-29 PROCEDURE — 2580000003 HC RX 258: Performed by: INTERNAL MEDICINE

## 2023-09-29 PROCEDURE — 36591 DRAW BLOOD OFF VENOUS DEVICE: CPT

## 2023-09-29 PROCEDURE — 83690 ASSAY OF LIPASE: CPT

## 2023-09-29 PROCEDURE — 96413 CHEMO IV INFUSION 1 HR: CPT

## 2023-09-29 PROCEDURE — 6360000002 HC RX W HCPCS: Performed by: INTERNAL MEDICINE

## 2023-09-29 RX ORDER — SODIUM CHLORIDE 9 MG/ML
5-250 INJECTION, SOLUTION INTRAVENOUS PRN
Status: DISCONTINUED | OUTPATIENT
Start: 2023-09-29 | End: 2023-09-30 | Stop reason: HOSPADM

## 2023-09-29 RX ORDER — MEPERIDINE HYDROCHLORIDE 50 MG/ML
12.5 INJECTION INTRAMUSCULAR; INTRAVENOUS; SUBCUTANEOUS PRN
OUTPATIENT
Start: 2023-09-29

## 2023-09-29 RX ORDER — ONDANSETRON 2 MG/ML
8 INJECTION INTRAMUSCULAR; INTRAVENOUS
OUTPATIENT
Start: 2023-09-29

## 2023-09-29 RX ORDER — SODIUM CHLORIDE 0.9 % (FLUSH) 0.9 %
5-40 SYRINGE (ML) INJECTION PRN
Status: DISCONTINUED | OUTPATIENT
Start: 2023-09-29 | End: 2023-09-30 | Stop reason: HOSPADM

## 2023-09-29 RX ORDER — SODIUM CHLORIDE 9 MG/ML
5-250 INJECTION, SOLUTION INTRAVENOUS PRN
Status: CANCELLED | OUTPATIENT
Start: 2023-09-29

## 2023-09-29 RX ORDER — FAMOTIDINE 10 MG/ML
20 INJECTION, SOLUTION INTRAVENOUS
OUTPATIENT
Start: 2023-09-29

## 2023-09-29 RX ORDER — EPINEPHRINE 1 MG/ML
0.3 INJECTION, SOLUTION, CONCENTRATE INTRAVENOUS PRN
OUTPATIENT
Start: 2023-09-29

## 2023-09-29 RX ORDER — ALBUTEROL SULFATE 90 UG/1
4 AEROSOL, METERED RESPIRATORY (INHALATION) PRN
OUTPATIENT
Start: 2023-09-29

## 2023-09-29 RX ORDER — SODIUM CHLORIDE 9 MG/ML
5-250 INJECTION, SOLUTION INTRAVENOUS PRN
OUTPATIENT
Start: 2023-09-29

## 2023-09-29 RX ORDER — ACETAMINOPHEN 325 MG/1
650 TABLET ORAL
OUTPATIENT
Start: 2023-09-29

## 2023-09-29 RX ORDER — SODIUM CHLORIDE 9 MG/ML
INJECTION, SOLUTION INTRAVENOUS CONTINUOUS
OUTPATIENT
Start: 2023-09-29

## 2023-09-29 RX ORDER — DIPHENHYDRAMINE HYDROCHLORIDE 50 MG/ML
50 INJECTION INTRAMUSCULAR; INTRAVENOUS
OUTPATIENT
Start: 2023-09-29

## 2023-09-29 RX ORDER — SODIUM CHLORIDE 0.9 % (FLUSH) 0.9 %
5-40 SYRINGE (ML) INJECTION PRN
Status: DISCONTINUED | OUTPATIENT
Start: 2023-09-29 | End: 2023-10-03 | Stop reason: HOSPADM

## 2023-09-29 RX ORDER — SODIUM CHLORIDE 0.9 % (FLUSH) 0.9 %
5-40 SYRINGE (ML) INJECTION PRN
Status: CANCELLED | OUTPATIENT
Start: 2023-09-29

## 2023-09-29 RX ORDER — HEPARIN SODIUM (PORCINE) LOCK FLUSH IV SOLN 100 UNIT/ML 100 UNIT/ML
500 SOLUTION INTRAVENOUS PRN
OUTPATIENT
Start: 2023-09-29

## 2023-09-29 RX ADMIN — SODIUM CHLORIDE, PRESERVATIVE FREE 10 ML: 5 INJECTION INTRAVENOUS at 09:00

## 2023-09-29 RX ADMIN — SODIUM CHLORIDE 25 ML/HR: 9 INJECTION, SOLUTION INTRAVENOUS at 12:50

## 2023-09-29 RX ADMIN — SODIUM CHLORIDE, PRESERVATIVE FREE 10 ML: 5 INJECTION INTRAVENOUS at 12:50

## 2023-09-29 RX ADMIN — SODIUM CHLORIDE 200 MG: 9 INJECTION, SOLUTION INTRAVENOUS at 12:53

## 2023-09-29 ASSESSMENT — PATIENT HEALTH QUESTIONNAIRE - PHQ9
SUM OF ALL RESPONSES TO PHQ QUESTIONS 1-9: 0
SUM OF ALL RESPONSES TO PHQ QUESTIONS 1-9: 0
SUM OF ALL RESPONSES TO PHQ9 QUESTIONS 1 & 2: 0
1. LITTLE INTEREST OR PLEASURE IN DOING THINGS: 0
SUM OF ALL RESPONSES TO PHQ QUESTIONS 1-9: 0
2. FEELING DOWN, DEPRESSED OR HOPELESS: 0
SUM OF ALL RESPONSES TO PHQ QUESTIONS 1-9: 0

## 2023-09-29 NOTE — PATIENT INSTRUCTIONS
Patient Instructions from Today's Visit    Reason for Visit:  Follow up - Melanoma    Diagnosis Information:  [email or web address] net/about-us/asco-answers-patient-education-materials/fvmw-ulbywig-uoci-sheets    Plan:  The low hemoglobin is likely related to chronic kidney disease. The iron levels that we have checked came back within normal range - therefore, it is not iron deficiency causing the anemia. You would still need to see GI as planned to rule out any small GI bleed as a cause. Your lipase levels have been high the few times it was checked - this could be due to chronic pancreatitis. However, the treatment you are on can cause autoimmune pancreatitis. It is difficult to differentiate which is which. Keep your appointment with gastroenterology as scheduled. Proceed with treatment today. Please call us if you have any questions or concerns before your next visit. Follow Up:  As scheduled.      Recent Lab Results:  Hospital Outpatient Visit on 09/29/2023   Component Date Value Ref Range Status    WBC 09/29/2023 8.0  4.3 - 11.1 K/uL Final    PERIPHERAL REVIEW TO FOLLOW    RBC 09/29/2023 3.55 (L)  4.23 - 5.6 M/uL Final    Hemoglobin 09/29/2023 9.5 (L)  13.6 - 17.2 g/dL Final    Hematocrit 09/29/2023 29.6 (L)  41.1 - 50.3 % Final    MCV 09/29/2023 83.4  82.0 - 102.0 FL Final    MCH 09/29/2023 26.8  26.1 - 32.9 PG Final    MCHC 09/29/2023 32.1  31.4 - 35.0 g/dL Final    RDW 09/29/2023 22.9 (H)  11.9 - 14.6 % Final    Platelets 03/62/6080 223  150 - 450 K/uL Final    MPV 09/29/2023 9.9  9.4 - 12.3 FL Final    nRBC 09/29/2023 0.00  0.0 - 0.2 K/uL Final    **Note: Absolute NRBC parameter is now reported with Hemogram**    Neutrophils % 09/29/2023 36 (L)  43 - 78 % Final    Lymphocytes % 09/29/2023 44  13 - 44 % Final    Monocytes % 09/29/2023 17 (H)  4.0 - 12.0 % Final    Eosinophils % 09/29/2023 2  0.5 - 7.8 % Final    Basophils % 09/29/2023 1  0.0 - 2.0 % Final    Immature Granulocytes 09/29/2023 0

## 2023-09-29 NOTE — TELEPHONE ENCOUNTER
Msg sent to pt    ----- Message from Lucille Juan MD sent at 9/29/2023  1:22 PM EDT -----  Please inform the patient that lipase has increased to 2006. If there is any worsening of his abdominal symptoms, he should go to the ER. Recommendation at that point would be to initiate Solu-Medrol 1 mg/kg twice a day.

## 2023-09-29 NOTE — PROGRESS NOTES
Patient arrived to port lab for port access and lab draw   [address] accessed and labs drawn per protocol   *Port remains accessed.  Patient discharged from port lab ambulatory*

## 2023-10-02 ENCOUNTER — TELEPHONE (OUTPATIENT)
Dept: ONCOLOGY | Age: 68
End: 2023-10-02

## 2023-10-02 NOTE — TELEPHONE ENCOUNTER
Discussed w/ pt - denies having any abdominal pains/worsening abdominal pains at this time. He is aware that he should go to ER for further eval should abdominal sx return. Pt has apt w/ GI on Oct 5th - encouraged to keep this apt. Pt verbalized understanding of all instruction given.     ----- Message from Marilyn David MD sent at 9/29/2023  1:22 PM EDT -----  Please inform the patient that lipase has increased to 2006. If there is any worsening of his abdominal symptoms, he should go to the ER. Recommendation at that point would be to initiate Solu-Medrol 1 mg/kg twice a day.

## 2023-10-04 ENCOUNTER — APPOINTMENT (OUTPATIENT)
Dept: ULTRASOUND IMAGING | Age: 68
End: 2023-10-04
Payer: OTHER GOVERNMENT

## 2023-10-04 ENCOUNTER — HOSPITAL ENCOUNTER (EMERGENCY)
Age: 68
Discharge: HOME | End: 2023-10-04
Attending: EMERGENCY MEDICINE | Admitting: EMERGENCY MEDICINE
Payer: OTHER GOVERNMENT

## 2023-10-04 VITALS
WEIGHT: 310 LBS | SYSTOLIC BLOOD PRESSURE: 107 MMHG | RESPIRATION RATE: 25 BRPM | HEART RATE: 73 BPM | OXYGEN SATURATION: 96 % | DIASTOLIC BLOOD PRESSURE: 58 MMHG | HEIGHT: 71 IN | TEMPERATURE: 98.2 F | BODY MASS INDEX: 43.4 KG/M2

## 2023-10-04 DIAGNOSIS — R74.8 ELEVATED LIPASE: Primary | ICD-10-CM

## 2023-10-04 DIAGNOSIS — R11.0 NAUSEA: ICD-10-CM

## 2023-10-04 DIAGNOSIS — R10.13 ABDOMINAL PAIN, EPIGASTRIC: ICD-10-CM

## 2023-10-04 DIAGNOSIS — D64.9 ANEMIA, UNSPECIFIED TYPE: ICD-10-CM

## 2023-10-04 LAB
ALBUMIN SERPL-MCNC: 3 G/DL (ref 3.2–4.6)
ALBUMIN/GLOB SERPL: 0.7 (ref 0.4–1.6)
ALP SERPL-CCNC: 120 U/L (ref 50–136)
ALT SERPL-CCNC: 21 U/L (ref 12–65)
ANION GAP SERPL CALC-SCNC: 7 MMOL/L (ref 2–11)
AST SERPL-CCNC: 26 U/L (ref 15–37)
BASOPHILS # BLD: 0.1 K/UL (ref 0–0.2)
BASOPHILS NFR BLD: 1 % (ref 0–2)
BILIRUB SERPL-MCNC: 0.9 MG/DL (ref 0.2–1.1)
BUN SERPL-MCNC: 35 MG/DL (ref 8–23)
CALCIUM SERPL-MCNC: 9.1 MG/DL (ref 8.3–10.4)
CHLORIDE SERPL-SCNC: 107 MMOL/L (ref 101–110)
CO2 SERPL-SCNC: 25 MMOL/L (ref 21–32)
CREAT SERPL-MCNC: 1.82 MG/DL (ref 0.8–1.5)
DIFFERENTIAL METHOD BLD: ABNORMAL
EOSINOPHIL # BLD: 0.1 K/UL (ref 0–0.8)
EOSINOPHIL NFR BLD: 1 % (ref 0.5–7.8)
ERYTHROCYTE [DISTWIDTH] IN BLOOD BY AUTOMATED COUNT: 23 % (ref 11.9–14.6)
GLOBULIN SER CALC-MCNC: 4.6 G/DL (ref 2.8–4.5)
GLUCOSE BLD STRIP.AUTO-MCNC: 69 MG/DL (ref 65–100)
GLUCOSE SERPL-MCNC: 100 MG/DL (ref 65–100)
HCT VFR BLD AUTO: 28.5 % (ref 41.1–50.3)
HGB BLD-MCNC: 9.2 G/DL (ref 13.6–17.2)
IMM GRANULOCYTES # BLD AUTO: 0.1 K/UL (ref 0–0.5)
IMM GRANULOCYTES NFR BLD AUTO: 1 % (ref 0–5)
LACTATE SERPL-SCNC: 1.9 MMOL/L (ref 0.4–2)
LIPASE SERPL-CCNC: 1750 U/L (ref 73–393)
LYMPHOCYTES # BLD: 3.3 K/UL (ref 0.5–4.6)
LYMPHOCYTES NFR BLD: 39 % (ref 13–44)
MAGNESIUM SERPL-MCNC: 1.8 MG/DL (ref 1.8–2.4)
MCH RBC QN AUTO: 27 PG (ref 26.1–32.9)
MCHC RBC AUTO-ENTMCNC: 32.3 G/DL (ref 31.4–35)
MCV RBC AUTO: 83.6 FL (ref 82–102)
MONOCYTES # BLD: 1.3 K/UL (ref 0.1–1.3)
MONOCYTES NFR BLD: 16 % (ref 4–12)
NEUTS SEG # BLD: 3.6 K/UL (ref 1.7–8.2)
NEUTS SEG NFR BLD: 43 % (ref 43–78)
NRBC # BLD: 0 K/UL (ref 0–0.2)
PLATELET # BLD AUTO: 292 K/UL (ref 150–450)
PMV BLD AUTO: 10.9 FL (ref 9.4–12.3)
POTASSIUM SERPL-SCNC: 4.4 MMOL/L (ref 3.5–5.1)
PROT SERPL-MCNC: 7.6 G/DL (ref 6.3–8.2)
RBC # BLD AUTO: 3.41 M/UL (ref 4.23–5.6)
SERVICE CMNT-IMP: NORMAL
SODIUM SERPL-SCNC: 139 MMOL/L (ref 133–143)
WBC # BLD AUTO: 8.3 K/UL (ref 4.3–11.1)

## 2023-10-04 PROCEDURE — 85025 COMPLETE CBC W/AUTO DIFF WBC: CPT

## 2023-10-04 PROCEDURE — 96374 THER/PROPH/DIAG INJ IV PUSH: CPT

## 2023-10-04 PROCEDURE — 83735 ASSAY OF MAGNESIUM: CPT

## 2023-10-04 PROCEDURE — 2580000003 HC RX 258: Performed by: EMERGENCY MEDICINE

## 2023-10-04 PROCEDURE — 93005 ELECTROCARDIOGRAM TRACING: CPT | Performed by: EMERGENCY MEDICINE

## 2023-10-04 PROCEDURE — 83605 ASSAY OF LACTIC ACID: CPT

## 2023-10-04 PROCEDURE — 80053 COMPREHEN METABOLIC PANEL: CPT

## 2023-10-04 PROCEDURE — 82962 GLUCOSE BLOOD TEST: CPT

## 2023-10-04 PROCEDURE — 83690 ASSAY OF LIPASE: CPT

## 2023-10-04 PROCEDURE — 76705 ECHO EXAM OF ABDOMEN: CPT

## 2023-10-04 PROCEDURE — 99284 EMERGENCY DEPT VISIT MOD MDM: CPT

## 2023-10-04 PROCEDURE — 6360000002 HC RX W HCPCS: Performed by: EMERGENCY MEDICINE

## 2023-10-04 RX ORDER — ONDANSETRON 2 MG/ML
4 INJECTION INTRAMUSCULAR; INTRAVENOUS
Status: COMPLETED | OUTPATIENT
Start: 2023-10-04 | End: 2023-10-04

## 2023-10-04 RX ORDER — 0.9 % SODIUM CHLORIDE 0.9 %
1000 INTRAVENOUS SOLUTION INTRAVENOUS
Status: COMPLETED | OUTPATIENT
Start: 2023-10-04 | End: 2023-10-04

## 2023-10-04 RX ORDER — HYDROCODONE BITARTRATE AND ACETAMINOPHEN 5; 325 MG/1; MG/1
1 TABLET ORAL EVERY 6 HOURS PRN
Qty: 12 TABLET | Refills: 0 | Status: SHIPPED | OUTPATIENT
Start: 2023-10-04 | End: 2023-10-07

## 2023-10-04 RX ORDER — ONDANSETRON HYDROCHLORIDE 8 MG/1
8 TABLET, FILM COATED ORAL EVERY 8 HOURS PRN
Qty: 30 TABLET | Refills: 0 | Status: SHIPPED | OUTPATIENT
Start: 2023-10-04

## 2023-10-04 RX ADMIN — SODIUM CHLORIDE 1000 ML: 9 INJECTION, SOLUTION INTRAVENOUS at 15:12

## 2023-10-04 RX ADMIN — ONDANSETRON 4 MG: 2 INJECTION INTRAMUSCULAR; INTRAVENOUS at 15:12

## 2023-10-04 ASSESSMENT — ENCOUNTER SYMPTOMS
CONSTIPATION: 0
ABDOMINAL PAIN: 1
DIARRHEA: 0
NAUSEA: 1
SHORTNESS OF BREATH: 0
BLOOD IN STOOL: 0
VOMITING: 0
COUGH: 0

## 2023-10-04 ASSESSMENT — PAIN - FUNCTIONAL ASSESSMENT: PAIN_FUNCTIONAL_ASSESSMENT: 0-10

## 2023-10-04 ASSESSMENT — PAIN SCALES - GENERAL: PAINLEVEL_OUTOF10: 7

## 2023-10-04 NOTE — ED PROVIDER NOTES
Emergency Department Provider Note       PCP: Roxene Baumgarten, DO   Age: 70 y.o. Sex: male     DISPOSITION Decision To Discharge 10/04/2023 07:08:51 PM       ICD-10-CM    1. Elevated lipase  R74.8       2. Abdominal pain, epigastric  R10.13 HYDROcodone-acetaminophen (NORCO) 5-325 MG per tablet      3. Nausea  R11.0       4. Anemia, unspecified type  D64.9           Medical Decision Making     Complexity of Problems Addressed:  1 or more acute illnesses that pose a threat to life or bodily function. Data Reviewed and Analyzed:   I independently ordered and reviewed each unique test.  I reviewed external records: provider visit note from PCP. I reviewed external records: previous lab results from outside ED. The patients assessment required an independent historian: Wife at bedside. The reason they were needed is important historical information not provided by the patient. I independently ordered and interpreted the ED EKG in the absence of a Cardiologist.    Rate: 73  EKG Interpretation: EKG Interpretation: paced rhythm  ST Segments: Nonspecific ST segments - NO STEMI      I interpreted the Ultrasound  no ductal dilatation. Discussion of management or test interpretation. Hypoglycemic after ultrasound, better after juice and crackers     Risk of Complications and/or Morbidity of Patient Management:  Patient was discharged risks and benefits of hospitalization were considered. ED Course as of 10/04/23 1910   Wed Oct 04, 2023   1406 Patient evaluated initially in triage. Rapid Medical Evaluation was conducted and necessary orders have been placed. I have performed a medical screening exam.  Care will now be transferred to the provider in the back of the emergency department. Severa Fam, PA 2:06 PM  [LB]      ED Course User Index  [LB] Severa Fam, [place]       Is this patient to be included in the SEP-1 core measure due to severe sepsis or septic shock?  No Exclusion
mmol/L    Chloride 107 101 - 110 mmol/L    CO2 25 21 - 32 mmol/L    Anion Gap 7 2 - 11 mmol/L    Glucose 100 65 - 100 mg/dL    BUN 35 (H) 8 - 23 MG/DL    Creatinine 1.82 (H) 0.8 - 1.5 MG/DL    Est, Glom Filt Rate 40 (L) >60 ml/min/1.73m2    Calcium 9.1 8.3 - 10.4 MG/DL    Total Bilirubin 0.9 0.2 - 1.1 MG/DL    ALT 21 12 - 65 U/L    AST 26 15 - 37 U/L    Alk Phosphatase 120 50 - 136 U/L    Total Protein 7.6 6.3 - 8.2 g/dL    Albumin 3.0 (L) 3.2 - 4.6 g/dL    Globulin 4.6 (H) 2.8 - 4.5 g/dL    Albumin/Globulin Ratio 0.7 0.4 - 1.6     Lipase    Collection Time: 10/04/23  1:57 PM   Result Value Ref Range    Lipase 1,750 (H) 73 - 393 U/L   Lactate, Sepsis (Select if patient is over 65 to rule out mesenteric ischemia)    Collection Time: 10/04/23  1:57 PM   Result Value Ref Range    Lactic Acid, Sepsis 1.9 0.4 - 2.0 MMOL/L   Magnesium    Collection Time: 10/04/23  1:57 PM   Result Value Ref Range    Magnesium 1.8 1.8 - 2.4 mg/dL       I discussed the results of all labs, procedures, radiographs, and treatments with the patient and available family. Treatment plan is agreed upon and the patient is ready for discharge. All voiced understanding of the discharge plan and medication instructions or changes as appropriate. Questions about treatment in the ED were answered. All were encouraged to return should symptoms worsen or new problems develop. Voice dictation software was used during the making of this note. This software is not perfect and grammatical and other typographical errors may be present. This note has not been completely proofread for errors.      Feliz Jefferson MD  10/04/23 [id number]

## 2023-10-04 NOTE — ED TRIAGE NOTES
Pt ambulatory to triage. Pt states that he has abnormal labs and possible pancreatitis. Pt advises that he is \"bleeding somewhere\". Pt is currently going through immunotherapy through the port for melanoma. Pt has appt with GI tomorrow.

## 2023-10-05 LAB
EKG ATRIAL RATE: 0 BPM
EKG DIAGNOSIS: NORMAL
EKG P AXIS: 0 DEGREES
EKG P-R INTERVAL: 218 MS
EKG Q-T INTERVAL: 427 MS
EKG QRS DURATION: 110 MS
EKG QTC CALCULATION (BAZETT): 471 MS
EKG R AXIS: 145 DEGREES
EKG T AXIS: 12 DEGREES
EKG VENTRICULAR RATE: 73 BPM

## 2023-10-09 ENCOUNTER — OFFICE VISIT (OUTPATIENT)
Dept: FAMILY MEDICINE CLINIC | Facility: CLINIC | Age: 68
End: 2023-10-09
Payer: MEDICARE

## 2023-10-09 VITALS
DIASTOLIC BLOOD PRESSURE: 58 MMHG | WEIGHT: 315 LBS | SYSTOLIC BLOOD PRESSURE: 102 MMHG | HEART RATE: 65 BPM | BODY MASS INDEX: 44.1 KG/M2 | HEIGHT: 71 IN | OXYGEN SATURATION: 97 %

## 2023-10-09 DIAGNOSIS — D64.9 ANEMIA, UNSPECIFIED TYPE: ICD-10-CM

## 2023-10-09 DIAGNOSIS — K85.90 ACUTE PANCREATITIS, UNSPECIFIED COMPLICATION STATUS, UNSPECIFIED PANCREATITIS TYPE: Primary | ICD-10-CM

## 2023-10-09 PROCEDURE — 3074F SYST BP LT 130 MM HG: CPT | Performed by: FAMILY MEDICINE

## 2023-10-09 PROCEDURE — 99213 OFFICE O/P EST LOW 20 MIN: CPT | Performed by: FAMILY MEDICINE

## 2023-10-09 PROCEDURE — 1123F ACP DISCUSS/DSCN MKR DOCD: CPT | Performed by: FAMILY MEDICINE

## 2023-10-09 PROCEDURE — 3078F DIAST BP <80 MM HG: CPT | Performed by: FAMILY MEDICINE

## 2023-10-09 NOTE — PROGRESS NOTES
Marc Primrose, DO                Diplomate of the [employer] of [facility] [place]         [phone]    Eric Gutierrez is a 70 y.o. male who was seen on 10/9/2023 for   Chief Complaint   Patient presents with    Follow-up     ER 10/4/23       Assessment & Plan     Diagnosis Orders   1. Acute pancreatitis, unspecified complication status, unspecified pancreatitis type      RESOLVED      2. Anemia, unspecified type                On this date 10/09/2023 I have spent 25 minutes reviewing previous notes, lab/imaging results and face to face with the patient discussing the diagnoses and importance of compliance with the treatment plan, as well as documenting on the day of the visit. RECENT LABS/TESTS TO REVIEW and DISCUSS    No results found for this visit on 10/09/23.        9/29/2023    10:44 AM 9/8/2023    10:46 AM 8/18/2023    10:35 AM   PHQ-9    Little interest or pleasure in doing things 0 0 0   Feeling down, depressed, or hopeless 0 0 0   PHQ-2 Score 0 0 0   PHQ-9 Total Score 0 0 0     I was diagnosed with Pancreatitis last Monday at the [facility] ER (Sept. 18th) and was wondering if it would be possible for you to order some labs for me at the first of the week to see what my numbers are currently? The ER doctor wanted me to see you last week or this coming week. Right now my appointment with you is scheduled for October 9th and I am on a cancellation list.    Thank you! Subjective    HPI:     This is a 79-year-old patient who was recently seen in the emergency room for possible pancreatitis. He was unable to schedule a follow-up visit with me very quickly after the hospitalization due to my schedule and has already seen his GI doctor in the meantime who has ordered follow-up lab studies. Return for HgbA1c soon.      Reviewed and updated this visit by provider:           Review of Systems   Constitutional:

## 2023-10-17 ASSESSMENT — ENCOUNTER SYMPTOMS
VOMITING: 0
ABDOMINAL PAIN: 0
EYES NEGATIVE: 1
CONSTIPATION: 0
SHORTNESS OF BREATH: 0
COUGH: 0
DIARRHEA: 0
WHEEZING: 0
NAUSEA: 0
ALLERGIC/IMMUNOLOGIC NEGATIVE: 1
BACK PAIN: 0
CHEST TIGHTNESS: 0

## 2023-10-18 ENCOUNTER — TELEPHONE (OUTPATIENT)
Dept: FAMILY MEDICINE CLINIC | Facility: CLINIC | Age: 68
End: 2023-10-18

## 2023-10-18 ENCOUNTER — NURSE ONLY (OUTPATIENT)
Dept: FAMILY MEDICINE CLINIC | Facility: CLINIC | Age: 68
End: 2023-10-18

## 2023-10-18 DIAGNOSIS — N18.32 TYPE 2 DIABETES MELLITUS WITH STAGE 3B CHRONIC KIDNEY DISEASE, WITH LONG-TERM CURRENT USE OF INSULIN (HCC): Chronic | ICD-10-CM

## 2023-10-18 DIAGNOSIS — R80.9 TYPE 2 DIABETES MELLITUS WITH MICROALBUMINURIA, WITH LONG-TERM CURRENT USE OF INSULIN (HCC): Chronic | ICD-10-CM

## 2023-10-18 DIAGNOSIS — Z79.4 TYPE 2 DIABETES MELLITUS WITH DIABETIC POLYNEUROPATHY, WITH LONG-TERM CURRENT USE OF INSULIN (HCC): Chronic | ICD-10-CM

## 2023-10-18 DIAGNOSIS — Z79.4 TYPE 2 DIABETES MELLITUS WITH STAGE 3B CHRONIC KIDNEY DISEASE, WITH LONG-TERM CURRENT USE OF INSULIN (HCC): Chronic | ICD-10-CM

## 2023-10-18 DIAGNOSIS — E11.42 TYPE 2 DIABETES MELLITUS WITH DIABETIC POLYNEUROPATHY, WITH LONG-TERM CURRENT USE OF INSULIN (HCC): Chronic | ICD-10-CM

## 2023-10-18 DIAGNOSIS — E11.29 TYPE 2 DIABETES MELLITUS WITH MICROALBUMINURIA, WITH LONG-TERM CURRENT USE OF INSULIN (HCC): Chronic | ICD-10-CM

## 2023-10-18 DIAGNOSIS — Z79.4 TYPE 2 DIABETES MELLITUS WITH MICROALBUMINURIA, WITH LONG-TERM CURRENT USE OF INSULIN (HCC): Chronic | ICD-10-CM

## 2023-10-18 DIAGNOSIS — E11.22 TYPE 2 DIABETES MELLITUS WITH STAGE 3B CHRONIC KIDNEY DISEASE, WITH LONG-TERM CURRENT USE OF INSULIN (HCC): Chronic | ICD-10-CM

## 2023-10-18 NOTE — TELEPHONE ENCOUNTER
Spoke with patient as per Dr Faith Whittaker to ask  if A1C was lab needed, per patient needs A1C  Please call patient and schedule lab appointment

## 2023-10-19 LAB
EST. AVERAGE GLUCOSE BLD GHB EST-MCNC: 137 MG/DL
HBA1C MFR BLD: 6.4 % (ref 4.8–5.6)

## 2023-10-20 ENCOUNTER — HOSPITAL ENCOUNTER (OUTPATIENT)
Dept: LAB | Age: 68
End: 2023-10-20
Payer: OTHER GOVERNMENT

## 2023-10-20 ENCOUNTER — OFFICE VISIT (OUTPATIENT)
Dept: ONCOLOGY | Age: 68
End: 2023-10-20
Payer: OTHER GOVERNMENT

## 2023-10-20 ENCOUNTER — HOSPITAL ENCOUNTER (OUTPATIENT)
Dept: INFUSION THERAPY | Age: 68
Discharge: HOME | End: 2023-10-20
Payer: OTHER GOVERNMENT

## 2023-10-20 VITALS
SYSTOLIC BLOOD PRESSURE: 91 MMHG | OXYGEN SATURATION: 99 % | BODY MASS INDEX: 44.97 KG/M2 | HEIGHT: 70 IN | DIASTOLIC BLOOD PRESSURE: 55 MMHG | WEIGHT: 314.1 LBS | RESPIRATION RATE: 18 BRPM | TEMPERATURE: 97.8 F | HEART RATE: 79 BPM

## 2023-10-20 DIAGNOSIS — Z87.19 HISTORY OF PANCREATITIS: ICD-10-CM

## 2023-10-20 DIAGNOSIS — C43.9 MALIGNANT MELANOMA, UNSPECIFIED SITE (HCC): Primary | ICD-10-CM

## 2023-10-20 DIAGNOSIS — D64.9 ANEMIA, UNSPECIFIED TYPE: ICD-10-CM

## 2023-10-20 DIAGNOSIS — I95.89 HYPOTENSION DUE TO HYPOVOLEMIA: ICD-10-CM

## 2023-10-20 DIAGNOSIS — C43.9 MALIGNANT MELANOMA, UNSPECIFIED SITE (HCC): ICD-10-CM

## 2023-10-20 DIAGNOSIS — R53.82 CHRONIC FATIGUE: ICD-10-CM

## 2023-10-20 DIAGNOSIS — E86.1 HYPOTENSION DUE TO HYPOVOLEMIA: Primary | ICD-10-CM

## 2023-10-20 DIAGNOSIS — I95.89 HYPOTENSION DUE TO HYPOVOLEMIA: Primary | ICD-10-CM

## 2023-10-20 DIAGNOSIS — Z79.899 HIGH RISK MEDICATION USE: ICD-10-CM

## 2023-10-20 DIAGNOSIS — E86.1 HYPOTENSION DUE TO HYPOVOLEMIA: ICD-10-CM

## 2023-10-20 LAB
ALBUMIN SERPL-MCNC: 2.9 G/DL (ref 3.2–4.6)
ALBUMIN/GLOB SERPL: 0.7 (ref 0.4–1.6)
ALP SERPL-CCNC: 117 U/L (ref 50–136)
ALT SERPL-CCNC: 17 U/L (ref 12–65)
ANION GAP SERPL CALC-SCNC: 5 MMOL/L (ref 2–11)
AST SERPL-CCNC: 25 U/L (ref 15–37)
BASOPHILS # BLD: 0.1 K/UL (ref 0–0.2)
BASOPHILS NFR BLD: 1 % (ref 0–2)
BILIRUB SERPL-MCNC: 0.9 MG/DL (ref 0.2–1.1)
BUN SERPL-MCNC: 25 MG/DL (ref 8–23)
CALCIUM SERPL-MCNC: 8.4 MG/DL (ref 8.3–10.4)
CHLORIDE SERPL-SCNC: 111 MMOL/L (ref 101–110)
CO2 SERPL-SCNC: 25 MMOL/L (ref 21–32)
CREAT SERPL-MCNC: 1.7 MG/DL (ref 0.8–1.5)
DIFFERENTIAL METHOD BLD: ABNORMAL
EOSINOPHIL # BLD: 0.1 K/UL (ref 0–0.8)
EOSINOPHIL NFR BLD: 1 % (ref 0.5–7.8)
ERYTHROCYTE [DISTWIDTH] IN BLOOD BY AUTOMATED COUNT: 27 % (ref 11.9–14.6)
FERRITIN SERPL-MCNC: 282 NG/ML (ref 8–388)
FOLATE SERPL-MCNC: 4.7 NG/ML (ref 3.1–17.5)
GLOBULIN SER CALC-MCNC: 4.2 G/DL (ref 2.8–4.5)
GLUCOSE SERPL-MCNC: 159 MG/DL (ref 65–100)
HCT VFR BLD AUTO: 26.8 % (ref 41.1–50.3)
HGB BLD-MCNC: 8.3 G/DL (ref 13.6–17.2)
IMM GRANULOCYTES # BLD AUTO: 0 K/UL (ref 0–0.5)
IMM GRANULOCYTES NFR BLD AUTO: 0 % (ref 0–5)
IRON SATN MFR SERPL: 38 %
IRON SERPL-MCNC: 94 UG/DL (ref 35–150)
LIPASE SERPL-CCNC: 1645 U/L (ref 73–393)
LYMPHOCYTES # BLD: 2.1 K/UL (ref 0.5–4.6)
LYMPHOCYTES NFR BLD: 33 % (ref 13–44)
MCH RBC QN AUTO: 28.2 PG (ref 26.1–32.9)
MCHC RBC AUTO-ENTMCNC: 31 G/DL (ref 31.4–35)
MCV RBC AUTO: 91.2 FL (ref 82–102)
MONOCYTES # BLD: 0.9 K/UL (ref 0.1–1.3)
MONOCYTES NFR BLD: 13 % (ref 4–12)
NEUTS SEG # BLD: 3.4 K/UL (ref 1.7–8.2)
NEUTS SEG NFR BLD: 52 % (ref 43–78)
NRBC # BLD: 0 K/UL (ref 0–0.2)
PLATELET # BLD AUTO: 229 K/UL (ref 150–450)
PMV BLD AUTO: 9.3 FL (ref 9.4–12.3)
POTASSIUM SERPL-SCNC: 4.1 MMOL/L (ref 3.5–5.1)
PROT SERPL-MCNC: 7.1 G/DL (ref 6.3–8.2)
RBC # BLD AUTO: 2.94 M/UL (ref 4.23–5.6)
SODIUM SERPL-SCNC: 141 MMOL/L (ref 133–143)
TIBC SERPL-MCNC: 247 UG/DL (ref 250–450)
TSH, 3RD GENERATION: 2.75 UIU/ML (ref 0.36–3.74)
VIT B12 SERPL-MCNC: 1442 PG/ML (ref 193–986)
WBC # BLD AUTO: 6.5 K/UL (ref 4.3–11.1)

## 2023-10-20 PROCEDURE — 3078F DIAST BP <80 MM HG: CPT

## 2023-10-20 PROCEDURE — 83550 IRON BINDING TEST: CPT

## 2023-10-20 PROCEDURE — 82607 VITAMIN B-12: CPT

## 2023-10-20 PROCEDURE — 80053 COMPREHEN METABOLIC PANEL: CPT

## 2023-10-20 PROCEDURE — 2580000003 HC RX 258: Performed by: INTERNAL MEDICINE

## 2023-10-20 PROCEDURE — 85025 COMPLETE CBC W/AUTO DIFF WBC: CPT

## 2023-10-20 PROCEDURE — 84443 ASSAY THYROID STIM HORMONE: CPT

## 2023-10-20 PROCEDURE — 82746 ASSAY OF FOLIC ACID SERUM: CPT

## 2023-10-20 PROCEDURE — 2580000003 HC RX 258

## 2023-10-20 PROCEDURE — 83690 ASSAY OF LIPASE: CPT

## 2023-10-20 PROCEDURE — 6360000002 HC RX W HCPCS

## 2023-10-20 PROCEDURE — 1123F ACP DISCUSS/DSCN MKR DOCD: CPT

## 2023-10-20 PROCEDURE — 83540 ASSAY OF IRON: CPT

## 2023-10-20 PROCEDURE — 99214 OFFICE O/P EST MOD 30 MIN: CPT

## 2023-10-20 PROCEDURE — 96413 CHEMO IV INFUSION 1 HR: CPT

## 2023-10-20 PROCEDURE — 3074F SYST BP LT 130 MM HG: CPT

## 2023-10-20 PROCEDURE — 36591 DRAW BLOOD OFF VENOUS DEVICE: CPT

## 2023-10-20 PROCEDURE — 82728 ASSAY OF FERRITIN: CPT

## 2023-10-20 RX ORDER — ACETAMINOPHEN 325 MG/1
650 TABLET ORAL
Status: CANCELLED | OUTPATIENT
Start: 2023-10-20

## 2023-10-20 RX ORDER — MEPERIDINE HYDROCHLORIDE 50 MG/ML
12.5 INJECTION INTRAMUSCULAR; INTRAVENOUS; SUBCUTANEOUS PRN
Status: CANCELLED | OUTPATIENT
Start: 2023-10-20

## 2023-10-20 RX ORDER — SODIUM CHLORIDE 0.9 % (FLUSH) 0.9 %
5-40 SYRINGE (ML) INJECTION PRN
Status: CANCELLED | OUTPATIENT
Start: 2023-10-20

## 2023-10-20 RX ORDER — SODIUM CHLORIDE 9 MG/ML
5-250 INJECTION, SOLUTION INTRAVENOUS PRN
Status: CANCELLED | OUTPATIENT
Start: 2023-10-20

## 2023-10-20 RX ORDER — SODIUM CHLORIDE 0.9 % (FLUSH) 0.9 %
5-40 SYRINGE (ML) INJECTION PRN
Status: DISCONTINUED | OUTPATIENT
Start: 2023-10-20 | End: 2023-10-21 | Stop reason: HOSPADM

## 2023-10-20 RX ORDER — HEPARIN 100 UNIT/ML
500 SYRINGE INTRAVENOUS PRN
OUTPATIENT
Start: 2023-10-20

## 2023-10-20 RX ORDER — HEPARIN SODIUM (PORCINE) LOCK FLUSH IV SOLN 100 UNIT/ML 100 UNIT/ML
500 SOLUTION INTRAVENOUS PRN
Status: CANCELLED | OUTPATIENT
Start: 2023-10-20

## 2023-10-20 RX ORDER — ONDANSETRON 2 MG/ML
8 INJECTION INTRAMUSCULAR; INTRAVENOUS
Status: CANCELLED | OUTPATIENT
Start: 2023-10-20

## 2023-10-20 RX ORDER — SODIUM CHLORIDE 0.9 % (FLUSH) 0.9 %
5-40 SYRINGE (ML) INJECTION PRN
OUTPATIENT
Start: 2023-10-20

## 2023-10-20 RX ORDER — ALBUTEROL SULFATE 90 UG/1
4 AEROSOL, METERED RESPIRATORY (INHALATION) PRN
Status: CANCELLED | OUTPATIENT
Start: 2023-10-20

## 2023-10-20 RX ORDER — SODIUM CHLORIDE 0.9 % (FLUSH) 0.9 %
5-40 SYRINGE (ML) INJECTION PRN
Status: DISCONTINUED | OUTPATIENT
Start: 2023-10-20 | End: 2023-10-24 | Stop reason: HOSPADM

## 2023-10-20 RX ORDER — 0.9 % SODIUM CHLORIDE 0.9 %
1000 INTRAVENOUS SOLUTION INTRAVENOUS ONCE
Status: CANCELLED | OUTPATIENT
Start: 2023-10-20 | End: 2023-10-20

## 2023-10-20 RX ORDER — EPINEPHRINE 1 MG/ML
0.3 INJECTION, SOLUTION, CONCENTRATE INTRAVENOUS PRN
Status: CANCELLED | OUTPATIENT
Start: 2023-10-20

## 2023-10-20 RX ORDER — SODIUM CHLORIDE 9 MG/ML
INJECTION, SOLUTION INTRAVENOUS CONTINUOUS
Status: CANCELLED | OUTPATIENT
Start: 2023-10-20

## 2023-10-20 RX ORDER — FAMOTIDINE 10 MG/ML
20 INJECTION, SOLUTION INTRAVENOUS
Status: CANCELLED | OUTPATIENT
Start: 2023-10-20

## 2023-10-20 RX ORDER — SODIUM CHLORIDE 9 MG/ML
5-250 INJECTION, SOLUTION INTRAVENOUS PRN
Status: DISCONTINUED | OUTPATIENT
Start: 2023-10-20 | End: 2023-10-21 | Stop reason: HOSPADM

## 2023-10-20 RX ORDER — DIPHENHYDRAMINE HYDROCHLORIDE 50 MG/ML
50 INJECTION INTRAMUSCULAR; INTRAVENOUS
Status: CANCELLED | OUTPATIENT
Start: 2023-10-20

## 2023-10-20 RX ORDER — 0.9 % SODIUM CHLORIDE 0.9 %
1000 INTRAVENOUS SOLUTION INTRAVENOUS ONCE
Status: COMPLETED | OUTPATIENT
Start: 2023-10-20 | End: 2023-10-20

## 2023-10-20 RX ORDER — SODIUM CHLORIDE 9 MG/ML
5-250 INJECTION, SOLUTION INTRAVENOUS PRN
OUTPATIENT
Start: 2023-10-20

## 2023-10-20 RX ADMIN — SODIUM CHLORIDE 1000 ML: 9 INJECTION, SOLUTION INTRAVENOUS at 10:46

## 2023-10-20 RX ADMIN — SODIUM CHLORIDE, PRESERVATIVE FREE 10 ML: 5 INJECTION INTRAVENOUS at 08:30

## 2023-10-20 RX ADMIN — SODIUM CHLORIDE 200 MG: 9 INJECTION, SOLUTION INTRAVENOUS at 11:18

## 2023-10-20 RX ADMIN — SODIUM CHLORIDE, PRESERVATIVE FREE 10 ML: 5 INJECTION INTRAVENOUS at 10:45

## 2023-10-20 ASSESSMENT — PATIENT HEALTH QUESTIONNAIRE - PHQ9
SUM OF ALL RESPONSES TO PHQ QUESTIONS 1-9: 0
SUM OF ALL RESPONSES TO PHQ QUESTIONS 1-9: 0
SUM OF ALL RESPONSES TO PHQ9 QUESTIONS 1 & 2: 0
SUM OF ALL RESPONSES TO PHQ QUESTIONS 1-9: 0
1. LITTLE INTEREST OR PLEASURE IN DOING THINGS: 0
SUM OF ALL RESPONSES TO PHQ QUESTIONS 1-9: 0
2. FEELING DOWN, DEPRESSED OR HOPELESS: 0

## 2023-10-20 NOTE — PROGRESS NOTES
Arrived to the [facility]. C6D1 Keytruda infusion and IVF completed. Patient tolerated well. Any issues or concerns during appointment: none. Patient aware of next infusion appointment on 11/10/2023 (date) at 21 [phone] (time). Patient aware of next lab and [facility] office visit on 11/10/2023 (date) at [address] (time). Patient instructed to call provider with temperature of 100.4 or greater or nausea/vomiting/ diarrhea or pain not controlled by medications  Discharged home ambulatory.

## 2023-10-23 ENCOUNTER — TELEPHONE (OUTPATIENT)
Dept: CARDIOLOGY CLINIC | Age: 68
End: 2023-10-23

## 2023-10-23 ENCOUNTER — TRANSCRIBE ORDERS (OUTPATIENT)
Dept: SCHEDULING | Age: 68
End: 2023-10-23

## 2023-10-23 DIAGNOSIS — R74.8 ELEVATED LIPASE: ICD-10-CM

## 2023-10-23 DIAGNOSIS — Z85.820 H/O MALIGNANT MELANOMA: ICD-10-CM

## 2023-10-23 DIAGNOSIS — D50.9 IRON DEFICIENCY ANEMIA, UNSPECIFIED IRON DEFICIENCY ANEMIA TYPE: Primary | ICD-10-CM

## 2023-10-23 DIAGNOSIS — N18.9 CHRONIC KIDNEY DISEASE, UNSPECIFIED CKD STAGE: ICD-10-CM

## 2023-10-23 DIAGNOSIS — R10.9 ABDOMINAL PAIN, UNSPECIFIED ABDOMINAL LOCATION: ICD-10-CM

## 2023-10-23 DIAGNOSIS — K57.90 DIVERTICULOSIS: ICD-10-CM

## 2023-10-23 NOTE — TELEPHONE ENCOUNTER
Pt called and wanted to let Dr Justin Gregg know that there will be a clearance coming over from [facility] for an MRI and wanted to give him aheads up.

## 2023-10-27 ENCOUNTER — OFFICE VISIT (OUTPATIENT)
Age: 68
End: 2023-10-27
Payer: MEDICARE

## 2023-10-27 VITALS
HEIGHT: 70 IN | HEART RATE: 58 BPM | WEIGHT: 309 LBS | SYSTOLIC BLOOD PRESSURE: 112 MMHG | DIASTOLIC BLOOD PRESSURE: 62 MMHG | BODY MASS INDEX: 44.24 KG/M2

## 2023-10-27 DIAGNOSIS — E78.00 HYPERCHOLESTEROLEMIA: Chronic | ICD-10-CM

## 2023-10-27 DIAGNOSIS — Z79.01 LONG TERM (CURRENT) USE OF ANTICOAGULANTS: Chronic | ICD-10-CM

## 2023-10-27 DIAGNOSIS — Z95.0 PRESENCE OF CARDIAC PACEMAKER: ICD-10-CM

## 2023-10-27 DIAGNOSIS — I48.11 LONGSTANDING PERSISTENT ATRIAL FIBRILLATION (HCC): Chronic | ICD-10-CM

## 2023-10-27 DIAGNOSIS — I10 BENIGN ESSENTIAL HYPERTENSION: Primary | Chronic | ICD-10-CM

## 2023-10-27 DIAGNOSIS — I47.29 NSVT (NONSUSTAINED VENTRICULAR TACHYCARDIA) (HCC): ICD-10-CM

## 2023-10-27 DIAGNOSIS — G47.33 OBSTRUCTIVE SLEEP APNEA ON CPAP: Chronic | ICD-10-CM

## 2023-10-27 DIAGNOSIS — I27.20 PULMONARY HYPERTENSION (HCC): Chronic | ICD-10-CM

## 2023-10-27 DIAGNOSIS — D50.0 IRON DEFICIENCY ANEMIA DUE TO CHRONIC BLOOD LOSS: ICD-10-CM

## 2023-10-27 PROCEDURE — 3074F SYST BP LT 130 MM HG: CPT | Performed by: INTERNAL MEDICINE

## 2023-10-27 PROCEDURE — 1123F ACP DISCUSS/DSCN MKR DOCD: CPT | Performed by: INTERNAL MEDICINE

## 2023-10-27 PROCEDURE — 3078F DIAST BP <80 MM HG: CPT | Performed by: INTERNAL MEDICINE

## 2023-10-27 PROCEDURE — 99214 OFFICE O/P EST MOD 30 MIN: CPT | Performed by: INTERNAL MEDICINE

## 2023-10-27 NOTE — PROGRESS NOTES
Obstructive sleep apnea on CPAP     Class 3 severe obesity due to excess calories with serious comorbidity and body mass index (BMI) of 45.0 to 49.9 in adult (HCC)      Low carb diet advised/reviewed. Information given. History of acute pancreatitis 01/14/2022     Hospitalized at [facility]. Asthma     COVID-19 vaccination refused 11/28/2021     HAD COVID-19. Advised immunization anyway. High risk medication use 11/28/2021    Iron deficiency anemia, unspecified     Idiopathic chronic gout of multiple sites without tophus     Refused varicella vaccine 05/14/2021    Type 2 diabetes mellitus with stage 3b chronic kidney disease, with long-term current use of insulin ([address]) 05/14/2021     Follows with Dr. Chavez Ramirez 02/01/2021    Pulmonary hypertension ([address]) 08/11/2020    History of 2019 novel coronavirus disease (COVID-19) 08/05/2020 8/5/20 at [address]        Gout 06/17/2020    Gastroesophageal reflux disease 06/17/2020    Shortness of breath 05/22/2020    Ventral hernia without obstruction or gangrene 03/10/2020     Last Assessment & Plan:   Very large diastases recti extending into the umbilical area as well. Surgery discussed attempting to repair this at the time of his   cholecystectomy but felt that it was too large and the risks outweigh   potential benefits.         Type 2 diabetes mellitus with diabetic polyneuropathy, with long-term current use of insulin ([address]) 11/29/2019     Follows with Dr. Padmini Perkins        History of colonic polyps 11/07/2019    Chronic right-sided low back pain without sciatica 05/10/2019    Type 2 diabetes mellitus with microalbuminuria, with long-term current use of insulin ([address]) 11/16/2018     Follows with Dr. Padmini Perkins        Hypercholesterolemia 06/29/2018    Seasonal allergic rhinitis due to pollen     NSVT (nonsustained ventricular tachycardia) ([address]) 09/27/2016    Atrioventricular block, complete ([address]) 04/14/2016    Presence of cardiac

## 2023-10-31 ENCOUNTER — HOSPITAL ENCOUNTER (EMERGENCY)
Age: 68
Discharge: HOME | End: 2023-10-31
Attending: EMERGENCY MEDICINE | Admitting: EMERGENCY MEDICINE
Payer: OTHER GOVERNMENT

## 2023-10-31 ENCOUNTER — APPOINTMENT (OUTPATIENT)
Dept: GENERAL RADIOLOGY | Age: 68
End: 2023-10-31
Payer: OTHER GOVERNMENT

## 2023-10-31 VITALS
DIASTOLIC BLOOD PRESSURE: 76 MMHG | RESPIRATION RATE: 18 BRPM | WEIGHT: 308 LBS | BODY MASS INDEX: 44.09 KG/M2 | TEMPERATURE: 98.2 F | HEIGHT: 70 IN | HEART RATE: 72 BPM | OXYGEN SATURATION: 97 % | SYSTOLIC BLOOD PRESSURE: 124 MMHG

## 2023-10-31 DIAGNOSIS — G89.29 ACUTE EXACERBATION OF CHRONIC LOW BACK PAIN: Primary | ICD-10-CM

## 2023-10-31 DIAGNOSIS — M54.50 ACUTE EXACERBATION OF CHRONIC LOW BACK PAIN: Primary | ICD-10-CM

## 2023-10-31 PROCEDURE — 72100 X-RAY EXAM L-S SPINE 2/3 VWS: CPT

## 2023-10-31 PROCEDURE — 6360000002 HC RX W HCPCS

## 2023-10-31 PROCEDURE — 99284 EMERGENCY DEPT VISIT MOD MDM: CPT

## 2023-10-31 PROCEDURE — 96372 THER/PROPH/DIAG INJ SC/IM: CPT

## 2023-10-31 RX ADMIN — HYDROMORPHONE HYDROCHLORIDE 0.5 MG: 1 INJECTION, SOLUTION INTRAMUSCULAR; INTRAVENOUS; SUBCUTANEOUS at 14:10

## 2023-10-31 ASSESSMENT — PAIN DESCRIPTION - LOCATION: LOCATION: BACK

## 2023-10-31 ASSESSMENT — PAIN SCALES - GENERAL: PAINLEVEL_OUTOF10: 10

## 2023-10-31 ASSESSMENT — PAIN DESCRIPTION - DESCRIPTORS: DESCRIPTORS: SHARP

## 2023-10-31 NOTE — ED PROVIDER NOTES
Emergency Department Provider Note       PCP: Akilah Gilbert DO   Age: 70 y.o. Sex: male     DISPOSITION Decision To Discharge 10/31/2023 02:33:58 PM       ICD-10-CM    1. Acute exacerbation of chronic low back pain  M54.50 [address] [place] [address] Dr Anaid Dietrich           Medical Decision Making     Complexity of Problems Addressed:  1 acute on chronic exacerbation    Data Reviewed and Analyzed:   I independently ordered and reviewed each unique test.               I interpreted the X-rays no acute. Discussion of management or test interpretation. Presented with acute on chronic exacerbation of chronic low back pain. Pain ongoing for years but acutely worsened in the last day or so. Pain is exacerbated with positional changes and improved with rest.  He has reproducible tenderness in the left paralumbar region. I personally ambulated this patient in the emergency department there is no evidence of any gait deficit or sensory deficit of bilateral lower extremities or low back. Absence of saddle anesthesias, bowel or bladder incontinence, abdominal pain, or sensory deficits of bilateral lower extremities which lowers my suspicion for acute spinal cord compression syndrome. I have ambulated this patient and he is able to move bilateral lower extremities with no evidence of foot drop or foot dragging. Gait antalgic but otherwise no obvious gait abnormality. Therefore I do not believe MRI imaging is appropriate or warranted in this patient's case. States anaphylactic reaction to morphine and last creatinine elevated 1.7. Decision to give dose of IM Dilaudid in ED course. Patient notes that back pain symptoms are improving after having received dose of IM Dilaudid. I provided outpatient referral to [facility] spine for further evaluation and management of patient's chronic low back pain.   I discussed return precautions and patient stable for

## 2023-10-31 NOTE — DISCHARGE INSTRUCTIONS
Your x-rays today show that you have lots of arthritis in your low back. I have arranged for you to be seen in the outpatient setting by [facility] for further evaluation and management of your pain. Take oxycodone at home as needed for pain relief. Do not take medication before during driving or with alcohol as it can make you feel groggy and impair your judgment. Scheduling will contact you with the spine specialist for further follow-up. Return with any new or worsening symptoms.

## 2023-10-31 NOTE — ED TRIAGE NOTES
Left lower back pain that got worse yesterday morning. Denies injury/trauma. Reports some radiation down left leg. Hx of sciatica but this feels different.  Reports inability to walk r/t weakness

## 2023-11-08 ENCOUNTER — OFFICE VISIT (OUTPATIENT)
Dept: ORTHOPEDIC SURGERY | Age: 68
End: 2023-11-08

## 2023-11-08 DIAGNOSIS — C43.9 MALIGNANT MELANOMA, UNSPECIFIED SITE (HCC): Primary | ICD-10-CM

## 2023-11-08 DIAGNOSIS — Z79.899 HIGH RISK MEDICATION USE: ICD-10-CM

## 2023-11-08 DIAGNOSIS — M47.816 FACET ARTHROPATHY, LUMBAR: ICD-10-CM

## 2023-11-08 DIAGNOSIS — M48.062 LUMBAR STENOSIS WITH NEUROGENIC CLAUDICATION: Primary | ICD-10-CM

## 2023-11-08 DIAGNOSIS — M51.36 DDD (DEGENERATIVE DISC DISEASE), LUMBAR: ICD-10-CM

## 2023-11-08 RX ORDER — IVERMECTIN 3 MG/1
TABLET ORAL
COMMUNITY
Start: 2023-11-03

## 2023-11-08 RX ORDER — TRIAMCINOLONE ACETONIDE 1 MG/G
CREAM TOPICAL
COMMUNITY
Start: 2023-10-18

## 2023-11-10 ENCOUNTER — TELEPHONE (OUTPATIENT)
Dept: ORTHOPEDIC SURGERY | Age: 68
End: 2023-11-10

## 2023-11-10 ENCOUNTER — OFFICE VISIT (OUTPATIENT)
Dept: ONCOLOGY | Age: 68
End: 2023-11-10
Payer: OTHER GOVERNMENT

## 2023-11-10 ENCOUNTER — HOSPITAL ENCOUNTER (OUTPATIENT)
Dept: INFUSION THERAPY | Age: 68
Discharge: HOME | End: 2023-11-10
Payer: OTHER GOVERNMENT

## 2023-11-10 ENCOUNTER — HOSPITAL ENCOUNTER (OUTPATIENT)
Dept: LAB | Age: 68
Discharge: HOME | End: 2023-11-10
Payer: OTHER GOVERNMENT

## 2023-11-10 VITALS
HEIGHT: 70 IN | WEIGHT: 311.3 LBS | DIASTOLIC BLOOD PRESSURE: 66 MMHG | SYSTOLIC BLOOD PRESSURE: 112 MMHG | OXYGEN SATURATION: 97 % | TEMPERATURE: 97.5 F | RESPIRATION RATE: 16 BRPM | BODY MASS INDEX: 44.57 KG/M2 | HEART RATE: 82 BPM

## 2023-11-10 DIAGNOSIS — C43.9 MALIGNANT MELANOMA, UNSPECIFIED SITE (HCC): ICD-10-CM

## 2023-11-10 DIAGNOSIS — C43.9 MALIGNANT MELANOMA, UNSPECIFIED SITE (HCC): Primary | ICD-10-CM

## 2023-11-10 DIAGNOSIS — Z79.899 HIGH RISK MEDICATION USE: ICD-10-CM

## 2023-11-10 LAB
ALBUMIN SERPL-MCNC: 3.2 G/DL (ref 3.2–4.6)
ALBUMIN/GLOB SERPL: 0.8 (ref 0.4–1.6)
ALP SERPL-CCNC: 111 U/L (ref 50–136)
ALT SERPL-CCNC: 18 U/L (ref 12–65)
ANION GAP SERPL CALC-SCNC: 11 MMOL/L (ref 2–11)
AST SERPL-CCNC: 22 U/L (ref 15–37)
BASOPHILS # BLD: 0.1 K/UL (ref 0–0.2)
BASOPHILS NFR BLD: 1 % (ref 0–2)
BILIRUB SERPL-MCNC: 0.5 MG/DL (ref 0.2–1.1)
BUN SERPL-MCNC: 26 MG/DL (ref 8–23)
CALCIUM SERPL-MCNC: 8.4 MG/DL (ref 8.3–10.4)
CHLORIDE SERPL-SCNC: 104 MMOL/L (ref 101–110)
CO2 SERPL-SCNC: 25 MMOL/L (ref 21–32)
CREAT SERPL-MCNC: 1.5 MG/DL (ref 0.8–1.5)
DIFFERENTIAL METHOD BLD: ABNORMAL
EOSINOPHIL # BLD: 0.5 K/UL (ref 0–0.8)
EOSINOPHIL NFR BLD: 7 % (ref 0.5–7.8)
ERYTHROCYTE [DISTWIDTH] IN BLOOD BY AUTOMATED COUNT: 20.1 % (ref 11.9–14.6)
GLOBULIN SER CALC-MCNC: 4 G/DL (ref 2.8–4.5)
GLUCOSE SERPL-MCNC: 135 MG/DL (ref 65–100)
HCT VFR BLD AUTO: 35.7 % (ref 41.1–50.3)
HGB BLD-MCNC: 11.5 G/DL (ref 13.6–17.2)
IMM GRANULOCYTES # BLD AUTO: 0 K/UL (ref 0–0.5)
IMM GRANULOCYTES NFR BLD AUTO: 0 % (ref 0–5)
LIPASE SERPL-CCNC: 785 U/L (ref 73–393)
LYMPHOCYTES # BLD: 1.9 K/UL (ref 0.5–4.6)
LYMPHOCYTES NFR BLD: 26 % (ref 13–44)
MCH RBC QN AUTO: 31.8 PG (ref 26.1–32.9)
MCHC RBC AUTO-ENTMCNC: 32.2 G/DL (ref 31.4–35)
MCV RBC AUTO: 98.6 FL (ref 82–102)
MONOCYTES # BLD: 0.7 K/UL (ref 0.1–1.3)
MONOCYTES NFR BLD: 10 % (ref 4–12)
NEUTS SEG # BLD: 4 K/UL (ref 1.7–8.2)
NEUTS SEG NFR BLD: 56 % (ref 43–78)
NRBC # BLD: 0 K/UL (ref 0–0.2)
PLATELET # BLD AUTO: 241 K/UL (ref 150–450)
PMV BLD AUTO: 9.9 FL (ref 9.4–12.3)
POTASSIUM SERPL-SCNC: 3.8 MMOL/L (ref 3.5–5.1)
PROT SERPL-MCNC: 7.2 G/DL (ref 6.3–8.2)
RBC # BLD AUTO: 3.62 M/UL (ref 4.23–5.6)
SODIUM SERPL-SCNC: 140 MMOL/L (ref 133–143)
TSH, 3RD GENERATION: 3.39 UIU/ML (ref 0.36–3)
WBC # BLD AUTO: 7.2 K/UL (ref 4.3–11.1)

## 2023-11-10 PROCEDURE — 84443 ASSAY THYROID STIM HORMONE: CPT

## 2023-11-10 PROCEDURE — 1123F ACP DISCUSS/DSCN MKR DOCD: CPT | Performed by: INTERNAL MEDICINE

## 2023-11-10 PROCEDURE — 80053 COMPREHEN METABOLIC PANEL: CPT

## 2023-11-10 PROCEDURE — 99215 OFFICE O/P EST HI 40 MIN: CPT | Performed by: INTERNAL MEDICINE

## 2023-11-10 PROCEDURE — 83690 ASSAY OF LIPASE: CPT

## 2023-11-10 PROCEDURE — 85025 COMPLETE CBC W/AUTO DIFF WBC: CPT

## 2023-11-10 PROCEDURE — 2580000003 HC RX 258: Performed by: INTERNAL MEDICINE

## 2023-11-10 PROCEDURE — 3078F DIAST BP <80 MM HG: CPT | Performed by: INTERNAL MEDICINE

## 2023-11-10 PROCEDURE — 6360000002 HC RX W HCPCS: Performed by: INTERNAL MEDICINE

## 2023-11-10 PROCEDURE — 96413 CHEMO IV INFUSION 1 HR: CPT

## 2023-11-10 PROCEDURE — 3074F SYST BP LT 130 MM HG: CPT | Performed by: INTERNAL MEDICINE

## 2023-11-10 RX ORDER — SODIUM CHLORIDE 9 MG/ML
INJECTION, SOLUTION INTRAVENOUS CONTINUOUS
Status: CANCELLED | OUTPATIENT
Start: 2023-11-10

## 2023-11-10 RX ORDER — ONDANSETRON 2 MG/ML
8 INJECTION INTRAMUSCULAR; INTRAVENOUS
Status: CANCELLED | OUTPATIENT
Start: 2023-11-10

## 2023-11-10 RX ORDER — OXYCODONE HYDROCHLORIDE AND ACETAMINOPHEN 5; 325 MG/1; MG/1
1 TABLET ORAL EVERY 6 HOURS PRN
Qty: 30 TABLET | Refills: 0 | Status: SHIPPED | OUTPATIENT
Start: 2023-11-10 | End: 2023-11-18

## 2023-11-10 RX ORDER — SODIUM CHLORIDE 9 MG/ML
INJECTION, SOLUTION INTRAVENOUS CONTINUOUS
Status: DISCONTINUED | OUTPATIENT
Start: 2023-11-10 | End: 2023-11-11 | Stop reason: HOSPADM

## 2023-11-10 RX ORDER — DIPHENHYDRAMINE HYDROCHLORIDE 50 MG/ML
50 INJECTION INTRAMUSCULAR; INTRAVENOUS
Status: DISCONTINUED | OUTPATIENT
Start: 2023-11-10 | End: 2023-11-11 | Stop reason: HOSPADM

## 2023-11-10 RX ORDER — SODIUM CHLORIDE 9 MG/ML
5-250 INJECTION, SOLUTION INTRAVENOUS PRN
Status: DISCONTINUED | OUTPATIENT
Start: 2023-11-10 | End: 2023-11-11 | Stop reason: HOSPADM

## 2023-11-10 RX ORDER — SODIUM CHLORIDE 0.9 % (FLUSH) 0.9 %
5-40 SYRINGE (ML) INJECTION PRN
Status: ACTIVE | OUTPATIENT
Start: 2023-11-10

## 2023-11-10 RX ORDER — SODIUM CHLORIDE 0.9 % (FLUSH) 0.9 %
5-40 SYRINGE (ML) INJECTION PRN
Status: CANCELLED | OUTPATIENT
Start: 2023-11-10

## 2023-11-10 RX ORDER — SODIUM CHLORIDE 9 MG/ML
5-250 INJECTION, SOLUTION INTRAVENOUS PRN
Status: CANCELLED | OUTPATIENT
Start: 2023-11-10

## 2023-11-10 RX ORDER — ALBUTEROL SULFATE 90 UG/1
4 AEROSOL, METERED RESPIRATORY (INHALATION) PRN
Status: CANCELLED | OUTPATIENT
Start: 2023-11-10

## 2023-11-10 RX ORDER — SODIUM CHLORIDE 0.9 % (FLUSH) 0.9 %
5-40 SYRINGE (ML) INJECTION PRN
Status: DISCONTINUED | OUTPATIENT
Start: 2023-11-10 | End: 2023-11-11 | Stop reason: HOSPADM

## 2023-11-10 RX ORDER — FAMOTIDINE 10 MG/ML
20 INJECTION, SOLUTION INTRAVENOUS
Status: CANCELLED | OUTPATIENT
Start: 2023-11-10

## 2023-11-10 RX ORDER — ONDANSETRON 2 MG/ML
8 INJECTION INTRAMUSCULAR; INTRAVENOUS
Status: DISCONTINUED | OUTPATIENT
Start: 2023-11-10 | End: 2023-11-11 | Stop reason: HOSPADM

## 2023-11-10 RX ORDER — DIPHENHYDRAMINE HYDROCHLORIDE 50 MG/ML
50 INJECTION INTRAMUSCULAR; INTRAVENOUS
Status: CANCELLED | OUTPATIENT
Start: 2023-11-10

## 2023-11-10 RX ORDER — MEPERIDINE HYDROCHLORIDE 50 MG/ML
12.5 INJECTION INTRAMUSCULAR; INTRAVENOUS; SUBCUTANEOUS PRN
Status: CANCELLED | OUTPATIENT
Start: 2023-11-10

## 2023-11-10 RX ORDER — MEPERIDINE HYDROCHLORIDE 25 MG/ML
12.5 INJECTION INTRAMUSCULAR; INTRAVENOUS; SUBCUTANEOUS PRN
Status: DISCONTINUED | OUTPATIENT
Start: 2023-11-10 | End: 2023-11-11 | Stop reason: HOSPADM

## 2023-11-10 RX ORDER — EPINEPHRINE 1 MG/ML
0.3 INJECTION, SOLUTION, CONCENTRATE INTRAVENOUS PRN
Status: DISCONTINUED | OUTPATIENT
Start: 2023-11-10 | End: 2023-11-11 | Stop reason: HOSPADM

## 2023-11-10 RX ORDER — HEPARIN SODIUM (PORCINE) LOCK FLUSH IV SOLN 100 UNIT/ML 100 UNIT/ML
500 SOLUTION INTRAVENOUS PRN
Status: CANCELLED | OUTPATIENT
Start: 2023-11-10

## 2023-11-10 RX ORDER — EPINEPHRINE 1 MG/ML
0.3 INJECTION, SOLUTION, CONCENTRATE INTRAVENOUS PRN
Status: CANCELLED | OUTPATIENT
Start: 2023-11-10

## 2023-11-10 RX ORDER — ACETAMINOPHEN 325 MG/1
650 TABLET ORAL
Status: DISCONTINUED | OUTPATIENT
Start: 2023-11-10 | End: 2023-11-11 | Stop reason: HOSPADM

## 2023-11-10 RX ORDER — ACETAMINOPHEN 325 MG/1
650 TABLET ORAL
Status: CANCELLED | OUTPATIENT
Start: 2023-11-10

## 2023-11-10 RX ORDER — ALBUTEROL SULFATE 90 UG/1
4 AEROSOL, METERED RESPIRATORY (INHALATION) PRN
Status: DISCONTINUED | OUTPATIENT
Start: 2023-11-10 | End: 2023-11-11 | Stop reason: HOSPADM

## 2023-11-10 RX ADMIN — SODIUM CHLORIDE, PRESERVATIVE FREE 10 ML: 5 INJECTION INTRAVENOUS at 10:25

## 2023-11-10 RX ADMIN — SODIUM CHLORIDE 200 ML/HR: 9 INJECTION, SOLUTION INTRAVENOUS at 10:26

## 2023-11-10 RX ADMIN — SODIUM CHLORIDE, PRESERVATIVE FREE 10 ML: 5 INJECTION INTRAVENOUS at 08:13

## 2023-11-10 RX ADMIN — SODIUM CHLORIDE 200 MG: 9 INJECTION, SOLUTION INTRAVENOUS at 10:49

## 2023-11-10 ASSESSMENT — PATIENT HEALTH QUESTIONNAIRE - PHQ9
SUM OF ALL RESPONSES TO PHQ QUESTIONS 1-9: 0
2. FEELING DOWN, DEPRESSED OR HOPELESS: 0
SUM OF ALL RESPONSES TO PHQ QUESTIONS 1-9: 0
1. LITTLE INTEREST OR PLEASURE IN DOING THINGS: 0
SUM OF ALL RESPONSES TO PHQ QUESTIONS 1-9: 0
SUM OF ALL RESPONSES TO PHQ QUESTIONS 1-9: 0
SUM OF ALL RESPONSES TO PHQ9 QUESTIONS 1 & 2: 0

## 2023-11-10 NOTE — PROGRESS NOTES
A referral for spine injection has been ordered.
by mouth daily, Disp: , Rfl:     ammonium lactate (LAC-HYDRIN) 12 % lotion, Apply topically as needed, Disp: , Rfl:     ascorbic acid (VITAMIN C) 500 MG tablet, Take by mouth, Disp: , Rfl:     Camphor-Menthol-Methyl Sal 3.1-6-10 % PTCH, Apply topically, Disp: , Rfl:     diclofenac sodium (VOLTAREN) 1 % GEL, Apply 4 g topically 4 times daily as needed, Disp: , Rfl:     fluticasone (FLONASE) 50 MCG/ACT nasal spray, 2 sprays by Nasal route as needed, Disp: , Rfl:     fluticasone-salmeterol (ADVAIR) 500-50 MCG/ACT AEPB diskus inhaler, Inhale 1 puff into the lungs 2 times daily as needed, Disp: , Rfl:     loratadine (CLARITIN) 10 MG tablet, Take 1 tablet by mouth daily, Disp: , Rfl:     methocarbamol (ROBAXIN) 500 MG tablet, Take 1 tablet by mouth in the morning and at bedtime, Disp: , Rfl:     omeprazole (PRILOSEC) 20 MG delayed release capsule, Take 1 capsule by mouth Daily, Disp: , Rfl:     tiotropium (SPIRIVA RESPIMAT) 2.5 MCG/ACT AERS inhaler, Inhale 2 puffs into the lungs daily, Disp: , Rfl:     trolamine salicylate (ASPER-FLEX) 10 % cream, Apply topically as needed, Disp: , Rfl:     amLODIPine (NORVASC) 5 MG tablet, TAKE ONE TABLET BY MOUTH ONE TIME DAILY (Patient not taking: Reported on 10/31/2023), Disp: 90 tablet, Rfl: 3    apixaban (ELIQUIS) 5 MG TABS tablet, Take 1 tablet by mouth 2 times daily (Patient not taking: Reported on 10/31/2023), Disp: , Rfl:     Past Surgical History:   Procedure Laterality Date    ACHILLES TENDON SURGERY Left 01/2023    CARDIAC CATHETERIZATION  2020    mild nonobstructive    CHOLECYSTECTOMY, LAPAROSCOPIC  2014    Dr. Vincent Collins    COLONOSCOPY  09/15/2022    ESOPHAGOGASTRODUODENOSCOPY  09/15/2022    GASTROCNEMIUS RECESSION Left 01/11/2023    Left Posterior ankle scope and amber SUPINE//PACEMAKER performed by Carmen Herrera MD at [facility]  2014    Dr. Clearence Boot    IR PORT PLACEMENT EQUAL OR GREATER THAN 5 YEARS  7/10/2023    IR PORT PLACEMENT EQUAL OR GREATER THAN 5

## 2023-11-10 NOTE — PROGRESS NOTES
Patient arrived to port lab for port access and lab draw. Port accessed and labs drawn per protocol. Port remains accessed for infusion appointment. Patient discharged from port lab ambulatory with self.

## 2023-11-10 NOTE — PROGRESS NOTES
Arrived to the [address] [place] [address]. Keytruda completed. Patient tolerated well. Any issues or concerns during appointment: none. Patient aware of next infusion appointment on 12/1/2023 at 1:45pm.  Discharged ambulatory with cane.

## 2023-11-10 NOTE — PATIENT INSTRUCTIONS
Patient Instructions from Today's Visit    Reason for Visit:  Follow up - Melanoma    Diagnosis Information:  [email or web address]    Plan:  Labs reviewed - everything has improved, which is great! Proceed to infusion for Keytruda. Proceed with MRI as scheduled. You will need to follow up with PCP/ortho for future refills on your pain medication - we will only be sending a uriel supply until you can get in touch with them. Please call us if you have any questions or concerns before your next visit. Follow Up:  3 weeks with labs   Infusion after office visit for Keytruda     Recent Lab Results:      Treatment Summary has been discussed and given to patient:         -------------------------------------------------------------------------------------------------------------------  Please call our office at [phone] if you have any  of the following symptoms:   Fever of 100.5 or greater  Chills  Shortness of breath  Swelling or pain in one leg    After office hours an answering service is available and will contact a provider for emergencies or if you are experiencing any of the above symptoms. Patient  express an interest in My Chart. My Chart log in information explained on the after visit summary printout at the [address]  desk.

## 2023-11-14 ENCOUNTER — TELEPHONE (OUTPATIENT)
Dept: ORTHOPEDIC SURGERY | Age: 68
End: 2023-11-14

## 2023-11-16 NOTE — TELEPHONE ENCOUNTER
Called patient to schedule injection but informed patient we haven't received authorization yet and I will call him back to schedule once received.

## 2023-11-22 ENCOUNTER — TELEPHONE (OUTPATIENT)
Dept: ORTHOPEDIC SURGERY | Age: 68
End: 2023-11-22

## 2023-11-28 ENCOUNTER — HOSPITAL ENCOUNTER (OUTPATIENT)
Dept: MRI IMAGING | Age: 68
Discharge: HOME | End: 2023-12-01
Payer: MEDICARE

## 2023-11-28 DIAGNOSIS — Z85.820 H/O MALIGNANT MELANOMA: ICD-10-CM

## 2023-11-28 DIAGNOSIS — N18.9 CHRONIC KIDNEY DISEASE, UNSPECIFIED CKD STAGE: ICD-10-CM

## 2023-11-28 DIAGNOSIS — R74.8 ELEVATED LIPASE: ICD-10-CM

## 2023-11-28 DIAGNOSIS — E78.00 HYPERCHOLESTEROLEMIA: ICD-10-CM

## 2023-11-28 DIAGNOSIS — R10.9 ABDOMINAL PAIN, UNSPECIFIED ABDOMINAL LOCATION: ICD-10-CM

## 2023-11-28 DIAGNOSIS — D50.9 IRON DEFICIENCY ANEMIA, UNSPECIFIED IRON DEFICIENCY ANEMIA TYPE: ICD-10-CM

## 2023-11-28 DIAGNOSIS — K57.90 DIVERTICULOSIS: ICD-10-CM

## 2023-11-28 PROCEDURE — 74181 MRI ABDOMEN W/O CONTRAST: CPT

## 2023-11-29 ENCOUNTER — OFFICE VISIT (OUTPATIENT)
Dept: ORTHOPEDIC SURGERY | Age: 68
End: 2023-11-29

## 2023-11-29 DIAGNOSIS — M54.17 LUMBOSACRAL RADICULOPATHY: Primary | ICD-10-CM

## 2023-11-29 RX ORDER — ATORVASTATIN CALCIUM 10 MG/1
10 TABLET, FILM COATED ORAL DAILY
Qty: 30 TABLET | Refills: 1 | Status: SHIPPED | OUTPATIENT
Start: 2023-11-29

## 2023-11-29 RX ORDER — TRIAMCINOLONE ACETONIDE 40 MG/ML
100 INJECTION, SUSPENSION INTRA-ARTICULAR; INTRAMUSCULAR ONCE
Status: COMPLETED | OUTPATIENT
Start: 2023-11-29 | End: 2023-11-29

## 2023-11-29 RX ADMIN — TRIAMCINOLONE ACETONIDE 100 MG: 40 INJECTION, SUSPENSION INTRA-ARTICULAR; INTRAMUSCULAR at 09:16

## 2023-11-29 NOTE — PROGRESS NOTES
Date: 11/29/23   Name: Reid Stauffer    Pre-Procedural Diagnosis:    Diagnosis Orders   1. Lumbosacral radiculopathy  XR INJ SPINE THER SUBST LUM/SAC W IMG          Procedure: Lumbar Epidural Steroid Injection (DAQUAN)    Precautions: [facility] Precautions spine injections: None. Patient denies any prior sensitivity to steroid, local anesthetic, contrast dye, iodine or shellfish. The procedure was discussed at length with the patient and informed consent was signed. The patient was placed in a prone position on the fluoroscopy table and the skin was prepped and draped in a routine sterile fashion. The area to be injected was anesthetized with approximately 5 cc of 1% Lidocaine. Initially a 22-gauge 3.5 inch spinal needle was carefully advanced under fluoroscopic guidance to the left L4-L5 interlaminar epidural space. At this time 0.25 cc of omnipaque administered. . Once proper placement was confirmed, 1.5 cc of sterile water and 100 mg of Kenalog were injected through the spinal needle. Fluoroscopic guidance was used intermittently over a 10-minute period to insure proper needle placement and patient safety. A hard copy of the fluoroscopic  images has been placed in the patient's chart. The patient was monitored after the procedure and discharged home without complication. A total of 2.5 cc of Kenalog were administered during this procedure. Resume Meds:  N/A  Pt was on Eliquis and has been told to stay off of it now.     Gregor Arias MD  11/29/23

## 2023-11-30 ENCOUNTER — TELEPHONE (OUTPATIENT)
Dept: ENDOCRINOLOGY | Age: 68
End: 2023-11-30

## 2023-11-30 DIAGNOSIS — R68.89 OTHER GENERAL SYMPTOMS AND SIGNS: ICD-10-CM

## 2023-11-30 DIAGNOSIS — Z79.4 TYPE 2 DIABETES MELLITUS WITH HYPERGLYCEMIA, WITH LONG-TERM CURRENT USE OF INSULIN (HCC): ICD-10-CM

## 2023-11-30 DIAGNOSIS — C43.9 MALIGNANT MELANOMA, UNSPECIFIED SITE (HCC): Primary | ICD-10-CM

## 2023-11-30 DIAGNOSIS — E11.65 TYPE 2 DIABETES MELLITUS WITH HYPERGLYCEMIA, WITH LONG-TERM CURRENT USE OF INSULIN (HCC): ICD-10-CM

## 2023-12-01 ENCOUNTER — OFFICE VISIT (OUTPATIENT)
Dept: ONCOLOGY | Age: 68
End: 2023-12-01
Payer: MEDICARE

## 2023-12-01 ENCOUNTER — HOSPITAL ENCOUNTER (OUTPATIENT)
Dept: LAB | Age: 68
End: 2023-12-01
Payer: OTHER GOVERNMENT

## 2023-12-01 ENCOUNTER — HOSPITAL ENCOUNTER (OUTPATIENT)
Dept: INFUSION THERAPY | Age: 68
Discharge: HOME | End: 2023-12-01
Payer: OTHER GOVERNMENT

## 2023-12-01 VITALS
DIASTOLIC BLOOD PRESSURE: 80 MMHG | RESPIRATION RATE: 18 BRPM | SYSTOLIC BLOOD PRESSURE: 132 MMHG | TEMPERATURE: 98.1 F | HEART RATE: 71 BPM | OXYGEN SATURATION: 98 % | WEIGHT: 315 LBS | BODY MASS INDEX: 45.1 KG/M2 | HEIGHT: 70 IN

## 2023-12-01 DIAGNOSIS — C43.9 MALIGNANT MELANOMA, UNSPECIFIED SITE (HCC): ICD-10-CM

## 2023-12-01 DIAGNOSIS — C43.9 MALIGNANT MELANOMA, UNSPECIFIED SITE (HCC): Primary | ICD-10-CM

## 2023-12-01 DIAGNOSIS — Z79.899 HIGH RISK MEDICATION USE: ICD-10-CM

## 2023-12-01 DIAGNOSIS — R53.82 CHRONIC FATIGUE: ICD-10-CM

## 2023-12-01 DIAGNOSIS — R68.89 OTHER GENERAL SYMPTOMS AND SIGNS: ICD-10-CM

## 2023-12-01 LAB
ALBUMIN SERPL-MCNC: 3.3 G/DL (ref 3.2–4.6)
ALBUMIN/GLOB SERPL: 0.8 (ref 0.4–1.6)
ALP SERPL-CCNC: 117 U/L (ref 50–136)
ALT SERPL-CCNC: 23 U/L (ref 12–65)
ANION GAP SERPL CALC-SCNC: 7 MMOL/L (ref 2–11)
AST SERPL-CCNC: 45 U/L (ref 15–37)
BASOPHILS # BLD: 0 K/UL (ref 0–0.2)
BASOPHILS NFR BLD: 0 % (ref 0–2)
BILIRUB SERPL-MCNC: 0.5 MG/DL (ref 0.2–1.1)
BUN SERPL-MCNC: 36 MG/DL (ref 8–23)
CALCIUM SERPL-MCNC: 8.7 MG/DL (ref 8.3–10.4)
CHLORIDE SERPL-SCNC: 106 MMOL/L (ref 101–110)
CO2 SERPL-SCNC: 24 MMOL/L (ref 21–32)
CREAT SERPL-MCNC: 1.7 MG/DL (ref 0.8–1.5)
DIFFERENTIAL METHOD BLD: ABNORMAL
EOSINOPHIL # BLD: 0 K/UL (ref 0–0.8)
EOSINOPHIL NFR BLD: 0 % (ref 0.5–7.8)
ERYTHROCYTE [DISTWIDTH] IN BLOOD BY AUTOMATED COUNT: 14.3 % (ref 11.9–14.6)
GLOBULIN SER CALC-MCNC: 3.9 G/DL (ref 2.8–4.5)
GLUCOSE SERPL-MCNC: 182 MG/DL (ref 65–100)
HCT VFR BLD AUTO: 37.1 % (ref 41.1–50.3)
HGB BLD-MCNC: 12.3 G/DL (ref 13.6–17.2)
IMM GRANULOCYTES # BLD AUTO: 0 K/UL (ref 0–0.5)
IMM GRANULOCYTES NFR BLD AUTO: 0 % (ref 0–5)
LYMPHOCYTES # BLD: 1.5 K/UL (ref 0.5–4.6)
LYMPHOCYTES NFR BLD: 13 % (ref 13–44)
MCH RBC QN AUTO: 32.5 PG (ref 26.1–32.9)
MCHC RBC AUTO-ENTMCNC: 33.2 G/DL (ref 31.4–35)
MCV RBC AUTO: 97.9 FL (ref 82–102)
MONOCYTES # BLD: 0.7 K/UL (ref 0.1–1.3)
MONOCYTES NFR BLD: 7 % (ref 4–12)
NEUTS SEG # BLD: 8.9 K/UL (ref 1.7–8.2)
NEUTS SEG NFR BLD: 80 % (ref 43–78)
NRBC # BLD: 0 K/UL (ref 0–0.2)
PLATELET # BLD AUTO: 234 K/UL (ref 150–450)
PMV BLD AUTO: 10.5 FL (ref 9.4–12.3)
POTASSIUM SERPL-SCNC: 5.6 MMOL/L (ref 3.5–5.1)
PROT SERPL-MCNC: 7.2 G/DL (ref 6.3–8.2)
RBC # BLD AUTO: 3.79 M/UL (ref 4.23–5.6)
SODIUM SERPL-SCNC: 137 MMOL/L (ref 133–143)
TSH, 3RD GENERATION: 0.93 UIU/ML (ref 0.36–3)
WBC # BLD AUTO: 11.2 K/UL (ref 4.3–11.1)

## 2023-12-01 PROCEDURE — 80053 COMPREHEN METABOLIC PANEL: CPT

## 2023-12-01 PROCEDURE — 96413 CHEMO IV INFUSION 1 HR: CPT

## 2023-12-01 PROCEDURE — 99214 OFFICE O/P EST MOD 30 MIN: CPT | Performed by: NURSE PRACTITIONER

## 2023-12-01 PROCEDURE — 84443 ASSAY THYROID STIM HORMONE: CPT

## 2023-12-01 PROCEDURE — 6360000002 HC RX W HCPCS: Performed by: INTERNAL MEDICINE

## 2023-12-01 PROCEDURE — 2580000003 HC RX 258: Performed by: NURSE PRACTITIONER

## 2023-12-01 PROCEDURE — 85025 COMPLETE CBC W/AUTO DIFF WBC: CPT

## 2023-12-01 PROCEDURE — 3079F DIAST BP 80-89 MM HG: CPT | Performed by: NURSE PRACTITIONER

## 2023-12-01 PROCEDURE — 6360000002 HC RX W HCPCS: Performed by: NURSE PRACTITIONER

## 2023-12-01 PROCEDURE — 1123F ACP DISCUSS/DSCN MKR DOCD: CPT | Performed by: NURSE PRACTITIONER

## 2023-12-01 PROCEDURE — 3075F SYST BP GE 130 - 139MM HG: CPT | Performed by: NURSE PRACTITIONER

## 2023-12-01 PROCEDURE — 2580000003 HC RX 258: Performed by: INTERNAL MEDICINE

## 2023-12-01 RX ORDER — MEPERIDINE HYDROCHLORIDE 25 MG/ML
12.5 INJECTION INTRAMUSCULAR; INTRAVENOUS; SUBCUTANEOUS PRN
Status: DISCONTINUED | OUTPATIENT
Start: 2023-12-01 | End: 2023-12-02 | Stop reason: HOSPADM

## 2023-12-01 RX ORDER — SODIUM CHLORIDE 9 MG/ML
5-250 INJECTION, SOLUTION INTRAVENOUS PRN
OUTPATIENT
Start: 2023-12-01

## 2023-12-01 RX ORDER — ONDANSETRON 2 MG/ML
8 INJECTION INTRAMUSCULAR; INTRAVENOUS
OUTPATIENT
Start: 2023-12-01

## 2023-12-01 RX ORDER — ALBUTEROL SULFATE 90 UG/1
4 AEROSOL, METERED RESPIRATORY (INHALATION) PRN
Status: DISCONTINUED | OUTPATIENT
Start: 2023-12-01 | End: 2023-12-02 | Stop reason: HOSPADM

## 2023-12-01 RX ORDER — SODIUM CHLORIDE 0.9 % (FLUSH) 0.9 %
5-40 SYRINGE (ML) INJECTION PRN
Status: CANCELLED | OUTPATIENT
Start: 2023-12-01

## 2023-12-01 RX ORDER — SODIUM CHLORIDE 9 MG/ML
5-250 INJECTION, SOLUTION INTRAVENOUS PRN
Status: CANCELLED | OUTPATIENT
Start: 2023-12-01

## 2023-12-01 RX ORDER — HEPARIN SODIUM (PORCINE) LOCK FLUSH IV SOLN 100 UNIT/ML 100 UNIT/ML
500 SOLUTION INTRAVENOUS PRN
Status: CANCELLED | OUTPATIENT
Start: 2023-12-01

## 2023-12-01 RX ORDER — HEPARIN 100 UNIT/ML
500 SYRINGE INTRAVENOUS PRN
Status: DISCONTINUED | OUTPATIENT
Start: 2023-12-01 | End: 2023-12-05 | Stop reason: HOSPADM

## 2023-12-01 RX ORDER — MEPERIDINE HYDROCHLORIDE 50 MG/ML
12.5 INJECTION INTRAMUSCULAR; INTRAVENOUS; SUBCUTANEOUS PRN
Status: CANCELLED | OUTPATIENT
Start: 2023-12-01

## 2023-12-01 RX ORDER — EPINEPHRINE 1 MG/ML
0.3 INJECTION, SOLUTION, CONCENTRATE INTRAVENOUS PRN
Status: DISCONTINUED | OUTPATIENT
Start: 2023-12-01 | End: 2023-12-02 | Stop reason: HOSPADM

## 2023-12-01 RX ORDER — SODIUM CHLORIDE 0.9 % (FLUSH) 0.9 %
5-40 SYRINGE (ML) INJECTION PRN
Status: DISCONTINUED | OUTPATIENT
Start: 2023-12-01 | End: 2023-12-02 | Stop reason: HOSPADM

## 2023-12-01 RX ORDER — ACETAMINOPHEN 325 MG/1
650 TABLET ORAL
Status: CANCELLED | OUTPATIENT
Start: 2023-12-01

## 2023-12-01 RX ORDER — ALBUTEROL SULFATE 90 UG/1
4 AEROSOL, METERED RESPIRATORY (INHALATION) PRN
Status: CANCELLED | OUTPATIENT
Start: 2023-12-01

## 2023-12-01 RX ORDER — SODIUM CHLORIDE 9 MG/ML
INJECTION, SOLUTION INTRAVENOUS CONTINUOUS
OUTPATIENT
Start: 2023-12-01

## 2023-12-01 RX ORDER — DIPHENHYDRAMINE HYDROCHLORIDE 50 MG/ML
50 INJECTION INTRAMUSCULAR; INTRAVENOUS
Status: CANCELLED | OUTPATIENT
Start: 2023-12-01

## 2023-12-01 RX ORDER — HEPARIN 100 UNIT/ML
500 SYRINGE INTRAVENOUS PRN
OUTPATIENT
Start: 2023-12-01

## 2023-12-01 RX ORDER — DIPHENHYDRAMINE HYDROCHLORIDE 50 MG/ML
50 INJECTION INTRAMUSCULAR; INTRAVENOUS
Status: DISCONTINUED | OUTPATIENT
Start: 2023-12-01 | End: 2023-12-02 | Stop reason: HOSPADM

## 2023-12-01 RX ORDER — FAMOTIDINE 10 MG/ML
20 INJECTION, SOLUTION INTRAVENOUS
Status: CANCELLED | OUTPATIENT
Start: 2023-12-01

## 2023-12-01 RX ORDER — ONDANSETRON 2 MG/ML
8 INJECTION INTRAMUSCULAR; INTRAVENOUS
Status: CANCELLED | OUTPATIENT
Start: 2023-12-01

## 2023-12-01 RX ORDER — SODIUM CHLORIDE 0.9 % (FLUSH) 0.9 %
5-40 SYRINGE (ML) INJECTION PRN
Status: DISCONTINUED | OUTPATIENT
Start: 2023-12-01 | End: 2023-12-05 | Stop reason: HOSPADM

## 2023-12-01 RX ORDER — SODIUM CHLORIDE 9 MG/ML
5-250 INJECTION, SOLUTION INTRAVENOUS PRN
Status: DISCONTINUED | OUTPATIENT
Start: 2023-12-01 | End: 2023-12-02 | Stop reason: HOSPADM

## 2023-12-01 RX ORDER — ACETAMINOPHEN 325 MG/1
650 TABLET ORAL
Status: DISCONTINUED | OUTPATIENT
Start: 2023-12-01 | End: 2023-12-02 | Stop reason: HOSPADM

## 2023-12-01 RX ORDER — SODIUM CHLORIDE 9 MG/ML
INJECTION, SOLUTION INTRAVENOUS CONTINUOUS
Status: CANCELLED | OUTPATIENT
Start: 2023-12-01

## 2023-12-01 RX ORDER — ONDANSETRON 2 MG/ML
8 INJECTION INTRAMUSCULAR; INTRAVENOUS
Status: DISCONTINUED | OUTPATIENT
Start: 2023-12-01 | End: 2023-12-02 | Stop reason: HOSPADM

## 2023-12-01 RX ORDER — EPINEPHRINE 1 MG/ML
0.3 INJECTION, SOLUTION, CONCENTRATE INTRAVENOUS PRN
Status: CANCELLED | OUTPATIENT
Start: 2023-12-01

## 2023-12-01 RX ADMIN — SODIUM CHLORIDE, PRESERVATIVE FREE 10 ML: 5 INJECTION INTRAVENOUS at 13:50

## 2023-12-01 RX ADMIN — HEPARIN 500 UNITS: 100 SYRINGE at 11:35

## 2023-12-01 RX ADMIN — SODIUM CHLORIDE, PRESERVATIVE FREE 40 ML: 5 INJECTION INTRAVENOUS at 11:34

## 2023-12-01 RX ADMIN — SODIUM CHLORIDE 200 MG: 9 INJECTION, SOLUTION INTRAVENOUS at 14:11

## 2023-12-01 RX ADMIN — SODIUM CHLORIDE 50 ML/HR: 9 INJECTION, SOLUTION INTRAVENOUS at 14:00

## 2023-12-01 ASSESSMENT — PATIENT HEALTH QUESTIONNAIRE - PHQ9
SUM OF ALL RESPONSES TO PHQ QUESTIONS 1-9: 0
1. LITTLE INTEREST OR PLEASURE IN DOING THINGS: 0
2. FEELING DOWN, DEPRESSED OR HOPELESS: 0
SUM OF ALL RESPONSES TO PHQ9 QUESTIONS 1 & 2: 0
SUM OF ALL RESPONSES TO PHQ QUESTIONS 1-9: 0

## 2023-12-01 NOTE — PROGRESS NOTES
Arrived to the [address] [place] [address]. [facility]. keytruda completed. Patient tolerated well. Patient aware of next infusion appointment on 12/22/2023 (date) at 80 (time). Patient aware of next lab and [facility] office visit on 12/22/2023 (date) at 0930 (time). Patient instructed to call provider with temperature of 100.4 or greater or nausea/vomiting/ diarrhea or pain not controlled by medications  Discharged ambulatory.

## 2023-12-22 ENCOUNTER — HOSPITAL ENCOUNTER (OUTPATIENT)
Dept: LAB | Age: 68
Discharge: HOME | End: 2023-12-25
Payer: OTHER GOVERNMENT

## 2023-12-22 ENCOUNTER — HOSPITAL ENCOUNTER (OUTPATIENT)
Dept: INFUSION THERAPY | Age: 68
Discharge: HOME | End: 2023-12-22
Payer: MEDICARE

## 2023-12-22 DIAGNOSIS — R53.82 CHRONIC FATIGUE: ICD-10-CM

## 2023-12-22 DIAGNOSIS — C43.9 MALIGNANT MELANOMA, UNSPECIFIED SITE (HCC): Primary | ICD-10-CM

## 2023-12-22 DIAGNOSIS — C43.9 MALIGNANT MELANOMA, UNSPECIFIED SITE (HCC): ICD-10-CM

## 2023-12-22 LAB
ALBUMIN SERPL-MCNC: 3 G/DL (ref 3.2–4.6)
ALBUMIN/GLOB SERPL: 0.7 (ref 0.4–1.6)
ALP SERPL-CCNC: 125 U/L (ref 50–136)
ALT SERPL-CCNC: 26 U/L (ref 12–65)
ANION GAP SERPL CALC-SCNC: 7 MMOL/L (ref 2–11)
AST SERPL-CCNC: 18 U/L (ref 15–37)
BASOPHILS # BLD: 0.1 K/UL (ref 0–0.2)
BASOPHILS NFR BLD: 1 % (ref 0–2)
BILIRUB SERPL-MCNC: 0.5 MG/DL (ref 0.2–1.1)
BUN SERPL-MCNC: 29 MG/DL (ref 8–23)
CALCIUM SERPL-MCNC: 8.6 MG/DL (ref 8.3–10.4)
CHLORIDE SERPL-SCNC: 105 MMOL/L (ref 103–113)
CO2 SERPL-SCNC: 27 MMOL/L (ref 21–32)
CREAT SERPL-MCNC: 1.6 MG/DL (ref 0.8–1.5)
DIFFERENTIAL METHOD BLD: ABNORMAL
EOSINOPHIL # BLD: 0.4 K/UL (ref 0–0.8)
EOSINOPHIL NFR BLD: 4 % (ref 0.5–7.8)
ERYTHROCYTE [DISTWIDTH] IN BLOOD BY AUTOMATED COUNT: 13.1 % (ref 11.9–14.6)
GLOBULIN SER CALC-MCNC: 4.3 G/DL (ref 2.8–4.5)
GLUCOSE SERPL-MCNC: 173 MG/DL (ref 65–100)
HCT VFR BLD AUTO: 41.6 % (ref 41.1–50.3)
HGB BLD-MCNC: 13.4 G/DL (ref 13.6–17.2)
IMM GRANULOCYTES # BLD AUTO: 0 K/UL (ref 0–0.5)
IMM GRANULOCYTES NFR BLD AUTO: 0 % (ref 0–5)
LYMPHOCYTES # BLD: 1.6 K/UL (ref 0.5–4.6)
LYMPHOCYTES NFR BLD: 16 % (ref 13–44)
MCH RBC QN AUTO: 31.5 PG (ref 26.1–32.9)
MCHC RBC AUTO-ENTMCNC: 32.2 G/DL (ref 31.4–35)
MCV RBC AUTO: 97.7 FL (ref 82–102)
MONOCYTES # BLD: 0.6 K/UL (ref 0.1–1.3)
MONOCYTES NFR BLD: 6 % (ref 4–12)
NEUTS SEG # BLD: 7.3 K/UL (ref 1.7–8.2)
NEUTS SEG NFR BLD: 73 % (ref 43–78)
NRBC # BLD: 0 K/UL (ref 0–0.2)
PLATELET # BLD AUTO: 150 K/UL (ref 150–450)
PMV BLD AUTO: 10.6 FL (ref 9.4–12.3)
POTASSIUM SERPL-SCNC: 3.7 MMOL/L (ref 3.5–5.1)
PROT SERPL-MCNC: 7.3 G/DL (ref 6.3–8.2)
RBC # BLD AUTO: 4.26 M/UL (ref 4.23–5.6)
SODIUM SERPL-SCNC: 139 MMOL/L (ref 136–146)
TSH, 3RD GENERATION: 2.44 UIU/ML (ref 0.36–3.74)
WBC # BLD AUTO: 9.9 K/UL (ref 4.3–11.1)

## 2023-12-22 PROCEDURE — 84443 ASSAY THYROID STIM HORMONE: CPT

## 2023-12-22 PROCEDURE — 80053 COMPREHEN METABOLIC PANEL: CPT

## 2023-12-22 PROCEDURE — 6360000002 HC RX W HCPCS: Performed by: NURSE PRACTITIONER

## 2023-12-22 PROCEDURE — 96413 CHEMO IV INFUSION 1 HR: CPT

## 2023-12-22 PROCEDURE — 2580000003 HC RX 258: Performed by: INTERNAL MEDICINE

## 2023-12-22 PROCEDURE — 85025 COMPLETE CBC W/AUTO DIFF WBC: CPT

## 2023-12-22 PROCEDURE — 2580000003 HC RX 258: Performed by: NURSE PRACTITIONER

## 2023-12-22 RX ORDER — SODIUM CHLORIDE 0.9 % (FLUSH) 0.9 %
5-40 SYRINGE (ML) INJECTION 2 TIMES DAILY
Status: DISCONTINUED | OUTPATIENT
Start: 2023-12-22 | End: 2023-12-26 | Stop reason: HOSPADM

## 2023-12-22 RX ORDER — ALBUTEROL SULFATE 90 UG/1
4 AEROSOL, METERED RESPIRATORY (INHALATION) PRN
Status: DISCONTINUED | OUTPATIENT
Start: 2023-12-22 | End: 2023-12-23 | Stop reason: HOSPADM

## 2023-12-22 RX ORDER — ONDANSETRON 2 MG/ML
8 INJECTION INTRAMUSCULAR; INTRAVENOUS
Status: DISCONTINUED | OUTPATIENT
Start: 2023-12-22 | End: 2023-12-23 | Stop reason: HOSPADM

## 2023-12-22 RX ORDER — SODIUM CHLORIDE 9 MG/ML
INJECTION, SOLUTION INTRAVENOUS CONTINUOUS
Status: DISCONTINUED | OUTPATIENT
Start: 2023-12-22 | End: 2023-12-23 | Stop reason: HOSPADM

## 2023-12-22 RX ORDER — MEPERIDINE HYDROCHLORIDE 25 MG/ML
12.5 INJECTION INTRAMUSCULAR; INTRAVENOUS; SUBCUTANEOUS PRN
Status: DISCONTINUED | OUTPATIENT
Start: 2023-12-22 | End: 2023-12-23 | Stop reason: HOSPADM

## 2023-12-22 RX ORDER — DIPHENHYDRAMINE HYDROCHLORIDE 50 MG/ML
50 INJECTION INTRAMUSCULAR; INTRAVENOUS
Status: DISCONTINUED | OUTPATIENT
Start: 2023-12-22 | End: 2023-12-23 | Stop reason: HOSPADM

## 2023-12-22 RX ORDER — EPINEPHRINE 1 MG/ML
0.3 INJECTION, SOLUTION, CONCENTRATE INTRAVENOUS PRN
Status: DISCONTINUED | OUTPATIENT
Start: 2023-12-22 | End: 2023-12-23 | Stop reason: HOSPADM

## 2023-12-22 RX ORDER — SODIUM CHLORIDE 9 MG/ML
5-250 INJECTION, SOLUTION INTRAVENOUS PRN
Status: DISCONTINUED | OUTPATIENT
Start: 2023-12-22 | End: 2023-12-23 | Stop reason: HOSPADM

## 2023-12-22 RX ORDER — SODIUM CHLORIDE 0.9 % (FLUSH) 0.9 %
5-40 SYRINGE (ML) INJECTION PRN
Status: DISCONTINUED | OUTPATIENT
Start: 2023-12-22 | End: 2023-12-23 | Stop reason: HOSPADM

## 2023-12-22 RX ORDER — ACETAMINOPHEN 325 MG/1
650 TABLET ORAL
Status: DISCONTINUED | OUTPATIENT
Start: 2023-12-22 | End: 2023-12-23 | Stop reason: HOSPADM

## 2023-12-22 RX ADMIN — SODIUM CHLORIDE 200 MG: 9 INJECTION, SOLUTION INTRAVENOUS at 10:56

## 2023-12-22 RX ADMIN — SODIUM CHLORIDE, PRESERVATIVE FREE 10 ML: 5 INJECTION INTRAVENOUS at 10:30

## 2023-12-22 RX ADMIN — SODIUM CHLORIDE, PRESERVATIVE FREE 10 ML: 5 INJECTION INTRAVENOUS at 09:20

## 2023-12-22 RX ADMIN — SODIUM CHLORIDE 100 ML/HR: 9 INJECTION, SOLUTION INTRAVENOUS at 10:34

## 2023-12-22 NOTE — PROGRESS NOTES
Ar[facility] to the [facility]. Labs reviewed and Keytruda completed. Patient tolerated well. Any issues or concerns during appointment: none. Patient aware of next infusion appointment on 1/12/2024 (date) at 200 (time). Patient aware of next lab and [facility] office visit on 1/12/2024 (date) at [phone] (time). Patient instructed to call provider with temperature of 100.4 or greater or nausea/vomiting/ diarrhea or pain not controlled by medications  Discharged ambulatory.

## 2023-12-22 NOTE — PROGRESS NOTES
Patient arrived to port lab for port access and lab draw   [address] accessed and labs drawn per protocol   *Port remains accessed. Patient discharged from port lab ambulatory.

## 2024-01-03 ENCOUNTER — OFFICE VISIT (OUTPATIENT)
Dept: ORTHOPEDIC SURGERY | Age: 69
End: 2024-01-03
Payer: MEDICARE

## 2024-01-03 DIAGNOSIS — M48.062 LUMBAR STENOSIS WITH NEUROGENIC CLAUDICATION: Primary | ICD-10-CM

## 2024-01-03 DIAGNOSIS — M47.816 FACET ARTHROPATHY, LUMBAR: ICD-10-CM

## 2024-01-03 DIAGNOSIS — M51.36 DDD (DEGENERATIVE DISC DISEASE), LUMBAR: ICD-10-CM

## 2024-01-03 DIAGNOSIS — M54.17 LUMBOSACRAL RADICULOPATHY: ICD-10-CM

## 2024-01-03 PROCEDURE — 99213 OFFICE O/P EST LOW 20 MIN: CPT | Performed by: PHYSICIAN ASSISTANT

## 2024-01-03 PROCEDURE — 1123F ACP DISCUSS/DSCN MKR DOCD: CPT | Performed by: PHYSICIAN ASSISTANT

## 2024-01-03 NOTE — PROGRESS NOTES
affected area up to 4 times/day as needed for pain, Disp: 45 g, Rfl: 2    colchicine (COLCRYS) 0.6 MG tablet, Take 1 tablet by mouth daily, Disp: 30 tablet, Rfl: 1    albuterol sulfate  (90 Base) MCG/ACT inhaler, Inhale 2 puffs into the lungs every 4 hours as needed for Wheezing or Shortness of Breath, Disp: , Rfl:     albuterol (PROVENTIL) (2.5 MG/3ML) 0.083% nebulizer solution, Inhale 3 mLs into the lungs every 6 hours as needed, Disp: , Rfl:     allopurinol (ZYLOPRIM) 300 MG tablet, Take 1 tablet by mouth daily, Disp: , Rfl:     ammonium lactate (LAC-HYDRIN) 12 % lotion, Apply topically as needed, Disp: , Rfl:     apixaban (ELIQUIS) 5 MG TABS tablet, Take 1 tablet by mouth 2 times daily (Patient not taking: Reported on 10/31/2023), Disp: , Rfl:     ascorbic acid (VITAMIN C) 500 MG tablet, Take by mouth, Disp: , Rfl:     Camphor-Menthol-Methyl Sal 3.1-6-10 % PTCH, Apply topically, Disp: , Rfl:     diclofenac sodium (VOLTAREN) 1 % GEL, Apply 4 g topically 4 times daily as needed, Disp: , Rfl:     fluticasone (FLONASE) 50 MCG/ACT nasal spray, 2 sprays by Nasal route as needed, Disp: , Rfl:     fluticasone-salmeterol (ADVAIR) 500-50 MCG/ACT AEPB diskus inhaler, Inhale 1 puff into the lungs 2 times daily as needed, Disp: , Rfl:     loratadine (CLARITIN) 10 MG tablet, Take 1 tablet by mouth daily, Disp: , Rfl:     methocarbamol (ROBAXIN) 500 MG tablet, Take 1 tablet by mouth in the morning and at bedtime, Disp: , Rfl:     omeprazole (PRILOSEC) 20 MG delayed release capsule, Take 1 capsule by mouth Daily, Disp: , Rfl:     tiotropium (SPIRIVA RESPIMAT) 2.5 MCG/ACT AERS inhaler, Inhale 2 puffs into the lungs daily, Disp: , Rfl:     trolamine salicylate (ASPER-FLEX) 10 % cream, Apply topically as needed, Disp: , Rfl:     Past Surgical History:   Procedure Laterality Date    ACHILLES TENDON SURGERY Left 01/2023    CARDIAC CATHETERIZATION  2020    mild nonobstructive    CHOLECYSTECTOMY, LAPAROSCOPIC  2014

## 2024-01-03 NOTE — PATIENT INSTRUCTIONS
further with less pain.      Are there other conditions that can cause similar symptoms?  The condition which most closely mimics spinal stenosis is poor blood circulation to the legs (vascular claudication).   Other conditions such as diabetic neuropathy and hip or knee arthritis also have very similar symptoms to spinal stenosis.     What is the prognosis?  The natural history of degenerative spinal stenosis is usually a slow progression, gradually reducing the ability to stand or walk for extended periods.      What treatments are available?  The use of anti-inflammatory medications may give partial relief of symptoms.  Physical therapy is often prescribed initially, which may improve lumbar range of motion and strength.  Chiropractic care may help ease early symptoms in some patients.  A series of steroid injections into the spine may calm the inflammation of the nerves and give temporary relief of the buttock and leg symptoms.  Though, these treatments may help the patient cope with the symptoms for several years, they have little effect on the natural history of the disease which is slow progression.    When should I consider consulting a spine surgeon?  When you are no longer able to tolerate the symptoms or it is interfering with your everyday activities despite the use of conservative treatments, you may be a good candidate for a decompression type of spine surgery. The procedure typically performed is called a laminectomy.  Some patients may require a fusion in addition to the laminectomy if there is too much joint laxity from the arthritic changes in the spine.   A decompression operation for spinal stenosis is about 80% effective in reducing your buttock and leg symptoms, including your ability to stand and walk.  Though there may be some reduction in low back pain, a laminectomy is much less predictable for the treatment of isolated back pain without symptoms in the buttocks or legs.      Orthopedic and

## 2024-01-08 ENCOUNTER — OFFICE VISIT (OUTPATIENT)
Age: 69
End: 2024-01-08
Payer: MEDICARE

## 2024-01-08 VITALS
HEIGHT: 70 IN | BODY MASS INDEX: 45.1 KG/M2 | DIASTOLIC BLOOD PRESSURE: 72 MMHG | HEART RATE: 76 BPM | SYSTOLIC BLOOD PRESSURE: 138 MMHG | WEIGHT: 315 LBS

## 2024-01-08 DIAGNOSIS — I48.11 LONGSTANDING PERSISTENT ATRIAL FIBRILLATION (HCC): Chronic | ICD-10-CM

## 2024-01-08 DIAGNOSIS — I50.22 CHRONIC SYSTOLIC (CONGESTIVE) HEART FAILURE (HCC): Primary | Chronic | ICD-10-CM

## 2024-01-08 DIAGNOSIS — I27.20 PULMONARY HYPERTENSION (HCC): Chronic | ICD-10-CM

## 2024-01-08 DIAGNOSIS — I10 BENIGN ESSENTIAL HYPERTENSION: Chronic | ICD-10-CM

## 2024-01-08 DIAGNOSIS — E78.00 HYPERCHOLESTEROLEMIA: Chronic | ICD-10-CM

## 2024-01-08 DIAGNOSIS — Z95.0 PRESENCE OF CARDIAC PACEMAKER: ICD-10-CM

## 2024-01-08 DIAGNOSIS — I47.29 NSVT (NONSUSTAINED VENTRICULAR TACHYCARDIA) (HCC): ICD-10-CM

## 2024-01-08 PROCEDURE — 99214 OFFICE O/P EST MOD 30 MIN: CPT | Performed by: INTERNAL MEDICINE

## 2024-01-08 PROCEDURE — 3078F DIAST BP <80 MM HG: CPT | Performed by: INTERNAL MEDICINE

## 2024-01-08 PROCEDURE — 1123F ACP DISCUSS/DSCN MKR DOCD: CPT | Performed by: INTERNAL MEDICINE

## 2024-01-08 PROCEDURE — 3075F SYST BP GE 130 - 139MM HG: CPT | Performed by: INTERNAL MEDICINE

## 2024-01-08 NOTE — PROGRESS NOTES
[address] [facility], [place] [address]  PHONE: [phone]     24    NAME:  Steve Mcgovern  : 1955  MRN: [id number]       SUBJECTIVE:   Steve Mcgovern is a 68 y.o. male seen for a follow up visit regarding the following:     Chief Complaint   Patient presents with    Congestive Heart Failure     HPI:   Longstanding persistent Afib s/p PM and AVN ablation.   [facility] PPM .  JUAN JOSE on CPAP.     LHC 2020: mild CAD, EDP 20      2020 admission for COVID, better now.     Echo 10/2022: normal EF, LVH, normal RVSP     Has melanoma, getting treatment via port.  More tired on treatment.   Energy better off the norvasc, off AC now as well.  Hb better.   NO CP, PATEL ok.   No more bleeding issues, anemia better now.    Patient denies recent history of orthopnea, PND, excessive dizziness and/or syncope.              Past Medical History, Past Surgical History, Family history, Social History, and Medications were all reviewed with the patient today and updated as necessary.     Current Outpatient Medications   Medication Sig Dispense Refill    metFORMIN (GLUCOPHAGE) 500 MG tablet Take 1 tablet by mouth 2 times daily (with meals) 180 tablet 3    atorvastatin (LIPITOR) 10 MG tablet Take 1 tablet by mouth daily 30 tablet 1    triamcinolone (KENALOG) 0.1 % cream       ondansetron (ZOFRAN) 8 MG tablet Take 1 tablet by mouth every 8 hours as needed for Nausea or Vomiting 30 tablet 0    LANTUS SOLOSTAR 100 UNIT/ML injection pen Inject 54 Units into the skin 2 times daily 90 mL 3    OZEMPIC, 1 MG/DOSE, 4 MG/3ML SOPN Inject 1 mg into the skin once a week 9 mL 3    pioglitazone (ACTOS) 30 MG tablet Take 0.5 tablets by mouth daily 45 tablet 3    carvedilol (COREG) 12.5 MG tablet Take 1 tablet by mouth 2 times daily (with meals) (Patient taking differently: Take 1 tablet by mouth 2 times daily (with meals) 1/2 pill twice a day) 180 tablet 3    hydrOXYzine HCl (ATARAX) 25 MG tablet       prochlorperazine

## 2024-01-10 DIAGNOSIS — Z79.899 HIGH RISK MEDICATION USE: ICD-10-CM

## 2024-01-10 DIAGNOSIS — C43.9 MALIGNANT MELANOMA, UNSPECIFIED SITE (HCC): Primary | ICD-10-CM

## 2024-01-11 ENCOUNTER — OFFICE VISIT (OUTPATIENT)
Dept: ENDOCRINOLOGY | Age: 69
End: 2024-01-11

## 2024-01-11 VITALS
DIASTOLIC BLOOD PRESSURE: 80 MMHG | OXYGEN SATURATION: 97 % | WEIGHT: 314 LBS | SYSTOLIC BLOOD PRESSURE: 125 MMHG | HEART RATE: 72 BPM | BODY MASS INDEX: 45.05 KG/M2

## 2024-01-11 DIAGNOSIS — E78.00 HYPERCHOLESTEROLEMIA: ICD-10-CM

## 2024-01-11 DIAGNOSIS — E11.65 TYPE 2 DIABETES MELLITUS WITH HYPERGLYCEMIA, WITH LONG-TERM CURRENT USE OF INSULIN (HCC): Primary | ICD-10-CM

## 2024-01-11 DIAGNOSIS — Z79.4 TYPE 2 DIABETES MELLITUS WITH HYPERGLYCEMIA, WITH LONG-TERM CURRENT USE OF INSULIN (HCC): Primary | ICD-10-CM

## 2024-01-11 DIAGNOSIS — I10 ESSENTIAL HYPERTENSION: ICD-10-CM

## 2024-01-11 DIAGNOSIS — N18.31 STAGE 3A CHRONIC KIDNEY DISEASE (HCC): ICD-10-CM

## 2024-01-11 DIAGNOSIS — R80.9 ALBUMINURIA: ICD-10-CM

## 2024-01-11 RX ORDER — SEMAGLUTIDE 1.34 MG/ML
1 INJECTION, SOLUTION SUBCUTANEOUS WEEKLY
Qty: 9 ML | Refills: 3
Start: 2024-01-11

## 2024-01-11 RX ORDER — INSULIN GLARGINE 100 [IU]/ML
54 INJECTION, SOLUTION SUBCUTANEOUS 2 TIMES DAILY
Qty: 90 ML | Refills: 3
Start: 2024-01-11

## 2024-01-11 RX ORDER — ATORVASTATIN CALCIUM 10 MG/1
10 TABLET, FILM COATED ORAL DAILY
Qty: 30 TABLET | Refills: 1
Start: 2024-01-11

## 2024-01-11 RX ORDER — PIOGLITAZONEHYDROCHLORIDE 30 MG/1
15 TABLET ORAL DAILY
Qty: 45 TABLET | Refills: 3
Start: 2024-01-11

## 2024-01-11 RX ORDER — CARVEDILOL 12.5 MG/1
6.25 TABLET ORAL 2 TIMES DAILY WITH MEALS
Qty: 90 TABLET | Refills: 3
Start: 2024-01-11

## 2024-01-11 NOTE — PROGRESS NOTES
REUBEN Melo MD, FACE    [facility] ENDOCRINOLOGY   AND   THYROID NODULE CLINIC            Reason for visit: follow-up of type 2 diabetes mellitus      ASSESSMENT AND PLAN:    1. Type 2 diabetes mellitus with hyperglycemia, with long-term current use of insulin (HCC)  Glycemic control is excellent.  No changes.  I will provide him with longitudinal care for this complex medical problem.  - AMB POC HEMOGLOBIN A1C  - LANTUS SOLOSTAR 100 UNIT/ML injection pen; Inject 54 Units into the skin 2 times daily  Dispense: 90 mL; Refill: 3  - OZEMPIC, 1 MG/DOSE, 4 MG/3ML SOPN; Inject 1 mg into the skin once a week  Dispense: 9 mL; Refill: 3  - metFORMIN (GLUCOPHAGE) 500 MG tablet; Take 1 tablet by mouth 2 times daily (with meals)  Dispense: 180 tablet; Refill: 3  - pioglitazone (ACTOS) 30 MG tablet; Take 0.5 tablets by mouth daily  Dispense: 45 tablet; Refill: 3    2. Essential hypertension  BP: 125/80   - carvedilol (COREG) 12.5 MG tablet; Take 0.5 tablets by mouth 2 times daily (with meals)  Dispense: 90 tablet; Refill: 3    3. Hypercholesterolemia  LDL is at target.  - atorvastatin (LIPITOR) 10 MG tablet; Take 1 tablet by mouth daily  Dispense: 30 tablet; Refill: 1    4. Albuminuria  He has a history of angioedema with both ACE inhibitors and angiotensin receptor blockers.  Intensive control of blood pressure is paramount.    5. Stage 3a chronic kidney disease (HCC)          Follow-up and Dispositions    Return for 4-6 months.                 History of Present Illness:    DIABETES MELLITUS  Steve Mcgovern is here for follow-up of type 2 diabetes mellitus.  He is currently treated with Ozempic 1 mg weekly, Lantus 54 units BID, Actos 15 mg daily, and metformin 500 mg BID.      He received a steroid injection from Dr. Javi Frankel 11/29/2023; this resulted in severe hyperglycemia for several weeks.     Date of diagnosis: type 2 diabetes mellitus in ~2005     Diet:  No specific diet     Exercise:  no regular

## 2024-01-12 ENCOUNTER — HOSPITAL ENCOUNTER (OUTPATIENT)
Dept: LAB | Age: 69
Discharge: HOME | End: 2024-01-12
Payer: OTHER GOVERNMENT

## 2024-01-12 ENCOUNTER — HOSPITAL ENCOUNTER (OUTPATIENT)
Dept: INFUSION THERAPY | Age: 69
Discharge: HOME | End: 2024-01-12
Payer: OTHER GOVERNMENT

## 2024-01-12 ENCOUNTER — OFFICE VISIT (OUTPATIENT)
Dept: ONCOLOGY | Age: 69
End: 2024-01-12
Payer: MEDICARE

## 2024-01-12 VITALS
DIASTOLIC BLOOD PRESSURE: 79 MMHG | OXYGEN SATURATION: 98 % | HEIGHT: 70 IN | HEART RATE: 72 BPM | SYSTOLIC BLOOD PRESSURE: 137 MMHG | WEIGHT: 315 LBS | RESPIRATION RATE: 16 BRPM | TEMPERATURE: 97.6 F | BODY MASS INDEX: 45.1 KG/M2

## 2024-01-12 DIAGNOSIS — C43.9 MALIGNANT MELANOMA, UNSPECIFIED SITE (HCC): Primary | ICD-10-CM

## 2024-01-12 DIAGNOSIS — C43.9 MALIGNANT MELANOMA, UNSPECIFIED SITE (HCC): ICD-10-CM

## 2024-01-12 DIAGNOSIS — E03.2 HYPOTHYROIDISM DUE TO MEDICATION: ICD-10-CM

## 2024-01-12 DIAGNOSIS — E03.2 HYPOTHYROIDISM DUE TO MEDICATION: Primary | ICD-10-CM

## 2024-01-12 LAB
ALBUMIN SERPL-MCNC: 3.2 G/DL (ref 3.2–4.6)
ALBUMIN/GLOB SERPL: 0.8 (ref 0.4–1.6)
ALP SERPL-CCNC: 123 U/L (ref 50–136)
ALT SERPL-CCNC: 21 U/L (ref 12–65)
ANION GAP SERPL CALC-SCNC: 3 MMOL/L (ref 2–11)
AST SERPL-CCNC: 18 U/L (ref 15–37)
BASOPHILS # BLD: 0.1 K/UL (ref 0–0.2)
BASOPHILS NFR BLD: 1 % (ref 0–2)
BILIRUB SERPL-MCNC: 0.3 MG/DL (ref 0.2–1.1)
BUN SERPL-MCNC: 29 MG/DL (ref 8–23)
CALCIUM SERPL-MCNC: 8.8 MG/DL (ref 8.3–10.4)
CHLORIDE SERPL-SCNC: 107 MMOL/L (ref 103–113)
CO2 SERPL-SCNC: 26 MMOL/L (ref 21–32)
CREAT SERPL-MCNC: 1.6 MG/DL (ref 0.8–1.5)
DIFFERENTIAL METHOD BLD: NORMAL
EOSINOPHIL # BLD: 0.4 K/UL (ref 0–0.8)
EOSINOPHIL NFR BLD: 4 % (ref 0.5–7.8)
ERYTHROCYTE [DISTWIDTH] IN BLOOD BY AUTOMATED COUNT: 13.4 % (ref 11.9–14.6)
GLOBULIN SER CALC-MCNC: 4.2 G/DL (ref 2.8–4.5)
GLUCOSE SERPL-MCNC: 135 MG/DL (ref 65–100)
HBA1C MFR BLD: 6.2 %
HCT VFR BLD AUTO: 41.8 % (ref 41.1–50.3)
HGB BLD-MCNC: 13.8 G/DL (ref 13.6–17.2)
IMM GRANULOCYTES # BLD AUTO: 0 K/UL (ref 0–0.5)
IMM GRANULOCYTES NFR BLD AUTO: 0 % (ref 0–5)
LYMPHOCYTES # BLD: 2.3 K/UL (ref 0.5–4.6)
LYMPHOCYTES NFR BLD: 23 % (ref 13–44)
MCH RBC QN AUTO: 30.7 PG (ref 26.1–32.9)
MCHC RBC AUTO-ENTMCNC: 33 G/DL (ref 31.4–35)
MCV RBC AUTO: 92.9 FL (ref 82–102)
MONOCYTES # BLD: 0.8 K/UL (ref 0.1–1.3)
MONOCYTES NFR BLD: 8 % (ref 4–12)
NEUTS SEG # BLD: 6.4 K/UL (ref 1.7–8.2)
NEUTS SEG NFR BLD: 64 % (ref 43–78)
NRBC # BLD: 0 K/UL (ref 0–0.2)
PLATELET # BLD AUTO: 327 K/UL (ref 150–450)
PMV BLD AUTO: 9.8 FL (ref 9.4–12.3)
POTASSIUM SERPL-SCNC: 3.6 MMOL/L (ref 3.5–5.1)
PROT SERPL-MCNC: 7.4 G/DL (ref 6.3–8.2)
RBC # BLD AUTO: 4.5 M/UL (ref 4.23–5.6)
SODIUM SERPL-SCNC: 136 MMOL/L (ref 136–146)
TSH W FREE THYROID IF ABNORMAL: 1.9 UIU/ML (ref 0.36–3.74)
WBC # BLD AUTO: 10.1 K/UL (ref 4.3–11.1)

## 2024-01-12 PROCEDURE — 1123F ACP DISCUSS/DSCN MKR DOCD: CPT | Performed by: INTERNAL MEDICINE

## 2024-01-12 PROCEDURE — 3075F SYST BP GE 130 - 139MM HG: CPT | Performed by: INTERNAL MEDICINE

## 2024-01-12 PROCEDURE — 85025 COMPLETE CBC W/AUTO DIFF WBC: CPT

## 2024-01-12 PROCEDURE — 84443 ASSAY THYROID STIM HORMONE: CPT

## 2024-01-12 PROCEDURE — 2580000003 HC RX 258: Performed by: INTERNAL MEDICINE

## 2024-01-12 PROCEDURE — 96413 CHEMO IV INFUSION 1 HR: CPT

## 2024-01-12 PROCEDURE — 6360000002 HC RX W HCPCS: Performed by: INTERNAL MEDICINE

## 2024-01-12 PROCEDURE — 99215 OFFICE O/P EST HI 40 MIN: CPT | Performed by: INTERNAL MEDICINE

## 2024-01-12 PROCEDURE — 3078F DIAST BP <80 MM HG: CPT | Performed by: INTERNAL MEDICINE

## 2024-01-12 PROCEDURE — 80053 COMPREHEN METABOLIC PANEL: CPT

## 2024-01-12 RX ORDER — SODIUM CHLORIDE 9 MG/ML
INJECTION, SOLUTION INTRAVENOUS CONTINUOUS
Status: CANCELLED | OUTPATIENT
Start: 2024-01-12

## 2024-01-12 RX ORDER — DIPHENHYDRAMINE HYDROCHLORIDE 50 MG/ML
50 INJECTION INTRAMUSCULAR; INTRAVENOUS
Status: DISCONTINUED | OUTPATIENT
Start: 2024-01-12 | End: 2024-01-13 | Stop reason: HOSPADM

## 2024-01-12 RX ORDER — ONDANSETRON 2 MG/ML
8 INJECTION INTRAMUSCULAR; INTRAVENOUS
Status: DISCONTINUED | OUTPATIENT
Start: 2024-01-12 | End: 2024-01-13 | Stop reason: HOSPADM

## 2024-01-12 RX ORDER — DIPHENHYDRAMINE HYDROCHLORIDE 50 MG/ML
50 INJECTION INTRAMUSCULAR; INTRAVENOUS
Status: CANCELLED | OUTPATIENT
Start: 2024-01-12

## 2024-01-12 RX ORDER — SODIUM CHLORIDE 9 MG/ML
5-250 INJECTION, SOLUTION INTRAVENOUS PRN
OUTPATIENT
Start: 2024-01-12

## 2024-01-12 RX ORDER — SODIUM CHLORIDE 9 MG/ML
INJECTION, SOLUTION INTRAVENOUS CONTINUOUS
Status: DISCONTINUED | OUTPATIENT
Start: 2024-01-12 | End: 2024-01-13 | Stop reason: HOSPADM

## 2024-01-12 RX ORDER — ONDANSETRON 2 MG/ML
8 INJECTION INTRAMUSCULAR; INTRAVENOUS
OUTPATIENT
Start: 2024-01-12

## 2024-01-12 RX ORDER — EPINEPHRINE 1 MG/ML
0.3 INJECTION, SOLUTION, CONCENTRATE INTRAVENOUS PRN
Status: CANCELLED | OUTPATIENT
Start: 2024-01-12

## 2024-01-12 RX ORDER — ALBUTEROL SULFATE 90 UG/1
4 AEROSOL, METERED RESPIRATORY (INHALATION) PRN
Status: CANCELLED | OUTPATIENT
Start: 2024-01-12

## 2024-01-12 RX ORDER — SODIUM CHLORIDE 0.9 % (FLUSH) 0.9 %
10 SYRINGE (ML) INJECTION PRN
Status: DISCONTINUED | OUTPATIENT
Start: 2024-01-12 | End: 2024-01-16 | Stop reason: HOSPADM

## 2024-01-12 RX ORDER — ALBUTEROL SULFATE 90 UG/1
4 AEROSOL, METERED RESPIRATORY (INHALATION) PRN
Status: DISCONTINUED | OUTPATIENT
Start: 2024-01-12 | End: 2024-01-13 | Stop reason: HOSPADM

## 2024-01-12 RX ORDER — MEPERIDINE HYDROCHLORIDE 25 MG/ML
12.5 INJECTION INTRAMUSCULAR; INTRAVENOUS; SUBCUTANEOUS PRN
Status: CANCELLED | OUTPATIENT
Start: 2024-01-12

## 2024-01-12 RX ORDER — EPINEPHRINE 1 MG/ML
0.3 INJECTION, SOLUTION, CONCENTRATE INTRAVENOUS PRN
Status: DISCONTINUED | OUTPATIENT
Start: 2024-01-12 | End: 2024-01-13 | Stop reason: HOSPADM

## 2024-01-12 RX ORDER — HEPARIN 100 UNIT/ML
500 SYRINGE INTRAVENOUS PRN
OUTPATIENT
Start: 2024-01-12

## 2024-01-12 RX ORDER — SODIUM CHLORIDE 9 MG/ML
5-250 INJECTION, SOLUTION INTRAVENOUS PRN
Status: DISCONTINUED | OUTPATIENT
Start: 2024-01-12 | End: 2024-01-13 | Stop reason: HOSPADM

## 2024-01-12 RX ORDER — ACETAMINOPHEN 325 MG/1
650 TABLET ORAL
Status: CANCELLED | OUTPATIENT
Start: 2024-01-12

## 2024-01-12 RX ORDER — ONDANSETRON 2 MG/ML
8 INJECTION INTRAMUSCULAR; INTRAVENOUS
Status: CANCELLED | OUTPATIENT
Start: 2024-01-12

## 2024-01-12 RX ORDER — SODIUM CHLORIDE 0.9 % (FLUSH) 0.9 %
5-40 SYRINGE (ML) INJECTION PRN
OUTPATIENT
Start: 2024-01-12

## 2024-01-12 RX ORDER — ACETAMINOPHEN 325 MG/1
650 TABLET ORAL
Status: DISCONTINUED | OUTPATIENT
Start: 2024-01-12 | End: 2024-01-13 | Stop reason: HOSPADM

## 2024-01-12 RX ORDER — MEPERIDINE HYDROCHLORIDE 25 MG/ML
12.5 INJECTION INTRAMUSCULAR; INTRAVENOUS; SUBCUTANEOUS PRN
Status: DISCONTINUED | OUTPATIENT
Start: 2024-01-12 | End: 2024-01-13 | Stop reason: HOSPADM

## 2024-01-12 RX ORDER — SODIUM CHLORIDE 9 MG/ML
5-250 INJECTION, SOLUTION INTRAVENOUS PRN
Status: CANCELLED | OUTPATIENT
Start: 2024-01-12

## 2024-01-12 RX ADMIN — SODIUM CHLORIDE 200 MG: 9 INJECTION, SOLUTION INTRAVENOUS at 12:30

## 2024-01-12 RX ADMIN — SODIUM CHLORIDE, PRESERVATIVE FREE 10 ML: 5 INJECTION INTRAVENOUS at 10:40

## 2024-01-12 RX ADMIN — SODIUM CHLORIDE 100 ML/HR: 9 INJECTION, SOLUTION INTRAVENOUS at 12:20

## 2024-01-12 NOTE — PROGRESS NOTES
Arrived to the Infusion Center.  Keytruda completed. Patient tolerated well.   Any issues or concerns during appointment: none.  Patient aware of next infusion appointment on 2/2  Patient instructed to call provider with temperature of 100.4 or greater or nausea/vomiting/ diarrhea or pain not controlled by medications  Discharged ambulatory.

## 2024-01-12 NOTE — PROGRESS NOTES
Patient arrived to port lab for port access and lab draw.  Port accessed and labs drawn per protocol.  Port remains accessed for infusion appointment.  Patient discharged from port lab ambulatory.

## 2024-01-12 NOTE — PATIENT INSTRUCTIONS
7.4  6.3 - 8.2 g/dL Final    Albumin 01/12/2024 3.2  3.2 - 4.6 g/dL Final    Globulin 01/12/2024 4.2  2.8 - 4.5 g/dL Final    Albumin/Globulin Ratio 01/12/2024 0.8  0.4 - 1.6   Final    WBC 01/12/2024 10.1  4.3 - 11.1 K/uL Final    RBC 01/12/2024 4.50  4.23 - 5.6 M/uL Final    Hemoglobin 01/12/2024 13.8  13.6 - 17.2 g/dL Final    Hematocrit 01/12/2024 41.8  41.1 - 50.3 % Final    MCV 01/12/2024 92.9  82.0 - 102.0 FL Final    MCH 01/12/2024 30.7  26.1 - 32.9 PG Final    MCHC 01/12/2024 33.0  31.4 - 35.0 g/dL Final    RDW 01/12/2024 13.4  11.9 - 14.6 % Final    Platelets 01/12/2024 327  150 - 450 K/uL Final    MPV 01/12/2024 9.8  9.4 - 12.3 FL Final    nRBC 01/12/2024 0.00  0.0 - 0.2 K/uL Final    **Note: Absolute NRBC parameter is now reported with Hemogram**    Neutrophils % 01/12/2024 64  43 - 78 % Final    Lymphocytes % 01/12/2024 23  13 - 44 % Final    Monocytes % 01/12/2024 8  4.0 - 12.0 % Final    Eosinophils % 01/12/2024 4  0.5 - 7.8 % Final    Basophils % 01/12/2024 1  0.0 - 2.0 % Final    Immature Granulocytes 01/12/2024 0  0.0 - 5.0 % Final    Neutrophils Absolute 01/12/2024 6.4  1.7 - 8.2 K/UL Final    Lymphocytes Absolute 01/12/2024 2.3  0.5 - 4.6 K/UL Final    Monocytes Absolute 01/12/2024 0.8  0.1 - 1.3 K/UL Final    Eosinophils Absolute 01/12/2024 0.4  0.0 - 0.8 K/UL Final    Basophils Absolute 01/12/2024 0.1  0.0 - 0.2 K/UL Final    Absolute Immature Granulocyte 01/12/2024 0.0  0.0 - 0.5 K/UL Final    Differential Type 01/12/2024 AUTOMATED    Final   Office Visit on 01/11/2024   Component Date Value Ref Range Status    Hemoglobin A1C, POC 01/12/2024 6.2  % Final         Treatment Summary has been discussed and given to patient: N/A        -------------------------------------------------------------------------------------------------------------------  Please call our office at [phone] if you have any  of the following symptoms:   Fever of 100.5 or greater  Chills  Shortness of breath  Swelling

## 2024-01-12 NOTE — PROGRESS NOTES
minutes.    Bartolo Arreola MD  [facility] Hematology and Oncology  [address] [place] [address]  Office : [phone]  Fax : [phone]        Elements of this note have been dictated using speech recognition software. As a result, errors of speech recognition may have occurred.

## 2024-01-18 PROCEDURE — 93294 REM INTERROG EVL PM/LDLS PM: CPT | Performed by: INTERNAL MEDICINE

## 2024-01-18 PROCEDURE — 93296 REM INTERROG EVL PM/IDS: CPT | Performed by: INTERNAL MEDICINE

## 2024-01-19 ENCOUNTER — OFFICE VISIT (OUTPATIENT)
Dept: FAMILY MEDICINE CLINIC | Facility: CLINIC | Age: 69
End: 2024-01-19
Payer: MEDICARE

## 2024-01-19 VITALS
HEIGHT: 70 IN | DIASTOLIC BLOOD PRESSURE: 72 MMHG | BODY MASS INDEX: 45.1 KG/M2 | SYSTOLIC BLOOD PRESSURE: 116 MMHG | HEART RATE: 70 BPM | OXYGEN SATURATION: 96 % | WEIGHT: 315 LBS

## 2024-01-19 DIAGNOSIS — I48.11 LONGSTANDING PERSISTENT ATRIAL FIBRILLATION (HCC): Chronic | ICD-10-CM

## 2024-01-19 DIAGNOSIS — E78.00 HYPERCHOLESTEROLEMIA: ICD-10-CM

## 2024-01-19 DIAGNOSIS — E11.22 TYPE 2 DIABETES MELLITUS WITH STAGE 3B CHRONIC KIDNEY DISEASE, WITH LONG-TERM CURRENT USE OF INSULIN (HCC): Chronic | ICD-10-CM

## 2024-01-19 DIAGNOSIS — E78.00 HYPERCHOLESTEREMIA: ICD-10-CM

## 2024-01-19 DIAGNOSIS — Z79.4 TYPE 2 DIABETES MELLITUS WITH STAGE 3B CHRONIC KIDNEY DISEASE, WITH LONG-TERM CURRENT USE OF INSULIN (HCC): Chronic | ICD-10-CM

## 2024-01-19 DIAGNOSIS — E66.01 CLASS 3 SEVERE OBESITY DUE TO EXCESS CALORIES WITH SERIOUS COMORBIDITY AND BODY MASS INDEX (BMI) OF 45.0 TO 49.9 IN ADULT (HCC): Chronic | ICD-10-CM

## 2024-01-19 DIAGNOSIS — N18.32 TYPE 2 DIABETES MELLITUS WITH STAGE 3B CHRONIC KIDNEY DISEASE, WITH LONG-TERM CURRENT USE OF INSULIN (HCC): Chronic | ICD-10-CM

## 2024-01-19 DIAGNOSIS — I50.22 CHRONIC SYSTOLIC (CONGESTIVE) HEART FAILURE (HCC): Chronic | ICD-10-CM

## 2024-01-19 DIAGNOSIS — J06.9 VIRAL URI: Primary | ICD-10-CM

## 2024-01-19 PROCEDURE — G0439 PPPS, SUBSEQ VISIT: HCPCS | Performed by: FAMILY MEDICINE

## 2024-01-19 PROCEDURE — 3074F SYST BP LT 130 MM HG: CPT | Performed by: FAMILY MEDICINE

## 2024-01-19 PROCEDURE — 1123F ACP DISCUSS/DSCN MKR DOCD: CPT | Performed by: FAMILY MEDICINE

## 2024-01-19 PROCEDURE — 3078F DIAST BP <80 MM HG: CPT | Performed by: FAMILY MEDICINE

## 2024-01-19 PROCEDURE — 99214 OFFICE O/P EST MOD 30 MIN: CPT | Performed by: FAMILY MEDICINE

## 2024-01-19 RX ORDER — FLUTICASONE PROPIONATE AND SALMETEROL 100; 50 UG/1; UG/1
1 POWDER RESPIRATORY (INHALATION) EVERY 12 HOURS
COMMUNITY

## 2024-01-19 RX ORDER — ATORVASTATIN CALCIUM 10 MG/1
10 TABLET, FILM COATED ORAL DAILY
Qty: 90 TABLET | Refills: 3 | Status: SHIPPED | OUTPATIENT
Start: 2024-01-19 | End: 2025-01-13

## 2024-01-19 RX ORDER — LANOLIN ALCOHOL/MO/W.PET/CERES
1000 CREAM (GRAM) TOPICAL DAILY
COMMUNITY

## 2024-01-19 RX ORDER — ACETAMINOPHEN 500 MG
500 TABLET ORAL EVERY 6 HOURS PRN
COMMUNITY

## 2024-01-19 RX ORDER — PREDNISONE 20 MG/1
TABLET ORAL
COMMUNITY
Start: 2024-01-16

## 2024-01-19 ASSESSMENT — PATIENT HEALTH QUESTIONNAIRE - PHQ9
SUM OF ALL RESPONSES TO PHQ QUESTIONS 1-9: 0
SUM OF ALL RESPONSES TO PHQ9 QUESTIONS 1 & 2: 0
SUM OF ALL RESPONSES TO PHQ QUESTIONS 1-9: 0
1. LITTLE INTEREST OR PLEASURE IN DOING THINGS: 0
SUM OF ALL RESPONSES TO PHQ QUESTIONS 1-9: 0
2. FEELING DOWN, DEPRESSED OR HOPELESS: 0
SUM OF ALL RESPONSES TO PHQ QUESTIONS 1-9: 0

## 2024-01-19 ASSESSMENT — ENCOUNTER SYMPTOMS: RESPIRATORY NEGATIVE: 1

## 2024-01-19 NOTE — PATIENT INSTRUCTIONS
vegetables are especially good for you. Examples include spinach, carrots, peaches, and berries.     Foods high in fiber can reduce your cholesterol and provide important vitamins and minerals. High-fiber foods include whole-grain cereals and breads, oatmeal, beans, brown rice, citrus fruits, and apples.     Eat lean proteins. Heart-healthy proteins include seafood, lean meats and poultry, eggs, beans, peas, nuts, seeds, and soy products.     Limit drinks and foods with added sugar. These include candy, desserts, and soda pop.   Lifestyle changes    If your doctor recommends it, get more exercise. Walking is a good choice. Bit by bit, increase the amount you walk every day. Try for at least 30 minutes on most days of the week. You also may want to swim, bike, or do other activities.     Do not smoke. If you need help quitting, talk to your doctor about stop-smoking programs and medicines. These can increase your chances of quitting for good. Quitting smoking may be the most important step you can take to protect your heart. It is never too late to quit.     Limit alcohol to 2 drinks a day for men and 1 drink a day for women. Too much alcohol can cause health problems.     Manage other health problems such as diabetes, high blood pressure, and high cholesterol. If you think you may have a problem with alcohol or drug use, talk to your doctor.   Medicines    Take your medicines exactly as prescribed. Call your doctor if you think you are having a problem with your medicine.     If your doctor recommends aspirin, take the amount directed each day. Make sure you take aspirin and not another kind of pain reliever, such as acetaminophen (Tylenol).   When should you call for help?   Call 911 if you have symptoms of a heart attack. These may include:    Chest pain or pressure, or a strange feeling in the chest.     Sweating.     Shortness of breath.     Pain, pressure, or a strange feeling in the back, neck, jaw, or upper

## 2024-01-19 NOTE — PROGRESS NOTES
PROGRESS NOTE    SUBJECTIVE:   Steve Mcgovern is a 68 y.o. male seen for a follow up visit regarding   Chief Complaint   Patient presents with    Establish Care     Here to establish care with provider; former patient of DR. Carias  Was seen at  on 1/16/23 for URI; still taken medication that was prescribed. Doing some better; still congestion  Was recommended to follow up with PCP regarding having watchman procedure    Medicare AWV        HPI:  Here for follow-up after going to urgent care with an upper respiratory infection.  He was treated with some steroids.  No antibiotics.  He is doing much better.         Reviewed and updated this visit by provider:           Review of Systems   Respiratory: Negative.     Cardiovascular:  Positive for chest pain and palpitations.          OBJECTIVE:  Vitals:    01/19/24 1121   BP: 116/72   Site: Left Upper Arm   Cuff Size: Large Adult   Pulse: 70   SpO2: 96%   Weight: (!) 144.6 kg (318 lb 12.8 oz)   Height: 1.778 m (5' 10\")        Physical Exam   General: Alert and oriented x3, well-appearing  Neck: No adenopathy, thyromegaly or thyroid nodules  Pulmonary: Normal effort, good airflow, no rales or rhonchi  CVS:  Regular rhythm presently, normal rate, normal S1, S2, no S3 or S4, no murmurs; no carotid bruits, 2+ pedal pulses      Medical problems and test results were reviewed with the patient today.     Recent Results (from the past 672 hour(s))   AMB POC HEMOGLOBIN A1C    Collection Time: 01/12/24  8:25 AM   Result Value Ref Range    Hemoglobin A1C, POC 6.2 %   Comprehensive metabolic panel    Collection Time: 01/12/24 10:40 AM   Result Value Ref Range    Sodium 136 136 - 146 mmol/L    Potassium 3.6 3.5 - 5.1 mmol/L    Chloride 107 103 - 113 mmol/L    CO2 26 21 - 32 mmol/L    Anion Gap 3 2 - 11 mmol/L    Glucose 135 (H) 65 - 100 mg/dL    BUN 29 (H) 8 - 23 MG/DL    Creatinine 1.60 (H) 0.8 - 1.5 MG/DL    Est, Glom Filt Rate 46 (L) >60 ml/min/1.73m2    Calcium 8.8 8.3 - 10.4

## 2024-01-26 DIAGNOSIS — E78.00 HYPERCHOLESTEROLEMIA: ICD-10-CM

## 2024-01-26 RX ORDER — ATORVASTATIN CALCIUM 10 MG/1
10 TABLET, FILM COATED ORAL DAILY
Qty: 30 TABLET | Refills: 1 | OUTPATIENT
Start: 2024-01-26

## 2024-01-31 ENCOUNTER — INITIAL CONSULT (OUTPATIENT)
Age: 69
End: 2024-01-31
Payer: MEDICARE

## 2024-01-31 VITALS
WEIGHT: 310.3 LBS | SYSTOLIC BLOOD PRESSURE: 126 MMHG | BODY MASS INDEX: 44.42 KG/M2 | HEIGHT: 70 IN | DIASTOLIC BLOOD PRESSURE: 86 MMHG | HEART RATE: 74 BPM

## 2024-01-31 DIAGNOSIS — I44.2 ATRIOVENTRICULAR BLOCK, COMPLETE (HCC): ICD-10-CM

## 2024-01-31 DIAGNOSIS — I48.11 LONGSTANDING PERSISTENT ATRIAL FIBRILLATION (HCC): Primary | Chronic | ICD-10-CM

## 2024-01-31 DIAGNOSIS — Z95.0 PRESENCE OF CARDIAC PACEMAKER: ICD-10-CM

## 2024-01-31 DIAGNOSIS — I10 BENIGN ESSENTIAL HYPERTENSION: Chronic | ICD-10-CM

## 2024-01-31 PROCEDURE — 3079F DIAST BP 80-89 MM HG: CPT | Performed by: INTERNAL MEDICINE

## 2024-01-31 PROCEDURE — 99204 OFFICE O/P NEW MOD 45 MIN: CPT | Performed by: INTERNAL MEDICINE

## 2024-01-31 PROCEDURE — 1123F ACP DISCUSS/DSCN MKR DOCD: CPT | Performed by: INTERNAL MEDICINE

## 2024-01-31 PROCEDURE — 3074F SYST BP LT 130 MM HG: CPT | Performed by: INTERNAL MEDICINE

## 2024-01-31 PROCEDURE — 93000 ELECTROCARDIOGRAM COMPLETE: CPT | Performed by: INTERNAL MEDICINE

## 2024-01-31 NOTE — PROGRESS NOTES
carvedilol (COREG) 12.5 MG tablet Take 0.5 tablets by mouth 2 times daily (with meals) 90 tablet 3    ivermectin 3 MG tablet       triamcinolone (KENALOG) 0.1 % cream       ondansetron (ZOFRAN) 8 MG tablet Take 1 tablet by mouth every 8 hours as needed for Nausea or Vomiting 30 tablet 0    hydrOXYzine HCl (ATARAX) 25 MG tablet       prochlorperazine (COMPAZINE) 10 MG tablet Take 1 tablet by mouth every 6 hours as needed (nausea/vomiting) 60 tablet 1    lidocaine-prilocaine (EMLA) 2.5-2.5 % cream Apply topically as needed to port site 30-60 minutes prior to being accessed with needle. Cover with saran wrap. 1 each 1    torsemide (DEMADEX) 20 MG tablet TAKE ONE TABLET BY MOUTH TWICE A  tablet 3    Omega-3 Fatty Acids (FISH OIL PO) Take by mouth      Lidocaine 5 % CREA Topical 5% Lidocaine and 5% Neurontin to apply to affected area up to 4 times/day as needed for pain 45 g 2    colchicine (COLCRYS) 0.6 MG tablet Take 1 tablet by mouth daily (Patient taking differently: Take 1 tablet by mouth as needed) 30 tablet 1    albuterol sulfate  (90 Base) MCG/ACT inhaler Inhale 2 puffs into the lungs every 4 hours as needed for Wheezing or Shortness of Breath      albuterol (PROVENTIL) (2.5 MG/3ML) 0.083% nebulizer solution Inhale 3 mLs into the lungs every 6 hours as needed      allopurinol (ZYLOPRIM) 300 MG tablet Take 1 tablet by mouth daily      ammonium lactate (LAC-HYDRIN) 12 % lotion Apply topically as needed      ascorbic acid (VITAMIN C) 500 MG tablet Take by mouth      Camphor-Menthol-Methyl Sal 3.1-6-10 % PTCH Apply topically      diclofenac sodium (VOLTAREN) 1 % GEL Apply 4 g topically 4 times daily as needed      fluticasone (FLONASE) 50 MCG/ACT nasal spray 2 sprays by Nasal route as needed      loratadine (CLARITIN) 10 MG tablet Take 1 tablet by mouth daily      methocarbamol (ROBAXIN) 500 MG tablet Take 1 tablet by mouth in the morning and at bedtime      omeprazole (PRILOSEC) 20 MG delayed release

## 2024-02-01 ENCOUNTER — TELEPHONE (OUTPATIENT)
Dept: CARDIAC CATH/INVASIVE PROCEDURES | Age: 69
End: 2024-02-01

## 2024-02-01 DIAGNOSIS — C43.9 MALIGNANT MELANOMA, UNSPECIFIED SITE (HCC): Primary | ICD-10-CM

## 2024-02-01 DIAGNOSIS — R53.82 CHRONIC FATIGUE: ICD-10-CM

## 2024-02-01 NOTE — TELEPHONE ENCOUNTER
Mr. Mcgovern was phoned to discuss LAAO.  He will be scheduled for Watchman implant on 2/15/2024.  Patient will be contacted the week prior to procedure with an arrival time and additional instructions.  Watchman coordinator contact ([phone]) information provided.

## 2024-02-02 ENCOUNTER — OFFICE VISIT (OUTPATIENT)
Dept: ONCOLOGY | Age: 69
End: 2024-02-02
Payer: MEDICARE

## 2024-02-02 ENCOUNTER — HOSPITAL ENCOUNTER (OUTPATIENT)
Dept: LAB | Age: 69
End: 2024-02-02
Payer: OTHER GOVERNMENT

## 2024-02-02 ENCOUNTER — HOSPITAL ENCOUNTER (OUTPATIENT)
Dept: INFUSION THERAPY | Age: 69
Setting detail: INFUSION SERIES
Discharge: HOME | End: 2024-02-02
Payer: OTHER GOVERNMENT

## 2024-02-02 VITALS
WEIGHT: 310.7 LBS | RESPIRATION RATE: 16 BRPM | SYSTOLIC BLOOD PRESSURE: 111 MMHG | HEIGHT: 70 IN | TEMPERATURE: 97.7 F | DIASTOLIC BLOOD PRESSURE: 67 MMHG | HEART RATE: 75 BPM | OXYGEN SATURATION: 98 % | BODY MASS INDEX: 44.48 KG/M2

## 2024-02-02 DIAGNOSIS — C43.9 MALIGNANT MELANOMA, UNSPECIFIED SITE (HCC): ICD-10-CM

## 2024-02-02 DIAGNOSIS — C43.9 MALIGNANT MELANOMA, UNSPECIFIED SITE (HCC): Primary | ICD-10-CM

## 2024-02-02 DIAGNOSIS — Z79.899 HIGH RISK MEDICATION USE: ICD-10-CM

## 2024-02-02 DIAGNOSIS — E03.2 HYPOTHYROIDISM DUE TO MEDICATION: ICD-10-CM

## 2024-02-02 LAB
ALBUMIN SERPL-MCNC: 2.9 G/DL (ref 3.2–4.6)
ALBUMIN/GLOB SERPL: 0.7 (ref 0.4–1.6)
ALP SERPL-CCNC: 109 U/L (ref 50–136)
ALT SERPL-CCNC: 38 U/L (ref 12–65)
ANION GAP SERPL CALC-SCNC: 7 MMOL/L (ref 2–11)
AST SERPL-CCNC: 22 U/L (ref 15–37)
BASOPHILS # BLD: 0.1 K/UL (ref 0–0.2)
BASOPHILS NFR BLD: 1 % (ref 0–2)
BILIRUB SERPL-MCNC: 0.5 MG/DL (ref 0.2–1.1)
BUN SERPL-MCNC: 41 MG/DL (ref 8–23)
CALCIUM SERPL-MCNC: 8.6 MG/DL (ref 8.3–10.4)
CHLORIDE SERPL-SCNC: 102 MMOL/L (ref 103–113)
CO2 SERPL-SCNC: 27 MMOL/L (ref 21–32)
CREAT SERPL-MCNC: 1.9 MG/DL (ref 0.8–1.5)
DIFFERENTIAL METHOD BLD: ABNORMAL
EOSINOPHIL # BLD: 0.4 K/UL (ref 0–0.8)
EOSINOPHIL NFR BLD: 3 % (ref 0.5–7.8)
ERYTHROCYTE [DISTWIDTH] IN BLOOD BY AUTOMATED COUNT: 14.4 % (ref 11.9–14.6)
GLOBULIN SER CALC-MCNC: 4 G/DL (ref 2.8–4.5)
GLUCOSE SERPL-MCNC: 275 MG/DL (ref 65–100)
HCT VFR BLD AUTO: 45.6 % (ref 41.1–50.3)
HGB BLD-MCNC: 15 G/DL (ref 13.6–17.2)
IMM GRANULOCYTES # BLD AUTO: 0.1 K/UL (ref 0–0.5)
IMM GRANULOCYTES NFR BLD AUTO: 1 % (ref 0–5)
LYMPHOCYTES # BLD: 2.4 K/UL (ref 0.5–4.6)
LYMPHOCYTES NFR BLD: 16 % (ref 13–44)
MCH RBC QN AUTO: 29.9 PG (ref 26.1–32.9)
MCHC RBC AUTO-ENTMCNC: 32.9 G/DL (ref 31.4–35)
MCV RBC AUTO: 91 FL (ref 82–102)
MONOCYTES # BLD: 0.7 K/UL (ref 0.1–1.3)
MONOCYTES NFR BLD: 5 % (ref 4–12)
NEUTS SEG # BLD: 11.4 K/UL (ref 1.7–8.2)
NEUTS SEG NFR BLD: 74 % (ref 43–78)
NRBC # BLD: 0 K/UL (ref 0–0.2)
PLATELET # BLD AUTO: 259 K/UL (ref 150–450)
PMV BLD AUTO: 10 FL (ref 9.4–12.3)
POTASSIUM SERPL-SCNC: 3.4 MMOL/L (ref 3.5–5.1)
PROT SERPL-MCNC: 6.9 G/DL (ref 6.3–8.2)
RBC # BLD AUTO: 5.01 M/UL (ref 4.23–5.6)
SODIUM SERPL-SCNC: 136 MMOL/L (ref 136–146)
TSH W FREE THYROID IF ABNORMAL: 1.3 UIU/ML (ref 0.36–3)
WBC # BLD AUTO: 15.1 K/UL (ref 4.3–11.1)

## 2024-02-02 PROCEDURE — 96413 CHEMO IV INFUSION 1 HR: CPT

## 2024-02-02 PROCEDURE — 2580000003 HC RX 258: Performed by: INTERNAL MEDICINE

## 2024-02-02 PROCEDURE — 2580000003 HC RX 258

## 2024-02-02 PROCEDURE — 3074F SYST BP LT 130 MM HG: CPT

## 2024-02-02 PROCEDURE — 1123F ACP DISCUSS/DSCN MKR DOCD: CPT

## 2024-02-02 PROCEDURE — 80053 COMPREHEN METABOLIC PANEL: CPT

## 2024-02-02 PROCEDURE — 6360000002 HC RX W HCPCS

## 2024-02-02 PROCEDURE — 84443 ASSAY THYROID STIM HORMONE: CPT

## 2024-02-02 PROCEDURE — 85025 COMPLETE CBC W/AUTO DIFF WBC: CPT

## 2024-02-02 PROCEDURE — 99214 OFFICE O/P EST MOD 30 MIN: CPT

## 2024-02-02 PROCEDURE — 3078F DIAST BP <80 MM HG: CPT

## 2024-02-02 RX ORDER — ONDANSETRON 2 MG/ML
8 INJECTION INTRAMUSCULAR; INTRAVENOUS
Status: CANCELLED | OUTPATIENT
Start: 2024-02-02

## 2024-02-02 RX ORDER — SODIUM CHLORIDE 0.9 % (FLUSH) 0.9 %
5-40 SYRINGE (ML) INJECTION PRN
Status: DISCONTINUED | OUTPATIENT
Start: 2024-02-02 | End: 2024-02-03 | Stop reason: HOSPADM

## 2024-02-02 RX ORDER — ALBUTEROL SULFATE 90 UG/1
4 AEROSOL, METERED RESPIRATORY (INHALATION) PRN
Status: DISCONTINUED | OUTPATIENT
Start: 2024-02-02 | End: 2024-02-03 | Stop reason: HOSPADM

## 2024-02-02 RX ORDER — SODIUM CHLORIDE 9 MG/ML
5-250 INJECTION, SOLUTION INTRAVENOUS PRN
Status: DISCONTINUED | OUTPATIENT
Start: 2024-02-02 | End: 2024-02-03 | Stop reason: HOSPADM

## 2024-02-02 RX ORDER — SODIUM CHLORIDE 9 MG/ML
5-250 INJECTION, SOLUTION INTRAVENOUS PRN
Status: CANCELLED | OUTPATIENT
Start: 2024-02-02

## 2024-02-02 RX ORDER — DIPHENHYDRAMINE HYDROCHLORIDE 50 MG/ML
50 INJECTION INTRAMUSCULAR; INTRAVENOUS
Status: CANCELLED | OUTPATIENT
Start: 2024-02-02

## 2024-02-02 RX ORDER — HEPARIN 100 UNIT/ML
500 SYRINGE INTRAVENOUS PRN
Status: CANCELLED | OUTPATIENT
Start: 2024-02-02

## 2024-02-02 RX ORDER — ACETAMINOPHEN 325 MG/1
650 TABLET ORAL
Status: CANCELLED | OUTPATIENT
Start: 2024-02-02

## 2024-02-02 RX ORDER — EPINEPHRINE 1 MG/ML
0.3 INJECTION, SOLUTION, CONCENTRATE INTRAVENOUS PRN
Status: DISCONTINUED | OUTPATIENT
Start: 2024-02-02 | End: 2024-02-03 | Stop reason: HOSPADM

## 2024-02-02 RX ORDER — ACETAMINOPHEN 325 MG/1
650 TABLET ORAL
Status: DISCONTINUED | OUTPATIENT
Start: 2024-02-02 | End: 2024-02-03 | Stop reason: HOSPADM

## 2024-02-02 RX ORDER — DIPHENHYDRAMINE HYDROCHLORIDE 50 MG/ML
50 INJECTION INTRAMUSCULAR; INTRAVENOUS
Status: DISCONTINUED | OUTPATIENT
Start: 2024-02-02 | End: 2024-02-03 | Stop reason: HOSPADM

## 2024-02-02 RX ORDER — MEPERIDINE HYDROCHLORIDE 25 MG/ML
12.5 INJECTION INTRAMUSCULAR; INTRAVENOUS; SUBCUTANEOUS PRN
Status: CANCELLED | OUTPATIENT
Start: 2024-02-02

## 2024-02-02 RX ORDER — SODIUM CHLORIDE 0.9 % (FLUSH) 0.9 %
5-40 SYRINGE (ML) INJECTION PRN
Status: ACTIVE | OUTPATIENT
Start: 2024-02-02

## 2024-02-02 RX ORDER — EPINEPHRINE 1 MG/ML
0.3 INJECTION, SOLUTION, CONCENTRATE INTRAVENOUS PRN
Status: CANCELLED | OUTPATIENT
Start: 2024-02-02

## 2024-02-02 RX ORDER — ALBUTEROL SULFATE 90 UG/1
4 AEROSOL, METERED RESPIRATORY (INHALATION) PRN
Status: CANCELLED | OUTPATIENT
Start: 2024-02-02

## 2024-02-02 RX ORDER — SODIUM CHLORIDE 0.9 % (FLUSH) 0.9 %
5-40 SYRINGE (ML) INJECTION PRN
Status: CANCELLED | OUTPATIENT
Start: 2024-02-02

## 2024-02-02 RX ORDER — MEPERIDINE HYDROCHLORIDE 25 MG/ML
12.5 INJECTION INTRAMUSCULAR; INTRAVENOUS; SUBCUTANEOUS PRN
Status: DISCONTINUED | OUTPATIENT
Start: 2024-02-02 | End: 2024-02-03 | Stop reason: HOSPADM

## 2024-02-02 RX ORDER — SODIUM CHLORIDE 9 MG/ML
INJECTION, SOLUTION INTRAVENOUS CONTINUOUS
Status: CANCELLED | OUTPATIENT
Start: 2024-02-02

## 2024-02-02 RX ORDER — ONDANSETRON 2 MG/ML
8 INJECTION INTRAMUSCULAR; INTRAVENOUS
Status: DISCONTINUED | OUTPATIENT
Start: 2024-02-02 | End: 2024-02-03 | Stop reason: HOSPADM

## 2024-02-02 RX ADMIN — SODIUM CHLORIDE, PRESERVATIVE FREE 10 ML: 5 INJECTION INTRAVENOUS at 12:13

## 2024-02-02 RX ADMIN — SODIUM CHLORIDE 200 MG: 9 INJECTION, SOLUTION INTRAVENOUS at 12:35

## 2024-02-02 RX ADMIN — SODIUM CHLORIDE 25 ML/HR: 9 INJECTION, SOLUTION INTRAVENOUS at 12:13

## 2024-02-02 RX ADMIN — SODIUM CHLORIDE, PRESERVATIVE FREE 10 ML: 5 INJECTION INTRAVENOUS at 10:38

## 2024-02-02 NOTE — PROGRESS NOTES
Steve Mcgovern has been referred to the [facility] Program for evaluation for Left Atrial Appendage Closure with the Watchman device for management of stroke risk resulting from non-valvular atrial fibrillation.    Based on this patient's history of anemia in the setting of chemotherapy, it has been determined that he is a poor candidate for long-term oral anticoagulation, however may be tolerant of short term treatment needed for watchman implantation.     Atrial Fibrillation CHADSVASC2 Score Stroke Risk:   68 y.o. 65-74 - 1   male Male - 0   CHF HX: No - 0   HTN HX: Yes - 1   Stroke/TIA/Thromboembolism No - 0   Vascular Disease HX: Yes - 1   Diabetes Mellitus Yes - 1   CHADSVASC 2 Score 4      Annual Stroke Risk 4.8%- moderate-high      HAS-BLED Score     HTN Hx [x] 1 Point   Renal Disease  [] 1 Point   Liver Disease [] 1 Point   Stroke Hx [] 1 Point   Prior Major Bleeding or Predisposition [x] 1 Point   Labile INR [] 1 Point   Age > 65 Years Current: 68 y.o.   [x] 1 Point   Rx Usage Predisposing to Bleeding [] 1 Point   Alcohol Use (> or = 8 Drinks/wk) [] 1 Point   Total: UCHASBLED: Score 3 High Risk 5.8% Risk of major bleeding 3.72  Bleeds Per 100 pts years Alternatives to Anticoagulation can be considered       Dr. Fadi Licea and Dr. Vazquez Cowan have both had a face to face conversation with this patient explaining atrial fibrillation, stroke risk with atrial fibrillation, and the different treatment plans as related to their elevated stroke risk with atrial fibrillation. Based on stroke risk, as shown with [id number] score, and bleeding risk, as shown with HAS-BLED score, a shared decision has been made to pursue closure of the left atrial appendage as a safe and effective alternative to oral anticoagulation.

## 2024-02-02 NOTE — PROGRESS NOTES
[facility] Hematology and Oncology: Office Visit Established Patient    Reason for follow up:    Melanoma of shoulder, left   Cancer Staging   pT4b Melanoma of left shoulder/upper arm  Staging form: Melanoma Of The Skin, AJCC 8th Edition  - Pathologic stage from 6/25/2023: Stage IIC (pT4b, pN0, cM0) - Signed by Bartolo Arreola MD on 6/26/2023  BRAF mutation not detected    Overview: (copied from prior)  This is a 68 years old male patient with multiple medical problems including chronic systolic heart failure, liver cirrhosis, type 2 diabetes mellitus with diabetic polyneuropathy, obstructive sleep apnea and atrial fibrillation (on long-term anticoagulation with apixaban) who presents for postoperative management of recently diagnosed cutaneous melanoma involving left shoulder.  In August 2022, he noted a small pigmented lesion over anterior left shoulder which had continued to grow in size.  In February 2023, he underwent shave biopsy of the suspicious lesion and pathology returned as pT4b Breslow 6.6 mm, Ricco level IV, LVI +, angiotropism +, regression absent, ulceration present, microscopic satellitosis not identified, mitotic rate 14/mm².  PET/CT on 3/28/2023 showed hypermetabolism in the region of recently excised left shoulder melanoma.  No evidence of metastatic disease was found elsewhere in the head neck chest abdomen pelvis or lower extremities.  He underwent WLE left shoulder melanoma and left axillary sentinel node excision on 4/25/23 for a pT4bN0 melanoma of the left shoulder.  Pathology returned as residual nodular malignant melanoma, 18 mm in depth, Ricco's level 5, margins negative.  Left axillary sentinel lymph node was negative for tumor.  On evaluation today, he appears to have healed well from his recent surgery and denies any pain, swelling or drainage from the surgical wounds and left axilla and over anterior left shoulder.  He denies fever, cough, chest pain, nausea, vomiting, abdominal pain,

## 2024-02-02 NOTE — PROGRESS NOTES
Right chest port accessed with 0.75\" power needle. Positive blood return; labs drawn.  Port flushed and remains accessed for same day infusion appointment.    Patient discharged from port lab ambulatory.

## 2024-02-02 NOTE — PROGRESS NOTES
Patient arrived to infusion. Keytruda completed. Patient tolerated well. Port deaccessed. Discharged ambulatory. Patient aware of next infusion appt on 2/23.

## 2024-02-09 NOTE — PROGRESS NOTES
Patient pre-assessment complete for LAAO scheduled for 2/15/2024, arrival time 0800. Patient verified using . Patient instructed to bring all home medications in labeled bottles on the day of procedure. NPO status reinforced. Patient's last dose of metformin will be on 2023. His last dose of Ozempic was on 2024.  He will have only clear liquids the day prior to procedure.  Wednesday night dose of Lantus insulin will be 43 units (80% of usual dose). Patient will take allopurinol, inhalers, carvedilol, and omeprazole with a small sip of water, the morning of his procedure. Patient instructed to HOLD all other medications and supplements. Patient verbalizes understanding of all instructions & denies any questions at this time. Patient has watchman coordinator contact ([phone]) information.

## 2024-02-14 ENCOUNTER — ANESTHESIA EVENT (OUTPATIENT)
Dept: CARDIAC CATH/INVASIVE PROCEDURES | Age: 69
DRG: 274 | End: 2024-02-14
Payer: OTHER GOVERNMENT

## 2024-02-15 ENCOUNTER — ANESTHESIA (OUTPATIENT)
Dept: CARDIAC CATH/INVASIVE PROCEDURES | Age: 69
DRG: 274 | End: 2024-02-15
Payer: OTHER GOVERNMENT

## 2024-02-15 ENCOUNTER — APPOINTMENT (OUTPATIENT)
Dept: CARDIAC CATH/INVASIVE PROCEDURES | Age: 69
DRG: 274 | End: 2024-02-15
Attending: INTERNAL MEDICINE
Payer: OTHER GOVERNMENT

## 2024-02-15 ENCOUNTER — HOSPITAL ENCOUNTER (INPATIENT)
Age: 69
LOS: 1 days | Discharge: HOME | DRG: 274 | End: 2024-02-15
Attending: INTERNAL MEDICINE | Admitting: INTERNAL MEDICINE
Payer: OTHER GOVERNMENT

## 2024-02-15 VITALS
WEIGHT: 310 LBS | TEMPERATURE: 98 F | OXYGEN SATURATION: 93 % | RESPIRATION RATE: 24 BRPM | HEIGHT: 71 IN | SYSTOLIC BLOOD PRESSURE: 120 MMHG | DIASTOLIC BLOOD PRESSURE: 70 MMHG | HEART RATE: 70 BPM | BODY MASS INDEX: 43.4 KG/M2

## 2024-02-15 DIAGNOSIS — I48.21 PERMANENT ATRIAL FIBRILLATION (HCC): Primary | ICD-10-CM

## 2024-02-15 PROBLEM — Z95.818 PRESENCE OF WATCHMAN LEFT ATRIAL APPENDAGE CLOSURE DEVICE: Status: ACTIVE | Noted: 2024-02-15

## 2024-02-15 LAB
ACT BLD: 250 SECS (ref 70–128)
ANION GAP SERPL CALC-SCNC: 8 MMOL/L (ref 2–11)
BUN SERPL-MCNC: 13 MG/DL (ref 8–23)
CALCIUM SERPL-MCNC: 9.1 MG/DL (ref 8.3–10.4)
CHLORIDE SERPL-SCNC: 105 MMOL/L (ref 103–113)
CO2 SERPL-SCNC: 27 MMOL/L (ref 21–32)
CREAT SERPL-MCNC: 1.2 MG/DL (ref 0.8–1.5)
ECHO BSA: 2.65 M2
ECHO BSA: 2.65 M2
EKG ATRIAL RATE: 69 BPM
EKG DIAGNOSIS: NORMAL
EKG Q-T INTERVAL: 458 MS
EKG QRS DURATION: 124 MS
EKG QTC CALCULATION (BAZETT): 494 MS
EKG R AXIS: 157 DEGREES
EKG T AXIS: 44 DEGREES
EKG VENTRICULAR RATE: 70 BPM
GLUCOSE BLD STRIP.AUTO-MCNC: 81 MG/DL (ref 65–100)
GLUCOSE SERPL-MCNC: 120 MG/DL (ref 65–100)
HISTORY CHECK: NORMAL
INR PPP: 1
MAGNESIUM SERPL-MCNC: 1.6 MG/DL (ref 1.8–2.4)
POTASSIUM SERPL-SCNC: 3.6 MMOL/L (ref 3.5–5.1)
PROTHROMBIN TIME: 13.6 SEC (ref 11.3–14.9)
SERVICE CMNT-IMP: NORMAL
SODIUM SERPL-SCNC: 140 MMOL/L (ref 136–146)

## 2024-02-15 PROCEDURE — 3700000001 HC ADD 15 MINUTES (ANESTHESIA): Performed by: INTERNAL MEDICINE

## 2024-02-15 PROCEDURE — 6360000002 HC RX W HCPCS: Performed by: NURSE ANESTHETIST, CERTIFIED REGISTERED

## 2024-02-15 PROCEDURE — 85347 COAGULATION TIME ACTIVATED: CPT

## 2024-02-15 PROCEDURE — 7100000001 HC PACU RECOVERY - ADDTL 15 MIN: Performed by: INTERNAL MEDICINE

## 2024-02-15 PROCEDURE — 7100000000 HC PACU RECOVERY - FIRST 15 MIN: Performed by: INTERNAL MEDICINE

## 2024-02-15 PROCEDURE — 2500000003 HC RX 250 WO HCPCS: Performed by: NURSE ANESTHETIST, CERTIFIED REGISTERED

## 2024-02-15 PROCEDURE — C1893 INTRO/SHEATH, FIXED,NON-PEEL: HCPCS | Performed by: INTERNAL MEDICINE

## 2024-02-15 PROCEDURE — C1894 INTRO/SHEATH, NON-LASER: HCPCS | Performed by: INTERNAL MEDICINE

## 2024-02-15 PROCEDURE — 6360000002 HC RX W HCPCS: Performed by: INTERNAL MEDICINE

## 2024-02-15 PROCEDURE — 86850 RBC ANTIBODY SCREEN: CPT

## 2024-02-15 PROCEDURE — 2580000003 HC RX 258: Performed by: INTERNAL MEDICINE

## 2024-02-15 PROCEDURE — C1769 GUIDE WIRE: HCPCS | Performed by: INTERNAL MEDICINE

## 2024-02-15 PROCEDURE — 33340 PERQ CLSR TCAT L ATR APNDGE: CPT | Performed by: INTERNAL MEDICINE

## 2024-02-15 PROCEDURE — 2720000010 HC SURG SUPPLY STERILE: Performed by: INTERNAL MEDICINE

## 2024-02-15 PROCEDURE — C1753 CATH, INTRAVAS ULTRASOUND: HCPCS | Performed by: INTERNAL MEDICINE

## 2024-02-15 PROCEDURE — 3700000000 HC ANESTHESIA ATTENDED CARE: Performed by: INTERNAL MEDICINE

## 2024-02-15 PROCEDURE — 80048 BASIC METABOLIC PNL TOTAL CA: CPT

## 2024-02-15 PROCEDURE — 93325 DOPPLER ECHO COLOR FLOW MAPG: CPT

## 2024-02-15 PROCEDURE — C1889 IMPLANT/INSERT DEVICE, NOC: HCPCS | Performed by: INTERNAL MEDICINE

## 2024-02-15 PROCEDURE — 86923 COMPATIBILITY TEST ELECTRIC: CPT

## 2024-02-15 PROCEDURE — 82962 GLUCOSE BLOOD TEST: CPT

## 2024-02-15 PROCEDURE — 2709999900 HC NON-CHARGEABLE SUPPLY: Performed by: INTERNAL MEDICINE

## 2024-02-15 PROCEDURE — 2580000003 HC RX 258: Performed by: NURSE ANESTHETIST, CERTIFIED REGISTERED

## 2024-02-15 PROCEDURE — 93355 ECHO TRANSESOPHAGEAL (TEE): CPT | Performed by: INTERNAL MEDICINE

## 2024-02-15 PROCEDURE — 86901 BLOOD TYPING SEROLOGIC RH(D): CPT

## 2024-02-15 PROCEDURE — 83735 ASSAY OF MAGNESIUM: CPT

## 2024-02-15 PROCEDURE — 86900 BLOOD TYPING SEROLOGIC ABO: CPT

## 2024-02-15 PROCEDURE — 85610 PROTHROMBIN TIME: CPT

## 2024-02-15 PROCEDURE — 93005 ELECTROCARDIOGRAM TRACING: CPT | Performed by: INTERNAL MEDICINE

## 2024-02-15 PROCEDURE — 93662 INTRACARDIAC ECG (ICE): CPT | Performed by: INTERNAL MEDICINE

## 2024-02-15 PROCEDURE — 6360000004 HC RX CONTRAST MEDICATION: Performed by: INTERNAL MEDICINE

## 2024-02-15 PROCEDURE — C1760 CLOSURE DEV, VASC: HCPCS | Performed by: INTERNAL MEDICINE

## 2024-02-15 PROCEDURE — B244ZZ4 ULTRASONOGRAPHY OF RIGHT HEART, TRANSESOPHAGEAL: ICD-10-PCS | Performed by: INTERNAL MEDICINE

## 2024-02-15 PROCEDURE — 02L73DK OCCLUSION OF LEFT ATRIAL APPENDAGE WITH INTRALUMINAL DEVICE, PERCUTANEOUS APPROACH: ICD-10-PCS | Performed by: INTERNAL MEDICINE

## 2024-02-15 DEVICE — LEFT ATRIAL APPENDAGE CLOSURE DEVICE WITH DELIVERY SYSTEM
Type: IMPLANTABLE DEVICE | Status: FUNCTIONAL
Brand: WATCHMAN FLX™

## 2024-02-15 RX ORDER — MIDAZOLAM HYDROCHLORIDE 2 MG/2ML
2 INJECTION, SOLUTION INTRAMUSCULAR; INTRAVENOUS
Status: DISCONTINUED | OUTPATIENT
Start: 2024-02-15 | End: 2024-02-15 | Stop reason: HOSPADM

## 2024-02-15 RX ORDER — ONDANSETRON 2 MG/ML
INJECTION INTRAMUSCULAR; INTRAVENOUS PRN
Status: DISCONTINUED | OUTPATIENT
Start: 2024-02-15 | End: 2024-02-15 | Stop reason: SDUPTHER

## 2024-02-15 RX ORDER — SODIUM CHLORIDE, SODIUM LACTATE, POTASSIUM CHLORIDE, CALCIUM CHLORIDE 600; 310; 30; 20 MG/100ML; MG/100ML; MG/100ML; MG/100ML
INJECTION, SOLUTION INTRAVENOUS CONTINUOUS
Status: DISCONTINUED | OUTPATIENT
Start: 2024-02-15 | End: 2024-02-15 | Stop reason: HOSPADM

## 2024-02-15 RX ORDER — SODIUM CHLORIDE 9 MG/ML
INJECTION, SOLUTION INTRAVENOUS CONTINUOUS
Status: DISCONTINUED | OUTPATIENT
Start: 2024-02-15 | End: 2024-02-15 | Stop reason: HOSPADM

## 2024-02-15 RX ORDER — FENTANYL CITRATE 50 UG/ML
100 INJECTION, SOLUTION INTRAMUSCULAR; INTRAVENOUS
Status: DISCONTINUED | OUTPATIENT
Start: 2024-02-15 | End: 2024-02-15 | Stop reason: HOSPADM

## 2024-02-15 RX ORDER — HYDROMORPHONE HYDROCHLORIDE 2 MG/ML
0.25 INJECTION, SOLUTION INTRAMUSCULAR; INTRAVENOUS; SUBCUTANEOUS EVERY 5 MIN PRN
Status: DISCONTINUED | OUTPATIENT
Start: 2024-02-15 | End: 2024-02-15 | Stop reason: HOSPADM

## 2024-02-15 RX ORDER — PROPOFOL 10 MG/ML
INJECTION, EMULSION INTRAVENOUS PRN
Status: DISCONTINUED | OUTPATIENT
Start: 2024-02-15 | End: 2024-02-15 | Stop reason: SDUPTHER

## 2024-02-15 RX ORDER — HEPARIN SODIUM 1000 [USP'U]/ML
INJECTION, SOLUTION INTRAVENOUS; SUBCUTANEOUS PRN
Status: DISCONTINUED | OUTPATIENT
Start: 2024-02-15 | End: 2024-02-15 | Stop reason: SDUPTHER

## 2024-02-15 RX ORDER — DIAZEPAM 5 MG/1
5 TABLET ORAL EVERY 6 HOURS PRN
Status: DISCONTINUED | OUTPATIENT
Start: 2024-02-15 | End: 2024-02-15

## 2024-02-15 RX ORDER — ACETAMINOPHEN 500 MG
1000 TABLET ORAL ONCE
Status: DISCONTINUED | OUTPATIENT
Start: 2024-02-15 | End: 2024-02-15 | Stop reason: HOSPADM

## 2024-02-15 RX ORDER — LIDOCAINE HYDROCHLORIDE 20 MG/ML
INJECTION, SOLUTION EPIDURAL; INFILTRATION; INTRACAUDAL; PERINEURAL PRN
Status: DISCONTINUED | OUTPATIENT
Start: 2024-02-15 | End: 2024-02-15 | Stop reason: SDUPTHER

## 2024-02-15 RX ORDER — ONDANSETRON 2 MG/ML
4 INJECTION INTRAMUSCULAR; INTRAVENOUS
Status: DISCONTINUED | OUTPATIENT
Start: 2024-02-15 | End: 2024-02-15 | Stop reason: HOSPADM

## 2024-02-15 RX ORDER — OXYCODONE HYDROCHLORIDE 5 MG/1
10 TABLET ORAL PRN
Status: DISCONTINUED | OUTPATIENT
Start: 2024-02-15 | End: 2024-02-15 | Stop reason: HOSPADM

## 2024-02-15 RX ORDER — SODIUM CHLORIDE 0.9 % (FLUSH) 0.9 %
5-40 SYRINGE (ML) INJECTION EVERY 12 HOURS SCHEDULED
Status: DISCONTINUED | OUTPATIENT
Start: 2024-02-15 | End: 2024-02-15 | Stop reason: HOSPADM

## 2024-02-15 RX ORDER — DIPHENHYDRAMINE HYDROCHLORIDE 50 MG/ML
12.5 INJECTION INTRAMUSCULAR; INTRAVENOUS
Status: DISCONTINUED | OUTPATIENT
Start: 2024-02-15 | End: 2024-02-15 | Stop reason: HOSPADM

## 2024-02-15 RX ORDER — HYDROMORPHONE HYDROCHLORIDE 2 MG/ML
0.5 INJECTION, SOLUTION INTRAMUSCULAR; INTRAVENOUS; SUBCUTANEOUS EVERY 10 MIN PRN
Status: DISCONTINUED | OUTPATIENT
Start: 2024-02-15 | End: 2024-02-15 | Stop reason: HOSPADM

## 2024-02-15 RX ORDER — SODIUM CHLORIDE 0.9 % (FLUSH) 0.9 %
5-40 SYRINGE (ML) INJECTION PRN
Status: DISCONTINUED | OUTPATIENT
Start: 2024-02-15 | End: 2024-02-15 | Stop reason: HOSPADM

## 2024-02-15 RX ORDER — DEXAMETHASONE SODIUM PHOSPHATE 4 MG/ML
INJECTION, SOLUTION INTRA-ARTICULAR; INTRALESIONAL; INTRAMUSCULAR; INTRAVENOUS; SOFT TISSUE PRN
Status: DISCONTINUED | OUTPATIENT
Start: 2024-02-15 | End: 2024-02-15 | Stop reason: SDUPTHER

## 2024-02-15 RX ORDER — SODIUM CHLORIDE 9 MG/ML
INJECTION, SOLUTION INTRAVENOUS PRN
Status: DISCONTINUED | OUTPATIENT
Start: 2024-02-15 | End: 2024-02-15 | Stop reason: HOSPADM

## 2024-02-15 RX ORDER — SUCCINYLCHOLINE CHLORIDE 20 MG/ML
INJECTION INTRAMUSCULAR; INTRAVENOUS PRN
Status: DISCONTINUED | OUTPATIENT
Start: 2024-02-15 | End: 2024-02-15 | Stop reason: SDUPTHER

## 2024-02-15 RX ORDER — HEPARIN 100 UNIT/ML
300 SYRINGE INTRAVENOUS ONCE
Status: COMPLETED | OUTPATIENT
Start: 2024-02-15 | End: 2024-02-15

## 2024-02-15 RX ORDER — LIDOCAINE HYDROCHLORIDE 10 MG/ML
1 INJECTION, SOLUTION INFILTRATION; PERINEURAL
Status: DISCONTINUED | OUTPATIENT
Start: 2024-02-15 | End: 2024-02-15 | Stop reason: HOSPADM

## 2024-02-15 RX ORDER — FAMOTIDINE 20 MG/1
20 TABLET, FILM COATED ORAL ONCE
Status: DISCONTINUED | OUTPATIENT
Start: 2024-02-15 | End: 2024-02-15 | Stop reason: HOSPADM

## 2024-02-15 RX ORDER — OXYCODONE HYDROCHLORIDE 5 MG/1
5 TABLET ORAL PRN
Status: DISCONTINUED | OUTPATIENT
Start: 2024-02-15 | End: 2024-02-15 | Stop reason: HOSPADM

## 2024-02-15 RX ORDER — MAGNESIUM SULFATE IN WATER 40 MG/ML
2000 INJECTION, SOLUTION INTRAVENOUS ONCE
Status: COMPLETED | OUTPATIENT
Start: 2024-02-15 | End: 2024-02-15

## 2024-02-15 RX ORDER — ROCURONIUM BROMIDE 10 MG/ML
INJECTION, SOLUTION INTRAVENOUS PRN
Status: DISCONTINUED | OUTPATIENT
Start: 2024-02-15 | End: 2024-02-15 | Stop reason: SDUPTHER

## 2024-02-15 RX ORDER — HEPARIN SODIUM 200 [USP'U]/100ML
INJECTION, SOLUTION INTRAVENOUS CONTINUOUS PRN
Status: COMPLETED | OUTPATIENT
Start: 2024-02-15 | End: 2024-02-15

## 2024-02-15 RX ADMIN — MAGNESIUM SULFATE HEPTAHYDRATE 2000 MG: 40 INJECTION, SOLUTION INTRAVENOUS at 10:17

## 2024-02-15 RX ADMIN — SUGAMMADEX 200 MG: 100 INJECTION, SOLUTION INTRAVENOUS at 11:43

## 2024-02-15 RX ADMIN — Medication 200 MG: at 10:42

## 2024-02-15 RX ADMIN — SODIUM CHLORIDE, SODIUM LACTATE, POTASSIUM CHLORIDE, AND CALCIUM CHLORIDE: 600; 310; 30; 20 INJECTION, SOLUTION INTRAVENOUS at 10:29

## 2024-02-15 RX ADMIN — HEPARIN 300 UNITS: 100 SYRINGE at 15:52

## 2024-02-15 RX ADMIN — ROCURONIUM BROMIDE 30 MG: 50 INJECTION, SOLUTION INTRAVENOUS at 10:49

## 2024-02-15 RX ADMIN — PHENYLEPHRINE HYDROCHLORIDE 100 MCG: 10 INJECTION INTRAVENOUS at 10:54

## 2024-02-15 RX ADMIN — DEXAMETHASONE SODIUM PHOSPHATE 4 MG: 4 INJECTION, SOLUTION INTRAMUSCULAR; INTRAVENOUS at 10:50

## 2024-02-15 RX ADMIN — HEPARIN SODIUM 7000 UNITS: 1000 INJECTION INTRAVENOUS; SUBCUTANEOUS at 11:01

## 2024-02-15 RX ADMIN — CEFAZOLIN 3000 MG: 10 INJECTION, POWDER, FOR SOLUTION INTRAVENOUS at 10:50

## 2024-02-15 RX ADMIN — PHENYLEPHRINE HYDROCHLORIDE 100 MCG: 10 INJECTION INTRAVENOUS at 10:56

## 2024-02-15 RX ADMIN — ONDANSETRON 44 MG: 2 INJECTION INTRAMUSCULAR; INTRAVENOUS at 10:57

## 2024-02-15 RX ADMIN — HEPARIN SODIUM 7000 UNITS: 1000 INJECTION INTRAVENOUS; SUBCUTANEOUS at 11:23

## 2024-02-15 RX ADMIN — LIDOCAINE HYDROCHLORIDE 80 MG: 20 INJECTION, SOLUTION EPIDURAL; INFILTRATION; INTRACAUDAL; PERINEURAL at 10:42

## 2024-02-15 RX ADMIN — PROPOFOL 200 MG: 10 INJECTION, EMULSION INTRAVENOUS at 10:42

## 2024-02-15 NOTE — DISCHARGE SUMMARY
extended release tablet Comments:   Reason for Stopping:             Note some or all of the above medications that were set to stop by the system but the pt was not taking in the outpatient setting.  Limitations in the system make it appear that we have stopped the medications when we have not.        Signed:  QEU Sung CNP  2/15/2024  3:04 PM

## 2024-02-15 NOTE — PROGRESS NOTES
Assisted OOB & ambulated to BR & on unit. Tolerated activity without difficulty. R groin without bleeding or hematoma

## 2024-02-15 NOTE — PROGRESS NOTES
TRANSFER - IN REPORT:    Verbal report received from LUCY Latif on Steve Mcgovern being received from PACU for routine post-op      Report consisted of patient’s Situation, Background, Assessment and Recommendations(SBAR).     Information from the following report(s) Procedure Summary was reviewed with the receiving nurse.    Opportunity for questions and clarification was provided.      Assessment completed upon patient’s arrival to unit and care assumed.

## 2024-02-15 NOTE — ANESTHESIA POSTPROCEDURE EVALUATION
Department of Anesthesiology  Postprocedure Note    Patient: Steve Mcgovern  MRN: [id number]  YOB: 1955  Date of evaluation: 2/15/2024    Procedure Summary       Date: 02/15/24 Room / Location: [facility] 2 ALL EVENTS / [facility] CARDIAC CATH LAB    Anesthesia Start: 1029 Anesthesia Stop: 1201    Procedure: Watchman hussein closure device Diagnosis: Permanent atrial fibrillation (HCC)    Providers: Fadi Licea MD Responsible Provider: DEVAN Arce MD    Anesthesia Type: general ASA Status: 3            Anesthesia Type: No value filed.    Gisella Phase I: Gisella Score: 9    Gisella Phase II:      Anesthesia Post Evaluation    Patient location during evaluation: PACU  Patient participation: complete - patient participated  Level of consciousness: awake and alert  Airway patency: patent  Nausea & Vomiting: no nausea and no vomiting  Cardiovascular status: hemodynamically stable  Respiratory status: acceptable  Hydration status: euvolemic  Comments: Blood pressure 124/72, pulse 69, temperature 98 °F (36.7 °C), temperature source Temporal, resp. rate 16, height 1.803 m (5' 11\"), weight (!) 140.6 kg (310 lb), SpO2 95 %.    No apparent anesthetic complications.  Pt stable for discharge from PACU  Multimodal analgesia pain management approach  Pain management: adequate    There were no known notable events for this encounter.

## 2024-02-15 NOTE — ANESTHESIA PRE PROCEDURE
without obstruction or gangrene K43.9    COVID-19 vaccination refused Z28.21    Pulmonary hypertension (HCC) I27.20    Seasonal allergic rhinitis due to pollen J30.1    Longstanding persistent atrial fibrillation (HCC) I48.11    Iron deficiency anemia, unspecified D50.9    Class 3 severe obesity due to excess calories with serious comorbidity and body mass index (BMI) of 45.0 to 49.9 in adult (HCC) E66.01, Z68.42    Long term (current) use of anticoagulants Z79.01    Actinic keratoses L57.0    Presence of cardiac pacemaker Z95.0    History of colonic polyps Z86.010    Hypercholesterolemia E78.00    Hepatic steatosis K76.0    Type 2 diabetes mellitus with diabetic polyneuropathy, with long-term current use of insulin ([facility]) E11.42, Z79.4    History of acute pancreatitis Z87.19    Gastroesophageal reflux disease K21.9    Asthma J45.909    Idiopathic chronic gout of multiple sites without tophus M1A.09X0    High risk medication use Z79.899    Shortness of breath R06.02    Refused varicella vaccine Z28.21    Vitamin D deficiency E55.9    Chronic right-sided low back pain without sciatica M54.50, G89.29    Type 2 diabetes mellitus with stage 3b chronic kidney disease, with long-term current use of insulin ([facility]) E11.22, N18.32, Z79.4    Presbyopia H52.4    Type 2 diabetes mellitus with microalbuminuria, with long-term current use of insulin ([facility]) E11.29, R80.9, Z79.4    Albuminuria R80.9    Chronic systolic (congestive) heart failure I50.22    Acute gastritis with hemorrhage K29.01    Acute non-recurrent ethmoidal sinusitis J01.20    Benign colonic polyp K63.5    Chronic obstructive pulmonary disease (HCC) J44.9    Chronic rhinitis J31.0    Digestive system disorder K92.9    Disorder of lung J98.4    Neuropathy G62.9    Normochromic normocytic anemia D64.9    Numbness R20.0    Osteoarthritis M19.90    Encounter for monitoring allopurinol therapy Z51.81, Z79.899    Atypical pigmented skin lesion L81.9    Anticoagulated

## 2024-02-15 NOTE — DISCHARGE INSTRUCTIONS
Watchman Discharge Instructions      Activity:    Follow your doctor’s recommendations  Return to normal activities gradually, pacing yourself as you feel better, resting when tired.  Activity should be limited for the next 48 hours. Climb stairs as little as possible and avoid any stooping, bending or strenuous activity for 48 hours. No heavy lifting (anything over 10 pounds) for five days.  Do not drive for 48 hoursHave a responsible person drive you home and stay with you for at least 24 hours after your heart catheterization/angiography.  Check the puncture site frequently for swelling or bleeding. If you see any bleeding, lie down and apply pressure over the area with a clean town or washcloth. Notify your doctor for any redness, swelling, drainage or oozing from the puncture site. Notify your doctor for any fever or chills.  You may resume your usual diet. Drink more fluids than usual.        Incision / Wound Care      Cleanse wounds with mild soap and water. Keep wound dry.  A small amount of bloody or clear drainage is normal.  Watch for redness, swelling, incision site hot to touch, foul or colored drainage from the incision site, these are all signs of infection and should be reported immediately to the physicians.  If there is site concern please notify your implanting physician.  You may remove the bandage from your Right and Groin in 24 hours. You may shower in 24 hours. No tub baths, hot tubs or swimming for one week. Do not place any lotions, creams, powders, ointments over the puncture site for one week. You may place a clean band-aid over the puncture site each day for 5 days. Change this daily.     Continued        Medications:  DO NOT STOP TAKING YOUR ELIQUIS WITHOUT SPEAKING WITH YOUR CARDIOLOGIST FIRST!  Take your medications as ordered at the time of discharge.  Do NOT stop taking any medications without first discussing it with your doctor.  Notify all your doctors of current medication

## 2024-02-15 NOTE — PROGRESS NOTES
Discharge instructions given per orders, voiced good understanding of R groin care, medications & follow up care. Denies any questions

## 2024-02-16 LAB
ABO + RH BLD: NORMAL
BLD PROD TYP BPU: NORMAL
BLD PROD TYP BPU: NORMAL
BLOOD BANK DISPENSE STATUS: NORMAL
BLOOD BANK DISPENSE STATUS: NORMAL
BLOOD GROUP ANTIBODIES SERPL: NORMAL
BPU ID: NORMAL
BPU ID: NORMAL
CROSSMATCH RESULT: NORMAL
CROSSMATCH RESULT: NORMAL
SPECIMEN EXP DATE BLD: NORMAL
UNIT DIVISION: 0
UNIT DIVISION: 0

## 2024-02-19 ENCOUNTER — TELEPHONE (OUTPATIENT)
Dept: CARDIAC CATH/INVASIVE PROCEDURES | Age: 69
End: 2024-02-19

## 2024-02-19 ENCOUNTER — TELEPHONE (OUTPATIENT)
Age: 69
End: 2024-02-19

## 2024-02-19 NOTE — TELEPHONE ENCOUNTER
I gave a WM patient a 30 day trial for Eliquis card last Thursday.  The pharmacy says that he's already had one a couple of years ago.  He needs eliquis 5 mg BID for the next 40 days. Per Lisa Cantor.    Samples put at  MA for patient to //brendab

## 2024-02-19 NOTE — TELEPHONE ENCOUNTER
Late entry - SDD follow up call 2/16/2024    Post Watchman SDD follow up call.     Mr. Mcgovern is 24 hours post Watchman device placement and SDD on 2/15/2024.  He is doing well w/o complaints.  He denies pain, swelling, and bleeding to the right groin puncture site.  He denies CP and SOB. He resumed Eliquis this am as prescribed per Dr. Licea.  Upcoming appts were reviewed. Patient has less eliquis than previously thought at home. Eliquis card and prescription phoned in to [facility] per patient request. Patient has WM coordinator contact ([phone]) information.    Eliquis 5 mg BID  #60 with 1 refill    [id number] [address] - P [phone] - F [phone] [[id number]]     Eliquis Free 30-Day Trial Offer  RX  BIN:  [id number]  GRP: [id number]  RxPCN:  [id number]   ID:  [phone]    Pharmacy called that free trial is not valid for this patient. Patient has received a free trial in the past.  This offer is valid for a one month trial per lifetime.    Eliquis samples are available for pickup at the  of the [address] office in [place] per Melany GUERRERO

## 2024-02-23 ENCOUNTER — OFFICE VISIT (OUTPATIENT)
Dept: ONCOLOGY | Age: 69
End: 2024-02-23
Payer: MEDICARE

## 2024-02-23 ENCOUNTER — HOSPITAL ENCOUNTER (OUTPATIENT)
Dept: LAB | Age: 69
Discharge: HOME | End: 2024-02-23
Payer: OTHER GOVERNMENT

## 2024-02-23 ENCOUNTER — HOSPITAL ENCOUNTER (OUTPATIENT)
Dept: INFUSION THERAPY | Age: 69
Discharge: HOME | End: 2024-02-23
Payer: OTHER GOVERNMENT

## 2024-02-23 VITALS
BODY MASS INDEX: 45.1 KG/M2 | SYSTOLIC BLOOD PRESSURE: 127 MMHG | DIASTOLIC BLOOD PRESSURE: 78 MMHG | HEIGHT: 70 IN | WEIGHT: 315 LBS | HEART RATE: 80 BPM | OXYGEN SATURATION: 97 % | TEMPERATURE: 97.5 F | RESPIRATION RATE: 20 BRPM

## 2024-02-23 DIAGNOSIS — C43.9 MALIGNANT MELANOMA, UNSPECIFIED SITE (HCC): Primary | ICD-10-CM

## 2024-02-23 DIAGNOSIS — Z79.899 HIGH RISK MEDICATION USE: ICD-10-CM

## 2024-02-23 DIAGNOSIS — E03.2 HYPOTHYROIDISM DUE TO MEDICATION: ICD-10-CM

## 2024-02-23 DIAGNOSIS — R53.82 CHRONIC FATIGUE: ICD-10-CM

## 2024-02-23 DIAGNOSIS — C43.9 MALIGNANT MELANOMA, UNSPECIFIED SITE (HCC): ICD-10-CM

## 2024-02-23 LAB
ALBUMIN SERPL-MCNC: 2.9 G/DL (ref 3.2–4.6)
ALBUMIN/GLOB SERPL: 0.7 (ref 0.4–1.6)
ALP SERPL-CCNC: 116 U/L (ref 50–136)
ALT SERPL-CCNC: 17 U/L (ref 12–65)
ANION GAP SERPL CALC-SCNC: 6 MMOL/L (ref 2–11)
AST SERPL-CCNC: 17 U/L (ref 15–37)
BASOPHILS # BLD: 0.1 K/UL (ref 0–0.2)
BASOPHILS NFR BLD: 1 % (ref 0–2)
BILIRUB SERPL-MCNC: 0.4 MG/DL (ref 0.2–1.1)
BUN SERPL-MCNC: 21 MG/DL (ref 8–23)
CALCIUM SERPL-MCNC: 8.9 MG/DL (ref 8.3–10.4)
CHLORIDE SERPL-SCNC: 107 MMOL/L (ref 103–113)
CO2 SERPL-SCNC: 26 MMOL/L (ref 21–32)
CREAT SERPL-MCNC: 1.3 MG/DL (ref 0.8–1.5)
DIFFERENTIAL METHOD BLD: ABNORMAL
EOSINOPHIL # BLD: 0.4 K/UL (ref 0–0.8)
EOSINOPHIL NFR BLD: 5 % (ref 0.5–7.8)
ERYTHROCYTE [DISTWIDTH] IN BLOOD BY AUTOMATED COUNT: 15.5 % (ref 11.9–14.6)
GLOBULIN SER CALC-MCNC: 4.1 G/DL (ref 2.8–4.5)
GLUCOSE SERPL-MCNC: 144 MG/DL (ref 65–100)
HCT VFR BLD AUTO: 38.5 % (ref 41.1–50.3)
HGB BLD-MCNC: 12.4 G/DL (ref 13.6–17.2)
IMM GRANULOCYTES # BLD AUTO: 0.1 K/UL (ref 0–0.5)
IMM GRANULOCYTES NFR BLD AUTO: 1 % (ref 0–5)
LYMPHOCYTES # BLD: 2.3 K/UL (ref 0.5–4.6)
LYMPHOCYTES NFR BLD: 27 % (ref 13–44)
MCH RBC QN AUTO: 29.2 PG (ref 26.1–32.9)
MCHC RBC AUTO-ENTMCNC: 32.2 G/DL (ref 31.4–35)
MCV RBC AUTO: 90.8 FL (ref 82–102)
MONOCYTES # BLD: 0.8 K/UL (ref 0.1–1.3)
MONOCYTES NFR BLD: 9 % (ref 4–12)
NEUTS SEG # BLD: 5.1 K/UL (ref 1.7–8.2)
NEUTS SEG NFR BLD: 57 % (ref 43–78)
NRBC # BLD: 0 K/UL (ref 0–0.2)
PLATELET # BLD AUTO: 314 K/UL (ref 150–450)
PMV BLD AUTO: 9.6 FL (ref 9.4–12.3)
POTASSIUM SERPL-SCNC: 3.9 MMOL/L (ref 3.5–5.1)
PROT SERPL-MCNC: 7 G/DL (ref 6.3–8.2)
RBC # BLD AUTO: 4.24 M/UL (ref 4.23–5.6)
SODIUM SERPL-SCNC: 139 MMOL/L (ref 136–146)
TSH W FREE THYROID IF ABNORMAL: 2.99 UIU/ML (ref 0.36–3.74)
WBC # BLD AUTO: 8.6 K/UL (ref 4.3–11.1)

## 2024-02-23 PROCEDURE — 1123F ACP DISCUSS/DSCN MKR DOCD: CPT | Performed by: NURSE PRACTITIONER

## 2024-02-23 PROCEDURE — 80053 COMPREHEN METABOLIC PANEL: CPT

## 2024-02-23 PROCEDURE — 99214 OFFICE O/P EST MOD 30 MIN: CPT | Performed by: NURSE PRACTITIONER

## 2024-02-23 PROCEDURE — 2580000003 HC RX 258: Performed by: NURSE PRACTITIONER

## 2024-02-23 PROCEDURE — 6360000002 HC RX W HCPCS: Performed by: NURSE PRACTITIONER

## 2024-02-23 PROCEDURE — 96413 CHEMO IV INFUSION 1 HR: CPT

## 2024-02-23 PROCEDURE — 3078F DIAST BP <80 MM HG: CPT | Performed by: NURSE PRACTITIONER

## 2024-02-23 PROCEDURE — 2580000003 HC RX 258: Performed by: INTERNAL MEDICINE

## 2024-02-23 PROCEDURE — 84443 ASSAY THYROID STIM HORMONE: CPT

## 2024-02-23 PROCEDURE — 3074F SYST BP LT 130 MM HG: CPT | Performed by: NURSE PRACTITIONER

## 2024-02-23 PROCEDURE — 85025 COMPLETE CBC W/AUTO DIFF WBC: CPT

## 2024-02-23 RX ORDER — SODIUM CHLORIDE 9 MG/ML
INJECTION, SOLUTION INTRAVENOUS CONTINUOUS
Status: DISCONTINUED | OUTPATIENT
Start: 2024-02-23 | End: 2024-02-24 | Stop reason: HOSPADM

## 2024-02-23 RX ORDER — SODIUM CHLORIDE 9 MG/ML
5-250 INJECTION, SOLUTION INTRAVENOUS PRN
Status: CANCELLED | OUTPATIENT
Start: 2024-02-23

## 2024-02-23 RX ORDER — MEPERIDINE HYDROCHLORIDE 25 MG/ML
12.5 INJECTION INTRAMUSCULAR; INTRAVENOUS; SUBCUTANEOUS PRN
Status: DISCONTINUED | OUTPATIENT
Start: 2024-02-23 | End: 2024-02-24 | Stop reason: HOSPADM

## 2024-02-23 RX ORDER — SODIUM CHLORIDE 0.9 % (FLUSH) 0.9 %
5-40 SYRINGE (ML) INJECTION PRN
Status: DISCONTINUED | OUTPATIENT
Start: 2024-02-23 | End: 2024-02-24 | Stop reason: HOSPADM

## 2024-02-23 RX ORDER — SODIUM CHLORIDE 0.9 % (FLUSH) 0.9 %
5-40 SYRINGE (ML) INJECTION PRN
Status: CANCELLED | OUTPATIENT
Start: 2024-02-23

## 2024-02-23 RX ORDER — EPINEPHRINE 1 MG/ML
0.3 INJECTION, SOLUTION, CONCENTRATE INTRAVENOUS PRN
Status: CANCELLED | OUTPATIENT
Start: 2024-02-23

## 2024-02-23 RX ORDER — ONDANSETRON 2 MG/ML
8 INJECTION INTRAMUSCULAR; INTRAVENOUS
Status: DISCONTINUED | OUTPATIENT
Start: 2024-02-23 | End: 2024-02-24 | Stop reason: HOSPADM

## 2024-02-23 RX ORDER — DIPHENHYDRAMINE HYDROCHLORIDE 50 MG/ML
50 INJECTION INTRAMUSCULAR; INTRAVENOUS
Status: DISCONTINUED | OUTPATIENT
Start: 2024-02-23 | End: 2024-02-24 | Stop reason: HOSPADM

## 2024-02-23 RX ORDER — ONDANSETRON 2 MG/ML
8 INJECTION INTRAMUSCULAR; INTRAVENOUS
Status: CANCELLED | OUTPATIENT
Start: 2024-02-23

## 2024-02-23 RX ORDER — SODIUM CHLORIDE 9 MG/ML
INJECTION, SOLUTION INTRAVENOUS CONTINUOUS
Status: CANCELLED | OUTPATIENT
Start: 2024-02-23

## 2024-02-23 RX ORDER — ALBUTEROL SULFATE 90 UG/1
4 AEROSOL, METERED RESPIRATORY (INHALATION) PRN
Status: CANCELLED | OUTPATIENT
Start: 2024-02-23

## 2024-02-23 RX ORDER — SODIUM CHLORIDE 9 MG/ML
5-250 INJECTION, SOLUTION INTRAVENOUS PRN
Status: DISCONTINUED | OUTPATIENT
Start: 2024-02-23 | End: 2024-02-24 | Stop reason: HOSPADM

## 2024-02-23 RX ORDER — ACETAMINOPHEN 325 MG/1
650 TABLET ORAL
Status: CANCELLED | OUTPATIENT
Start: 2024-02-23

## 2024-02-23 RX ORDER — ALBUTEROL SULFATE 90 UG/1
4 AEROSOL, METERED RESPIRATORY (INHALATION) PRN
Status: DISCONTINUED | OUTPATIENT
Start: 2024-02-23 | End: 2024-02-24 | Stop reason: HOSPADM

## 2024-02-23 RX ORDER — MEPERIDINE HYDROCHLORIDE 25 MG/ML
12.5 INJECTION INTRAMUSCULAR; INTRAVENOUS; SUBCUTANEOUS PRN
Status: CANCELLED | OUTPATIENT
Start: 2024-02-23

## 2024-02-23 RX ORDER — HEPARIN 100 UNIT/ML
500 SYRINGE INTRAVENOUS PRN
Status: DISCONTINUED | OUTPATIENT
Start: 2024-02-23 | End: 2024-02-24 | Stop reason: HOSPADM

## 2024-02-23 RX ORDER — ACETAMINOPHEN 325 MG/1
650 TABLET ORAL
Status: DISCONTINUED | OUTPATIENT
Start: 2024-02-23 | End: 2024-02-24 | Stop reason: HOSPADM

## 2024-02-23 RX ORDER — SODIUM CHLORIDE 0.9 % (FLUSH) 0.9 %
5-40 SYRINGE (ML) INJECTION 2 TIMES DAILY
Status: DISCONTINUED | OUTPATIENT
Start: 2024-02-23 | End: 2024-02-27 | Stop reason: HOSPADM

## 2024-02-23 RX ORDER — DIPHENHYDRAMINE HYDROCHLORIDE 50 MG/ML
50 INJECTION INTRAMUSCULAR; INTRAVENOUS
Status: CANCELLED | OUTPATIENT
Start: 2024-02-23

## 2024-02-23 RX ORDER — EPINEPHRINE 1 MG/ML
0.3 INJECTION, SOLUTION, CONCENTRATE INTRAVENOUS PRN
Status: DISCONTINUED | OUTPATIENT
Start: 2024-02-23 | End: 2024-02-24 | Stop reason: HOSPADM

## 2024-02-23 RX ORDER — HEPARIN 100 UNIT/ML
500 SYRINGE INTRAVENOUS PRN
Status: CANCELLED | OUTPATIENT
Start: 2024-02-23

## 2024-02-23 RX ADMIN — SODIUM CHLORIDE, PRESERVATIVE FREE 10 ML: 5 INJECTION INTRAVENOUS at 10:00

## 2024-02-23 RX ADMIN — SODIUM CHLORIDE, PRESERVATIVE FREE 10 ML: 5 INJECTION INTRAVENOUS at 11:49

## 2024-02-23 RX ADMIN — SODIUM CHLORIDE 100 ML/HR: 9 INJECTION, SOLUTION INTRAVENOUS at 11:50

## 2024-02-23 RX ADMIN — SODIUM CHLORIDE 200 MG: 9 INJECTION, SOLUTION INTRAVENOUS at 12:11

## 2024-02-23 RX ADMIN — SODIUM CHLORIDE, PRESERVATIVE FREE 10 ML: 5 INJECTION INTRAVENOUS at 12:45

## 2024-02-23 ASSESSMENT — PATIENT HEALTH QUESTIONNAIRE - PHQ9
SUM OF ALL RESPONSES TO PHQ QUESTIONS 1-9: 0
SUM OF ALL RESPONSES TO PHQ9 QUESTIONS 1 & 2: 0
1. LITTLE INTEREST OR PLEASURE IN DOING THINGS: 0
2. FEELING DOWN, DEPRESSED OR HOPELESS: 0
SUM OF ALL RESPONSES TO PHQ QUESTIONS 1-9: 0

## 2024-02-23 NOTE — PROGRESS NOTES
Arrived to the Infusion Center.  Keytruda completed. Patient tolerated without difficulty.   Any issues or concerns during appointment: None.  Patient aware of next infusion appointment on 03/15 (date) at 1215 (time).  Patient aware of next lab and [facility] office visit on   Patient instructed to call provider with temperature of 100.4 or greater or nausea/vomiting/ diarrhea or pain not controlled by medications  Discharged ambulatory.

## 2024-02-23 NOTE — PROGRESS NOTES
being this past Wednesday. He reports his \"ears cleared\" this morning finally. He had some diarrhea with the steroids but this has resolved since being off the steroids. No other GI issues. His cough from his URI is improving. His SOB is chronic and at baseline. No skin changes. No recent fevers. He denies any pain. He is having a watchman procedure on 2/15. VS and labs reviewed and stable. Creat is slightly up from his normal. His PCP told him to decrease his Demadex dose and he has not done this yet. He will make his PCP aware of today's creat. His BGL was elevated on labs as well d/t patient correcting his hypoglycemia this AM. All questions were answered to the best of my ability. He will call our office with any questions/concerns. Return in three weeks for next OV/Keytruda.     2/23/24: Here today for follow up and next pembrolizumab. He had a recent Watchman implanted - still a little sore. He otherwise has had no changes. Energy is fair. Appetite is good. He has no GI issues. No new or worsening dyspnea. No worsening swelling. No headaches or dizziness. Labs reviewed and okay to proceed with next pembrolizumab. He will follow up in 3 weeks as planned or sooner if needed.       CADENCE Adams  [facility] Hematology and Oncology  [address] [place] [address]  Office : [phone]  Fax : [phone]

## 2024-02-23 NOTE — PROGRESS NOTES
Patient to port lab for port access and lab draw. Port accessed using 20g 0.75\" power needle without difficulty. Labs drawn from port and port flushed. Port remains accessed. Patient tolerated well. Discharged ambulatory.

## 2024-03-07 ENCOUNTER — HOSPITAL ENCOUNTER (OUTPATIENT)
Dept: CT IMAGING | Age: 69
Discharge: HOME | End: 2024-03-07
Attending: INTERNAL MEDICINE
Payer: OTHER GOVERNMENT

## 2024-03-07 DIAGNOSIS — C43.9 MALIGNANT MELANOMA, UNSPECIFIED SITE (HCC): ICD-10-CM

## 2024-03-07 PROCEDURE — 6360000004 HC RX CONTRAST MEDICATION: Performed by: INTERNAL MEDICINE

## 2024-03-07 PROCEDURE — 74176 CT ABD & PELVIS W/O CONTRAST: CPT

## 2024-03-07 RX ADMIN — DIATRIZOATE MEGLUMINE AND DIATRIZOATE SODIUM 15 ML: 660; 100 LIQUID ORAL; RECTAL at 10:47

## 2024-03-08 NOTE — PROGRESS NOTES
black or bloody stools.  He reports diabetic neuropathy involving his feet, it is not painful nor does it cause issues with balance.      Interval history:  Interval history updated in the assessment and plan.      Review of Systems:  14 point ROS was negative except as per HPI      ECOG PERFORMANCE STATUS - 1- Restricted in physically strenuous activity but ambulatory and able to carry out work of a light or sedentary nature such as light house work, office work.     Pain - Pain Score:   0 - No pain (fatigue 4')/10. None/Minimal pain - not affecting QOL        Reviewed and updated this visit by provider:  Tobacco  Allergies  Meds  Problems  Med Hx  Surg Hx  Fam Hx          Current Outpatient Medications   Medication Sig Dispense Refill    apixaban (ELIQUIS) 5 MG TABS tablet Take 1 tablet by mouth 2 times daily 60 tablet 1    vitamin B-12 (CYANOCOBALAMIN) 1000 MCG tablet Take 1 tablet by mouth daily      acetaminophen (TYLENOL) 500 MG tablet Take 1 tablet by mouth every 6 hours as needed for Pain      fluticasone-salmeterol (ADVAIR) 100-50 MCG/ACT AEPB diskus inhaler Inhale 1 puff into the lungs in the morning and 1 puff in the evening.      atorvastatin (LIPITOR) 10 MG tablet Take 1 tablet by mouth daily 90 tablet 3    LANTUS SOLOSTAR 100 UNIT/ML injection pen Inject 54 Units into the skin 2 times daily 90 mL 3    OZEMPIC, 1 MG/DOSE, 4 MG/3ML SOPN Inject 1 mg into the skin once a week 9 mL 3    metFORMIN (GLUCOPHAGE) 500 MG tablet Take 1 tablet by mouth 2 times daily (with meals) 180 tablet 3    pioglitazone (ACTOS) 30 MG tablet Take 0.5 tablets by mouth daily 45 tablet 3    carvedilol (COREG) 12.5 MG tablet Take 0.5 tablets by mouth 2 times daily (with meals) 90 tablet 3    triamcinolone (KENALOG) 0.1 % cream       ondansetron (ZOFRAN) 8 MG tablet Take 1 tablet by mouth every 8 hours as needed for Nausea or Vomiting 30 tablet 0    hydrOXYzine HCl (ATARAX) 25 MG tablet Take 1 tablet by mouth every 4 hours as

## 2024-03-11 DIAGNOSIS — E03.2 HYPOTHYROIDISM DUE TO MEDICATION: Primary | ICD-10-CM

## 2024-03-11 DIAGNOSIS — C43.9 MALIGNANT MELANOMA, UNSPECIFIED SITE (HCC): ICD-10-CM

## 2024-03-15 ENCOUNTER — HOSPITAL ENCOUNTER (OUTPATIENT)
Dept: LAB | Age: 69
End: 2024-03-15
Payer: OTHER GOVERNMENT

## 2024-03-15 ENCOUNTER — HOSPITAL ENCOUNTER (OUTPATIENT)
Dept: INFUSION THERAPY | Age: 69
Discharge: HOME | End: 2024-03-15
Payer: OTHER GOVERNMENT

## 2024-03-15 ENCOUNTER — OFFICE VISIT (OUTPATIENT)
Dept: ONCOLOGY | Age: 69
End: 2024-03-15
Payer: MEDICARE

## 2024-03-15 VITALS
OXYGEN SATURATION: 97 % | BODY MASS INDEX: 45.1 KG/M2 | RESPIRATION RATE: 16 BRPM | SYSTOLIC BLOOD PRESSURE: 137 MMHG | TEMPERATURE: 97.9 F | HEIGHT: 70 IN | WEIGHT: 315 LBS | DIASTOLIC BLOOD PRESSURE: 92 MMHG | HEART RATE: 72 BPM

## 2024-03-15 DIAGNOSIS — R53.82 CHRONIC FATIGUE: ICD-10-CM

## 2024-03-15 DIAGNOSIS — C43.9 MALIGNANT MELANOMA, UNSPECIFIED SITE (HCC): Primary | ICD-10-CM

## 2024-03-15 DIAGNOSIS — C43.62 MALIGNANT MELANOMA OF LEFT UPPER LIMB, INCLUDING SHOULDER (HCC): ICD-10-CM

## 2024-03-15 DIAGNOSIS — E03.2 HYPOTHYROIDISM DUE TO MEDICATION: ICD-10-CM

## 2024-03-15 DIAGNOSIS — C43.9 MALIGNANT MELANOMA, UNSPECIFIED SITE (HCC): ICD-10-CM

## 2024-03-15 LAB
ALBUMIN SERPL-MCNC: 3.2 G/DL (ref 3.2–4.6)
ALBUMIN/GLOB SERPL: 0.8 (ref 0.4–1.6)
ALP SERPL-CCNC: 116 U/L (ref 50–136)
ALT SERPL-CCNC: 16 U/L (ref 12–65)
ANION GAP SERPL CALC-SCNC: 6 MMOL/L (ref 2–11)
AST SERPL-CCNC: 14 U/L (ref 15–37)
BASOPHILS # BLD: 0.1 K/UL (ref 0–0.2)
BASOPHILS NFR BLD: 1 % (ref 0–2)
BILIRUB SERPL-MCNC: 0.6 MG/DL (ref 0.2–1.1)
BUN SERPL-MCNC: 20 MG/DL (ref 8–23)
CALCIUM SERPL-MCNC: 8.6 MG/DL (ref 8.3–10.4)
CHLORIDE SERPL-SCNC: 108 MMOL/L (ref 103–113)
CO2 SERPL-SCNC: 27 MMOL/L (ref 21–32)
CREAT SERPL-MCNC: 1.4 MG/DL (ref 0.8–1.5)
DIFFERENTIAL METHOD BLD: ABNORMAL
EOSINOPHIL # BLD: 0.4 K/UL (ref 0–0.8)
EOSINOPHIL NFR BLD: 5 % (ref 0.5–7.8)
ERYTHROCYTE [DISTWIDTH] IN BLOOD BY AUTOMATED COUNT: 16.6 % (ref 11.9–14.6)
GLOBULIN SER CALC-MCNC: 4.1 G/DL (ref 2.8–4.5)
GLUCOSE SERPL-MCNC: 126 MG/DL (ref 65–100)
HCT VFR BLD AUTO: 39.7 % (ref 41.1–50.3)
HGB BLD-MCNC: 12.6 G/DL (ref 13.6–17.2)
IMM GRANULOCYTES # BLD AUTO: 0 K/UL (ref 0–0.5)
IMM GRANULOCYTES NFR BLD AUTO: 0 % (ref 0–5)
LYMPHOCYTES # BLD: 2.8 K/UL (ref 0.5–4.6)
LYMPHOCYTES NFR BLD: 31 % (ref 13–44)
MCH RBC QN AUTO: 29 PG (ref 26.1–32.9)
MCHC RBC AUTO-ENTMCNC: 31.7 G/DL (ref 31.4–35)
MCV RBC AUTO: 91.3 FL (ref 82–102)
MONOCYTES # BLD: 0.8 K/UL (ref 0.1–1.3)
MONOCYTES NFR BLD: 9 % (ref 4–12)
NEUTS SEG # BLD: 5 K/UL (ref 1.7–8.2)
NEUTS SEG NFR BLD: 54 % (ref 43–78)
NRBC # BLD: 0 K/UL (ref 0–0.2)
PLATELET # BLD AUTO: 219 K/UL (ref 150–450)
PMV BLD AUTO: 9.6 FL (ref 9.4–12.3)
POTASSIUM SERPL-SCNC: 3.6 MMOL/L (ref 3.5–5.1)
PROT SERPL-MCNC: 7.3 G/DL (ref 6.3–8.2)
RBC # BLD AUTO: 4.35 M/UL (ref 4.23–5.6)
SODIUM SERPL-SCNC: 141 MMOL/L (ref 136–146)
TSH W FREE THYROID IF ABNORMAL: 2.45 UIU/ML (ref 0.36–3)
WBC # BLD AUTO: 9.1 K/UL (ref 4.3–11.1)

## 2024-03-15 PROCEDURE — 96413 CHEMO IV INFUSION 1 HR: CPT

## 2024-03-15 PROCEDURE — 1123F ACP DISCUSS/DSCN MKR DOCD: CPT | Performed by: INTERNAL MEDICINE

## 2024-03-15 PROCEDURE — 3075F SYST BP GE 130 - 139MM HG: CPT | Performed by: INTERNAL MEDICINE

## 2024-03-15 PROCEDURE — 2580000003 HC RX 258: Performed by: INTERNAL MEDICINE

## 2024-03-15 PROCEDURE — 3079F DIAST BP 80-89 MM HG: CPT | Performed by: INTERNAL MEDICINE

## 2024-03-15 PROCEDURE — 80053 COMPREHEN METABOLIC PANEL: CPT

## 2024-03-15 PROCEDURE — 6360000002 HC RX W HCPCS: Performed by: INTERNAL MEDICINE

## 2024-03-15 PROCEDURE — 84443 ASSAY THYROID STIM HORMONE: CPT

## 2024-03-15 PROCEDURE — 85025 COMPLETE CBC W/AUTO DIFF WBC: CPT

## 2024-03-15 PROCEDURE — 99215 OFFICE O/P EST HI 40 MIN: CPT | Performed by: INTERNAL MEDICINE

## 2024-03-15 RX ORDER — SODIUM CHLORIDE 0.9 % (FLUSH) 0.9 %
5-40 SYRINGE (ML) INJECTION PRN
Status: CANCELLED | OUTPATIENT
Start: 2024-03-15

## 2024-03-15 RX ORDER — ONDANSETRON 2 MG/ML
8 INJECTION INTRAMUSCULAR; INTRAVENOUS
Status: DISCONTINUED | OUTPATIENT
Start: 2024-03-15 | End: 2024-03-16 | Stop reason: HOSPADM

## 2024-03-15 RX ORDER — ONDANSETRON 2 MG/ML
8 INJECTION INTRAMUSCULAR; INTRAVENOUS
Status: CANCELLED | OUTPATIENT
Start: 2024-03-15

## 2024-03-15 RX ORDER — SODIUM CHLORIDE 0.9 % (FLUSH) 0.9 %
5-40 SYRINGE (ML) INJECTION PRN
Status: DISCONTINUED | OUTPATIENT
Start: 2024-03-15 | End: 2024-03-16 | Stop reason: HOSPADM

## 2024-03-15 RX ORDER — DIPHENHYDRAMINE HYDROCHLORIDE 50 MG/ML
50 INJECTION INTRAMUSCULAR; INTRAVENOUS
Status: DISCONTINUED | OUTPATIENT
Start: 2024-03-15 | End: 2024-03-16 | Stop reason: HOSPADM

## 2024-03-15 RX ORDER — ACETAMINOPHEN 325 MG/1
650 TABLET ORAL
Status: CANCELLED | OUTPATIENT
Start: 2024-03-15

## 2024-03-15 RX ORDER — SODIUM CHLORIDE 9 MG/ML
INJECTION, SOLUTION INTRAVENOUS CONTINUOUS
Status: CANCELLED | OUTPATIENT
Start: 2024-03-15

## 2024-03-15 RX ORDER — SODIUM CHLORIDE 9 MG/ML
5-250 INJECTION, SOLUTION INTRAVENOUS PRN
Status: CANCELLED | OUTPATIENT
Start: 2024-03-15

## 2024-03-15 RX ORDER — HEPARIN 100 UNIT/ML
500 SYRINGE INTRAVENOUS PRN
Status: CANCELLED | OUTPATIENT
Start: 2024-03-15

## 2024-03-15 RX ORDER — SODIUM CHLORIDE 9 MG/ML
INJECTION, SOLUTION INTRAVENOUS CONTINUOUS
Status: DISCONTINUED | OUTPATIENT
Start: 2024-03-15 | End: 2024-03-16 | Stop reason: HOSPADM

## 2024-03-15 RX ORDER — DIPHENHYDRAMINE HYDROCHLORIDE 50 MG/ML
50 INJECTION INTRAMUSCULAR; INTRAVENOUS
Status: CANCELLED | OUTPATIENT
Start: 2024-03-15

## 2024-03-15 RX ORDER — EPINEPHRINE 1 MG/ML
0.3 INJECTION, SOLUTION, CONCENTRATE INTRAVENOUS PRN
Status: DISCONTINUED | OUTPATIENT
Start: 2024-03-15 | End: 2024-03-16 | Stop reason: HOSPADM

## 2024-03-15 RX ORDER — MEPERIDINE HYDROCHLORIDE 25 MG/ML
12.5 INJECTION INTRAMUSCULAR; INTRAVENOUS; SUBCUTANEOUS PRN
Status: DISCONTINUED | OUTPATIENT
Start: 2024-03-15 | End: 2024-03-16 | Stop reason: HOSPADM

## 2024-03-15 RX ORDER — EPINEPHRINE 1 MG/ML
0.3 INJECTION, SOLUTION, CONCENTRATE INTRAVENOUS PRN
Status: CANCELLED | OUTPATIENT
Start: 2024-03-15

## 2024-03-15 RX ORDER — ALBUTEROL SULFATE 90 UG/1
4 AEROSOL, METERED RESPIRATORY (INHALATION) PRN
Status: CANCELLED | OUTPATIENT
Start: 2024-03-15

## 2024-03-15 RX ORDER — ALBUTEROL SULFATE 90 UG/1
4 AEROSOL, METERED RESPIRATORY (INHALATION) PRN
Status: DISCONTINUED | OUTPATIENT
Start: 2024-03-15 | End: 2024-03-16 | Stop reason: HOSPADM

## 2024-03-15 RX ORDER — SODIUM CHLORIDE 9 MG/ML
5-250 INJECTION, SOLUTION INTRAVENOUS PRN
Status: DISCONTINUED | OUTPATIENT
Start: 2024-03-15 | End: 2024-03-16 | Stop reason: HOSPADM

## 2024-03-15 RX ORDER — SODIUM CHLORIDE 0.9 % (FLUSH) 0.9 %
5-40 SYRINGE (ML) INJECTION PRN
Status: DISCONTINUED | OUTPATIENT
Start: 2024-03-15 | End: 2024-03-19 | Stop reason: HOSPADM

## 2024-03-15 RX ORDER — ACETAMINOPHEN 325 MG/1
650 TABLET ORAL
Status: DISCONTINUED | OUTPATIENT
Start: 2024-03-15 | End: 2024-03-16 | Stop reason: HOSPADM

## 2024-03-15 RX ORDER — MEPERIDINE HYDROCHLORIDE 25 MG/ML
12.5 INJECTION INTRAMUSCULAR; INTRAVENOUS; SUBCUTANEOUS PRN
Status: CANCELLED | OUTPATIENT
Start: 2024-03-15

## 2024-03-15 RX ADMIN — SODIUM CHLORIDE, PRESERVATIVE FREE 10 ML: 5 INJECTION INTRAVENOUS at 11:59

## 2024-03-15 RX ADMIN — SODIUM CHLORIDE 50 ML/HR: 9 INJECTION, SOLUTION INTRAVENOUS at 11:59

## 2024-03-15 RX ADMIN — SODIUM CHLORIDE 200 MG: 9 INJECTION, SOLUTION INTRAVENOUS at 12:50

## 2024-03-15 RX ADMIN — SODIUM CHLORIDE, PRESERVATIVE FREE 10 ML: 5 INJECTION INTRAVENOUS at 10:10

## 2024-03-15 ASSESSMENT — PATIENT HEALTH QUESTIONNAIRE - PHQ9
2. FEELING DOWN, DEPRESSED OR HOPELESS: 0
SUM OF ALL RESPONSES TO PHQ QUESTIONS 1-9: 0
SUM OF ALL RESPONSES TO PHQ QUESTIONS 1-9: 0
1. LITTLE INTEREST OR PLEASURE IN DOING THINGS: 0
SUM OF ALL RESPONSES TO PHQ QUESTIONS 1-9: 0
SUM OF ALL RESPONSES TO PHQ QUESTIONS 1-9: 0
SUM OF ALL RESPONSES TO PHQ9 QUESTIONS 1 & 2: 0

## 2024-03-15 NOTE — PATIENT INSTRUCTIONS
experiencing any of the above symptoms.    Patient does express an interest in My Chart.  My Chart log in information explained on the after visit summary printout at the check-out desk.    Chapis Joyce RN

## 2024-03-15 NOTE — PROGRESS NOTES
Arrived to the Infusion Center. Keytruda completed. Patient tolerated well.   Any issues or concerns during appointment: no.  Patient aware of next appointment on 4/5/24 (date) at 1015 (time).  Patient instructed to call provider with temperature of 100.4 or greater or nausea/vomiting/ diarrhea or pain not controlled by medications  Discharged ambulatory.

## 2024-03-25 NOTE — PROGRESS NOTES
Patient pre-assessment complete for Steve Mcgovern scheduled for CRISTINA, arrival time 0930. Patient verified using . Patient instructed to HOLD metformin, actos, torsemide. Instructed they can take all other medications excluding vitamins & supplements. Patient verbalizes understanding of all instructions & denies any questions at this time.

## 2024-03-26 ENCOUNTER — HOSPITAL ENCOUNTER (OUTPATIENT)
Dept: CARDIAC CATH/INVASIVE PROCEDURES | Age: 69
Discharge: HOME | End: 2024-03-26
Attending: INTERNAL MEDICINE | Admitting: INTERNAL MEDICINE
Payer: OTHER GOVERNMENT

## 2024-03-26 VITALS
HEART RATE: 70 BPM | BODY MASS INDEX: 45.1 KG/M2 | SYSTOLIC BLOOD PRESSURE: 141 MMHG | DIASTOLIC BLOOD PRESSURE: 81 MMHG | OXYGEN SATURATION: 97 % | RESPIRATION RATE: 16 BRPM | WEIGHT: 315 LBS | HEIGHT: 70 IN | TEMPERATURE: 98 F

## 2024-03-26 DIAGNOSIS — I48.11 LONGSTANDING PERSISTENT ATRIAL FIBRILLATION (HCC): Primary | Chronic | ICD-10-CM

## 2024-03-26 DIAGNOSIS — I48.0 PAROXYSMAL A-FIB (HCC): ICD-10-CM

## 2024-03-26 LAB
ECHO BSA: 2.71 M2
EKG ATRIAL RATE: 102 BPM
EKG DIAGNOSIS: NORMAL
EKG Q-T INTERVAL: 438 MS
EKG QRS DURATION: 120 MS
EKG QTC CALCULATION (BAZETT): 502 MS
EKG R AXIS: 153 DEGREES
EKG T AXIS: 0 DEGREES
EKG VENTRICULAR RATE: 79 BPM

## 2024-03-26 PROCEDURE — 6360000002 HC RX W HCPCS: Performed by: INTERNAL MEDICINE

## 2024-03-26 PROCEDURE — 93325 DOPPLER ECHO COLOR FLOW MAPG: CPT

## 2024-03-26 PROCEDURE — 93005 ELECTROCARDIOGRAM TRACING: CPT | Performed by: INTERNAL MEDICINE

## 2024-03-26 PROCEDURE — 99152 MOD SED SAME PHYS/QHP 5/>YRS: CPT

## 2024-03-26 PROCEDURE — 6370000000 HC RX 637 (ALT 250 FOR IP): Performed by: INTERNAL MEDICINE

## 2024-03-26 RX ORDER — LIDOCAINE HYDROCHLORIDE 20 MG/ML
SOLUTION OROPHARYNGEAL PRN
Status: COMPLETED | OUTPATIENT
Start: 2024-03-26 | End: 2024-03-26

## 2024-03-26 RX ORDER — CLOPIDOGREL BISULFATE 75 MG/1
75 TABLET ORAL DAILY
Qty: 90 TABLET | Refills: 0 | Status: SHIPPED | OUTPATIENT
Start: 2024-03-26

## 2024-03-26 RX ORDER — MIDAZOLAM HYDROCHLORIDE 1 MG/ML
INJECTION INTRAMUSCULAR; INTRAVENOUS PRN
Status: COMPLETED | OUTPATIENT
Start: 2024-03-26 | End: 2024-03-26

## 2024-03-26 RX ORDER — ASPIRIN 81 MG/1
81 TABLET ORAL DAILY
Qty: 90 TABLET | Refills: 0 | Status: SHIPPED | OUTPATIENT
Start: 2024-03-26

## 2024-03-26 RX ORDER — HEPARIN 100 UNIT/ML
100 SYRINGE INTRAVENOUS ONCE
Status: DISCONTINUED | OUTPATIENT
Start: 2024-03-26 | End: 2024-03-26 | Stop reason: HOSPADM

## 2024-03-26 RX ORDER — HEPARIN 100 UNIT/ML
300 SYRINGE INTRAVENOUS
Status: COMPLETED | OUTPATIENT
Start: 2024-03-26 | End: 2024-03-26

## 2024-03-26 RX ORDER — FENTANYL CITRATE 50 UG/ML
INJECTION, SOLUTION INTRAMUSCULAR; INTRAVENOUS PRN
Status: COMPLETED | OUTPATIENT
Start: 2024-03-26 | End: 2024-03-26

## 2024-03-26 RX ADMIN — FENTANYL CITRATE 50 MCG: 50 INJECTION, SOLUTION INTRAMUSCULAR; INTRAVENOUS at 11:12

## 2024-03-26 RX ADMIN — HEPARIN 300 UNITS: 100 SYRINGE at 12:28

## 2024-03-26 RX ADMIN — LIDOCAINE HYDROCHLORIDE 15 ML: 20 SOLUTION ORAL at 10:40

## 2024-03-26 RX ADMIN — MIDAZOLAM 2 MG: 1 INJECTION INTRAMUSCULAR; INTRAVENOUS at 11:12

## 2024-03-26 RX ADMIN — MIDAZOLAM 1 MG: 1 INJECTION INTRAMUSCULAR; INTRAVENOUS at 11:19

## 2024-03-26 RX ADMIN — FENTANYL CITRATE 50 MCG: 50 INJECTION, SOLUTION INTRAMUSCULAR; INTRAVENOUS at 11:17

## 2024-03-26 RX ADMIN — MIDAZOLAM 1 MG: 1 INJECTION INTRAMUSCULAR; INTRAVENOUS at 11:15

## 2024-03-26 NOTE — PROGRESS NOTES
CRISTINA  by Dr Reagan  II ASA II Mallampati  4mg versed  100mcg fentanyl  Viscous Solution given at 1040    Pt tolerated well.

## 2024-03-26 NOTE — PROGRESS NOTES
Port de accessed    Discharge instructions given per orders, voiced good understanding of  care, medications & follow up care. Denies any questions

## 2024-03-26 NOTE — DISCHARGE INSTRUCTIONS
AFTER YOU TRANSESOPHAGEAL ECHOCARDIOGRAM    Be sure someone else drives you home. You may feel drowsy for several hours.    Do not eat or drink for at least two hours after your procedure. Your throat will be numb and there is a risk you might have difficulty swallowing for a while. Be careful when you do eat or drink for the first time especially with hot fluids since you could easily burn your throat.    Call your doctor if:    You are bleeding from your throat or mouth.  You have trouble breathing all of a sudden.  You have chest pain or any pain that spreads to your neck, jaw, or arms.  You have questions or concerns.  You have a fever greater than 101°F.

## 2024-03-26 NOTE — PROGRESS NOTES
Patient received to CPRU room # 16  Ambulatory from [facility]. Patient scheduled for CRISTINA today with Dr Reagan. Procedure reviewed & questions answered, voiced good understanding consent obtained & placed on chart. All medications and medical history reviewed. Will prep patient per orders. Patient & family updated on plan of care.      The patient has a fraility score of 3-MANAGING WELL, based on ambulation.

## 2024-03-26 NOTE — NURSE NAVIGATOR
Successful 45 day post [place] CRISTINA this am. No LA thrombus noted.  See CRISTINA report for further details.  Eliquis discontinued.  Plavix 75 mg and aspirin 81 mg daily ordered per Dr. Reagan/Kate protocol.  Patient has Watchman coordinator contact ([phone]) information for questions or concerns.

## 2024-03-26 NOTE — H&P
CARDIAC CATHETERIZATION  2020    mild nonobstructive    CHOLECYSTECTOMY, LAPAROSCOPIC  2014    Dr. Solano    COLONOSCOPY  09/15/2022    EP DEVICE PROCEDURE N/A 2/15/2024    Watchman hussein closure device performed by Fadi Licea MD at [facility] CARDIAC CATH LAB    ESOPHAGOGASTRODUODENOSCOPY  09/15/2022    GASTROCNEMIUS RECESSION Left 01/11/2023    Left Posterior ankle scope and amber SUPINE//PACEMAKER performed by Reggie Lazaro MD at [facility]    HERNIA REPAIR  2014    Dr. Solano    IR PORT PLACEMENT EQUAL OR GREATER THAN 5 YEARS  07/10/2023    IR PORT PLACEMENT EQUAL OR GREATER THAN 5 YEARS 7/10/2023 [facility] RADIOLOGY SPECIALS    KNEE SURGERY Bilateral     multiple surgeries with bilateral TKAs    LYMPH NODE BIOPSY N/A 04/25/2023    LEFT AXILLARY SENTINEL LYMPH NODE BIOPSY EXCISION performed by Reggie Solano MD at [facility]    ORTHOPEDIC SURGERY  1990's    right hand    PACEMAKER PLACEMENT  12/29/2020    [employer] biventricular pacemaker    PACEMAKER PLACEMENT  2014    Dr. Briones    SHOULDER SURGERY Left 04/25/2023    WIDE LOCAL EXCISION LEFT SHOULDER MELANOMA, & SENTINEL NODE EXCISION performed by Reggie Solano MD at [facility]    TONSILLECTOMY Bilateral     mid 2000s    TOTAL KNEE ARTHROPLASTY Bilateral     UPPP UVULOPALATOPHARYGOPLASTY  mid 2000's       Allergies   Allergen Reactions    Lisinopril Angioedema    Losartan Angioedema    Morphine Anaphylaxis and Swelling    Penicillins Anaphylaxis     Previous RX for cephalosporin tolerated    Tizanidine Other (See Comments)     Pancreatitis      Social History     Socioeconomic History    Marital status:      Spouse name: Not on file    Number of children: Not on file    Years of education: Not on file    Highest education level: Not on file   Occupational History    Not on file   Tobacco Use    Smoking status: Former     Current packs/day: 0.00     Average packs/day: 0.3 packs/day for 1 year (0.3 ttl pk-yrs)     Types: Cigarettes     Start date: 1/1/1974

## 2024-04-03 ENCOUNTER — OFFICE VISIT (OUTPATIENT)
Age: 69
End: 2024-04-03
Payer: MEDICARE

## 2024-04-03 VITALS
HEART RATE: 68 BPM | HEIGHT: 70 IN | SYSTOLIC BLOOD PRESSURE: 136 MMHG | DIASTOLIC BLOOD PRESSURE: 82 MMHG | WEIGHT: 315 LBS | BODY MASS INDEX: 45.1 KG/M2

## 2024-04-03 DIAGNOSIS — I48.21 PERMANENT ATRIAL FIBRILLATION (HCC): Primary | ICD-10-CM

## 2024-04-03 DIAGNOSIS — I50.22 CHRONIC SYSTOLIC (CONGESTIVE) HEART FAILURE (HCC): Chronic | ICD-10-CM

## 2024-04-03 DIAGNOSIS — I10 BENIGN ESSENTIAL HYPERTENSION: Chronic | ICD-10-CM

## 2024-04-03 DIAGNOSIS — Z95.818 PRESENCE OF WATCHMAN LEFT ATRIAL APPENDAGE CLOSURE DEVICE: ICD-10-CM

## 2024-04-03 PROCEDURE — 99212 OFFICE O/P EST SF 10 MIN: CPT | Performed by: NURSE PRACTITIONER

## 2024-04-03 PROCEDURE — 1123F ACP DISCUSS/DSCN MKR DOCD: CPT | Performed by: NURSE PRACTITIONER

## 2024-04-03 PROCEDURE — 3079F DIAST BP 80-89 MM HG: CPT | Performed by: NURSE PRACTITIONER

## 2024-04-03 PROCEDURE — 3075F SYST BP GE 130 - 139MM HG: CPT | Performed by: NURSE PRACTITIONER

## 2024-04-03 NOTE — PROGRESS NOTES
Heart Disease Mother     Cancer Brother      Social History     Tobacco Use    Smoking status: Former     Current packs/day: 0.00     Average packs/day: 0.3 packs/day for 1 year (0.3 ttl pk-yrs)     Types: Cigarettes     Start date: 1974     Quit date: 1975     Years since quittin.2    Smokeless tobacco: Former     Types: Chew   Substance Use Topics    Alcohol use: No       ROS:  A comprehensive review of systems was performed with the pertinent positives and negatives as noted in the HPI. All other systems were reviewed and are negative    PHYSICAL EXAM:    Ht 1.778 m (5' 10\")   Wt (!) 150.1 kg (331 lb)   BMI 47.49 kg/m²      Wt Readings from Last 5 Encounters:   24 (!) 150.1 kg (331 lb)   24 (!) 148.3 kg (327 lb)   03/15/24 (!) 148.6 kg (327 lb 8 oz)   24 (!) 150.8 kg (332 lb 6.4 oz)   02/15/24 (!) 140.6 kg (310 lb)       BP Readings from Last 5 Encounters:   24 (!) 141/81   03/15/24 (!) 137/92   24 127/78   02/15/24 120/70   24 111/67       General appearance: Alert, well appearing, and in no distress   Eyes: Sclerae anicteric, Pupils equal, EOMI  ENT: Hearing grossly normal bilaterally, normal dentition  CV: RRR, no M/R/G, no JVD, normal distal pulses, no lower extremity edema  Pulm: Clear to auscultation, no wheezes, no crackles, no accessory muscle use  GI: Soft, nontender, nondistended, normal bowel sounds  Neuro: Alert and oriented  Skin: Normal coloration and turgor.  Psych: Appropriate affect, providing full and comprehensive history  MSK: normal muscle bulk and tone    Medical problems and test results were reviewed with the patient today.     @resultsrcnt@    CBC   Recent Labs     03/15/24  1006   WBC 9.1   HGB 12.6*   HCT 39.7*   MCV 91.3           CMP  Lab Results   Component Value Date     03/15/2024    K 3.6 03/15/2024     03/15/2024    CO2 27 03/15/2024    BUN 20 03/15/2024    CREATININE 1.40 03/15/2024    GLUCOSE 126 (H)

## 2024-04-04 NOTE — PROGRESS NOTES
icterus  HEENT: Moist mucous membranes, normal oropharynx  Lymph nodes: No cervical, axillary or inguinal lymphadenopathy is noted.  Cardiovascular: S1, S2 present, no m/r/g   Lungs: Clear to auscultation, no rales or wheezing, no accessory muscle use  Abdomen: Soft, and non-tender on palpation, no organomegaly, bowel sounds audible  Extremities: No peripheral edema, no joint swelling  Neurological: patient can follow commands and move all extremities    Labs:  Lab Results   Component Value Date    WBC 8.1 04/05/2024    HGB 12.9 (L) 04/05/2024    HCT 40.2 (L) 04/05/2024    MCV 91.0 04/05/2024     04/05/2024     Lab Results   Component Value Date    NEUTROABS 4.5 04/05/2024    LYMPHOPCT 32 04/05/2024    LYMPHSABS 2.6 04/05/2024    MONOPCT 7 04/05/2024    MONOSABS 0.6 04/05/2024    EOSABS 0.5 04/05/2024    BASOPCT 1 04/05/2024     Lab Results   Component Value Date     04/05/2024    K 3.6 04/05/2024     04/05/2024    CO2 25 04/05/2024    BUN 21 04/05/2024    CREATININE 1.50 04/05/2024    GLUCOSE 134 (H) 04/05/2024    CALCIUM 8.9 04/05/2024    PROT 7.2 04/05/2024    LABALBU 3.4 04/05/2024    BILITOT 0.6 04/05/2024    ALKPHOS 118 04/05/2024    AST 16 04/05/2024    ALT 16 04/05/2024    LABGLOM 50 (L) 04/05/2024    GFRAA 56 (L) 06/20/2022    AGRATIO 0.9 04/05/2024    GLOB 3.8 04/05/2024       Lab Results   Component Value Date    IRON 94 10/20/2023    TIBC 247 (L) 10/20/2023    FERRITIN 282 10/20/2023     Lab Results   Component Value Date    TSH 2.240 03/09/2021    TSHELE 1.58 04/05/2024    MLX8AWU 2.440 12/22/2023           Imaging:  CT Result (most recent):  CT CHEST ABDOMEN PELVIS WO CONTRAST 03/07/2024    Narrative  CT of the Chest, Abdomen, and Pelvis    INDICATION: Malignant melanoma.    COMPARISON: CT abdomen pelvis September 2023. PET/CT March 28, 2023.    FINDINGS:  - LUNGS: Minimal atelectasis in the bases. No metastatic pulmonary nodules. No  pleural effusion.  - MEDIASTINUM/AXILLA: Mild

## 2024-04-05 ENCOUNTER — OFFICE VISIT (OUTPATIENT)
Dept: ONCOLOGY | Age: 69
End: 2024-04-05
Payer: MEDICARE

## 2024-04-05 ENCOUNTER — HOSPITAL ENCOUNTER (OUTPATIENT)
Dept: INFUSION THERAPY | Age: 69
Setting detail: INFUSION SERIES
Discharge: HOME | End: 2024-04-05
Payer: OTHER GOVERNMENT

## 2024-04-05 ENCOUNTER — HOSPITAL ENCOUNTER (OUTPATIENT)
Dept: LAB | Age: 69
End: 2024-04-05
Payer: MEDICARE

## 2024-04-05 VITALS
BODY MASS INDEX: 47.38 KG/M2 | HEART RATE: 71 BPM | OXYGEN SATURATION: 97 % | DIASTOLIC BLOOD PRESSURE: 83 MMHG | RESPIRATION RATE: 18 BRPM | WEIGHT: 315 LBS | SYSTOLIC BLOOD PRESSURE: 144 MMHG | TEMPERATURE: 97.6 F

## 2024-04-05 DIAGNOSIS — C43.9 MALIGNANT MELANOMA, UNSPECIFIED SITE (HCC): ICD-10-CM

## 2024-04-05 DIAGNOSIS — C43.9 MALIGNANT MELANOMA, UNSPECIFIED SITE (HCC): Primary | ICD-10-CM

## 2024-04-05 DIAGNOSIS — E03.2 HYPOTHYROIDISM DUE TO MEDICATION: ICD-10-CM

## 2024-04-05 DIAGNOSIS — C43.62 MALIGNANT MELANOMA OF LEFT UPPER LIMB, INCLUDING SHOULDER (HCC): ICD-10-CM

## 2024-04-05 LAB
ALBUMIN SERPL-MCNC: 3.4 G/DL (ref 3.2–4.6)
ALBUMIN/GLOB SERPL: 0.9 (ref 0.4–1.6)
ALP SERPL-CCNC: 118 U/L (ref 50–136)
ALT SERPL-CCNC: 16 U/L (ref 12–65)
ANION GAP SERPL CALC-SCNC: 8 MMOL/L (ref 2–11)
AST SERPL-CCNC: 16 U/L (ref 15–37)
BASOPHILS # BLD: 0.1 K/UL (ref 0–0.2)
BASOPHILS NFR BLD: 1 % (ref 0–2)
BILIRUB SERPL-MCNC: 0.6 MG/DL (ref 0.2–1.1)
BUN SERPL-MCNC: 21 MG/DL (ref 8–23)
CALCIUM SERPL-MCNC: 8.9 MG/DL (ref 8.3–10.4)
CHLORIDE SERPL-SCNC: 108 MMOL/L (ref 103–113)
CO2 SERPL-SCNC: 25 MMOL/L (ref 21–32)
CREAT SERPL-MCNC: 1.5 MG/DL (ref 0.8–1.5)
DIFFERENTIAL METHOD BLD: ABNORMAL
EOSINOPHIL # BLD: 0.5 K/UL (ref 0–0.8)
EOSINOPHIL NFR BLD: 6 % (ref 0.5–7.8)
ERYTHROCYTE [DISTWIDTH] IN BLOOD BY AUTOMATED COUNT: 15.9 % (ref 11.9–14.6)
GLOBULIN SER CALC-MCNC: 3.8 G/DL (ref 2.8–4.5)
GLUCOSE SERPL-MCNC: 134 MG/DL (ref 65–100)
HCT VFR BLD AUTO: 40.2 % (ref 41.1–50.3)
HGB BLD-MCNC: 12.9 G/DL (ref 13.6–17.2)
IMM GRANULOCYTES # BLD AUTO: 0 K/UL (ref 0–0.5)
IMM GRANULOCYTES NFR BLD AUTO: 0 % (ref 0–5)
LYMPHOCYTES # BLD: 2.6 K/UL (ref 0.5–4.6)
LYMPHOCYTES NFR BLD: 32 % (ref 13–44)
MCH RBC QN AUTO: 29.2 PG (ref 26.1–32.9)
MCHC RBC AUTO-ENTMCNC: 32.1 G/DL (ref 31.4–35)
MCV RBC AUTO: 91 FL (ref 82–102)
MONOCYTES # BLD: 0.6 K/UL (ref 0.1–1.3)
MONOCYTES NFR BLD: 7 % (ref 4–12)
NEUTS SEG # BLD: 4.5 K/UL (ref 1.7–8.2)
NEUTS SEG NFR BLD: 54 % (ref 43–78)
NRBC # BLD: 0 K/UL (ref 0–0.2)
PLATELET # BLD AUTO: 228 K/UL (ref 150–450)
PMV BLD AUTO: 9.7 FL (ref 9.4–12.3)
POTASSIUM SERPL-SCNC: 3.6 MMOL/L (ref 3.5–5.1)
PROT SERPL-MCNC: 7.2 G/DL (ref 6.3–8.2)
RBC # BLD AUTO: 4.42 M/UL (ref 4.23–5.6)
SODIUM SERPL-SCNC: 141 MMOL/L (ref 136–146)
TSH W FREE THYROID IF ABNORMAL: 1.58 UIU/ML (ref 0.36–3.74)
WBC # BLD AUTO: 8.1 K/UL (ref 4.3–11.1)

## 2024-04-05 PROCEDURE — 85025 COMPLETE CBC W/AUTO DIFF WBC: CPT

## 2024-04-05 PROCEDURE — 99215 OFFICE O/P EST HI 40 MIN: CPT | Performed by: INTERNAL MEDICINE

## 2024-04-05 PROCEDURE — 3079F DIAST BP 80-89 MM HG: CPT | Performed by: INTERNAL MEDICINE

## 2024-04-05 PROCEDURE — 3077F SYST BP >= 140 MM HG: CPT | Performed by: INTERNAL MEDICINE

## 2024-04-05 PROCEDURE — 80053 COMPREHEN METABOLIC PANEL: CPT

## 2024-04-05 PROCEDURE — 96413 CHEMO IV INFUSION 1 HR: CPT

## 2024-04-05 PROCEDURE — 2580000003 HC RX 258: Performed by: INTERNAL MEDICINE

## 2024-04-05 PROCEDURE — 6360000002 HC RX W HCPCS: Performed by: INTERNAL MEDICINE

## 2024-04-05 PROCEDURE — 1123F ACP DISCUSS/DSCN MKR DOCD: CPT | Performed by: INTERNAL MEDICINE

## 2024-04-05 PROCEDURE — 84443 ASSAY THYROID STIM HORMONE: CPT

## 2024-04-05 RX ORDER — SODIUM CHLORIDE 0.9 % (FLUSH) 0.9 %
5-40 SYRINGE (ML) INJECTION PRN
Status: CANCELLED | OUTPATIENT
Start: 2024-04-05

## 2024-04-05 RX ORDER — ACETAMINOPHEN 325 MG/1
650 TABLET ORAL
Status: DISCONTINUED | OUTPATIENT
Start: 2024-04-05 | End: 2024-04-06 | Stop reason: HOSPADM

## 2024-04-05 RX ORDER — ACETAMINOPHEN 325 MG/1
650 TABLET ORAL
Status: CANCELLED | OUTPATIENT
Start: 2024-04-05

## 2024-04-05 RX ORDER — SODIUM CHLORIDE 9 MG/ML
5-250 INJECTION, SOLUTION INTRAVENOUS PRN
Status: CANCELLED | OUTPATIENT
Start: 2024-04-05

## 2024-04-05 RX ORDER — ALBUTEROL SULFATE 90 UG/1
4 AEROSOL, METERED RESPIRATORY (INHALATION) PRN
Status: DISCONTINUED | OUTPATIENT
Start: 2024-04-05 | End: 2024-04-06 | Stop reason: HOSPADM

## 2024-04-05 RX ORDER — SODIUM CHLORIDE 9 MG/ML
5-250 INJECTION, SOLUTION INTRAVENOUS PRN
Status: DISCONTINUED | OUTPATIENT
Start: 2024-04-05 | End: 2024-04-06 | Stop reason: HOSPADM

## 2024-04-05 RX ORDER — ONDANSETRON 2 MG/ML
8 INJECTION INTRAMUSCULAR; INTRAVENOUS
Status: CANCELLED | OUTPATIENT
Start: 2024-04-05

## 2024-04-05 RX ORDER — EPINEPHRINE 1 MG/ML
0.3 INJECTION, SOLUTION, CONCENTRATE INTRAVENOUS PRN
Status: DISCONTINUED | OUTPATIENT
Start: 2024-04-05 | End: 2024-04-06 | Stop reason: HOSPADM

## 2024-04-05 RX ORDER — SODIUM CHLORIDE 0.9 % (FLUSH) 0.9 %
5-40 SYRINGE (ML) INJECTION PRN
Status: ACTIVE | OUTPATIENT
Start: 2024-04-05

## 2024-04-05 RX ORDER — MEPERIDINE HYDROCHLORIDE 25 MG/ML
12.5 INJECTION INTRAMUSCULAR; INTRAVENOUS; SUBCUTANEOUS PRN
Status: CANCELLED | OUTPATIENT
Start: 2024-04-05

## 2024-04-05 RX ORDER — MEPERIDINE HYDROCHLORIDE 25 MG/ML
12.5 INJECTION INTRAMUSCULAR; INTRAVENOUS; SUBCUTANEOUS PRN
Status: DISCONTINUED | OUTPATIENT
Start: 2024-04-05 | End: 2024-04-06 | Stop reason: HOSPADM

## 2024-04-05 RX ORDER — SODIUM CHLORIDE 9 MG/ML
INJECTION, SOLUTION INTRAVENOUS CONTINUOUS
Status: CANCELLED | OUTPATIENT
Start: 2024-04-05

## 2024-04-05 RX ORDER — HEPARIN 100 UNIT/ML
500 SYRINGE INTRAVENOUS PRN
Status: CANCELLED | OUTPATIENT
Start: 2024-04-05

## 2024-04-05 RX ORDER — DIPHENHYDRAMINE HYDROCHLORIDE 50 MG/ML
50 INJECTION INTRAMUSCULAR; INTRAVENOUS
Status: CANCELLED | OUTPATIENT
Start: 2024-04-05

## 2024-04-05 RX ORDER — EPINEPHRINE 1 MG/ML
0.3 INJECTION, SOLUTION, CONCENTRATE INTRAVENOUS PRN
Status: CANCELLED | OUTPATIENT
Start: 2024-04-05

## 2024-04-05 RX ORDER — ALBUTEROL SULFATE 90 UG/1
4 AEROSOL, METERED RESPIRATORY (INHALATION) PRN
Status: CANCELLED | OUTPATIENT
Start: 2024-04-05

## 2024-04-05 RX ORDER — DIPHENHYDRAMINE HYDROCHLORIDE 50 MG/ML
50 INJECTION INTRAMUSCULAR; INTRAVENOUS
Status: DISCONTINUED | OUTPATIENT
Start: 2024-04-05 | End: 2024-04-06 | Stop reason: HOSPADM

## 2024-04-05 RX ORDER — SODIUM CHLORIDE 0.9 % (FLUSH) 0.9 %
5-40 SYRINGE (ML) INJECTION PRN
Status: DISCONTINUED | OUTPATIENT
Start: 2024-04-05 | End: 2024-04-06 | Stop reason: HOSPADM

## 2024-04-05 RX ORDER — ONDANSETRON 2 MG/ML
8 INJECTION INTRAMUSCULAR; INTRAVENOUS
Status: DISCONTINUED | OUTPATIENT
Start: 2024-04-05 | End: 2024-04-06 | Stop reason: HOSPADM

## 2024-04-05 RX ADMIN — SODIUM CHLORIDE 200 MG: 9 INJECTION, SOLUTION INTRAVENOUS at 12:04

## 2024-04-05 RX ADMIN — SODIUM CHLORIDE, PRESERVATIVE FREE 10 ML: 5 INJECTION INTRAVENOUS at 12:17

## 2024-04-05 RX ADMIN — SODIUM CHLORIDE, PRESERVATIVE FREE 10 ML: 5 INJECTION INTRAVENOUS at 10:05

## 2024-04-05 ASSESSMENT — PATIENT HEALTH QUESTIONNAIRE - PHQ9
SUM OF ALL RESPONSES TO PHQ QUESTIONS 1-9: 0
SUM OF ALL RESPONSES TO PHQ9 QUESTIONS 1 & 2: 0
SUM OF ALL RESPONSES TO PHQ QUESTIONS 1-9: 0
1. LITTLE INTEREST OR PLEASURE IN DOING THINGS: NOT AT ALL
2. FEELING DOWN, DEPRESSED OR HOPELESS: NOT AT ALL

## 2024-04-05 NOTE — PROGRESS NOTES
Patient arrived to Infusion Center for Keytruda. Assessment completed.  No needs voiced at this time. Patient tolerated infusion well and is aware of next appointment on 4/26/24 @1049.  Patient discharged ambulatory with spouse.

## 2024-04-05 NOTE — PATIENT INSTRUCTIONS
interest in My Chart.  My Chart log in information explained on the after visit summary printout at the check-out desk.    Chapis Jocye RN

## 2024-04-22 PROCEDURE — 93294 REM INTERROG EVL PM/LDLS PM: CPT | Performed by: INTERNAL MEDICINE

## 2024-04-22 PROCEDURE — 93296 REM INTERROG EVL PM/IDS: CPT | Performed by: INTERNAL MEDICINE

## 2024-04-26 ENCOUNTER — HOSPITAL ENCOUNTER (OUTPATIENT)
Dept: INFUSION THERAPY | Age: 69
Setting detail: INFUSION SERIES
Discharge: HOME | End: 2024-04-26
Payer: OTHER GOVERNMENT

## 2024-04-26 ENCOUNTER — OFFICE VISIT (OUTPATIENT)
Dept: ONCOLOGY | Age: 69
End: 2024-04-26
Payer: OTHER GOVERNMENT

## 2024-04-26 ENCOUNTER — HOSPITAL ENCOUNTER (OUTPATIENT)
Dept: LAB | Age: 69
Discharge: HOME | End: 2024-04-26
Payer: OTHER GOVERNMENT

## 2024-04-26 VITALS
SYSTOLIC BLOOD PRESSURE: 127 MMHG | DIASTOLIC BLOOD PRESSURE: 88 MMHG | TEMPERATURE: 97.7 F | HEART RATE: 73 BPM | RESPIRATION RATE: 16 BRPM | OXYGEN SATURATION: 96 % | HEIGHT: 70 IN | BODY MASS INDEX: 45.1 KG/M2 | WEIGHT: 315 LBS

## 2024-04-26 DIAGNOSIS — Z79.899 HIGH RISK MEDICATION USE: ICD-10-CM

## 2024-04-26 DIAGNOSIS — E03.2 HYPOTHYROIDISM DUE TO MEDICATION: ICD-10-CM

## 2024-04-26 DIAGNOSIS — C43.9 MALIGNANT MELANOMA, UNSPECIFIED SITE (HCC): ICD-10-CM

## 2024-04-26 DIAGNOSIS — C43.9 MALIGNANT MELANOMA, UNSPECIFIED SITE (HCC): Primary | ICD-10-CM

## 2024-04-26 DIAGNOSIS — C43.62 MALIGNANT MELANOMA OF LEFT UPPER LIMB, INCLUDING SHOULDER (HCC): Primary | ICD-10-CM

## 2024-04-26 LAB
ALBUMIN SERPL-MCNC: 3.3 G/DL (ref 3.2–4.6)
ALBUMIN/GLOB SERPL: 0.8 (ref 1–1.9)
ALP SERPL-CCNC: 123 U/L (ref 40–129)
ALT SERPL-CCNC: 6 U/L (ref 12–65)
ANION GAP SERPL CALC-SCNC: 14 MMOL/L (ref 9–18)
AST SERPL-CCNC: 19 U/L (ref 15–37)
BASOPHILS # BLD: 0.1 K/UL (ref 0–0.2)
BASOPHILS NFR BLD: 1 % (ref 0–2)
BILIRUB SERPL-MCNC: 0.3 MG/DL (ref 0–1.2)
BUN SERPL-MCNC: 21 MG/DL (ref 8–23)
CALCIUM SERPL-MCNC: 8.7 MG/DL (ref 8.8–10.2)
CHLORIDE SERPL-SCNC: 105 MMOL/L (ref 98–107)
CO2 SERPL-SCNC: 25 MMOL/L (ref 20–28)
CREAT SERPL-MCNC: 1.42 MG/DL (ref 0.8–1.3)
DIFFERENTIAL METHOD BLD: ABNORMAL
EOSINOPHIL # BLD: 0.7 K/UL (ref 0–0.8)
EOSINOPHIL NFR BLD: 8 % (ref 0.5–7.8)
ERYTHROCYTE [DISTWIDTH] IN BLOOD BY AUTOMATED COUNT: 15 % (ref 11.9–14.6)
GLOBULIN SER CALC-MCNC: 4 G/DL (ref 2.3–3.5)
GLUCOSE SERPL-MCNC: 125 MG/DL (ref 70–99)
HCT VFR BLD AUTO: 40.7 % (ref 41.1–50.3)
HGB BLD-MCNC: 13.1 G/DL (ref 13.6–17.2)
IMM GRANULOCYTES # BLD AUTO: 0 K/UL (ref 0–0.5)
IMM GRANULOCYTES NFR BLD AUTO: 0 % (ref 0–5)
LYMPHOCYTES # BLD: 2.6 K/UL (ref 0.5–4.6)
LYMPHOCYTES NFR BLD: 29 % (ref 13–44)
MCH RBC QN AUTO: 29 PG (ref 26.1–32.9)
MCHC RBC AUTO-ENTMCNC: 32.2 G/DL (ref 31.4–35)
MCV RBC AUTO: 90.2 FL (ref 82–102)
MONOCYTES # BLD: 0.6 K/UL (ref 0.1–1.3)
MONOCYTES NFR BLD: 7 % (ref 4–12)
NEUTS SEG # BLD: 5 K/UL (ref 1.7–8.2)
NEUTS SEG NFR BLD: 55 % (ref 43–78)
NRBC # BLD: 0 K/UL (ref 0–0.2)
PLATELET # BLD AUTO: 254 K/UL (ref 150–450)
PMV BLD AUTO: 9.5 FL (ref 9.4–12.3)
POTASSIUM SERPL-SCNC: 3.9 MMOL/L (ref 3.5–5.1)
PROT SERPL-MCNC: 7.2 G/DL (ref 6.3–8.2)
RBC # BLD AUTO: 4.51 M/UL (ref 4.23–5.6)
SODIUM SERPL-SCNC: 144 MMOL/L (ref 136–145)
TSH W FREE THYROID IF ABNORMAL: 1.63 UIU/ML (ref 0.27–4.2)
WBC # BLD AUTO: 9.1 K/UL (ref 4.3–11.1)

## 2024-04-26 PROCEDURE — 80053 COMPREHEN METABOLIC PANEL: CPT

## 2024-04-26 PROCEDURE — 85025 COMPLETE CBC W/AUTO DIFF WBC: CPT

## 2024-04-26 PROCEDURE — 6360000002 HC RX W HCPCS: Performed by: NURSE PRACTITIONER

## 2024-04-26 PROCEDURE — 3074F SYST BP LT 130 MM HG: CPT | Performed by: NURSE PRACTITIONER

## 2024-04-26 PROCEDURE — 1123F ACP DISCUSS/DSCN MKR DOCD: CPT | Performed by: NURSE PRACTITIONER

## 2024-04-26 PROCEDURE — 2580000003 HC RX 258: Performed by: INTERNAL MEDICINE

## 2024-04-26 PROCEDURE — 99214 OFFICE O/P EST MOD 30 MIN: CPT | Performed by: NURSE PRACTITIONER

## 2024-04-26 PROCEDURE — 2580000003 HC RX 258: Performed by: NURSE PRACTITIONER

## 2024-04-26 PROCEDURE — 84443 ASSAY THYROID STIM HORMONE: CPT

## 2024-04-26 PROCEDURE — 3079F DIAST BP 80-89 MM HG: CPT | Performed by: NURSE PRACTITIONER

## 2024-04-26 PROCEDURE — 96413 CHEMO IV INFUSION 1 HR: CPT

## 2024-04-26 RX ORDER — ACETAMINOPHEN 325 MG/1
650 TABLET ORAL
Status: CANCELLED | OUTPATIENT
Start: 2024-04-26

## 2024-04-26 RX ORDER — SODIUM CHLORIDE 9 MG/ML
5-250 INJECTION, SOLUTION INTRAVENOUS PRN
Status: DISCONTINUED | OUTPATIENT
Start: 2024-04-26 | End: 2024-04-27 | Stop reason: HOSPADM

## 2024-04-26 RX ORDER — SODIUM CHLORIDE 0.9 % (FLUSH) 0.9 %
5-40 SYRINGE (ML) INJECTION PRN
Status: CANCELLED | OUTPATIENT
Start: 2024-04-26

## 2024-04-26 RX ORDER — MEPERIDINE HYDROCHLORIDE 25 MG/ML
12.5 INJECTION INTRAMUSCULAR; INTRAVENOUS; SUBCUTANEOUS PRN
Status: CANCELLED | OUTPATIENT
Start: 2024-04-26

## 2024-04-26 RX ORDER — SODIUM CHLORIDE 9 MG/ML
INJECTION, SOLUTION INTRAVENOUS CONTINUOUS
Status: CANCELLED | OUTPATIENT
Start: 2024-04-26

## 2024-04-26 RX ORDER — HEPARIN 100 UNIT/ML
500 SYRINGE INTRAVENOUS PRN
Status: CANCELLED | OUTPATIENT
Start: 2024-04-26

## 2024-04-26 RX ORDER — SODIUM CHLORIDE 9 MG/ML
5-250 INJECTION, SOLUTION INTRAVENOUS PRN
Status: CANCELLED | OUTPATIENT
Start: 2024-04-26

## 2024-04-26 RX ORDER — ONDANSETRON 2 MG/ML
8 INJECTION INTRAMUSCULAR; INTRAVENOUS
Status: CANCELLED | OUTPATIENT
Start: 2024-04-26

## 2024-04-26 RX ORDER — SODIUM CHLORIDE 0.9 % (FLUSH) 0.9 %
5-40 SYRINGE (ML) INJECTION PRN
Status: DISCONTINUED | OUTPATIENT
Start: 2024-04-26 | End: 2024-04-27 | Stop reason: HOSPADM

## 2024-04-26 RX ORDER — SODIUM CHLORIDE 0.9 % (FLUSH) 0.9 %
5-40 SYRINGE (ML) INJECTION PRN
Status: DISCONTINUED | OUTPATIENT
Start: 2024-04-26 | End: 2024-04-30 | Stop reason: HOSPADM

## 2024-04-26 RX ORDER — PEMBROLIZUMAB 25 MG/ML
INJECTION, SOLUTION INTRAVENOUS
COMMUNITY

## 2024-04-26 RX ORDER — ALBUTEROL SULFATE 90 UG/1
4 AEROSOL, METERED RESPIRATORY (INHALATION) PRN
Status: CANCELLED | OUTPATIENT
Start: 2024-04-26

## 2024-04-26 RX ORDER — DIPHENHYDRAMINE HYDROCHLORIDE 50 MG/ML
50 INJECTION INTRAMUSCULAR; INTRAVENOUS
Status: CANCELLED | OUTPATIENT
Start: 2024-04-26

## 2024-04-26 RX ORDER — EPINEPHRINE 1 MG/ML
0.3 INJECTION, SOLUTION, CONCENTRATE INTRAVENOUS PRN
Status: CANCELLED | OUTPATIENT
Start: 2024-04-26

## 2024-04-26 RX ADMIN — SODIUM CHLORIDE 50 ML/HR: 9 INJECTION, SOLUTION INTRAVENOUS at 12:41

## 2024-04-26 RX ADMIN — SODIUM CHLORIDE, PRESERVATIVE FREE 10 ML: 5 INJECTION INTRAVENOUS at 13:55

## 2024-04-26 RX ADMIN — SODIUM CHLORIDE, PRESERVATIVE FREE 10 ML: 5 INJECTION INTRAVENOUS at 12:40

## 2024-04-26 RX ADMIN — SODIUM CHLORIDE, PRESERVATIVE FREE 10 ML: 5 INJECTION INTRAVENOUS at 10:20

## 2024-04-26 RX ADMIN — SODIUM CHLORIDE 200 MG: 9 INJECTION, SOLUTION INTRAVENOUS at 13:20

## 2024-04-26 ASSESSMENT — PATIENT HEALTH QUESTIONNAIRE - PHQ9
SUM OF ALL RESPONSES TO PHQ9 QUESTIONS 1 & 2: 0
SUM OF ALL RESPONSES TO PHQ QUESTIONS 1-9: 0
SUM OF ALL RESPONSES TO PHQ QUESTIONS 1-9: 0
2. FEELING DOWN, DEPRESSED OR HOPELESS: NOT AT ALL
1. LITTLE INTEREST OR PLEASURE IN DOING THINGS: NOT AT ALL
SUM OF ALL RESPONSES TO PHQ QUESTIONS 1-9: 0
SUM OF ALL RESPONSES TO PHQ QUESTIONS 1-9: 0

## 2024-04-26 NOTE — PROGRESS NOTES
Arrived to the Infusion Center.  Keytruda completed. Patient tolerated without difficulty.   Any issues or concerns during appointment: None.  Patient aware of next infusion appointment on 05/17/2024 (date) at 1230 (time).  Patient aware of next lab and [facility] office visit on 05/17/2024 (date) at 1045 with lab followed by OV at 1130 (time).  Patient instructed to call provider with temperature of 100.4 or greater or nausea/vomiting/ diarrhea or pain not controlled by medications  Discharged ambulatory with wife at side.

## 2024-04-26 NOTE — PROGRESS NOTES
[facility] Hematology and Oncology: Office Visit Established Patient    Reason for follow up:    Melanoma of shoulder, left   Cancer Staging   pT4b Melanoma of left shoulder/upper arm  Staging form: Melanoma Of The Skin, AJCC 8th Edition  - Pathologic stage from 6/25/2023: Stage IIC (pT4b, pN0, cM0) - Signed by Bartolo Arreola MD on 6/26/2023  BRAF mutation not detected    Overview: (copied from prior)  This is a 68 years old male patient with multiple medical problems including chronic systolic heart failure, liver cirrhosis, type 2 diabetes mellitus with diabetic polyneuropathy, obstructive sleep apnea and atrial fibrillation (on long-term anticoagulation with apixaban) who presents for postoperative management of recently diagnosed cutaneous melanoma involving left shoulder.  In August 2022, he noted a small pigmented lesion over anterior left shoulder which had continued to grow in size.  In February 2023, he underwent shave biopsy of the suspicious lesion and pathology returned as pT4b Breslow 6.6 mm, Ricco level IV, LVI +, angiotropism +, regression absent, ulceration present, microscopic satellitosis not identified, mitotic rate 14/mm².  PET/CT on 3/28/2023 showed hypermetabolism in the region of recently excised left shoulder melanoma.  No evidence of metastatic disease was found elsewhere in the head neck chest abdomen pelvis or lower extremities.  He underwent WLE left shoulder melanoma and left axillary sentinel node excision on 4/25/23 for a pT4bN0 melanoma of the left shoulder.  Pathology returned as residual nodular malignant melanoma, 18 mm in depth, Ricco's level 5, margins negative.  Left axillary sentinel lymph node was negative for tumor.  On evaluation today, he appears to have healed well from his recent surgery and denies any pain, swelling or drainage from the surgical wounds and left axilla and over anterior left shoulder.  He denies fever, cough, chest pain, nausea, vomiting, abdominal pain,

## 2024-04-29 ENCOUNTER — OFFICE VISIT (OUTPATIENT)
Dept: SURGERY | Age: 69
End: 2024-04-29
Payer: MEDICARE

## 2024-04-29 VITALS — WEIGHT: 315 LBS | HEIGHT: 70 IN | BODY MASS INDEX: 45.1 KG/M2

## 2024-04-29 DIAGNOSIS — C43.62 MELANOMA OF SHOULDER, LEFT (HCC): Primary | Chronic | ICD-10-CM

## 2024-04-29 DIAGNOSIS — R59.0 MEDIASTINAL ADENOPATHY: ICD-10-CM

## 2024-04-29 PROCEDURE — 99214 OFFICE O/P EST MOD 30 MIN: CPT | Performed by: SURGERY

## 2024-04-29 PROCEDURE — 1123F ACP DISCUSS/DSCN MKR DOCD: CPT | Performed by: SURGERY

## 2024-04-29 NOTE — PROGRESS NOTES
Acute gastritis with hemorrhage 09/29/2022     Priority: Medium     Oct 02, 2007 Entered By: HEATHER BETANCOURT JR Comment: + esophagitis 9/07      Benign colonic polyp 09/29/2022     Priority: Medium    Chronic obstructive pulmonary disease (HCC) 09/29/2022     Priority: Medium     Last Assessment & Plan:   Formatting of this note might be different from the original.  - Home meds  - Not in exacerbation      Chronic rhinitis 09/29/2022     Priority: Medium    Digestive system disorder 09/29/2022     Priority: Medium    Disorder of lung 09/29/2022     Priority: Medium    Neuropathy 09/29/2022     Priority: Medium    Numbness 09/29/2022     Priority: Medium    Osteoarthritis 09/29/2022     Priority: Medium    Chronic systolic (congestive) heart failure 09/19/2022     Priority: Medium    Albuminuria 08/17/2022     Priority: Medium    Acute non-recurrent ethmoidal sinusitis 03/09/2022     Priority: Medium     Last Assessment & Plan:   Formatting of this note might be different from the original.  -Patient with signs and symptoms of sinusitis.  Lungs clear on exam.  Only with ethmoid frontal sinus discomfort without purulent drainage, fever, only going on for days now.  -Recommended Covid and influenza testing today.  Patient should quarantine per CDC guidelines until test have resulted.  Will call with results and further management.  -Recommended plenty of fluids and rest, wash hands often, Tylenol and Mucinex as needed for fever, discomfort, congestion and cough, will give Atrovent nasal spray today  -We will give back pocket prescription for doxycycline and recommended to only take if has persistent symptoms greater than a week or worsening symptoms consistent with bacterial sinusitis.  -Red flags and indications to seek emergent care discussed with the Patient/Guardian today.  Will follow up as needed.  Patient/Guardian agrees with plan.      Normochromic normocytic anemia 03/09/2020     Priority: Medium     Last

## 2024-05-08 ENCOUNTER — OFFICE VISIT (OUTPATIENT)
Age: 69
End: 2024-05-08
Payer: MEDICARE

## 2024-05-08 VITALS
HEIGHT: 70 IN | SYSTOLIC BLOOD PRESSURE: 138 MMHG | DIASTOLIC BLOOD PRESSURE: 80 MMHG | HEART RATE: 72 BPM | WEIGHT: 315 LBS | BODY MASS INDEX: 45.1 KG/M2

## 2024-05-08 DIAGNOSIS — Z95.0 PRESENCE OF CARDIAC PACEMAKER: ICD-10-CM

## 2024-05-08 DIAGNOSIS — I10 BENIGN ESSENTIAL HYPERTENSION: Chronic | ICD-10-CM

## 2024-05-08 DIAGNOSIS — I48.11 LONGSTANDING PERSISTENT ATRIAL FIBRILLATION (HCC): Chronic | ICD-10-CM

## 2024-05-08 DIAGNOSIS — I27.20 PULMONARY HYPERTENSION (HCC): Chronic | ICD-10-CM

## 2024-05-08 DIAGNOSIS — Z95.818 PRESENCE OF WATCHMAN LEFT ATRIAL APPENDAGE CLOSURE DEVICE: ICD-10-CM

## 2024-05-08 DIAGNOSIS — I50.22 CHRONIC SYSTOLIC (CONGESTIVE) HEART FAILURE (HCC): Primary | Chronic | ICD-10-CM

## 2024-05-08 PROCEDURE — 3075F SYST BP GE 130 - 139MM HG: CPT | Performed by: INTERNAL MEDICINE

## 2024-05-08 PROCEDURE — 99214 OFFICE O/P EST MOD 30 MIN: CPT | Performed by: INTERNAL MEDICINE

## 2024-05-08 PROCEDURE — 3079F DIAST BP 80-89 MM HG: CPT | Performed by: INTERNAL MEDICINE

## 2024-05-08 PROCEDURE — 1123F ACP DISCUSS/DSCN MKR DOCD: CPT | Performed by: INTERNAL MEDICINE

## 2024-05-08 NOTE — PROGRESS NOTES
2 [facility], [address] [place] [address]  PHONE: [phone]     24    NAME:  Steve Mcgovern  : 1955  MRN: [id number]       SUBJECTIVE:   Steve Mcgovern is a 69 y.o. male seen for a follow up visit regarding the following:     Chief Complaint   Patient presents with    Congestive Heart Failure    Atrial Fibrillation       HPI:   Longstanding persistent Afib s/p PM and AVN ablation.   [facility] PPM .  JUAN JOSE on CPAP.     [facility] 2020: mild CAD, EDP 20      2020 admission for COVID, better now.     Echo 10/2022: normal EF, LVH, normal RVSP  2024: watchman  CRISTINA 3/2024: normal EF.      Has melanoma, getting treatment via port.  More back issues now.  No angina.  No more bleeding issues.   NO CP, PATEL ok.   No more bleeding issues, anemia better now.    Patient denies recent history of orthopnea, PND, excessive dizziness and/or syncope.            Past Medical History, Past Surgical History, Family history, Social History, and Medications were all reviewed with the patient today and updated as necessary.     Current Outpatient Medications   Medication Sig Dispense Refill    pembrolizumab (KEYTRUDA) 100 MG/4ML SOLN Infuse intravenously      aspirin 81 MG EC tablet Take 1 tablet by mouth daily 90 tablet 0    clopidogrel (PLAVIX) 75 MG tablet Take 1 tablet by mouth daily 90 tablet 0    vitamin B-12 (CYANOCOBALAMIN) 1000 MCG tablet Take 1 tablet by mouth daily      acetaminophen (TYLENOL) 500 MG tablet Take 1 tablet by mouth every 6 hours as needed for Pain      atorvastatin (LIPITOR) 10 MG tablet Take 1 tablet by mouth daily 90 tablet 3    LANTUS SOLOSTAR 100 UNIT/ML injection pen Inject 54 Units into the skin 2 times daily 90 mL 3    OZEMPIC, 1 MG/DOSE, 4 MG/3ML SOPN Inject 1 mg into the skin once a week 9 mL 3    metFORMIN (GLUCOPHAGE) 500 MG tablet Take 1 tablet by mouth 2 times daily (with meals) 180 tablet 3    pioglitazone (ACTOS) 30 MG tablet Take 0.5 tablets by mouth daily 45 tablet

## 2024-05-10 ENCOUNTER — TELEPHONE (OUTPATIENT)
Dept: ORTHOPEDIC SURGERY | Age: 69
End: 2024-05-10

## 2024-05-10 DIAGNOSIS — M54.17 LUMBOSACRAL RADICULOPATHY: ICD-10-CM

## 2024-05-10 DIAGNOSIS — M51.36 DDD (DEGENERATIVE DISC DISEASE), LUMBAR: ICD-10-CM

## 2024-05-10 DIAGNOSIS — M48.062 LUMBAR STENOSIS WITH NEUROGENIC CLAUDICATION: Primary | ICD-10-CM

## 2024-05-10 DIAGNOSIS — M47.816 FACET ARTHROPATHY, LUMBAR: ICD-10-CM

## 2024-05-16 NOTE — PROGRESS NOTES
intermittently but this has been manageable with topical steroids.  There are no further signs of autoimmune toxicity.  He denies any fever or infectious symptoms.  Labs and physical exam are adequate to proceed with the next dose of Keytruda.  Restaging PET/CT will be scheduled towards the end of the planned 1 year of therapy in July 2024.  Return office visit and labs per treatment plan.  Total visit time 40 minutes.    Bartolo Arreola MD  [facility] Hematology and Oncology  [address] [place] [address]  Office : [phone]  Fax : [phone]      Elements of this note have been dictated using speech recognition software. As a result, errors of speech recognition may have occurred.

## 2024-05-17 ENCOUNTER — OFFICE VISIT (OUTPATIENT)
Dept: ONCOLOGY | Age: 69
End: 2024-05-17
Payer: MEDICARE

## 2024-05-17 ENCOUNTER — HOSPITAL ENCOUNTER (OUTPATIENT)
Dept: INFUSION THERAPY | Age: 69
Setting detail: INFUSION SERIES
Discharge: HOME | End: 2024-05-17
Payer: OTHER GOVERNMENT

## 2024-05-17 ENCOUNTER — HOSPITAL ENCOUNTER (OUTPATIENT)
Dept: LAB | Age: 69
End: 2024-05-17
Payer: OTHER GOVERNMENT

## 2024-05-17 VITALS
RESPIRATION RATE: 18 BRPM | BODY MASS INDEX: 45.1 KG/M2 | SYSTOLIC BLOOD PRESSURE: 133 MMHG | WEIGHT: 315 LBS | OXYGEN SATURATION: 96 % | HEIGHT: 70 IN | TEMPERATURE: 97.6 F | HEART RATE: 84 BPM | DIASTOLIC BLOOD PRESSURE: 82 MMHG

## 2024-05-17 DIAGNOSIS — C43.9 MALIGNANT MELANOMA, UNSPECIFIED SITE (HCC): Primary | ICD-10-CM

## 2024-05-17 DIAGNOSIS — E03.2 HYPOTHYROIDISM DUE TO MEDICATION: ICD-10-CM

## 2024-05-17 DIAGNOSIS — C43.9 MALIGNANT MELANOMA, UNSPECIFIED SITE (HCC): ICD-10-CM

## 2024-05-17 DIAGNOSIS — C43.62 MALIGNANT MELANOMA OF LEFT UPPER LIMB, INCLUDING SHOULDER (HCC): Primary | ICD-10-CM

## 2024-05-17 LAB
ALBUMIN SERPL-MCNC: 3.4 G/DL (ref 3.2–4.6)
ALBUMIN/GLOB SERPL: 0.9 (ref 1–1.9)
ALP SERPL-CCNC: 130 U/L (ref 40–129)
ALT SERPL-CCNC: 13 U/L (ref 12–65)
ANION GAP SERPL CALC-SCNC: 14 MMOL/L (ref 9–18)
AST SERPL-CCNC: 21 U/L (ref 15–37)
BASOPHILS # BLD: 0.1 K/UL (ref 0–0.2)
BASOPHILS NFR BLD: 1 % (ref 0–2)
BILIRUB SERPL-MCNC: 0.3 MG/DL (ref 0–1.2)
BUN SERPL-MCNC: 20 MG/DL (ref 8–23)
CALCIUM SERPL-MCNC: 8.8 MG/DL (ref 8.8–10.2)
CHLORIDE SERPL-SCNC: 103 MMOL/L (ref 98–107)
CO2 SERPL-SCNC: 24 MMOL/L (ref 20–28)
CREAT SERPL-MCNC: 1.42 MG/DL (ref 0.8–1.3)
DIFFERENTIAL METHOD BLD: ABNORMAL
EOSINOPHIL # BLD: 0.7 K/UL (ref 0–0.8)
EOSINOPHIL NFR BLD: 8 % (ref 0.5–7.8)
ERYTHROCYTE [DISTWIDTH] IN BLOOD BY AUTOMATED COUNT: 14.3 % (ref 11.9–14.6)
GLOBULIN SER CALC-MCNC: 3.6 G/DL (ref 2.3–3.5)
GLUCOSE SERPL-MCNC: 135 MG/DL (ref 70–99)
HCT VFR BLD AUTO: 40.6 % (ref 41.1–50.3)
HGB BLD-MCNC: 13 G/DL (ref 13.6–17.2)
IMM GRANULOCYTES # BLD AUTO: 0 K/UL (ref 0–0.5)
IMM GRANULOCYTES NFR BLD AUTO: 0 % (ref 0–5)
LYMPHOCYTES # BLD: 2.5 K/UL (ref 0.5–4.6)
LYMPHOCYTES NFR BLD: 30 % (ref 13–44)
MCH RBC QN AUTO: 28.6 PG (ref 26.1–32.9)
MCHC RBC AUTO-ENTMCNC: 32 G/DL (ref 31.4–35)
MCV RBC AUTO: 89.2 FL (ref 82–102)
MONOCYTES # BLD: 0.7 K/UL (ref 0.1–1.3)
MONOCYTES NFR BLD: 9 % (ref 4–12)
NEUTS SEG # BLD: 4.2 K/UL (ref 1.7–8.2)
NEUTS SEG NFR BLD: 52 % (ref 43–78)
NRBC # BLD: 0 K/UL (ref 0–0.2)
PLATELET # BLD AUTO: 270 K/UL (ref 150–450)
PMV BLD AUTO: 9.7 FL (ref 9.4–12.3)
POTASSIUM SERPL-SCNC: 4.3 MMOL/L (ref 3.5–5.1)
PROT SERPL-MCNC: 7 G/DL (ref 6.3–8.2)
RBC # BLD AUTO: 4.55 M/UL (ref 4.23–5.6)
SODIUM SERPL-SCNC: 141 MMOL/L (ref 136–145)
TSH W FREE THYROID IF ABNORMAL: 2.19 UIU/ML (ref 0.27–4.2)
WBC # BLD AUTO: 8.1 K/UL (ref 4.3–11.1)

## 2024-05-17 PROCEDURE — 3075F SYST BP GE 130 - 139MM HG: CPT | Performed by: INTERNAL MEDICINE

## 2024-05-17 PROCEDURE — 6360000002 HC RX W HCPCS: Performed by: INTERNAL MEDICINE

## 2024-05-17 PROCEDURE — 99215 OFFICE O/P EST HI 40 MIN: CPT | Performed by: INTERNAL MEDICINE

## 2024-05-17 PROCEDURE — 1123F ACP DISCUSS/DSCN MKR DOCD: CPT | Performed by: INTERNAL MEDICINE

## 2024-05-17 PROCEDURE — 2580000003 HC RX 258: Performed by: INTERNAL MEDICINE

## 2024-05-17 PROCEDURE — 84443 ASSAY THYROID STIM HORMONE: CPT

## 2024-05-17 PROCEDURE — 80053 COMPREHEN METABOLIC PANEL: CPT

## 2024-05-17 PROCEDURE — 85025 COMPLETE CBC W/AUTO DIFF WBC: CPT

## 2024-05-17 PROCEDURE — 96413 CHEMO IV INFUSION 1 HR: CPT

## 2024-05-17 PROCEDURE — 3079F DIAST BP 80-89 MM HG: CPT | Performed by: INTERNAL MEDICINE

## 2024-05-17 RX ORDER — SODIUM CHLORIDE 9 MG/ML
5-250 INJECTION, SOLUTION INTRAVENOUS PRN
Status: CANCELLED | OUTPATIENT
Start: 2024-05-17

## 2024-05-17 RX ORDER — DIPHENHYDRAMINE HYDROCHLORIDE 50 MG/ML
50 INJECTION INTRAMUSCULAR; INTRAVENOUS
Status: CANCELLED | OUTPATIENT
Start: 2024-05-17

## 2024-05-17 RX ORDER — SODIUM CHLORIDE 0.9 % (FLUSH) 0.9 %
5-40 SYRINGE (ML) INJECTION PRN
Status: CANCELLED | OUTPATIENT
Start: 2024-05-17

## 2024-05-17 RX ORDER — HEPARIN 100 UNIT/ML
500 SYRINGE INTRAVENOUS PRN
Status: CANCELLED | OUTPATIENT
Start: 2024-05-17

## 2024-05-17 RX ORDER — SODIUM CHLORIDE 9 MG/ML
INJECTION, SOLUTION INTRAVENOUS CONTINUOUS
Status: CANCELLED | OUTPATIENT
Start: 2024-05-17

## 2024-05-17 RX ORDER — SODIUM CHLORIDE 0.9 % (FLUSH) 0.9 %
5-40 SYRINGE (ML) INJECTION PRN
Status: DISCONTINUED | OUTPATIENT
Start: 2024-05-17 | End: 2024-05-21 | Stop reason: HOSPADM

## 2024-05-17 RX ORDER — ONDANSETRON 2 MG/ML
8 INJECTION INTRAMUSCULAR; INTRAVENOUS
Status: CANCELLED | OUTPATIENT
Start: 2024-05-17

## 2024-05-17 RX ORDER — MEPERIDINE HYDROCHLORIDE 25 MG/ML
12.5 INJECTION INTRAMUSCULAR; INTRAVENOUS; SUBCUTANEOUS PRN
Status: CANCELLED | OUTPATIENT
Start: 2024-05-17

## 2024-05-17 RX ORDER — ALBUTEROL SULFATE 90 UG/1
4 AEROSOL, METERED RESPIRATORY (INHALATION) PRN
Status: DISCONTINUED | OUTPATIENT
Start: 2024-05-17 | End: 2024-05-18 | Stop reason: HOSPADM

## 2024-05-17 RX ORDER — SODIUM CHLORIDE 0.9 % (FLUSH) 0.9 %
5-40 SYRINGE (ML) INJECTION PRN
Status: DISCONTINUED | OUTPATIENT
Start: 2024-05-17 | End: 2024-05-18 | Stop reason: HOSPADM

## 2024-05-17 RX ORDER — DIPHENHYDRAMINE HYDROCHLORIDE 50 MG/ML
50 INJECTION INTRAMUSCULAR; INTRAVENOUS
Status: DISCONTINUED | OUTPATIENT
Start: 2024-05-17 | End: 2024-05-18 | Stop reason: HOSPADM

## 2024-05-17 RX ORDER — EPINEPHRINE 1 MG/ML
0.3 INJECTION, SOLUTION, CONCENTRATE INTRAVENOUS PRN
Status: CANCELLED | OUTPATIENT
Start: 2024-05-17

## 2024-05-17 RX ORDER — EPINEPHRINE 1 MG/ML
0.3 INJECTION, SOLUTION, CONCENTRATE INTRAVENOUS PRN
Status: DISCONTINUED | OUTPATIENT
Start: 2024-05-17 | End: 2024-05-18 | Stop reason: HOSPADM

## 2024-05-17 RX ORDER — MEPERIDINE HYDROCHLORIDE 25 MG/ML
12.5 INJECTION INTRAMUSCULAR; INTRAVENOUS; SUBCUTANEOUS PRN
Status: DISCONTINUED | OUTPATIENT
Start: 2024-05-17 | End: 2024-05-18 | Stop reason: HOSPADM

## 2024-05-17 RX ORDER — ONDANSETRON 2 MG/ML
8 INJECTION INTRAMUSCULAR; INTRAVENOUS
Status: DISCONTINUED | OUTPATIENT
Start: 2024-05-17 | End: 2024-05-18 | Stop reason: HOSPADM

## 2024-05-17 RX ORDER — SODIUM CHLORIDE 9 MG/ML
INJECTION, SOLUTION INTRAVENOUS CONTINUOUS
Status: DISCONTINUED | OUTPATIENT
Start: 2024-05-17 | End: 2024-05-18 | Stop reason: HOSPADM

## 2024-05-17 RX ORDER — ACETAMINOPHEN 325 MG/1
650 TABLET ORAL
Status: DISCONTINUED | OUTPATIENT
Start: 2024-05-17 | End: 2024-05-18 | Stop reason: HOSPADM

## 2024-05-17 RX ORDER — SODIUM CHLORIDE 9 MG/ML
5-250 INJECTION, SOLUTION INTRAVENOUS PRN
Status: DISCONTINUED | OUTPATIENT
Start: 2024-05-17 | End: 2024-05-18 | Stop reason: HOSPADM

## 2024-05-17 RX ORDER — ACETAMINOPHEN 325 MG/1
650 TABLET ORAL
Status: CANCELLED | OUTPATIENT
Start: 2024-05-17

## 2024-05-17 RX ORDER — ALBUTEROL SULFATE 90 UG/1
4 AEROSOL, METERED RESPIRATORY (INHALATION) PRN
Status: CANCELLED | OUTPATIENT
Start: 2024-05-17

## 2024-05-17 RX ADMIN — SODIUM CHLORIDE, PRESERVATIVE FREE 10 ML: 5 INJECTION INTRAVENOUS at 10:30

## 2024-05-17 RX ADMIN — SODIUM CHLORIDE 100 ML/HR: 9 INJECTION, SOLUTION INTRAVENOUS at 12:40

## 2024-05-17 RX ADMIN — SODIUM CHLORIDE, PRESERVATIVE FREE 10 ML: 5 INJECTION INTRAVENOUS at 13:36

## 2024-05-17 RX ADMIN — SODIUM CHLORIDE 200 MG: 9 INJECTION, SOLUTION INTRAVENOUS at 12:59

## 2024-05-17 ASSESSMENT — PAIN DESCRIPTION - FREQUENCY: FREQUENCY: CONTINUOUS

## 2024-05-17 ASSESSMENT — PATIENT HEALTH QUESTIONNAIRE - PHQ9
2. FEELING DOWN, DEPRESSED OR HOPELESS: NOT AT ALL
SUM OF ALL RESPONSES TO PHQ QUESTIONS 1-9: 0
SUM OF ALL RESPONSES TO PHQ9 QUESTIONS 1 & 2: 0
1. LITTLE INTEREST OR PLEASURE IN DOING THINGS: NOT AT ALL
SUM OF ALL RESPONSES TO PHQ QUESTIONS 1-9: 0

## 2024-05-17 ASSESSMENT — PAIN DESCRIPTION - ORIENTATION: ORIENTATION: LOWER

## 2024-05-17 ASSESSMENT — PAIN DESCRIPTION - LOCATION: LOCATION: BACK

## 2024-05-17 ASSESSMENT — PAIN DESCRIPTION - PAIN TYPE: TYPE: ACUTE PAIN

## 2024-05-17 ASSESSMENT — PAIN DESCRIPTION - DESCRIPTORS: DESCRIPTORS: ACHING

## 2024-05-17 ASSESSMENT — PAIN SCALES - GENERAL: PAINLEVEL_OUTOF10: 8

## 2024-05-17 ASSESSMENT — PAIN - FUNCTIONAL ASSESSMENT: PAIN_FUNCTIONAL_ASSESSMENT: PREVENTS OR INTERFERES SOME ACTIVE ACTIVITIES AND ADLS

## 2024-05-17 ASSESSMENT — PAIN DESCRIPTION - ONSET: ONSET: ON-GOING

## 2024-05-17 NOTE — PATIENT INSTRUCTIONS
Patient Information from Today's Visit    The members of your [facility]cology [facility] are listed below:    Physician Provider: Bartolo Arreola, Medical Oncologist  Advanced Practice Clinician: Nurys Beck NP  Registered Nurse: Chapis MOROCHO RN  Navigator: N/A  Medical Assistant: Mily GOODMAN MA  : Sarah VAUGHN   Supportive Care Services: Jeny HOUSE LMSW    Follow Up Instructions:   - continue treatment      Treatment Summary has been discussed and given to patient:No      Current Labs:   Hospital Outpatient Visit on 05/17/2024   Component Date Value Ref Range Status    WBC 05/17/2024 8.1  4.3 - 11.1 K/uL Final    RBC 05/17/2024 4.55  4.23 - 5.6 M/uL Final    Hemoglobin 05/17/2024 13.0 (L)  13.6 - 17.2 g/dL Final    Hematocrit 05/17/2024 40.6 (L)  41.1 - 50.3 % Final    MCV 05/17/2024 89.2  82.0 - 102.0 FL Final    MCH 05/17/2024 28.6  26.1 - 32.9 PG Final    MCHC 05/17/2024 32.0  31.4 - 35.0 g/dL Final    RDW 05/17/2024 14.3  11.9 - 14.6 % Final    Platelets 05/17/2024 270  150 - 450 K/uL Final    MPV 05/17/2024 9.7  9.4 - 12.3 FL Final    nRBC 05/17/2024 0.00  0.0 - 0.2 K/uL Final    **Note: Absolute NRBC parameter is now reported with Hemogram**    Neutrophils % 05/17/2024 52  43 - 78 % Final    Lymphocytes % 05/17/2024 30  13 - 44 % Final    Monocytes % 05/17/2024 9  4.0 - 12.0 % Final    Eosinophils % 05/17/2024 8 (H)  0.5 - 7.8 % Final    Basophils % 05/17/2024 1  0.0 - 2.0 % Final    Immature Granulocytes % 05/17/2024 0  0.0 - 5.0 % Final    Neutrophils Absolute 05/17/2024 4.2  1.7 - 8.2 K/UL Final    Lymphocytes Absolute 05/17/2024 2.5  0.5 - 4.6 K/UL Final    Monocytes Absolute 05/17/2024 0.7  0.1 - 1.3 K/UL Final    Eosinophils Absolute 05/17/2024 0.7  0.0 - 0.8 K/UL Final    Basophils Absolute 05/17/2024 0.1  0.0 - 0.2 K/UL Final    Immature Granulocytes Absolute 05/17/2024 0.0  0.0 - 0.5 K/UL Final    Differential Type 05/17/2024 AUTOMATED    Final                Please refer to After Visit Summary or

## 2024-05-17 NOTE — PROGRESS NOTES
Pt arrived ambulatory.  Keytruda completed without complications. Pt aware of next appt 6/7 at 1145.  Discharged ambulatory, no distress noted.  Patient instructed to call provider with temperature of 100.4 or greater or nausea/vomiting/ diarrhea or pain not controlled by medications

## 2024-05-29 ENCOUNTER — OFFICE VISIT (OUTPATIENT)
Dept: ORTHOPEDIC SURGERY | Age: 69
End: 2024-05-29

## 2024-05-29 DIAGNOSIS — M54.17 LUMBOSACRAL RADICULOPATHY: Primary | ICD-10-CM

## 2024-05-29 RX ORDER — TRIAMCINOLONE ACETONIDE 40 MG/ML
100 INJECTION, SUSPENSION INTRA-ARTICULAR; INTRAMUSCULAR ONCE
Status: SHIPPED | OUTPATIENT
Start: 2024-05-29

## 2024-05-29 NOTE — PROGRESS NOTES
Patient presents for scheduled translaminar lumbar DAQUAN.  Apparently he has not been on Plavix for the last 2 months or thereabouts after having a cardiac device implanted.  He did not stop the Plavix or was not aware to.  Will get in touch with the referring party for them to get clearance for the patient to come off the Plavix for 6-7 days at which time the lumbar DAQUAN can be rescheduled.  BH

## 2024-06-03 ENCOUNTER — TELEPHONE (OUTPATIENT)
Age: 69
End: 2024-06-03

## 2024-06-03 NOTE — TELEPHONE ENCOUNTER
Spoke with patient and was able reschedule him for his spinal injection.  We did discuss not taking his Plavix 5 days prior his injection and he was compliant.  He was instructed to begin holding this medication starting 6/7/24 before his appointment on 6/12/24.  The patient states he will write this information on his calendar.  I offered a reminder call but he says he will write it on his calendar.

## 2024-06-04 DIAGNOSIS — D50.9 IRON DEFICIENCY ANEMIA, UNSPECIFIED IRON DEFICIENCY ANEMIA TYPE: ICD-10-CM

## 2024-06-04 DIAGNOSIS — Z11.59 ENCOUNTER FOR SCREENING FOR OTHER VIRAL DISEASES: ICD-10-CM

## 2024-06-04 DIAGNOSIS — C43.9 MALIGNANT MELANOMA, UNSPECIFIED SITE (HCC): ICD-10-CM

## 2024-06-04 DIAGNOSIS — E86.1 HYPOTENSION DUE TO HYPOVOLEMIA: ICD-10-CM

## 2024-06-04 DIAGNOSIS — Z87.19 HISTORY OF PANCREATITIS: ICD-10-CM

## 2024-06-04 DIAGNOSIS — R21 RASH AND NONSPECIFIC SKIN ERUPTION: ICD-10-CM

## 2024-06-04 DIAGNOSIS — C43.62 MALIGNANT MELANOMA OF LEFT UPPER LIMB, INCLUDING SHOULDER (HCC): Primary | ICD-10-CM

## 2024-06-04 DIAGNOSIS — R53.82 CHRONIC FATIGUE: ICD-10-CM

## 2024-06-04 DIAGNOSIS — C43.62 MELANOMA OF SHOULDER, LEFT (HCC): ICD-10-CM

## 2024-06-04 DIAGNOSIS — R68.89 OTHER GENERAL SYMPTOMS AND SIGNS: ICD-10-CM

## 2024-06-04 DIAGNOSIS — Z79.899 HIGH RISK MEDICATION USE: ICD-10-CM

## 2024-06-04 DIAGNOSIS — D64.9 ANEMIA, UNSPECIFIED TYPE: ICD-10-CM

## 2024-06-07 ENCOUNTER — HOSPITAL ENCOUNTER (OUTPATIENT)
Dept: INFUSION THERAPY | Age: 69
Setting detail: INFUSION SERIES
Discharge: HOME | End: 2024-06-07
Payer: OTHER GOVERNMENT

## 2024-06-07 ENCOUNTER — OFFICE VISIT (OUTPATIENT)
Dept: ONCOLOGY | Age: 69
End: 2024-06-07
Payer: OTHER GOVERNMENT

## 2024-06-07 ENCOUNTER — HOSPITAL ENCOUNTER (OUTPATIENT)
Dept: LAB | Age: 69
End: 2024-06-07
Payer: OTHER GOVERNMENT

## 2024-06-07 VITALS
HEIGHT: 70 IN | HEART RATE: 71 BPM | SYSTOLIC BLOOD PRESSURE: 137 MMHG | OXYGEN SATURATION: 95 % | TEMPERATURE: 97.4 F | BODY MASS INDEX: 45.1 KG/M2 | DIASTOLIC BLOOD PRESSURE: 79 MMHG | WEIGHT: 315 LBS | RESPIRATION RATE: 16 BRPM

## 2024-06-07 DIAGNOSIS — E03.2 HYPOTHYROIDISM DUE TO MEDICATION: ICD-10-CM

## 2024-06-07 DIAGNOSIS — R68.89 OTHER GENERAL SYMPTOMS AND SIGNS: ICD-10-CM

## 2024-06-07 DIAGNOSIS — C43.9 MALIGNANT MELANOMA, UNSPECIFIED SITE (HCC): ICD-10-CM

## 2024-06-07 DIAGNOSIS — D64.9 ANEMIA, UNSPECIFIED TYPE: ICD-10-CM

## 2024-06-07 DIAGNOSIS — Z79.899 HIGH RISK MEDICATION USE: ICD-10-CM

## 2024-06-07 DIAGNOSIS — R21 RASH AND NONSPECIFIC SKIN ERUPTION: ICD-10-CM

## 2024-06-07 DIAGNOSIS — Z11.59 ENCOUNTER FOR SCREENING FOR OTHER VIRAL DISEASES: ICD-10-CM

## 2024-06-07 DIAGNOSIS — C43.62 MELANOMA OF SHOULDER, LEFT (HCC): ICD-10-CM

## 2024-06-07 DIAGNOSIS — E03.2 HYPOTHYROIDISM DUE TO MEDICATION: Primary | ICD-10-CM

## 2024-06-07 DIAGNOSIS — Z87.19 HISTORY OF PANCREATITIS: ICD-10-CM

## 2024-06-07 DIAGNOSIS — C43.62 MALIGNANT MELANOMA OF LEFT UPPER LIMB, INCLUDING SHOULDER (HCC): ICD-10-CM

## 2024-06-07 DIAGNOSIS — R53.82 CHRONIC FATIGUE: ICD-10-CM

## 2024-06-07 DIAGNOSIS — D50.9 IRON DEFICIENCY ANEMIA, UNSPECIFIED IRON DEFICIENCY ANEMIA TYPE: ICD-10-CM

## 2024-06-07 DIAGNOSIS — E86.1 HYPOTENSION DUE TO HYPOVOLEMIA: ICD-10-CM

## 2024-06-07 DIAGNOSIS — C43.9 MALIGNANT MELANOMA, UNSPECIFIED SITE (HCC): Primary | ICD-10-CM

## 2024-06-07 LAB
ALBUMIN SERPL-MCNC: 3.4 G/DL (ref 3.2–4.6)
ALBUMIN/GLOB SERPL: 0.9 (ref 1–1.9)
ALP SERPL-CCNC: 118 U/L (ref 40–129)
ALT SERPL-CCNC: 13 U/L (ref 12–65)
ANION GAP SERPL CALC-SCNC: 12 MMOL/L (ref 9–18)
AST SERPL-CCNC: 22 U/L (ref 15–37)
BASOPHILS # BLD: 0.1 K/UL (ref 0–0.2)
BASOPHILS NFR BLD: 1 % (ref 0–2)
BILIRUB SERPL-MCNC: 0.4 MG/DL (ref 0–1.2)
BUN SERPL-MCNC: 19 MG/DL (ref 8–23)
CALCIUM SERPL-MCNC: 9 MG/DL (ref 8.8–10.2)
CHLORIDE SERPL-SCNC: 103 MMOL/L (ref 98–107)
CO2 SERPL-SCNC: 26 MMOL/L (ref 20–28)
CREAT SERPL-MCNC: 1.32 MG/DL (ref 0.8–1.3)
DIFFERENTIAL METHOD BLD: ABNORMAL
EOSINOPHIL # BLD: 0.6 K/UL (ref 0–0.8)
EOSINOPHIL NFR BLD: 8 % (ref 0.5–7.8)
ERYTHROCYTE [DISTWIDTH] IN BLOOD BY AUTOMATED COUNT: 14.2 % (ref 11.9–14.6)
GLOBULIN SER CALC-MCNC: 3.7 G/DL (ref 2.3–3.5)
GLUCOSE SERPL-MCNC: 114 MG/DL (ref 70–99)
HCT VFR BLD AUTO: 40 % (ref 41.1–50.3)
HGB BLD-MCNC: 12.5 G/DL (ref 13.6–17.2)
IMM GRANULOCYTES # BLD AUTO: 0 K/UL (ref 0–0.5)
IMM GRANULOCYTES NFR BLD AUTO: 0 % (ref 0–5)
LYMPHOCYTES # BLD: 2.3 K/UL (ref 0.5–4.6)
LYMPHOCYTES NFR BLD: 30 % (ref 13–44)
MCH RBC QN AUTO: 27.9 PG (ref 26.1–32.9)
MCHC RBC AUTO-ENTMCNC: 31.3 G/DL (ref 31.4–35)
MCV RBC AUTO: 89.3 FL (ref 82–102)
MONOCYTES # BLD: 0.7 K/UL (ref 0.1–1.3)
MONOCYTES NFR BLD: 9 % (ref 4–12)
NEUTS SEG # BLD: 4 K/UL (ref 1.7–8.2)
NEUTS SEG NFR BLD: 52 % (ref 43–78)
NRBC # BLD: 0 K/UL (ref 0–0.2)
PLATELET # BLD AUTO: 269 K/UL (ref 150–450)
PMV BLD AUTO: 9.7 FL (ref 9.4–12.3)
POTASSIUM SERPL-SCNC: 4.1 MMOL/L (ref 3.5–5.1)
PROT SERPL-MCNC: 7.1 G/DL (ref 6.3–8.2)
RBC # BLD AUTO: 4.48 M/UL (ref 4.23–5.6)
SODIUM SERPL-SCNC: 141 MMOL/L (ref 136–145)
TSH W FREE THYROID IF ABNORMAL: 1.74 UIU/ML (ref 0.27–4.2)
WBC # BLD AUTO: 7.8 K/UL (ref 4.3–11.1)

## 2024-06-07 PROCEDURE — 96413 CHEMO IV INFUSION 1 HR: CPT

## 2024-06-07 PROCEDURE — 80053 COMPREHEN METABOLIC PANEL: CPT

## 2024-06-07 PROCEDURE — 2580000003 HC RX 258: Performed by: INTERNAL MEDICINE

## 2024-06-07 PROCEDURE — 6360000002 HC RX W HCPCS: Performed by: NURSE PRACTITIONER

## 2024-06-07 PROCEDURE — 99214 OFFICE O/P EST MOD 30 MIN: CPT | Performed by: NURSE PRACTITIONER

## 2024-06-07 PROCEDURE — 3075F SYST BP GE 130 - 139MM HG: CPT | Performed by: NURSE PRACTITIONER

## 2024-06-07 PROCEDURE — 84443 ASSAY THYROID STIM HORMONE: CPT

## 2024-06-07 PROCEDURE — 1123F ACP DISCUSS/DSCN MKR DOCD: CPT | Performed by: NURSE PRACTITIONER

## 2024-06-07 PROCEDURE — 85025 COMPLETE CBC W/AUTO DIFF WBC: CPT

## 2024-06-07 PROCEDURE — 3078F DIAST BP <80 MM HG: CPT | Performed by: NURSE PRACTITIONER

## 2024-06-07 PROCEDURE — 2580000003 HC RX 258: Performed by: NURSE PRACTITIONER

## 2024-06-07 RX ORDER — EPINEPHRINE 1 MG/ML
0.3 INJECTION, SOLUTION, CONCENTRATE INTRAVENOUS PRN
Status: CANCELLED | OUTPATIENT
Start: 2024-06-07

## 2024-06-07 RX ORDER — SODIUM CHLORIDE 0.9 % (FLUSH) 0.9 %
5-40 SYRINGE (ML) INJECTION PRN
Status: DISCONTINUED | OUTPATIENT
Start: 2024-06-07 | End: 2024-06-08 | Stop reason: HOSPADM

## 2024-06-07 RX ORDER — SODIUM CHLORIDE 9 MG/ML
5-250 INJECTION, SOLUTION INTRAVENOUS PRN
Status: CANCELLED | OUTPATIENT
Start: 2024-06-07

## 2024-06-07 RX ORDER — SODIUM CHLORIDE 0.9 % (FLUSH) 0.9 %
5-40 SYRINGE (ML) INJECTION PRN
Status: DISCONTINUED | OUTPATIENT
Start: 2024-06-07 | End: 2024-06-11 | Stop reason: HOSPADM

## 2024-06-07 RX ORDER — ACETAMINOPHEN 325 MG/1
650 TABLET ORAL
Status: CANCELLED | OUTPATIENT
Start: 2024-06-07

## 2024-06-07 RX ORDER — SODIUM CHLORIDE 9 MG/ML
INJECTION, SOLUTION INTRAVENOUS CONTINUOUS
Status: DISCONTINUED | OUTPATIENT
Start: 2024-06-07 | End: 2024-06-08 | Stop reason: HOSPADM

## 2024-06-07 RX ORDER — HEPARIN 100 UNIT/ML
500 SYRINGE INTRAVENOUS PRN
Status: CANCELLED | OUTPATIENT
Start: 2024-06-07

## 2024-06-07 RX ORDER — DIPHENHYDRAMINE HYDROCHLORIDE 50 MG/ML
50 INJECTION INTRAMUSCULAR; INTRAVENOUS
Status: DISCONTINUED | OUTPATIENT
Start: 2024-06-07 | End: 2024-06-08 | Stop reason: HOSPADM

## 2024-06-07 RX ORDER — ONDANSETRON 2 MG/ML
8 INJECTION INTRAMUSCULAR; INTRAVENOUS
Status: CANCELLED | OUTPATIENT
Start: 2024-06-07

## 2024-06-07 RX ORDER — ONDANSETRON 2 MG/ML
8 INJECTION INTRAMUSCULAR; INTRAVENOUS
Status: DISCONTINUED | OUTPATIENT
Start: 2024-06-07 | End: 2024-06-08 | Stop reason: HOSPADM

## 2024-06-07 RX ORDER — DIPHENHYDRAMINE HYDROCHLORIDE 50 MG/ML
50 INJECTION INTRAMUSCULAR; INTRAVENOUS
Status: CANCELLED | OUTPATIENT
Start: 2024-06-07

## 2024-06-07 RX ORDER — SODIUM CHLORIDE 9 MG/ML
INJECTION, SOLUTION INTRAVENOUS CONTINUOUS
Status: CANCELLED | OUTPATIENT
Start: 2024-06-07

## 2024-06-07 RX ORDER — SODIUM CHLORIDE 0.9 % (FLUSH) 0.9 %
5-40 SYRINGE (ML) INJECTION PRN
Status: CANCELLED | OUTPATIENT
Start: 2024-06-07

## 2024-06-07 RX ORDER — ALBUTEROL SULFATE 90 UG/1
4 AEROSOL, METERED RESPIRATORY (INHALATION) PRN
Status: CANCELLED | OUTPATIENT
Start: 2024-06-07

## 2024-06-07 RX ORDER — ALBUTEROL SULFATE 90 UG/1
4 AEROSOL, METERED RESPIRATORY (INHALATION) PRN
Status: DISCONTINUED | OUTPATIENT
Start: 2024-06-07 | End: 2024-06-08 | Stop reason: HOSPADM

## 2024-06-07 RX ORDER — MEPERIDINE HYDROCHLORIDE 25 MG/ML
12.5 INJECTION INTRAMUSCULAR; INTRAVENOUS; SUBCUTANEOUS PRN
Status: CANCELLED | OUTPATIENT
Start: 2024-06-07

## 2024-06-07 RX ORDER — EPINEPHRINE 1 MG/ML
0.3 INJECTION, SOLUTION, CONCENTRATE INTRAVENOUS PRN
Status: DISCONTINUED | OUTPATIENT
Start: 2024-06-07 | End: 2024-06-08 | Stop reason: HOSPADM

## 2024-06-07 RX ORDER — ACETAMINOPHEN 325 MG/1
650 TABLET ORAL
Status: DISCONTINUED | OUTPATIENT
Start: 2024-06-07 | End: 2024-06-08 | Stop reason: HOSPADM

## 2024-06-07 RX ORDER — SODIUM CHLORIDE 9 MG/ML
5-250 INJECTION, SOLUTION INTRAVENOUS PRN
Status: DISCONTINUED | OUTPATIENT
Start: 2024-06-07 | End: 2024-06-08 | Stop reason: HOSPADM

## 2024-06-07 RX ORDER — MEPERIDINE HYDROCHLORIDE 25 MG/ML
12.5 INJECTION INTRAMUSCULAR; INTRAVENOUS; SUBCUTANEOUS PRN
Status: DISCONTINUED | OUTPATIENT
Start: 2024-06-07 | End: 2024-06-08 | Stop reason: HOSPADM

## 2024-06-07 RX ADMIN — SODIUM CHLORIDE 200 MG: 9 INJECTION, SOLUTION INTRAVENOUS at 12:09

## 2024-06-07 RX ADMIN — SODIUM CHLORIDE, PRESERVATIVE FREE 10 ML: 5 INJECTION INTRAVENOUS at 12:42

## 2024-06-07 RX ADMIN — SODIUM CHLORIDE, PRESERVATIVE FREE 10 ML: 5 INJECTION INTRAVENOUS at 09:50

## 2024-06-07 RX ADMIN — SODIUM CHLORIDE 100 ML/HR: 9 INJECTION, SOLUTION INTRAVENOUS at 11:44

## 2024-06-07 ASSESSMENT — PAIN DESCRIPTION - FREQUENCY: FREQUENCY: CONTINUOUS

## 2024-06-07 ASSESSMENT — PAIN DESCRIPTION - PAIN TYPE: TYPE: CHRONIC PAIN

## 2024-06-07 ASSESSMENT — PATIENT HEALTH QUESTIONNAIRE - PHQ9
SUM OF ALL RESPONSES TO PHQ9 QUESTIONS 1 & 2: 0
1. LITTLE INTEREST OR PLEASURE IN DOING THINGS: NOT AT ALL
SUM OF ALL RESPONSES TO PHQ QUESTIONS 1-9: 0
2. FEELING DOWN, DEPRESSED OR HOPELESS: NOT AT ALL
SUM OF ALL RESPONSES TO PHQ QUESTIONS 1-9: 0

## 2024-06-07 ASSESSMENT — PAIN DESCRIPTION - DESCRIPTORS: DESCRIPTORS: ACHING

## 2024-06-07 ASSESSMENT — PAIN SCALES - GENERAL: PAINLEVEL_OUTOF10: 8

## 2024-06-07 ASSESSMENT — PAIN DESCRIPTION - ORIENTATION: ORIENTATION: LOWER

## 2024-06-07 ASSESSMENT — PAIN DESCRIPTION - ONSET: ONSET: ON-GOING

## 2024-06-07 ASSESSMENT — PAIN - FUNCTIONAL ASSESSMENT: PAIN_FUNCTIONAL_ASSESSMENT: PREVENTS OR INTERFERES SOME ACTIVE ACTIVITIES AND ADLS

## 2024-06-07 ASSESSMENT — PAIN DESCRIPTION - LOCATION: LOCATION: BACK

## 2024-06-07 NOTE — PROGRESS NOTES
2/15/2024.  He is aware of the critical importance of regular dermatology follow-up.    4/26/2024:  He is here today for follow up and consideration of next Keytruda.  He has been well overall.  He only has intermittent rash r/t Keytruda but has been overall improving - using cream from derm as needed.  He has ongoing lower back pain, worse recently as he thinks he aggravated it with activity; he has had injections in the past and will pursue these again as needed.  Currently, pain is improving.  He denies any other issues or concerns r/t treatment.  No fevers or infectious symptoms.  Labs reviewed and adequate for treatment.  F/u in 3 weeks as scheduled.        5/17/2024: Patient has been tolerating Keytruda rather well.  He has had dispersed areas of pruritic rash appear on his trunk intermittently but this has been manageable with topical steroids.  There are no further signs of autoimmune toxicity.  He denies any fever or infectious symptoms.  Labs and physical exam are adequate to proceed with the next dose of Keytruda.  Restaging PET/CT will be scheduled towards the end of the planned 1 year of therapy in July 2024.  Return office visit and labs per treatment plan.  Total visit time 40 minutes.    6/7/2024:  He is here today for follow up and next Keytruda.  He has been well overall aside from his back pain.  He is scheduled to have injections again (which have helped historically) on Wednesday of next week.  He denies any issues r/t Keytruda.  He has mild rash, very manageable with topical cream.  He denies any GI or bowel complaints.  He denies any respiratory changes.  He denies any fevers or other infectious symptoms.  Labs reviewed and stable/adequate for tx.  F/u in 3 weeks for next Keytruda, planning for PET the end of July after completion of therapy.         Nurys Ebony, APRN - NP  [facility] Hematology and Oncology  [address] [place] [address]  Office : [phone]  Fax :

## 2024-06-07 NOTE — PROGRESS NOTES
Patient to port lab for port access and lab draw. Port accessed, per protocol, using 20g 0.75\" power needle without difficulty. Labs drawn from port and port flushed. Port remains accessed. Patient tolerated well. Discharged ambulatory.

## 2024-06-07 NOTE — PROGRESS NOTES
Pt arrived ambulatory.  Keytruda completed without complications.  Pt aware of next appt 6/28 at 1030.  Discharged ambulatory, no distress noted.  Patient instructed to call provider with temperature of 100.4 or greater or nausea/vomiting/ diarrhea or pain not controlled by medications

## 2024-06-12 ENCOUNTER — OFFICE VISIT (OUTPATIENT)
Dept: ORTHOPEDIC SURGERY | Age: 69
End: 2024-06-12
Payer: MEDICARE

## 2024-06-12 DIAGNOSIS — M54.17 LUMBOSACRAL RADICULOPATHY: Primary | ICD-10-CM

## 2024-06-12 PROCEDURE — 62323 NJX INTERLAMINAR LMBR/SAC: CPT | Performed by: PHYSICAL MEDICINE & REHABILITATION

## 2024-06-12 RX ORDER — TRIAMCINOLONE ACETONIDE 40 MG/ML
100 INJECTION, SUSPENSION INTRA-ARTICULAR; INTRAMUSCULAR ONCE
Status: COMPLETED | OUTPATIENT
Start: 2024-06-12 | End: 2024-06-12

## 2024-06-12 RX ADMIN — TRIAMCINOLONE ACETONIDE 100 MG: 40 INJECTION, SUSPENSION INTRA-ARTICULAR; INTRAMUSCULAR at 09:01

## 2024-06-12 NOTE — PROGRESS NOTES
Date: 06/12/24   Name: Steve Mcgovern    Pre-Procedural Diagnosis:    Diagnosis Orders   1. Lumbosacral radiculopathy  triamcinolone acetonide (KENALOG-40) injection 100 mg    XR INJ SPINE THER SUBST LUM/SAC W IMG      Reviewed lumbosacral spine radiographs that reveal 5 non rib-bearing lumbar vertebrae.      Procedure: Lumbar Epidural Steroid Injection (DAQUAN)    Precautions: [facility] Precautions spine injections: None.  Patient denies any prior sensitivity to steroid, local anesthetic, contrast dye, iodine or shellfish.    The procedure was discussed at length with the patient and informed consent was signed. The patient was placed in a prone position on the fluoroscopy table and the skin was prepped and draped in a routine sterile fashion. The area to be injected was anesthetized with approximately 5 cc of 1% Lidocaine. Initially a 22-gauge 3.5 inch spinal needle was carefully advanced under fluoroscopic guidance to the left L4-L5 interlaminar epidural space. At this time 0.25 cc of omnipaque administered.. Once proper placement was confirmed, 1.5 cc of sterile water and 100 mg of Kenalog were injected through the spinal needle.     Fluoroscopic guidance was used intermittently over a 10-minute period to insure proper needle placement and patient safety. A hard copy of the fluoroscopic  images has been placed in the patient's chart. The patient was monitored after the procedure and discharged home without complication.     A total of 2.5 cc of Kenalog were administered during this procedure.    Resume Meds:  Patient to resume Plavix/asa in the AM.    DEVAN MILAN JR, MD  06/12/24

## 2024-06-20 RX ORDER — TORSEMIDE 20 MG/1
20 TABLET ORAL 2 TIMES DAILY
Qty: 180 TABLET | Refills: 3 | Status: SHIPPED | OUTPATIENT
Start: 2024-06-20 | End: 2025-06-20

## 2024-06-27 NOTE — PROGRESS NOTES
[facility] Hematology and Oncology: Office Visit Established Patient    Reason for follow up:    Melanoma of shoulder, left   Cancer Staging   pT4b Melanoma of left shoulder/upper arm  Staging form: Melanoma Of The Skin, AJCC 8th Edition  - Pathologic stage from 6/25/2023: Stage IIC (pT4b, pN0, cM0) - Signed by Bartolo Arreola MD on 6/26/2023  BRAF mutation not detected    Overview: (copied from prior)  This is a 68 years old male patient with multiple medical problems including chronic systolic heart failure, liver cirrhosis, type 2 diabetes mellitus with diabetic polyneuropathy, obstructive sleep apnea and atrial fibrillation (on long-term anticoagulation with apixaban) who presents for postoperative management of recently diagnosed cutaneous melanoma involving left shoulder.  In August 2022, he noted a small pigmented lesion over anterior left shoulder which had continued to grow in size.  In February 2023, he underwent shave biopsy of the suspicious lesion and pathology returned as pT4b Breslow 6.6 mm, Ricco level IV, LVI +, angiotropism +, regression absent, ulceration present, microscopic satellitosis not identified, mitotic rate 14/mm².  PET/CT on 3/28/2023 showed hypermetabolism in the region of recently excised left shoulder melanoma.  No evidence of metastatic disease was found elsewhere in the head neck chest abdomen pelvis or lower extremities.  He underwent WLE left shoulder melanoma and left axillary sentinel node excision on 4/25/23 for a pT4bN0 melanoma of the left shoulder.  Pathology returned as residual nodular malignant melanoma, 18 mm in depth, Ricco's level 5, margins negative.  Left axillary sentinel lymph node was negative for tumor.  On evaluation today, he appears to have healed well from his recent surgery and denies any pain, swelling or drainage from the surgical wounds and left axilla and over anterior left shoulder.  He denies fever, cough, chest pain, nausea, vomiting, abdominal pain,

## 2024-06-28 ENCOUNTER — HOSPITAL ENCOUNTER (OUTPATIENT)
Dept: LAB | Age: 69
End: 2024-06-28
Payer: OTHER GOVERNMENT

## 2024-06-28 ENCOUNTER — HOSPITAL ENCOUNTER (OUTPATIENT)
Dept: INFUSION THERAPY | Age: 69
Setting detail: INFUSION SERIES
Discharge: HOME | End: 2024-06-28
Payer: OTHER GOVERNMENT

## 2024-06-28 ENCOUNTER — OFFICE VISIT (OUTPATIENT)
Dept: ONCOLOGY | Age: 69
End: 2024-06-28
Payer: MEDICARE

## 2024-06-28 VITALS
DIASTOLIC BLOOD PRESSURE: 80 MMHG | TEMPERATURE: 97.4 F | BODY MASS INDEX: 45.1 KG/M2 | OXYGEN SATURATION: 98 % | WEIGHT: 315 LBS | HEART RATE: 71 BPM | SYSTOLIC BLOOD PRESSURE: 142 MMHG | HEIGHT: 70 IN | RESPIRATION RATE: 16 BRPM

## 2024-06-28 DIAGNOSIS — E03.2 HYPOTHYROIDISM DUE TO MEDICATION: ICD-10-CM

## 2024-06-28 DIAGNOSIS — C43.9 MALIGNANT MELANOMA, UNSPECIFIED SITE (HCC): ICD-10-CM

## 2024-06-28 DIAGNOSIS — C43.9 MALIGNANT MELANOMA, UNSPECIFIED SITE (HCC): Primary | ICD-10-CM

## 2024-06-28 LAB
ALBUMIN SERPL-MCNC: 3.3 G/DL (ref 3.2–4.6)
ALBUMIN/GLOB SERPL: 0.9 (ref 1–1.9)
ALP SERPL-CCNC: 112 U/L (ref 40–129)
ALT SERPL-CCNC: 16 U/L (ref 12–65)
ANION GAP SERPL CALC-SCNC: 13 MMOL/L (ref 9–18)
AST SERPL-CCNC: 18 U/L (ref 15–37)
BASOPHILS # BLD: 0.1 K/UL (ref 0–0.2)
BASOPHILS NFR BLD: 1 % (ref 0–2)
BILIRUB SERPL-MCNC: 0.4 MG/DL (ref 0–1.2)
BUN SERPL-MCNC: 31 MG/DL (ref 8–23)
CALCIUM SERPL-MCNC: 9.1 MG/DL (ref 8.8–10.2)
CHLORIDE SERPL-SCNC: 103 MMOL/L (ref 98–107)
CO2 SERPL-SCNC: 24 MMOL/L (ref 20–28)
CREAT SERPL-MCNC: 1.42 MG/DL (ref 0.8–1.3)
DIFFERENTIAL METHOD BLD: ABNORMAL
EOSINOPHIL # BLD: 0.3 K/UL (ref 0–0.8)
EOSINOPHIL NFR BLD: 3 % (ref 0.5–7.8)
ERYTHROCYTE [DISTWIDTH] IN BLOOD BY AUTOMATED COUNT: 15 % (ref 11.9–14.6)
GLOBULIN SER CALC-MCNC: 3.7 G/DL (ref 2.3–3.5)
GLUCOSE SERPL-MCNC: 184 MG/DL (ref 70–99)
HCT VFR BLD AUTO: 41.1 % (ref 41.1–50.3)
HGB BLD-MCNC: 12.9 G/DL (ref 13.6–17.2)
IMM GRANULOCYTES # BLD AUTO: 0 K/UL (ref 0–0.5)
IMM GRANULOCYTES NFR BLD AUTO: 0 % (ref 0–5)
LYMPHOCYTES # BLD: 2.4 K/UL (ref 0.5–4.6)
LYMPHOCYTES NFR BLD: 23 % (ref 13–44)
MCH RBC QN AUTO: 27.9 PG (ref 26.1–32.9)
MCHC RBC AUTO-ENTMCNC: 31.4 G/DL (ref 31.4–35)
MCV RBC AUTO: 89 FL (ref 82–102)
MONOCYTES # BLD: 0.7 K/UL (ref 0.1–1.3)
MONOCYTES NFR BLD: 7 % (ref 4–12)
NEUTS SEG # BLD: 6.6 K/UL (ref 1.7–8.2)
NEUTS SEG NFR BLD: 66 % (ref 43–78)
NRBC # BLD: 0 K/UL (ref 0–0.2)
PLATELET # BLD AUTO: 261 K/UL (ref 150–450)
PMV BLD AUTO: 9.8 FL (ref 9.4–12.3)
POTASSIUM SERPL-SCNC: 4.2 MMOL/L (ref 3.5–5.1)
PROT SERPL-MCNC: 7 G/DL (ref 6.3–8.2)
RBC # BLD AUTO: 4.62 M/UL (ref 4.23–5.6)
SODIUM SERPL-SCNC: 140 MMOL/L (ref 136–145)
TSH W FREE THYROID IF ABNORMAL: 1.63 UIU/ML (ref 0.27–4.2)
WBC # BLD AUTO: 10.1 K/UL (ref 4.3–11.1)

## 2024-06-28 PROCEDURE — 2580000003 HC RX 258: Performed by: INTERNAL MEDICINE

## 2024-06-28 PROCEDURE — 3077F SYST BP >= 140 MM HG: CPT | Performed by: INTERNAL MEDICINE

## 2024-06-28 PROCEDURE — 80053 COMPREHEN METABOLIC PANEL: CPT

## 2024-06-28 PROCEDURE — 96413 CHEMO IV INFUSION 1 HR: CPT

## 2024-06-28 PROCEDURE — 84443 ASSAY THYROID STIM HORMONE: CPT

## 2024-06-28 PROCEDURE — 1123F ACP DISCUSS/DSCN MKR DOCD: CPT | Performed by: INTERNAL MEDICINE

## 2024-06-28 PROCEDURE — 6360000002 HC RX W HCPCS: Performed by: INTERNAL MEDICINE

## 2024-06-28 PROCEDURE — 3079F DIAST BP 80-89 MM HG: CPT | Performed by: INTERNAL MEDICINE

## 2024-06-28 PROCEDURE — 99215 OFFICE O/P EST HI 40 MIN: CPT | Performed by: INTERNAL MEDICINE

## 2024-06-28 PROCEDURE — 85025 COMPLETE CBC W/AUTO DIFF WBC: CPT

## 2024-06-28 RX ORDER — DIPHENHYDRAMINE HYDROCHLORIDE 50 MG/ML
50 INJECTION INTRAMUSCULAR; INTRAVENOUS
Status: CANCELLED | OUTPATIENT
Start: 2024-06-28

## 2024-06-28 RX ORDER — ONDANSETRON 2 MG/ML
8 INJECTION INTRAMUSCULAR; INTRAVENOUS
Status: CANCELLED | OUTPATIENT
Start: 2024-06-28

## 2024-06-28 RX ORDER — SODIUM CHLORIDE 9 MG/ML
5-250 INJECTION, SOLUTION INTRAVENOUS PRN
Status: CANCELLED | OUTPATIENT
Start: 2024-06-28

## 2024-06-28 RX ORDER — SODIUM CHLORIDE 0.9 % (FLUSH) 0.9 %
5-40 SYRINGE (ML) INJECTION PRN
Status: DISCONTINUED | OUTPATIENT
Start: 2024-06-28 | End: 2024-06-29 | Stop reason: HOSPADM

## 2024-06-28 RX ORDER — FAMOTIDINE 10 MG/ML
20 INJECTION, SOLUTION INTRAVENOUS
Status: CANCELLED | OUTPATIENT
Start: 2024-06-28

## 2024-06-28 RX ORDER — ALBUTEROL SULFATE 90 UG/1
4 AEROSOL, METERED RESPIRATORY (INHALATION) PRN
Status: CANCELLED | OUTPATIENT
Start: 2024-06-28

## 2024-06-28 RX ORDER — SODIUM CHLORIDE 0.9 % (FLUSH) 0.9 %
5-40 SYRINGE (ML) INJECTION PRN
Status: CANCELLED | OUTPATIENT
Start: 2024-06-28

## 2024-06-28 RX ORDER — ACETAMINOPHEN 325 MG/1
650 TABLET ORAL
Status: DISCONTINUED | OUTPATIENT
Start: 2024-06-28 | End: 2024-06-29 | Stop reason: HOSPADM

## 2024-06-28 RX ORDER — MEPERIDINE HYDROCHLORIDE 50 MG/ML
12.5 INJECTION INTRAMUSCULAR; INTRAVENOUS; SUBCUTANEOUS PRN
Status: CANCELLED | OUTPATIENT
Start: 2024-06-28

## 2024-06-28 RX ORDER — SODIUM CHLORIDE 0.9 % (FLUSH) 0.9 %
5-40 SYRINGE (ML) INJECTION PRN
Status: DISCONTINUED | OUTPATIENT
Start: 2024-06-28 | End: 2024-07-02 | Stop reason: HOSPADM

## 2024-06-28 RX ORDER — EPINEPHRINE 1 MG/ML
0.3 INJECTION, SOLUTION, CONCENTRATE INTRAVENOUS PRN
Status: CANCELLED | OUTPATIENT
Start: 2024-06-28

## 2024-06-28 RX ORDER — HEPARIN SODIUM (PORCINE) LOCK FLUSH IV SOLN 100 UNIT/ML 100 UNIT/ML
500 SOLUTION INTRAVENOUS PRN
Status: CANCELLED | OUTPATIENT
Start: 2024-06-28

## 2024-06-28 RX ORDER — SODIUM CHLORIDE 9 MG/ML
5-250 INJECTION, SOLUTION INTRAVENOUS PRN
Status: DISCONTINUED | OUTPATIENT
Start: 2024-06-28 | End: 2024-06-29 | Stop reason: HOSPADM

## 2024-06-28 RX ORDER — ACETAMINOPHEN 325 MG/1
650 TABLET ORAL
Status: CANCELLED | OUTPATIENT
Start: 2024-06-28

## 2024-06-28 RX ORDER — ONDANSETRON 2 MG/ML
8 INJECTION INTRAMUSCULAR; INTRAVENOUS
Status: DISCONTINUED | OUTPATIENT
Start: 2024-06-28 | End: 2024-06-29 | Stop reason: HOSPADM

## 2024-06-28 RX ORDER — SODIUM CHLORIDE 9 MG/ML
INJECTION, SOLUTION INTRAVENOUS CONTINUOUS
Status: CANCELLED | OUTPATIENT
Start: 2024-06-28

## 2024-06-28 RX ORDER — DIPHENHYDRAMINE HYDROCHLORIDE 50 MG/ML
50 INJECTION INTRAMUSCULAR; INTRAVENOUS
Status: DISCONTINUED | OUTPATIENT
Start: 2024-06-28 | End: 2024-06-29 | Stop reason: HOSPADM

## 2024-06-28 RX ADMIN — SODIUM CHLORIDE 200 MG: 9 INJECTION, SOLUTION INTRAVENOUS at 12:13

## 2024-06-28 RX ADMIN — SODIUM CHLORIDE, PRESERVATIVE FREE 10 ML: 5 INJECTION INTRAVENOUS at 09:32

## 2024-06-28 RX ADMIN — SODIUM CHLORIDE, PRESERVATIVE FREE 10 ML: 5 INJECTION INTRAVENOUS at 11:40

## 2024-06-28 RX ADMIN — SODIUM CHLORIDE 50 ML/HR: 9 INJECTION, SOLUTION INTRAVENOUS at 12:12

## 2024-06-28 ASSESSMENT — PATIENT HEALTH QUESTIONNAIRE - PHQ9
SUM OF ALL RESPONSES TO PHQ QUESTIONS 1-9: 0
SUM OF ALL RESPONSES TO PHQ QUESTIONS 1-9: 0
SUM OF ALL RESPONSES TO PHQ9 QUESTIONS 1 & 2: 0
SUM OF ALL RESPONSES TO PHQ QUESTIONS 1-9: 0
1. LITTLE INTEREST OR PLEASURE IN DOING THINGS: NOT AT ALL
2. FEELING DOWN, DEPRESSED OR HOPELESS: NOT AT ALL
SUM OF ALL RESPONSES TO PHQ QUESTIONS 1-9: 0

## 2024-06-28 NOTE — PATIENT INSTRUCTIONS
Pet scan the week of August 15th   Follow up for scan review the week of August 22nd  call the radiology scheduling line to set up the PET scan as above.  [phone].

## 2024-06-28 NOTE — PROGRESS NOTES
Arrived to the Infusion Center. Keytruda infusion completed. Patient tolerated well.   Any issues or concerns during appointment: no  Patient aware of next lab and [facility] office visit on 8/22/2024 (date) at 0930 (time).  Patient instructed to call provider with temperature of 100.4 or greater or nausea/vomiting/ diarrhea or pain not controlled by medications  Discharged ambulatory.

## 2024-07-02 DIAGNOSIS — I48.11 LONGSTANDING PERSISTENT ATRIAL FIBRILLATION (HCC): Chronic | ICD-10-CM

## 2024-07-02 RX ORDER — CLOPIDOGREL BISULFATE 75 MG/1
75 TABLET ORAL DAILY
Qty: 90 TABLET | Refills: 3 | Status: SHIPPED | OUTPATIENT
Start: 2024-07-02

## 2024-07-02 NOTE — TELEPHONE ENCOUNTER
Plavix continued lov 5/8/24 escribed as below:  Requested Prescriptions     Signed Prescriptions Disp Refills    clopidogrel (PLAVIX) 75 MG tablet 90 tablet 3     Sig: Take 1 tablet by mouth daily     Authorizing Provider: SHRUTI MAYFIELD     Ordering User: MADELINE DURAN

## 2024-07-02 NOTE — TELEPHONE ENCOUNTER
MEDICATION REFILL REQUEST      Name of Medication:  plavix   Dose:  75 mg  Frequency:  daily   Quantity:    Days' supply:  90      Pharmacy Name/Location:  [facility] /Please call in today because he is out/ Please call patient when called in [phone]

## 2024-07-11 NOTE — PROGRESS NOTES
REUBEN Melo MD, [facility] ENDOCRINOLOGY   AND   THYROID [facility]ODULE CL[facility]IC            Reason for visit: follow-up of type 2 diabetes mellitus      ASSESSMENT AND PLAN:    1. Type 2 diabetes mellitus with hyperglycemia, with long-term current use of insulin (HCC)  Glycemic control appears to be quite good except when he receives intra-articular steroid injections.  He has continued to receive steroid injections but is never provided with instructions about how to adjust therapy to account for expected hyperglycemia.  I recommend that corticosteroid injections be avoided if at all possible.  If they are necessary, I will be administering physician should provide some guidance about how to adjust therapy.  No changes today.  - OZEMPIC, 1 MG/DOSE, 4 MG/3ML SOPN sc injection; Inject 1 mg into the skin once a week  Dispense: 9 mL; Refill: 3  - metFORMIN (GLUCOPHAGE) 500 MG tablet; Take 1 tablet by mouth 2 times daily (with meals)  Dispense: 180 tablet; Refill: 3  - pioglitazone (ACTOS) 30 MG tablet; Take 0.5 tablets by mouth daily  Dispense: 45 tablet; Refill: 3  - LANTUS SOLOSTAR 100 UNIT/ML injection pen; Inject 54 Units into the skin 2 times daily  Dispense: 90 mL; Refill: 3    2. Hypercholesterolemia  LDL is at target.  - atorvastatin (LIPITOR) 10 MG tablet; Take 1 tablet by mouth daily  Dispense: 90 tablet; Refill: 3    3. Essential hypertension  BP: 120/70     4. Albuminuria  He has a history of angioedema with both ACE inhibitors and angiotensin receptor blockers.  Intensive control of blood pressure is paramount.    5. Stage 3a chronic kidney disease (HCC)            Follow-up and Dispositions    Return in about 6 months (around 1/12/2025).                   History of Present Illness:    DIABETES MELLITUS  Steve Mcgovern is here for follow-up of type 2 diabetes mellitus.  He is currently treated with Ozempic 1 mg weekly, Lantus 54 units BID, Actos 15 mg daily, and metformin 500 mg BID.      He

## 2024-07-12 ENCOUNTER — OFFICE VISIT (OUTPATIENT)
Dept: ENDOCRINOLOGY | Age: 69
End: 2024-07-12

## 2024-07-12 VITALS
WEIGHT: 315 LBS | HEART RATE: 68 BPM | BODY MASS INDEX: 47.64 KG/M2 | OXYGEN SATURATION: 98 % | DIASTOLIC BLOOD PRESSURE: 70 MMHG | SYSTOLIC BLOOD PRESSURE: 120 MMHG

## 2024-07-12 DIAGNOSIS — Z79.4 TYPE 2 DIABETES MELLITUS WITH HYPERGLYCEMIA, WITH LONG-TERM CURRENT USE OF INSULIN (HCC): Primary | ICD-10-CM

## 2024-07-12 DIAGNOSIS — R80.9 ALBUMINURIA: ICD-10-CM

## 2024-07-12 DIAGNOSIS — E78.00 HYPERCHOLESTEROLEMIA: ICD-10-CM

## 2024-07-12 DIAGNOSIS — I10 ESSENTIAL HYPERTENSION: ICD-10-CM

## 2024-07-12 DIAGNOSIS — N18.31 STAGE 3A CHRONIC KIDNEY DISEASE (HCC): ICD-10-CM

## 2024-07-12 DIAGNOSIS — E11.65 TYPE 2 DIABETES MELLITUS WITH HYPERGLYCEMIA, WITH LONG-TERM CURRENT USE OF INSULIN (HCC): Primary | ICD-10-CM

## 2024-07-12 RX ORDER — INSULIN GLARGINE 100 [IU]/ML
54 INJECTION, SOLUTION SUBCUTANEOUS 2 TIMES DAILY
Qty: 90 ML | Refills: 3
Start: 2024-07-12

## 2024-07-12 RX ORDER — PIOGLITAZONEHYDROCHLORIDE 30 MG/1
15 TABLET ORAL DAILY
Qty: 45 TABLET | Refills: 3
Start: 2024-07-12

## 2024-07-12 RX ORDER — SEMAGLUTIDE 1.34 MG/ML
1 INJECTION, SOLUTION SUBCUTANEOUS WEEKLY
Qty: 9 ML | Refills: 3
Start: 2024-07-12

## 2024-07-12 RX ORDER — ATORVASTATIN CALCIUM 10 MG/1
10 TABLET, FILM COATED ORAL DAILY
Qty: 90 TABLET | Refills: 3
Start: 2024-07-12 | End: 2025-07-07

## 2024-07-26 ENCOUNTER — OFFICE VISIT (OUTPATIENT)
Dept: FAMILY MEDICINE CLINIC | Facility: CLINIC | Age: 69
End: 2024-07-26
Payer: MEDICARE

## 2024-07-26 VITALS
HEART RATE: 70 BPM | SYSTOLIC BLOOD PRESSURE: 132 MMHG | DIASTOLIC BLOOD PRESSURE: 80 MMHG | HEIGHT: 70 IN | OXYGEN SATURATION: 96 % | BODY MASS INDEX: 45.1 KG/M2 | WEIGHT: 315 LBS

## 2024-07-26 DIAGNOSIS — C43.62 MALIGNANT MELANOMA OF LEFT UPPER EXTREMITY INCLUDING SHOULDER (HCC): ICD-10-CM

## 2024-07-26 DIAGNOSIS — I10 BENIGN ESSENTIAL HYPERTENSION: Chronic | ICD-10-CM

## 2024-07-26 DIAGNOSIS — E11.22 TYPE 2 DIABETES MELLITUS WITH STAGE 3B CHRONIC KIDNEY DISEASE, WITH LONG-TERM CURRENT USE OF INSULIN (HCC): Primary | Chronic | ICD-10-CM

## 2024-07-26 DIAGNOSIS — Z79.4 TYPE 2 DIABETES MELLITUS WITH STAGE 3B CHRONIC KIDNEY DISEASE, WITH LONG-TERM CURRENT USE OF INSULIN (HCC): Primary | Chronic | ICD-10-CM

## 2024-07-26 DIAGNOSIS — N18.32 TYPE 2 DIABETES MELLITUS WITH STAGE 3B CHRONIC KIDNEY DISEASE, WITH LONG-TERM CURRENT USE OF INSULIN (HCC): Primary | Chronic | ICD-10-CM

## 2024-07-26 DIAGNOSIS — I48.21 PERMANENT ATRIAL FIBRILLATION (HCC): ICD-10-CM

## 2024-07-26 PROCEDURE — 1123F ACP DISCUSS/DSCN MKR DOCD: CPT | Performed by: FAMILY MEDICINE

## 2024-07-26 PROCEDURE — 3079F DIAST BP 80-89 MM HG: CPT | Performed by: FAMILY MEDICINE

## 2024-07-26 PROCEDURE — 99214 OFFICE O/P EST MOD 30 MIN: CPT | Performed by: FAMILY MEDICINE

## 2024-07-26 PROCEDURE — 3075F SYST BP GE 130 - 139MM HG: CPT | Performed by: FAMILY MEDICINE

## 2024-07-26 NOTE — PROGRESS NOTES
PROGRESS NOTE    SUBJECTIVE:   Steve Mcgovern is a 69 y.o. male seen for a follow up visit regarding   Chief Complaint   Patient presents with    Follow-up     6 months         HPI:  Very pleasant 68-year-old male comes in today for follow-up of chronic problems.  He just finished chemotherapy for melanoma.  He is tolerating this fairly well overall.  He also recently had epidural steroid injection which he reports has not taken effect yet.  He has a history of type 2 diabetes, severe obesity, followed by endocrinology, history of A-fib, status post Watchman, followed by cardiology.         Reviewed and updated this visit by provider:           Review of Systems   Respiratory:          Chronic dyspnea   Cardiovascular: Negative.           OBJECTIVE:  Vitals:    07/26/24 0805   BP: 132/80   Site: Right Upper Arm   Position: Sitting   Cuff Size: Large Adult   Pulse: 70   SpO2: 96%   Weight: (!) 151.5 kg (334 lb)   Height: 1.778 m (5' 10\")        Physical Exam   General: Alert and oriented x3, in no distress presently,  Neck: No adenopathy, thyromegaly or thyroid nodules  Pulmonary: Normal effort, good airflow, no rales or rhonchi  CVS: Regular rate and rhythm, normal S1, S2, no S3 or S4, no murmurs; no carotid bruits, 2+ pedal pulses      Medical problems and test results were reviewed with the patient today.     No results found for this or any previous visit (from the past 672 hour(s)).      No results found for any visits on 07/26/24.     ASSESSMENT and PLAN    1. Type 2 diabetes mellitus with stage 3b chronic kidney disease, with long-term current use of insulin (HCC), followed by endocrinology  2. Malignant melanoma of left upper extremity including shoulder (HCC), just finished adjuvant chemotherapy  3. Permanent atrial fibrillation (HCC), stable, status post watchman, followed by cardiology, last echo with normal EF  4. Benign essential hypertension, at goal  5.  Chronic back pain with sciatica, recent DAQUAN

## 2024-07-30 ENCOUNTER — TELEPHONE (OUTPATIENT)
Dept: FAMILY MEDICINE CLINIC | Facility: CLINIC | Age: 69
End: 2024-07-30

## 2024-07-30 NOTE — TELEPHONE ENCOUNTER
Patient called stating pharmacy told him that his Rx for Atorvastatin was cancelled on 7/12/24. Wanting to know why this is. Advised patient that it looks as though Dr. Melo Rx medication on 7/12/24 for a years worth of refills. Patient states this is not what the pharmacy told him. Advised patient to reach out to Dr. Melo's office regarding issues with medication. Patient states he will not call their office because all of the medication Dr. Melo said he sent to the pharmacy the pharmacy is saying they did not receive. Again advised patient that this is something he needed to contact Dr. Melo's office about. Patient stated \"I just will not take any of these medications then\" and hung up the phone.

## 2024-07-31 DIAGNOSIS — E78.00 HYPERCHOLESTEROLEMIA: ICD-10-CM

## 2024-07-31 RX ORDER — ATORVASTATIN CALCIUM 10 MG/1
10 TABLET, FILM COATED ORAL DAILY
Qty: 90 TABLET | Refills: 3 | Status: SHIPPED | OUTPATIENT
Start: 2024-07-31 | End: 2025-07-26

## 2024-08-15 ENCOUNTER — NURSE ONLY (OUTPATIENT)
Age: 69
End: 2024-08-15

## 2024-08-15 ENCOUNTER — HOSPITAL ENCOUNTER (OUTPATIENT)
Dept: PET IMAGING | Age: 69
Discharge: HOME | End: 2024-08-15
Payer: OTHER GOVERNMENT

## 2024-08-15 ENCOUNTER — OFFICE VISIT (OUTPATIENT)
Age: 69
End: 2024-08-15
Payer: MEDICARE

## 2024-08-15 VITALS
HEART RATE: 72 BPM | DIASTOLIC BLOOD PRESSURE: 92 MMHG | HEIGHT: 70 IN | SYSTOLIC BLOOD PRESSURE: 152 MMHG | BODY MASS INDEX: 45.1 KG/M2 | WEIGHT: 315 LBS

## 2024-08-15 DIAGNOSIS — C43.9 MALIGNANT MELANOMA, UNSPECIFIED SITE (HCC): ICD-10-CM

## 2024-08-15 DIAGNOSIS — Z95.0 PRESENCE OF CARDIAC PACEMAKER: Primary | ICD-10-CM

## 2024-08-15 DIAGNOSIS — I44.2 ATRIOVENTRICULAR BLOCK, COMPLETE (HCC): ICD-10-CM

## 2024-08-15 DIAGNOSIS — I48.21 PERMANENT ATRIAL FIBRILLATION (HCC): ICD-10-CM

## 2024-08-15 DIAGNOSIS — I47.29 NSVT (NONSUSTAINED VENTRICULAR TACHYCARDIA) (HCC): ICD-10-CM

## 2024-08-15 DIAGNOSIS — I27.20 PULMONARY HYPERTENSION (HCC): Chronic | ICD-10-CM

## 2024-08-15 DIAGNOSIS — I48.11 LONGSTANDING PERSISTENT ATRIAL FIBRILLATION (HCC): Primary | ICD-10-CM

## 2024-08-15 DIAGNOSIS — I48.11 LONGSTANDING PERSISTENT ATRIAL FIBRILLATION (HCC): Chronic | ICD-10-CM

## 2024-08-15 DIAGNOSIS — Z95.818 PRESENCE OF WATCHMAN LEFT ATRIAL APPENDAGE CLOSURE DEVICE: ICD-10-CM

## 2024-08-15 DIAGNOSIS — C43.62 MALIGNANT MELANOMA OF LEFT UPPER LIMB, INCLUDING SHOULDER (HCC): ICD-10-CM

## 2024-08-15 LAB
GLUCOSE BLD STRIP.AUTO-MCNC: 109 MG/DL (ref 65–100)
SERVICE CMNT-IMP: ABNORMAL

## 2024-08-15 PROCEDURE — 99214 OFFICE O/P EST MOD 30 MIN: CPT | Performed by: INTERNAL MEDICINE

## 2024-08-15 PROCEDURE — 1123F ACP DISCUSS/DSCN MKR DOCD: CPT | Performed by: INTERNAL MEDICINE

## 2024-08-15 PROCEDURE — 3080F DIAST BP >= 90 MM HG: CPT | Performed by: INTERNAL MEDICINE

## 2024-08-15 PROCEDURE — 2580000003 HC RX 258: Performed by: INTERNAL MEDICINE

## 2024-08-15 PROCEDURE — 82962 GLUCOSE BLOOD TEST: CPT

## 2024-08-15 PROCEDURE — 3077F SYST BP >= 140 MM HG: CPT | Performed by: INTERNAL MEDICINE

## 2024-08-15 PROCEDURE — 6360000004 HC RX CONTRAST MEDICATION: Performed by: INTERNAL MEDICINE

## 2024-08-15 PROCEDURE — 3430000000 HC RX DIAGNOSTIC RADIOPHARMACEUTICAL: Performed by: INTERNAL MEDICINE

## 2024-08-15 PROCEDURE — 78816 PET IMAGE W/CT FULL BODY: CPT

## 2024-08-15 PROCEDURE — A9609 HC RX DIAGNOSTIC RADIOPHARMACEUTICAL: HCPCS | Performed by: INTERNAL MEDICINE

## 2024-08-15 RX ORDER — SODIUM CHLORIDE 0.9 % (FLUSH) 0.9 %
20 SYRINGE (ML) INJECTION AS NEEDED
Status: DISCONTINUED | OUTPATIENT
Start: 2024-08-15 | End: 2024-08-19 | Stop reason: HOSPADM

## 2024-08-15 RX ORDER — FLUDEOXYGLUCOSE F 18 200 MCI/ML
13.28 INJECTION, SOLUTION INTRAVENOUS
Status: COMPLETED | OUTPATIENT
Start: 2024-08-15 | End: 2024-08-15

## 2024-08-15 RX ADMIN — FLUDEOXYGLUCOSE F 18 13.28 MILLICURIE: 200 INJECTION, SOLUTION INTRAVENOUS at 10:50

## 2024-08-15 RX ADMIN — DIATRIZOATE MEGLUMINE AND DIATRIZOATE SODIUM 10 ML: 660; 100 LIQUID ORAL; RECTAL at 10:50

## 2024-08-15 RX ADMIN — SODIUM CHLORIDE, PRESERVATIVE FREE 20 ML: 5 INJECTION INTRAVENOUS at 10:50

## 2024-08-15 NOTE — PROGRESS NOTES
Hypercholesterolemia 06/29/2018    Seasonal allergic rhinitis due to pollen     NSVT (nonsustained ventricular tachycardia) (HCC) 09/27/2016    Atrioventricular block, complete (HCC) 04/14/2016    Presence of cardiac pacemaker 01/13/2016    Edema of both lower extremities due to peripheral venous insufficiency 12/29/2015     Limit salt.  Elevate legs when possible.  Overall weight loss would also   help.        Actinic keratoses 07/22/2015    Long term (current) use of anticoagulants     Vitamin D deficiency     Hepatic steatosis 03/25/2014    Longstanding persistent atrial fibrillation (HCC) 04/10/2007      Past Surgical History:   Procedure Laterality Date    ACHILLES TENDON SURGERY Left 01/2023    CARDIAC CATHETERIZATION  2020    mild nonobstructive    CHOLECYSTECTOMY, LAPAROSCOPIC  2014    Dr. Solano    COLONOSCOPY  09/15/2022    EP DEVICE PROCEDURE N/A 2/15/2024    Watchman hussein closure device performed by Fadi Licea MD at [facility] CARDIAC CATH LAB    ESOPHAGOGASTRODUODENOSCOPY  09/15/2022    GASTROCNEMIUS RECESSION Left 01/11/2023    Left Posterior ankle scope and amber SUPINE//PACEMAKER performed by Reggie Lazaro MD at [facility]    HERNIA REPAIR  2014    Dr. Solano    IR PORT PLACEMENT EQUAL OR GREATER THAN 5 YEARS  07/10/2023    IR PORT PLACEMENT EQUAL OR GREATER THAN 5 YEARS 7/10/2023 [facility] RADIOLOGY SPECIALS    KNEE SURGERY Bilateral     multiple surgeries with bilateral TKAs    LYMPH NODE BIOPSY N/A 04/25/2023    LEFT AXILLARY SENTINEL LYMPH NODE BIOPSY EXCISION performed by Reggie Solano MD at [facility]    ORTHOPEDIC SURGERY  1990's    right hand    PACEMAKER PLACEMENT  12/29/2020    [employer]jiv biventricular pacemaker    PACEMAKER PLACEMENT  2014    Dr. Briones    SHOULDER SURGERY Left 04/25/2023    WIDE LOCAL EXCISION LEFT SHOULDER MELANOMA, & SENTINEL NODE EXCISION performed by Reggie Solano MD at [facility]    TONSILLECTOMY Bilateral     mid 2000s    TOTAL KNEE ARTHROPLASTY Bilateral     UPPP

## 2024-08-16 ENCOUNTER — TELEPHONE (OUTPATIENT)
Age: 69
End: 2024-08-16

## 2024-08-19 DIAGNOSIS — C43.9 MALIGNANT MELANOMA, UNSPECIFIED SITE (HCC): ICD-10-CM

## 2024-08-19 DIAGNOSIS — E03.2 HYPOTHYROIDISM DUE TO MEDICATION: Primary | ICD-10-CM

## 2024-08-21 NOTE — PROGRESS NOTES
with Keytruda very well and has no signs of autoimmune toxicity on today's evaluation.  He denies any nausea, vomiting, epigastric/abdominal discomfort, diarrhea or any symptoms suggestive of persistent pancreatitis.  MRI abdomen on 11/28/2023 showed liver cirrhosis and hepatomegaly without focal liver lesions.  He is established with GI.  He has also been closely followed by cardiology for history of atrial fibrillation and CHF.  Watchman closure device placement is being planned.  Labs and physical exam are adequate to proceed with the next cycle of adjuvant Keytruda.  We shall plan restaging CT scans without contrast (due to CKD) in about 6 weeks.  Patient knows to call sooner with questions or concerns.      4/5/2024: Patient returns for toxicity evaluation prior to receiving cycle 14 of placement.  He has been tolerating the treatment very well.  There are no signs of autoimmune toxicity on today's evaluation.  He has no fever or infectious symptoms.  Labs and physical exam are adequate to proceed with pembrolizumab.  Restaging PET/CT will be scheduled towards the end of the planned 1 year of therapy-July 2024.  He recently had a follow-up with cardiology regarding management of A-fib s/p PPM and AVN ablation, watchman implant on 2/15/2024.  He is aware of the critical importance of regular dermatology follow-up.    4/26/2024:  He is here today for follow up and consideration of next Keytruda.  He has been well overall.  He only has intermittent rash r/t Keytruda but has been overall improving - using cream from derm as needed.  He has ongoing lower back pain, worse recently as he thinks he aggravated it with activity; he has had injections in the past and will pursue these again as needed.  Currently, pain is improving.  He denies any other issues or concerns r/t treatment.  No fevers or infectious symptoms.  Labs reviewed and adequate for treatment.  F/u in 3 weeks as scheduled.        6/28/2024: Patient

## 2024-08-22 ENCOUNTER — HOSPITAL ENCOUNTER (OUTPATIENT)
Dept: LAB | Age: 69
Discharge: HOME | End: 2024-08-22
Payer: OTHER GOVERNMENT

## 2024-08-22 ENCOUNTER — OFFICE VISIT (OUTPATIENT)
Dept: ONCOLOGY | Age: 69
End: 2024-08-22
Payer: MEDICARE

## 2024-08-22 VITALS
TEMPERATURE: 97.4 F | SYSTOLIC BLOOD PRESSURE: 148 MMHG | HEART RATE: 70 BPM | RESPIRATION RATE: 16 BRPM | BODY MASS INDEX: 45.1 KG/M2 | OXYGEN SATURATION: 96 % | HEIGHT: 70 IN | WEIGHT: 315 LBS | DIASTOLIC BLOOD PRESSURE: 94 MMHG

## 2024-08-22 DIAGNOSIS — E03.2 HYPOTHYROIDISM DUE TO MEDICATION: ICD-10-CM

## 2024-08-22 DIAGNOSIS — C43.9 MALIGNANT MELANOMA, UNSPECIFIED SITE (HCC): ICD-10-CM

## 2024-08-22 DIAGNOSIS — C43.9 MALIGNANT MELANOMA, UNSPECIFIED SITE (HCC): Primary | ICD-10-CM

## 2024-08-22 DIAGNOSIS — Z79.899 HIGH RISK MEDICATION USE: ICD-10-CM

## 2024-08-22 LAB
ALBUMIN SERPL-MCNC: 3.2 G/DL (ref 3.2–4.6)
ALBUMIN/GLOB SERPL: 0.9 (ref 1–1.9)
ALP SERPL-CCNC: 112 U/L (ref 40–129)
ALT SERPL-CCNC: 16 U/L (ref 12–65)
ANION GAP SERPL CALC-SCNC: 12 MMOL/L (ref 9–18)
AST SERPL-CCNC: 21 U/L (ref 15–37)
BASOPHILS # BLD: 0.1 K/UL (ref 0–0.2)
BASOPHILS NFR BLD: 1 % (ref 0–2)
BILIRUB SERPL-MCNC: 0.4 MG/DL (ref 0–1.2)
BUN SERPL-MCNC: 18 MG/DL (ref 8–23)
CALCIUM SERPL-MCNC: 8.9 MG/DL (ref 8.8–10.2)
CHLORIDE SERPL-SCNC: 103 MMOL/L (ref 98–107)
CO2 SERPL-SCNC: 26 MMOL/L (ref 20–28)
CREAT SERPL-MCNC: 1.33 MG/DL (ref 0.8–1.3)
DIFFERENTIAL METHOD BLD: ABNORMAL
EOSINOPHIL # BLD: 1 K/UL (ref 0–0.8)
EOSINOPHIL NFR BLD: 11 % (ref 0.5–7.8)
ERYTHROCYTE [DISTWIDTH] IN BLOOD BY AUTOMATED COUNT: 16 % (ref 11.9–14.6)
GLOBULIN SER CALC-MCNC: 3.5 G/DL (ref 2.3–3.5)
GLUCOSE SERPL-MCNC: 103 MG/DL (ref 70–99)
HCT VFR BLD AUTO: 40.8 % (ref 41.1–50.3)
HGB BLD-MCNC: 12.9 G/DL (ref 13.6–17.2)
IMM GRANULOCYTES # BLD AUTO: 0 K/UL (ref 0–0.5)
IMM GRANULOCYTES NFR BLD AUTO: 0 % (ref 0–5)
LYMPHOCYTES # BLD: 2.2 K/UL (ref 0.5–4.6)
LYMPHOCYTES NFR BLD: 23 % (ref 13–44)
MCH RBC QN AUTO: 28.3 PG (ref 26.1–32.9)
MCHC RBC AUTO-ENTMCNC: 31.6 G/DL (ref 31.4–35)
MCV RBC AUTO: 89.5 FL (ref 82–102)
MONOCYTES # BLD: 0.7 K/UL (ref 0.1–1.3)
MONOCYTES NFR BLD: 8 % (ref 4–12)
NEUTS SEG # BLD: 5.4 K/UL (ref 1.7–8.2)
NEUTS SEG NFR BLD: 57 % (ref 43–78)
NRBC # BLD: 0 K/UL (ref 0–0.2)
PLATELET # BLD AUTO: 264 K/UL (ref 150–450)
PMV BLD AUTO: 9.6 FL (ref 9.4–12.3)
POTASSIUM SERPL-SCNC: 4 MMOL/L (ref 3.5–5.1)
PROT SERPL-MCNC: 6.7 G/DL (ref 6.3–8.2)
RBC # BLD AUTO: 4.56 M/UL (ref 4.23–5.6)
SODIUM SERPL-SCNC: 141 MMOL/L (ref 136–145)
TSH W FREE THYROID IF ABNORMAL: 2.5 UIU/ML (ref 0.27–4.2)
WBC # BLD AUTO: 9.4 K/UL (ref 4.3–11.1)

## 2024-08-22 PROCEDURE — 3077F SYST BP >= 140 MM HG: CPT | Performed by: INTERNAL MEDICINE

## 2024-08-22 PROCEDURE — 99214 OFFICE O/P EST MOD 30 MIN: CPT | Performed by: INTERNAL MEDICINE

## 2024-08-22 PROCEDURE — 6360000002 HC RX W HCPCS: Performed by: INTERNAL MEDICINE

## 2024-08-22 PROCEDURE — 1123F ACP DISCUSS/DSCN MKR DOCD: CPT | Performed by: INTERNAL MEDICINE

## 2024-08-22 PROCEDURE — 80053 COMPREHEN METABOLIC PANEL: CPT

## 2024-08-22 PROCEDURE — 36591 DRAW BLOOD OFF VENOUS DEVICE: CPT

## 2024-08-22 PROCEDURE — 2580000003 HC RX 258: Performed by: INTERNAL MEDICINE

## 2024-08-22 PROCEDURE — 84443 ASSAY THYROID STIM HORMONE: CPT

## 2024-08-22 PROCEDURE — 3080F DIAST BP >= 90 MM HG: CPT | Performed by: INTERNAL MEDICINE

## 2024-08-22 PROCEDURE — 85025 COMPLETE CBC W/AUTO DIFF WBC: CPT

## 2024-08-22 RX ORDER — SODIUM CHLORIDE 0.9 % (FLUSH) 0.9 %
5-40 SYRINGE (ML) INJECTION PRN
Status: DISCONTINUED | OUTPATIENT
Start: 2024-08-22 | End: 2024-08-26 | Stop reason: HOSPADM

## 2024-08-22 RX ORDER — HEPARIN 100 UNIT/ML
300 SYRINGE INTRAVENOUS PRN
Status: DISCONTINUED | OUTPATIENT
Start: 2024-08-22 | End: 2024-08-26 | Stop reason: HOSPADM

## 2024-08-22 RX ADMIN — SODIUM CHLORIDE, PRESERVATIVE FREE 20 ML: 5 INJECTION INTRAVENOUS at 09:35

## 2024-08-22 RX ADMIN — HEPARIN 300 UNITS: 100 SYRINGE at 09:40

## 2024-08-22 ASSESSMENT — PATIENT HEALTH QUESTIONNAIRE - PHQ9
SUM OF ALL RESPONSES TO PHQ QUESTIONS 1-9: 0
1. LITTLE INTEREST OR PLEASURE IN DOING THINGS: NOT AT ALL
2. FEELING DOWN, DEPRESSED OR HOPELESS: NOT AT ALL
SUM OF ALL RESPONSES TO PHQ9 QUESTIONS 1 & 2: 0
SUM OF ALL RESPONSES TO PHQ QUESTIONS 1-9: 0

## 2024-08-22 NOTE — PROGRESS NOTES
Patient to port lab for port access and lab draw. Port accessed using 20g 0.75\" power needle without difficulty. Labs drawn from port and port flushed and locked with Heparin. Port de-accessed, dressing applied. Patient tolerated well. Discharged ambulatory.

## 2024-08-22 NOTE — PATIENT INSTRUCTIONS
Patient Information from Today's Visit    The members of your [facility] are listed below:    Physician Provider: Bartolo Arreola, Medical Oncologist  Advanced Practice Clinician: Nurys Beck NP  Registered Nurse: Chapis MOROCHO RN  Navigator: Karla HOUSE RN  Medical Assistant: Mily GOODMAN MA  : Sarah VAUGHN   Supportive Care Services: Jae CORLEY LMSW    Diagnosis: Melanoma      Follow Up Instructions:   - Labs reviewed   - Will plan CT scan without contrast (due to kidney disease) in 4 months  Please call radiology scheduling to schedule scan.   Their number is: [phone]  Please make sure scans are scheduled at least 48 hours prior to the follow up visit for imaging review with us.  ** if imaging is not completed prior to your follow up appointment with us, this may result in your follow up appointment being rescheduled **    Follow up in 4 months with labs prior      Treatment Summary has been discussed and given to patient:No      Current Labs:   Hospital Outpatient Visit on 08/22/2024   Component Date Value Ref Range Status    Sodium 08/22/2024 141  136 - 145 mmol/L Final    Potassium 08/22/2024 4.0  3.5 - 5.1 mmol/L Final    Chloride 08/22/2024 103  98 - 107 mmol/L Final    CO2 08/22/2024 26  20 - 28 mmol/L Final    Anion Gap 08/22/2024 12  9 - 18 mmol/L Final    Glucose 08/22/2024 103 (H)  70 - 99 mg/dL Final    Comment: <70 mg/dL Consistent with, but not fully diagnostic of hypoglycemia.  100 - 125 mg/dL Impaired fasting glucose/consistent with pre-diabetes mellitus.  > 126 mg/dl Fasting glucose consistent with overt diabetes mellitus      BUN 08/22/2024 18  8 - 23 MG/DL Final    Creatinine 08/22/2024 1.33 (H)  0.80 - 1.30 MG/DL Final    Est, Glom Filt Rate 08/22/2024 58 (L)  >60 ml/min/1.73m2 Final    Comment:    Pediatric calculator link: https://www.kidney.org/professionals/kdoqi/gfr_calculatorped     These results are not intended for use in patients <18 years of age.     eGFR results

## 2024-08-26 ENCOUNTER — OFFICE VISIT (OUTPATIENT)
Age: 69
End: 2024-08-26
Payer: MEDICARE

## 2024-08-26 DIAGNOSIS — M54.17 LUMBOSACRAL RADICULOPATHY: Primary | ICD-10-CM

## 2024-08-26 DIAGNOSIS — M47.816 FACET ARTHROPATHY, LUMBAR: ICD-10-CM

## 2024-08-26 DIAGNOSIS — M48.062 LUMBAR STENOSIS WITH NEUROGENIC CLAUDICATION: ICD-10-CM

## 2024-08-26 DIAGNOSIS — M51.36 DDD (DEGENERATIVE DISC DISEASE), LUMBAR: ICD-10-CM

## 2024-08-26 PROCEDURE — 1123F ACP DISCUSS/DSCN MKR DOCD: CPT | Performed by: PHYSICIAN ASSISTANT

## 2024-08-26 PROCEDURE — 99214 OFFICE O/P EST MOD 30 MIN: CPT | Performed by: PHYSICIAN ASSISTANT

## 2024-08-26 NOTE — PROGRESS NOTES
Name: Steve Mcgovern  YOB: 1955  Gender: male  MRN: [id number]    CC: Back Pain (Injection discussion)       HPI: This is a 69 y.o. year old male who has a quite complicated medical history.  He has diabetes, atrial fibrillation with a pacemaker which is MRI compatible,  diagnosis of melanoma on his left arm that required excision and he had chemotherapy for that.  He has history of pancreatitis elevated liver and pancreatic enzymes and more recently anemia which is undergoing further evaluation.     He has chronic history of lower back pain [facility] but he had acute exacerbation of lower back pain that took him to the emergency room 8 days ago.  Pain is across the back radiates into the left hip.  He does have weakness of the left leg.  Pain has been quite severe he began taking his opioid medication that he used after his Achilles reconstruction and that has helped as well as methocarbamol.    This patient  has not had lumbar surgery in the past.     We saw on a lumbar CT that had quite severe stenosis at L4-5 we referred him for L4-5 epidural steroid injection and he had this November 29 with Dr. Frankel and he reported 100% relief of symptoms.    He had another lumbar epidural steroid injection at L4-5 June 12, 2024 and this was not as effective it only lasted about 2 weeks but he notes that it felt different when he was getting it and Dr. Frankel mentioned that it was tighter to get the medication in.  He is having pain again we have discussed we have seen stenosis however the CT scan is not the best imaging source to evaluate true severity of the stenosis.  With his medical comorbidities and morbid obesity, he would not be surgical candidate for laminectomy however he may benefit from further injections we may need to further delineate anatomy to outlined those he also could be a candidate for a Minuteman procedure with pain management.      to palpation along the spinous processes and paraspinal musculature.   The patient ambulates with slight forward flexed gait but without assistive device  ROM of bilateral hip(s) reveals no irritability.   NEURO:  Cranial nerves grossly intact.  No motor deficits.    Straight leg testing is positive left  Sensory testing reveals intact sensation to light touch and in the distribution of the L3-S1 dermatomes bilaterally  Ankle jerk is negative for clonus    Reflexes   Right Left   Quadriceps (L4) 2 2   Achilles (S1) 2 2     Strength testing in the lower extremity reveals the following based on the 5 point grading scale:     HF (L2) H Ab (L5) KE (L3/4) ADF (L4) EHL (L5) A Ev (S1) APF (S1)   Right 5 4 5 5 5 5 5   Left 4 4 4 4 5 5 5     PSYCH:  Alert and oriented X 3.  Appropriate affect.  Intact judgment and insight.         Radiographic Studies:     Independent interpretation of  AP, lateral and spot views of the lumbar spine: Reviewed from October 31 and multilevel degenerative changes in the Spondylosis.  No spondylolisthesis.  Disc osteophyte noted at L4-5 with severe facet arthropathy.    X-ray impression: Advanced degenerative changes lumbar spine.    I also independently reviewed a CT scan of the abdomen pelvis from September 18, 2023 and on this CT scan we can see quite severe stenosis L4-5 from disc osteophyte complex and facet arthropathy.        Assessment/Plan:         Diagnosis Orders   1. Lumbosacral radiculopathy  MRI LUMBAR SPINE WO CONTRAST      2. Lumbar stenosis with neurogenic claudication  MRI LUMBAR SPINE WO CONTRAST      3. DDD (degenerative disc disease), lumbar  MRI LUMBAR SPINE WO CONTRAST      4. Facet arthropathy, lumbar  MRI LUMBAR SPINE WO CONTRAST      5. Body mass index (BMI) 45.0-49.9, adult (HCC)  MRI LUMBAR SPINE WO CONTRAST          This patient's clinical history and physical exam is consistent with neurogenic claudication which is likely due to lumbar stenosis.  We can visualize

## 2024-09-17 ENCOUNTER — TELEPHONE (OUTPATIENT)
Dept: ORTHOPEDIC SURGERY | Age: 69
End: 2024-09-17

## 2024-09-17 NOTE — TELEPHONE ENCOUNTER
Please cqall this pt about the mri,he has a pacemaker .need to be [facility] at [facility],  Has not been brenden,she said he is in lot pain thanks please call wife/ [phone]

## 2024-09-24 ENCOUNTER — TELEPHONE (OUTPATIENT)
Dept: ORTHOPEDIC SURGERY | Age: 69
End: 2024-09-24

## 2024-10-14 ENCOUNTER — HOSPITAL ENCOUNTER (OUTPATIENT)
Dept: MRI IMAGING | Age: 69
Discharge: HOME | End: 2024-10-17
Payer: MEDICARE

## 2024-10-14 DIAGNOSIS — M47.816 FACET ARTHROPATHY, LUMBAR: ICD-10-CM

## 2024-10-14 DIAGNOSIS — M48.062 LUMBAR STENOSIS WITH NEUROGENIC CLAUDICATION: ICD-10-CM

## 2024-10-14 DIAGNOSIS — M54.17 LUMBOSACRAL RADICULOPATHY: ICD-10-CM

## 2024-10-14 DIAGNOSIS — M51.369 DDD (DEGENERATIVE DISC DISEASE), LUMBAR: ICD-10-CM

## 2024-10-14 PROCEDURE — 72148 MRI LUMBAR SPINE W/O DYE: CPT

## 2024-10-23 ENCOUNTER — OFFICE VISIT (OUTPATIENT)
Age: 69
End: 2024-10-23
Payer: MEDICARE

## 2024-10-23 DIAGNOSIS — M48.062 LUMBAR STENOSIS WITH NEUROGENIC CLAUDICATION: Primary | ICD-10-CM

## 2024-10-23 DIAGNOSIS — M48.061 FORAMINAL STENOSIS OF LUMBAR REGION: ICD-10-CM

## 2024-10-23 PROCEDURE — 1123F ACP DISCUSS/DSCN MKR DOCD: CPT | Performed by: PHYSICIAN ASSISTANT

## 2024-10-23 PROCEDURE — 99214 OFFICE O/P EST MOD 30 MIN: CPT | Performed by: PHYSICIAN ASSISTANT

## 2024-10-23 NOTE — PROGRESS NOTES
Name: Steve Mcgovern  YOB: 1955  Gender: male  MRN: [id number]    CC: Back Pain (MRI results)       HPI: This is a 69 y.o. year old male who has a quite complicated medical history.  He has diabetes, atrial fibrillation with a pacemaker which is MRI compatible,  diagnosis of melanoma on his left arm that required excision and he had chemotherapy for that.  He has history of pancreatitis elevated liver and pancreatic enzymes and more recently anemia which is undergoing further evaluation.     He has chronic history of lower back pain [facility] but he had acute exacerbation of lower back pain that took him to the emergency room 8 days ago.  Pain is across the back radiates into the left hip.  He does have weakness of the left leg.  Pain has been quite severe he began taking his opioid medication that he used after his Achilles reconstruction and that has helped as well as methocarbamol.    This patient  has not had lumbar surgery in the past.     We saw on a lumbar CT that had quite severe stenosis at L4-5 we referred him for L4-5 epidural steroid injection and he had this November 29 with Dr. Frankel and he reported 100% relief of symptoms.    He had another lumbar epidural steroid injection at L4-5 June 12, 2024 and this was not as effective it only lasted about 2 weeks but he notes that it felt different when he was getting it and Dr. Frankel mentioned that it was tighter to get the medication in.  He is having pain again we have discussed we have seen stenosis however the CT scan is not the best imaging source to evaluate true severity of the stenosis.  With his medical comorbidities and morbid obesity, he would not be surgical candidate for laminectomy however he may benefit from further injections we may need to further delineate anatomy to outlined those he also could be a candidate for a Minuteman procedure with pain management.    We referred him

## 2024-11-21 ENCOUNTER — OFFICE VISIT (OUTPATIENT)
Age: 69
End: 2024-11-21
Payer: MEDICARE

## 2024-11-21 DIAGNOSIS — M48.062 SPINAL STENOSIS OF LUMBAR REGION WITH NEUROGENIC CLAUDICATION: Primary | ICD-10-CM

## 2024-11-21 PROCEDURE — 1159F MED LIST DOCD IN RCRD: CPT | Performed by: PHYSICIAN ASSISTANT

## 2024-11-21 PROCEDURE — 99214 OFFICE O/P EST MOD 30 MIN: CPT | Performed by: PHYSICIAN ASSISTANT

## 2024-11-21 PROCEDURE — 1123F ACP DISCUSS/DSCN MKR DOCD: CPT | Performed by: PHYSICIAN ASSISTANT

## 2024-11-21 ASSESSMENT — ENCOUNTER SYMPTOMS
ABDOMINAL PAIN: 0
EYES NEGATIVE: 1
SHORTNESS OF BREATH: 0
ALLERGIC/IMMUNOLOGIC NEGATIVE: 1

## 2024-11-21 NOTE — PROGRESS NOTES
.  
Mr Mcgovern is a new patient referred by Nydia De La Rosa for consideration of procedures for his spinal stenosis with radicular complaints. He had MRI which showed moderate to severe stenosis at L2/3 and L4/5. He is not a good candidate for surgery secondary to medical comorbidities.   
Lidocaine and 5% Neurontin to apply to affected area up to 4 times/day as needed for pain 45 g 2    colchicine (COLCRYS) 0.6 MG tablet Take 1 tablet by mouth daily (Patient taking differently: Take 1 tablet by mouth as needed) 30 tablet 1    albuterol sulfate  (90 Base) MCG/ACT inhaler Inhale 2 puffs into the lungs every 4 hours as needed for Wheezing or Shortness of Breath      albuterol (PROVENTIL) (2.5 MG/3ML) 0.083% nebulizer solution Inhale 3 mLs into the lungs every 6 hours as needed      allopurinol (ZYLOPRIM) 300 MG tablet Take 1 tablet by mouth daily      ammonium lactate (LAC-HYDRIN) 12 % lotion Apply topically as needed      ascorbic acid (VITAMIN C) 500 MG tablet Take by mouth      diclofenac sodium (VOLTAREN) 1 % GEL Apply 4 g topically 4 times daily as needed      fluticasone (FLONASE) 50 MCG/ACT nasal spray 2 sprays by Nasal route as needed      loratadine (CLARITIN) 10 MG tablet Take 1 tablet by mouth daily      methocarbamol (ROBAXIN) 500 MG tablet Take 1 tablet by mouth in the morning and at bedtime      omeprazole (PRILOSEC) 20 MG delayed release capsule Take 1 capsule by mouth Daily      tiotropium (SPIRIVA RESPIMAT) 2.5 MCG/ACT AERS inhaler Inhale 2 puffs into the lungs daily      trolamine salicylate (ASPER-FLEX) 10 % cream Apply topically as needed       Facility-Administered Encounter Medications as of 11/21/2024   Medication Dose Route Frequency Provider Last Rate Last Admin    triamcinolone acetonide (KENALOG-40) injection 100 mg  100 mg Other Once DEVAN Frankel Jr., MD              Review of Systems   Constitutional:  Negative for chills, fatigue, fever and unexpected weight change.   HENT:  Negative for congestion and ear pain.    Eyes: Negative.    Respiratory:  Negative for shortness of breath.    Cardiovascular:  Negative for chest pain.   Gastrointestinal:  Negative for abdominal pain.   Endocrine: Negative.    Genitourinary: Negative.    Skin:  Negative for rash.

## 2024-11-23 DIAGNOSIS — E11.65 TYPE 2 DIABETES MELLITUS WITH HYPERGLYCEMIA, WITH LONG-TERM CURRENT USE OF INSULIN (HCC): ICD-10-CM

## 2024-11-23 DIAGNOSIS — Z79.4 TYPE 2 DIABETES MELLITUS WITH HYPERGLYCEMIA, WITH LONG-TERM CURRENT USE OF INSULIN (HCC): ICD-10-CM

## 2024-12-09 ENCOUNTER — HOSPITAL ENCOUNTER (OUTPATIENT)
Dept: CT IMAGING | Age: 69
Discharge: HOME | End: 2024-12-12
Attending: INTERNAL MEDICINE
Payer: OTHER GOVERNMENT

## 2024-12-09 DIAGNOSIS — C43.9 MALIGNANT MELANOMA, UNSPECIFIED SITE (HCC): ICD-10-CM

## 2024-12-09 PROCEDURE — 74176 CT ABD & PELVIS W/O CONTRAST: CPT

## 2024-12-18 NOTE — PROGRESS NOTES
mediastinal and abdominal regions has been noted, but they have not shown any growth. Dermatological evaluations have not raised any concerns. A PET CT scan is scheduled for early to mid-April 2025 to monitor the condition. A follow-up appointment will be arranged to discuss the results of the PET scan.  Results  Laboratory Studies  Kidney function is stable. Chemistry panel is largely unremarkable. Mild anemia present but stable.    Imaging  CT scan of the chest, abdomen, and pelvis completed on 12/09/2024, identified a new 7 mm ground glass nodule in the left lower lobe felt to be infectious or inflammatory. Prominent paratracheal lymph nodes were noted and unchanged from early 2023 when they were not hypermetabolic consistent with benign lymph nodes. Irregular hepatic contour raising possibility of cirrhosis unchanged and a borderline enlarged distal periaortic and iliac lymph nodes that were unchanged from March 2023 and were previously not hypermetabolic suggestive of benign lymph nodes. No evidence of recurrent or metastatic disease in the chest, abdomen, or pelvis.        2. Chronic mild anemia.  Discussed differential diagnosis. The anemia is not severe and has remained stable throughout the year. It is unlikely to be the primary cause of his shortness of breath. He has been anemic since 2020. Iron studies will be conducted today. He is advised to continue his current regimen of multivitamins, B12, and folate.    3. Liver cirrhosis.  The patient has a history of liver cirrhosis, as indicated by an MRI in 11/2023, which showed suspected chronic liver disease or cirrhosis with hepatomegaly. Subsequent CT scans have shown an irregular hepatic contour, raising the possibility of cirrhosis.    4. Back pain.  The patient has a long-term issue with back pain, which has not yet stabilized. He recently had an MRI of his back. He is being followed by ortho.    ROV in about 3 months with CT prior or sooner if needed.

## 2024-12-19 ENCOUNTER — HOSPITAL ENCOUNTER (OUTPATIENT)
Dept: LAB | Age: 69
Discharge: HOME | End: 2024-12-19

## 2024-12-19 ENCOUNTER — OFFICE VISIT (OUTPATIENT)
Dept: ONCOLOGY | Age: 69
End: 2024-12-19
Payer: MEDICARE

## 2024-12-19 VITALS
BODY MASS INDEX: 45.1 KG/M2 | DIASTOLIC BLOOD PRESSURE: 94 MMHG | SYSTOLIC BLOOD PRESSURE: 159 MMHG | HEIGHT: 70 IN | HEART RATE: 71 BPM | OXYGEN SATURATION: 97 % | WEIGHT: 315 LBS | RESPIRATION RATE: 16 BRPM | TEMPERATURE: 97.5 F

## 2024-12-19 DIAGNOSIS — D64.9 ANEMIA, UNSPECIFIED TYPE: Primary | ICD-10-CM

## 2024-12-19 DIAGNOSIS — C43.9 MALIGNANT MELANOMA, UNSPECIFIED SITE (HCC): ICD-10-CM

## 2024-12-19 DIAGNOSIS — Z79.899 HIGH RISK MEDICATION USE: ICD-10-CM

## 2024-12-19 DIAGNOSIS — D64.9 ANEMIA, UNSPECIFIED TYPE: ICD-10-CM

## 2024-12-19 LAB
ALBUMIN SERPL-MCNC: 3 G/DL (ref 3.2–4.6)
ALBUMIN/GLOB SERPL: 0.8 (ref 1–1.9)
ALP SERPL-CCNC: 116 U/L (ref 40–129)
ALT SERPL-CCNC: 13 U/L (ref 8–55)
ANION GAP SERPL CALC-SCNC: 9 MMOL/L (ref 7–16)
AST SERPL-CCNC: 19 U/L (ref 15–37)
BASOPHILS # BLD: 0.1 K/UL (ref 0–0.2)
BASOPHILS NFR BLD: 1 % (ref 0–2)
BILIRUB SERPL-MCNC: 0.4 MG/DL (ref 0–1.2)
BUN SERPL-MCNC: 22 MG/DL (ref 8–23)
CALCIUM SERPL-MCNC: 9.1 MG/DL (ref 8.8–10.2)
CHLORIDE SERPL-SCNC: 103 MMOL/L (ref 98–107)
CO2 SERPL-SCNC: 27 MMOL/L (ref 20–29)
CREAT SERPL-MCNC: 1.32 MG/DL (ref 0.8–1.3)
DIFFERENTIAL METHOD BLD: ABNORMAL
EOSINOPHIL # BLD: 0.6 K/UL (ref 0–0.8)
EOSINOPHIL NFR BLD: 7 % (ref 0.5–7.8)
ERYTHROCYTE [DISTWIDTH] IN BLOOD BY AUTOMATED COUNT: 15.1 % (ref 11.9–14.6)
FERRITIN SERPL-MCNC: 25 NG/ML (ref 8–388)
GLOBULIN SER CALC-MCNC: 4 G/DL (ref 2.3–3.5)
GLUCOSE SERPL-MCNC: 95 MG/DL (ref 70–99)
HCT VFR BLD AUTO: 39.7 % (ref 41.1–50.3)
HGB BLD-MCNC: 12.7 G/DL (ref 13.6–17.2)
IMM GRANULOCYTES # BLD AUTO: 0 K/UL (ref 0–0.5)
IMM GRANULOCYTES NFR BLD AUTO: 0 % (ref 0–5)
IRON SATN MFR SERPL: 13 % (ref 20–50)
IRON SERPL-MCNC: 46 UG/DL (ref 35–100)
LYMPHOCYTES # BLD: 2.4 K/UL (ref 0.5–4.6)
LYMPHOCYTES NFR BLD: 27 % (ref 13–44)
MCH RBC QN AUTO: 28.5 PG (ref 26.1–32.9)
MCHC RBC AUTO-ENTMCNC: 32 G/DL (ref 31.4–35)
MCV RBC AUTO: 89 FL (ref 82–102)
MONOCYTES # BLD: 0.7 K/UL (ref 0.1–1.3)
MONOCYTES NFR BLD: 7 % (ref 4–12)
NEUTS SEG # BLD: 5.1 K/UL (ref 1.7–8.2)
NEUTS SEG NFR BLD: 58 % (ref 43–78)
NRBC # BLD: 0 K/UL (ref 0–0.2)
PLATELET # BLD AUTO: 280 K/UL (ref 150–450)
PMV BLD AUTO: 9.7 FL (ref 9.4–12.3)
POTASSIUM SERPL-SCNC: 3.9 MMOL/L (ref 3.5–5.1)
PROT SERPL-MCNC: 7 G/DL (ref 6.3–8.2)
RBC # BLD AUTO: 4.46 M/UL (ref 4.23–5.6)
SODIUM SERPL-SCNC: 139 MMOL/L (ref 136–145)
TIBC SERPL-MCNC: 358 UG/DL (ref 240–450)
TSH W FREE THYROID IF ABNORMAL: 2.05 UIU/ML (ref 0.27–4.2)
UIBC SERPL-MCNC: 312 UG/DL (ref 112–347)
WBC # BLD AUTO: 8.8 K/UL (ref 4.3–11.1)

## 2024-12-19 PROCEDURE — 1125F AMNT PAIN NOTED PAIN PRSNT: CPT | Performed by: INTERNAL MEDICINE

## 2024-12-19 PROCEDURE — 99214 OFFICE O/P EST MOD 30 MIN: CPT | Performed by: INTERNAL MEDICINE

## 2024-12-19 PROCEDURE — 83540 ASSAY OF IRON: CPT

## 2024-12-19 PROCEDURE — 36591 DRAW BLOOD OFF VENOUS DEVICE: CPT

## 2024-12-19 PROCEDURE — 82728 ASSAY OF FERRITIN: CPT

## 2024-12-19 PROCEDURE — 3080F DIAST BP >= 90 MM HG: CPT | Performed by: INTERNAL MEDICINE

## 2024-12-19 PROCEDURE — 1123F ACP DISCUSS/DSCN MKR DOCD: CPT | Performed by: INTERNAL MEDICINE

## 2024-12-19 PROCEDURE — 83550 IRON BINDING TEST: CPT

## 2024-12-19 PROCEDURE — 2500000003 HC RX 250 WO HCPCS: Performed by: INTERNAL MEDICINE

## 2024-12-19 PROCEDURE — 85025 COMPLETE CBC W/AUTO DIFF WBC: CPT

## 2024-12-19 PROCEDURE — 6360000002 HC RX W HCPCS: Performed by: INTERNAL MEDICINE

## 2024-12-19 PROCEDURE — 80053 COMPREHEN METABOLIC PANEL: CPT

## 2024-12-19 PROCEDURE — 3077F SYST BP >= 140 MM HG: CPT | Performed by: INTERNAL MEDICINE

## 2024-12-19 PROCEDURE — 84443 ASSAY THYROID STIM HORMONE: CPT

## 2024-12-19 RX ORDER — HEPARIN 100 UNIT/ML
300 SYRINGE INTRAVENOUS PRN
Status: ACTIVE | OUTPATIENT
Start: 2024-12-19 | End: 2024-12-19

## 2024-12-19 RX ORDER — FERROUS SULFATE 325(65) MG
325 TABLET ORAL
Qty: 90 TABLET | Refills: 1 | Status: SHIPPED | OUTPATIENT
Start: 2024-12-19

## 2024-12-19 RX ORDER — SODIUM CHLORIDE 0.9 % (FLUSH) 0.9 %
5-40 SYRINGE (ML) INJECTION PRN
Status: ACTIVE | OUTPATIENT
Start: 2024-12-19 | End: 2024-12-19

## 2024-12-19 RX ADMIN — SODIUM CHLORIDE, PRESERVATIVE FREE 20 ML: 5 INJECTION INTRAVENOUS at 08:50

## 2024-12-19 RX ADMIN — HEPARIN 300 UNITS: 100 SYRINGE at 08:50

## 2024-12-19 ASSESSMENT — PATIENT HEALTH QUESTIONNAIRE - PHQ9
SUM OF ALL RESPONSES TO PHQ9 QUESTIONS 1 & 2: 0
2. FEELING DOWN, DEPRESSED OR HOPELESS: NOT AT ALL
1. LITTLE INTEREST OR PLEASURE IN DOING THINGS: NOT AT ALL
SUM OF ALL RESPONSES TO PHQ QUESTIONS 1-9: 0

## 2024-12-19 NOTE — PROGRESS NOTES
Patient to port lab for port access and lab draw. Port accessed per protocol; using 20g 0.75\" power needle without difficulty. Labs drawn from port and port flushed. Port de-accessed, dressing applied. Patient tolerated well. Discharged ambulatory.

## 2024-12-19 NOTE — PATIENT INSTRUCTIONS
Summary or [facility] for upcoming appointment information. Please call our office for rescheduling needs at least 24 hours before your scheduled appointment time.If you have any questions regarding your upcoming schedule please reach out to your care team through [facility] or call [phone].     Please notify your assigned Nurse Navigator of any unplanned hospital admissions or Emergency Room visits within 24 hours of discharge.    -------------------------------------------------------------------------------------------------------------------  Please call our office at [phone] if you have any  of the following symptoms:   Fever of 100.5 or greater  Chills  Shortness of breath  Swelling or pain in one leg    After office hours an answering service is available and will contact a provider for emergencies or if you are experiencing any of the above symptoms.        Chapis Joyce RN

## 2024-12-23 ENCOUNTER — OFFICE VISIT (OUTPATIENT)
Age: 69
End: 2024-12-23
Payer: MEDICARE

## 2024-12-23 ENCOUNTER — TELEPHONE (OUTPATIENT)
Dept: ONCOLOGY | Age: 69
End: 2024-12-23

## 2024-12-23 VITALS
HEIGHT: 70 IN | BODY MASS INDEX: 45.1 KG/M2 | SYSTOLIC BLOOD PRESSURE: 156 MMHG | DIASTOLIC BLOOD PRESSURE: 96 MMHG | HEART RATE: 94 BPM | WEIGHT: 315 LBS

## 2024-12-23 DIAGNOSIS — I47.29 NSVT (NONSUSTAINED VENTRICULAR TACHYCARDIA) (HCC): ICD-10-CM

## 2024-12-23 DIAGNOSIS — Z95.818 PRESENCE OF WATCHMAN LEFT ATRIAL APPENDAGE CLOSURE DEVICE: ICD-10-CM

## 2024-12-23 DIAGNOSIS — I27.20 PULMONARY HYPERTENSION (HCC): Chronic | ICD-10-CM

## 2024-12-23 DIAGNOSIS — Z95.0 PRESENCE OF CARDIAC PACEMAKER: Primary | ICD-10-CM

## 2024-12-23 PROCEDURE — 3080F DIAST BP >= 90 MM HG: CPT | Performed by: INTERNAL MEDICINE

## 2024-12-23 PROCEDURE — 1125F AMNT PAIN NOTED PAIN PRSNT: CPT | Performed by: INTERNAL MEDICINE

## 2024-12-23 PROCEDURE — 99214 OFFICE O/P EST MOD 30 MIN: CPT | Performed by: INTERNAL MEDICINE

## 2024-12-23 PROCEDURE — 1123F ACP DISCUSS/DSCN MKR DOCD: CPT | Performed by: INTERNAL MEDICINE

## 2024-12-23 PROCEDURE — 3075F SYST BP GE 130 - 139MM HG: CPT | Performed by: INTERNAL MEDICINE

## 2024-12-23 RX ORDER — CARVEDILOL 6.25 MG/1
6.25 TABLET ORAL 2 TIMES DAILY WITH MEALS
COMMUNITY

## 2024-12-23 NOTE — PROGRESS NOTES
[address] [facility], [place] [address]  PHONE: [phone]     24    NAME:  Steve Mcgovern  : 1955  MRN: [id number]       SUBJECTIVE:   Steve Mcgovern is a 69 y.o. male seen for a follow up visit regarding the following:     Chief Complaint   Patient presents with    Congestive Heart Failure    Atrial Fibrillation    Follow-up       HPI: Longstanding persistent Afib s/p PM and AVN ablation.   [employer] San[employer]iv PPM .  JUAN JOSE on CPAP.     C 2020: mild CAD, EDP 20      2020 admission for COVID, better now.     Echo 10/2022: normal EF, LVH, normal RVSP  2024: watchman  CRISTINA 3/2024: normal EF.      Needs back surgery.  No new angina, edema, palp.  No new edema, palp.  No angina.  No more bleeding issues.   NO CP, PATEL ok.   Home BP better now.  Home -120s.     Patient denies recent history of orthopnea, PND, excessive dizziness and/or syncope.     [facility]          Past Medical History, Past Surgical History, Family history, Social History, and Medications were all reviewed with the patient today and updated as necessary.     Current Outpatient Medications   Medication Sig Dispense Refill    carvedilol (COREG) 6.25 MG tablet Take 1 tablet by mouth 2 times daily (with meals)      ferrous sulfate (IRON 325) 325 (65 Fe) MG tablet Take 1 tablet by mouth daily (with breakfast) 90 tablet 1    metFORMIN (GLUCOPHAGE) 500 MG tablet TAKE ONE TABLET BY MOUTH TWICE A DAY WITH A MEAL 180 tablet 3    atorvastatin (LIPITOR) 10 MG tablet Take 1 tablet by mouth daily 90 tablet 3    OZEMPIC, 1 MG/DOSE, 4 MG/3ML SOPN sc injection Inject 1 mg into the skin once a week 9 mL 3    pioglitazone (ACTOS) 30 MG tablet Take 0.5 tablets by mouth daily 45 tablet 3    LANTUS SOLOSTAR 100 UNIT/ML injection pen Inject 54 Units into the skin 2 times daily 90 mL 3    torsemide (DEMADEX) 20 MG tablet Take 1 tablet by mouth 2 times daily 180 tablet 3    aspirin 81 MG EC tablet Take 1 tablet by mouth daily 90

## 2024-12-23 NOTE — TELEPHONE ENCOUNTER
Yuli clayg left unread.  Contacted pt regarding low iron levels and RX for ferrous sulfate.  Pt verbalized understanding and stated he has already started him.  Informed pt that we would recheck his iron studies at his nxt apt to ensure they were improving w/ iron tablets.  Pt verbalized understanding.

## 2025-01-27 ENCOUNTER — OFFICE VISIT (OUTPATIENT)
Dept: FAMILY MEDICINE CLINIC | Facility: CLINIC | Age: 70
End: 2025-01-27
Payer: MEDICARE

## 2025-01-27 VITALS
BODY MASS INDEX: 45.1 KG/M2 | SYSTOLIC BLOOD PRESSURE: 138 MMHG | HEART RATE: 70 BPM | HEIGHT: 70 IN | OXYGEN SATURATION: 96 % | DIASTOLIC BLOOD PRESSURE: 80 MMHG | WEIGHT: 315 LBS

## 2025-01-27 DIAGNOSIS — E66.01 MORBID (SEVERE) OBESITY DUE TO EXCESS CALORIES: ICD-10-CM

## 2025-01-27 DIAGNOSIS — J40 BRONCHITIS: Primary | ICD-10-CM

## 2025-01-27 DIAGNOSIS — E11.22 TYPE 2 DIABETES MELLITUS WITH STAGE 3B CHRONIC KIDNEY DISEASE, WITH LONG-TERM CURRENT USE OF INSULIN (HCC): ICD-10-CM

## 2025-01-27 DIAGNOSIS — C43.9 MALIGNANT MELANOMA, UNSPECIFIED SITE (HCC): ICD-10-CM

## 2025-01-27 DIAGNOSIS — E78.00 HYPERCHOLESTEROLEMIA: ICD-10-CM

## 2025-01-27 DIAGNOSIS — Z00.00 MEDICARE ANNUAL WELLNESS VISIT, SUBSEQUENT: ICD-10-CM

## 2025-01-27 DIAGNOSIS — Z12.5 SCREENING FOR PROSTATE CANCER: ICD-10-CM

## 2025-01-27 DIAGNOSIS — N18.32 TYPE 2 DIABETES MELLITUS WITH STAGE 3B CHRONIC KIDNEY DISEASE, WITH LONG-TERM CURRENT USE OF INSULIN (HCC): ICD-10-CM

## 2025-01-27 DIAGNOSIS — J41.0 SIMPLE CHRONIC BRONCHITIS (HCC): ICD-10-CM

## 2025-01-27 DIAGNOSIS — I10 PRIMARY HYPERTENSION: ICD-10-CM

## 2025-01-27 DIAGNOSIS — Z79.4 TYPE 2 DIABETES MELLITUS WITH STAGE 3B CHRONIC KIDNEY DISEASE, WITH LONG-TERM CURRENT USE OF INSULIN (HCC): ICD-10-CM

## 2025-01-27 PROBLEM — N18.9 CHRONIC KIDNEY DISEASE: Status: ACTIVE | Noted: 2025-01-27

## 2025-01-27 PROBLEM — I48.21 PERMANENT ATRIAL FIBRILLATION (HCC): Status: RESOLVED | Noted: 2024-02-15 | Resolved: 2025-01-27

## 2025-01-27 LAB
CHOLEST SERPL-MCNC: 113 MG/DL (ref 0–200)
CREAT UR-MCNC: 150 MG/DL (ref 39–259)
EST. AVERAGE GLUCOSE BLD GHB EST-MCNC: 148 MG/DL
HBA1C MFR BLD: 6.8 % (ref 0–5.6)
HDLC SERPL-MCNC: 25 MG/DL (ref 40–60)
HDLC SERPL: 4.5 (ref 0–5)
LDLC SERPL CALC-MCNC: 52 MG/DL (ref 0–100)
MICROALBUMIN UR-MCNC: 168 MG/DL (ref 0–20)
MICROALBUMIN/CREAT UR-RTO: 1120 MG/G (ref 0–30)
PSA SERPL-MCNC: 5.3 NG/ML (ref 0–4)
TRIGL SERPL-MCNC: 181 MG/DL (ref 0–150)
VLDLC SERPL CALC-MCNC: 36 MG/DL (ref 6–23)

## 2025-01-27 PROCEDURE — 3079F DIAST BP 80-89 MM HG: CPT | Performed by: FAMILY MEDICINE

## 2025-01-27 PROCEDURE — 3075F SYST BP GE 130 - 139MM HG: CPT | Performed by: FAMILY MEDICINE

## 2025-01-27 PROCEDURE — 1123F ACP DISCUSS/DSCN MKR DOCD: CPT | Performed by: FAMILY MEDICINE

## 2025-01-27 PROCEDURE — G0439 PPPS, SUBSEQ VISIT: HCPCS | Performed by: FAMILY MEDICINE

## 2025-01-27 PROCEDURE — 1159F MED LIST DOCD IN RCRD: CPT | Performed by: FAMILY MEDICINE

## 2025-01-27 PROCEDURE — 99214 OFFICE O/P EST MOD 30 MIN: CPT | Performed by: FAMILY MEDICINE

## 2025-01-27 PROCEDURE — 1160F RVW MEDS BY RX/DR IN RCRD: CPT | Performed by: FAMILY MEDICINE

## 2025-01-27 RX ORDER — DOXYCYCLINE HYCLATE 100 MG
100 TABLET ORAL 2 TIMES DAILY
Qty: 20 TABLET | Refills: 0 | Status: SHIPPED | OUTPATIENT
Start: 2025-01-27 | End: 2025-02-06

## 2025-01-27 SDOH — ECONOMIC STABILITY: FOOD INSECURITY: WITHIN THE PAST 12 MONTHS, YOU WORRIED THAT YOUR FOOD WOULD RUN OUT BEFORE YOU GOT MONEY TO BUY MORE.: NEVER TRUE

## 2025-01-27 SDOH — ECONOMIC STABILITY: FOOD INSECURITY: WITHIN THE PAST 12 MONTHS, THE FOOD YOU BOUGHT JUST DIDN'T LAST AND YOU DIDN'T HAVE MONEY TO GET MORE.: NEVER TRUE

## 2025-01-27 ASSESSMENT — PATIENT HEALTH QUESTIONNAIRE - PHQ9
1. LITTLE INTEREST OR PLEASURE IN DOING THINGS: NOT AT ALL
SUM OF ALL RESPONSES TO PHQ QUESTIONS 1-9: 0
2. FEELING DOWN, DEPRESSED OR HOPELESS: NOT AT ALL
SUM OF ALL RESPONSES TO PHQ9 QUESTIONS 1 & 2: 0
SUM OF ALL RESPONSES TO PHQ QUESTIONS 1-9: 0

## 2025-01-27 ASSESSMENT — ENCOUNTER SYMPTOMS
SHORTNESS OF BREATH: 0
COUGH: 1

## 2025-01-27 NOTE — PROGRESS NOTES
PROGRESS NOTE    SUBJECTIVE:   Steve Mcgovern is a 69 y.o. male seen for a follow up visit regarding   Chief Complaint   Patient presents with    Medicare AW    Diabetes     Follow up    Congestion     Complains of congestion, slight cough, x 10 days         HPI:  Here for follow-up of chronic problems and annual wellness visit.  Today he reports that he has had a cough, progressive now for 10 days.  Occasional blood-tinged sputum production.  Denies fever presently.  Continues to deal with severe back pain, has a procedure pending, reportedly the Minuteman procedure.         Reviewed and updated this visit by provider:           Review of Systems   Constitutional:  Negative for fever.   HENT:  Positive for congestion.    Respiratory:  Positive for cough. Negative for shortness of breath.    Cardiovascular: Negative.           OBJECTIVE:  Vitals:    01/27/25 0921 01/27/25 0953   BP: (!) 158/94 138/80   Site: Left Upper Arm    Cuff Size: Large Adult    Pulse: 70    SpO2: 96%    Weight: (!) 148.9 kg (328 lb 3.2 oz)    Height: 1.778 m (5' 10\")         Physical Exam   General: Alert and oriented x3, mildly ill-appearing, nontoxic  HEENT: Normocephalic; external ears, ear canals are normal,  Tympanic membranes are mildly erythematous, PERRLA, EOMI, normal conjunctiva; mildly injected nasal mucosa with minimal drainage; oropharynx is moist without mucosal lesion  Neck: No adenopathy, thyromegaly or thyroid nodules  Pulmonary: Normal effort, good airflow, no rales occasional rhonchi  CVS: Regular rate and rhythm, normal S1, S2, no S3 or S4, no murmurs; no carotid bruits, 2+ pedal pulses  Abdomen: Nondistended, normal bowel sounds, nontender without mass organomegaly  Extremities: No cyanosis/clubbing/edema    Medical problems and test results were reviewed with the patient today.     No results found for this or any previous visit (from the past 672 hour(s)).    No results found for any visits on 01/27/25.     ASSESSMENT

## 2025-01-31 ENCOUNTER — OFFICE VISIT (OUTPATIENT)
Dept: ENDOCRINOLOGY | Age: 70
End: 2025-01-31
Payer: MEDICARE

## 2025-01-31 VITALS
SYSTOLIC BLOOD PRESSURE: 123 MMHG | HEIGHT: 71 IN | HEART RATE: 70 BPM | BODY MASS INDEX: 44.1 KG/M2 | DIASTOLIC BLOOD PRESSURE: 84 MMHG | WEIGHT: 315 LBS | OXYGEN SATURATION: 98 %

## 2025-01-31 DIAGNOSIS — R80.9 ALBUMINURIA: ICD-10-CM

## 2025-01-31 DIAGNOSIS — E11.65 TYPE 2 DIABETES MELLITUS WITH HYPERGLYCEMIA, WITH LONG-TERM CURRENT USE OF INSULIN (HCC): Primary | ICD-10-CM

## 2025-01-31 DIAGNOSIS — I10 ESSENTIAL HYPERTENSION: ICD-10-CM

## 2025-01-31 DIAGNOSIS — E78.00 HYPERCHOLESTEROLEMIA: ICD-10-CM

## 2025-01-31 DIAGNOSIS — Z79.4 TYPE 2 DIABETES MELLITUS WITH HYPERGLYCEMIA, WITH LONG-TERM CURRENT USE OF INSULIN (HCC): Primary | ICD-10-CM

## 2025-01-31 DIAGNOSIS — N18.31 STAGE 3A CHRONIC KIDNEY DISEASE (HCC): ICD-10-CM

## 2025-01-31 PROCEDURE — G2211 COMPLEX E/M VISIT ADD ON: HCPCS | Performed by: INTERNAL MEDICINE

## 2025-01-31 PROCEDURE — 3074F SYST BP LT 130 MM HG: CPT | Performed by: INTERNAL MEDICINE

## 2025-01-31 PROCEDURE — 1159F MED LIST DOCD IN RCRD: CPT | Performed by: INTERNAL MEDICINE

## 2025-01-31 PROCEDURE — 1123F ACP DISCUSS/DSCN MKR DOCD: CPT | Performed by: INTERNAL MEDICINE

## 2025-01-31 PROCEDURE — 3079F DIAST BP 80-89 MM HG: CPT | Performed by: INTERNAL MEDICINE

## 2025-01-31 PROCEDURE — 3044F HG A1C LEVEL LT 7.0%: CPT | Performed by: INTERNAL MEDICINE

## 2025-01-31 PROCEDURE — 99214 OFFICE O/P EST MOD 30 MIN: CPT | Performed by: INTERNAL MEDICINE

## 2025-01-31 ASSESSMENT — ENCOUNTER SYMPTOMS: BACK PAIN: 1

## 2025-01-31 NOTE — PROGRESS NOTES
Neck:      Thyroid: No thyroid mass or thyromegaly.   Cardiovascular:      Rate and Rhythm: Normal rate and regular rhythm.   Pulmonary:      Effort: Pulmonary effort is normal.      Breath sounds: Normal breath sounds.   Neurological:      Mental Status: He is alert.   Psychiatric:         Mood and Affect: Mood normal.         Behavior: Behavior normal.         No orders of the defined types were placed in this encounter.        Current Outpatient Medications   Medication Sig Dispense Refill    doxycycline hyclate (VIBRA-TABS) 100 MG tablet Take 1 tablet by mouth 2 times daily for 10 days 20 tablet 0    carvedilol (COREG) 6.25 MG tablet Take 1 tablet by mouth 2 times daily (with meals)      ferrous sulfate (IRON 325) 325 (65 Fe) MG tablet Take 1 tablet by mouth daily (with breakfast) 90 tablet 1    metFORMIN (GLUCOPHAGE) 500 MG tablet TAKE ONE TABLET BY MOUTH TWICE A DAY WITH A MEAL 180 tablet 3    atorvastatin (LIPITOR) 10 MG tablet Take 1 tablet by mouth daily 90 tablet 3    OZEMPIC, 1 MG/DOSE, 4 MG/3ML SOPN sc injection Inject 1 mg into the skin once a week 9 mL 3    pioglitazone (ACTOS) 30 MG tablet Take 0.5 tablets by mouth daily 45 tablet 3    LANTUS SOLOSTAR 100 UNIT/ML injection pen Inject 54 Units into the skin 2 times daily 90 mL 3    torsemide (DEMADEX) 20 MG tablet Take 1 tablet by mouth 2 times daily 180 tablet 3    aspirin 81 MG EC tablet Take 1 tablet by mouth daily 90 tablet 0    vitamin B-12 (CYANOCOBALAMIN) 1000 MCG tablet Take 1 tablet by mouth daily      acetaminophen (TYLENOL) 500 MG tablet Take 2 tablets by mouth 2 times daily      triamcinolone (KENALOG) 0.1 % cream       hydrOXYzine HCl (ATARAX) 25 MG tablet Take 1 tablet by mouth every 4 hours as needed      lidocaine-prilocaine (EMLA) 2.5-2.5 % cream Apply topically as needed to port site 30-60 minutes prior to being accessed with needle. Cover with saran wrap. 1 each 1    Omega-3 Fatty Acids (FISH OIL PO) Take by mouth      Lidocaine 5

## 2025-02-04 ENCOUNTER — TELEPHONE (OUTPATIENT)
Dept: ORTHOPEDIC SURGERY | Age: 70
End: 2025-02-04

## 2025-02-04 NOTE — TELEPHONE ENCOUNTER
[facility] for [facility].    As per talked with pt.'s wife she said she doesn't want appt. With [facility] De La oR[facility], waiting on [facility]x [facility] from [facility] for this [facility] With Dr. Peacock    I can see referral for [facility] by Nydia De La Rosa for PM. This referral you can review its for Orthopaedic. So, pt. Need sx appt. With [facility] or Dr. Ellison?    Please guide.    Thanks.

## 2025-02-10 ENCOUNTER — OFFICE VISIT (OUTPATIENT)
Dept: FAMILY MEDICINE CLINIC | Facility: CLINIC | Age: 70
End: 2025-02-10
Payer: MEDICARE

## 2025-02-10 VITALS
HEART RATE: 70 BPM | BODY MASS INDEX: 45.1 KG/M2 | OXYGEN SATURATION: 97 % | SYSTOLIC BLOOD PRESSURE: 146 MMHG | WEIGHT: 315 LBS | DIASTOLIC BLOOD PRESSURE: 92 MMHG | HEIGHT: 70 IN | RESPIRATION RATE: 16 BRPM

## 2025-02-10 DIAGNOSIS — J45.41 MODERATE PERSISTENT ASTHMA WITH ACUTE EXACERBATION: Primary | ICD-10-CM

## 2025-02-10 PROCEDURE — 3080F DIAST BP >= 90 MM HG: CPT | Performed by: FAMILY MEDICINE

## 2025-02-10 PROCEDURE — 3077F SYST BP >= 140 MM HG: CPT | Performed by: FAMILY MEDICINE

## 2025-02-10 PROCEDURE — 1123F ACP DISCUSS/DSCN MKR DOCD: CPT | Performed by: FAMILY MEDICINE

## 2025-02-10 PROCEDURE — 99213 OFFICE O/P EST LOW 20 MIN: CPT | Performed by: FAMILY MEDICINE

## 2025-02-10 RX ORDER — BUDESONIDE 0.5 MG/2ML
500 INHALANT ORAL 2 TIMES DAILY
Qty: 60 EACH | Refills: 3 | Status: SHIPPED | OUTPATIENT
Start: 2025-02-10

## 2025-02-10 RX ORDER — BENZONATATE 200 MG/1
200 CAPSULE ORAL 3 TIMES DAILY PRN
COMMUNITY
Start: 2025-02-07

## 2025-02-10 ASSESSMENT — ENCOUNTER SYMPTOMS
COUGH: 1
WHEEZING: 1

## 2025-02-10 NOTE — PROGRESS NOTES
PROGRESS NOTE    SUBJECTIVE:   Steve Mcgovern is a 69 y.o. male seen for a follow up visit regarding   Chief Complaint   Patient presents with    Cough     Had the flu, kept the cough. Has had it going on for a month.  W[place] to  [facility] last week, got benzonatate started helping yesterday 2/9/25  Has not been coughing anything up        HPI:  Here for follow-up after suffering with respiratory infections.  I saw him 2 weeks ago, was diagnosed with bronchitis, he has had an interim visit to urgent care because of persistent cough.  He states his cough is better now, but still with paroxysms of cough.  He has a history of asthma, has Stiolto and albuterol metered-dose inhalers at home, nothing with inhaled steroid.         Reviewed and updated this visit by provider:           Review of Systems   Constitutional:  Negative for fever.   HENT:  Negative for congestion.    Respiratory:  Positive for cough and wheezing.    Cardiovascular: Negative.           OBJECTIVE:  Vitals:    02/10/25 1129   BP: (!) 146/92   Site: Left Upper Arm   Position: Sitting   Cuff Size: Large Adult   Pulse: 70   Resp: 16   SpO2: 97%   Weight: (!) 149 kg (328 lb 6.4 oz)   Height: 1.778 m (5' 10\")        Physical Exam   General: Alert and oriented x3, well-appearing  Neck: No adenopathy, thyromegaly or thyroid nodules  Pulmonary: Normal effort, good airflow, no rales or rhonchi, mild end expiratory wheezing  CVS: Regular rate and rhythm, normal S1, S2, no S3 or S4, no murmurs; no carotid bruits, 2+ pedal pulses      Medical problems and test results were reviewed with the patient today.     Recent Results (from the past 672 hour(s))   PSA Screening    Collection Time: 01/27/25  9:56 AM   Result Value Ref Range    PSA 5.3 (H) 0.0 - 4.0 ng/mL   Hemoglobin A1C    Collection Time: 01/27/25  9:56 AM   Result Value Ref Range    Hemoglobin A1C 6.8 (H) 0 - 5.6 %    Estimated Avg Glucose 148 mg/dL   Albumin/Creatinine Ratio, Urine    Collection Time:

## 2025-02-18 ENCOUNTER — TELEPHONE (OUTPATIENT)
Dept: ORTHOPEDIC SURGERY | Age: 70
End: 2025-02-18

## 2025-02-18 NOTE — TELEPHONE ENCOUNTER
Spoke to patient's wife and clarified that the address on the referral is [facility] but the procedure requested is intended for [facility].  I further explained that perhaps the [facility] put the wrong [facility] the referral which is why it came to us.  She states she is going to call Dr. Peacock's office and the [facility] for clarification.

## 2025-02-20 ENCOUNTER — TELEPHONE (OUTPATIENT)
Dept: FAMILY MEDICINE CLINIC | Facility: CLINIC | Age: 70
End: 2025-02-20

## 2025-02-20 NOTE — TELEPHONE ENCOUNTER
Patients wife called stating provider recommended Urology referral due to elevated PSA levels but patient wanted to check with [facility] prior to agreeing to referral. Wife reports [facility] saw [facility] and rechecked PSA levels and it was higher than his reading here. Patient is requesting for provider to send referral to [facility]. Patient is aware that provider is out of the office until Monday but requesting for this to be sent prior to then. Please advise if referral can be placed or if this will need to wait until provider returns on Monday.

## 2025-02-21 DIAGNOSIS — R97.20 ELEVATED PSA: Primary | ICD-10-CM

## 2025-02-21 NOTE — TELEPHONE ENCOUNTER
Returned call to patient and advised that this referral has been placed and that the Urology office will call to get him scheduled. He voiced understanding and thanks.

## 2025-02-27 ENCOUNTER — TELEPHONE (OUTPATIENT)
Dept: ORTHOPEDIC SURGERY | Age: 70
End: 2025-02-27

## 2025-02-27 NOTE — TELEPHONE ENCOUNTER
Spoke to patient's wife about them not getting a resolution with community care.  She was told that she will need a new appointment with [place] and then a request of service to refer him to pain management.  The visit should not filed with [facility] but through community care.  His last appointment was too long ago to be valid with a new request of service.  I will schedule an appointment so that we can get this handled properly.

## 2025-02-27 NOTE — TELEPHONE ENCOUNTER
Please call pt spouse felix back,about a ref  to [facility].they need add information from a [facility]art.her #[phone]/ [facility] telling her to see a hunt .self pay. Please call thanks.

## 2025-03-04 ENCOUNTER — OFFICE VISIT (OUTPATIENT)
Age: 70
End: 2025-03-04
Payer: OTHER GOVERNMENT

## 2025-03-04 DIAGNOSIS — M48.061 FORAMINAL STENOSIS OF LUMBAR REGION: Primary | ICD-10-CM

## 2025-03-04 DIAGNOSIS — M51.361 DEGENERATION OF INTERVERTEBRAL DISC OF LUMBAR REGION WITH LOWER EXTREMITY PAIN: ICD-10-CM

## 2025-03-04 DIAGNOSIS — M48.062 LUMBAR STENOSIS WITH NEUROGENIC CLAUDICATION: ICD-10-CM

## 2025-03-04 PROCEDURE — 1123F ACP DISCUSS/DSCN MKR DOCD: CPT | Performed by: PHYSICIAN ASSISTANT

## 2025-03-04 PROCEDURE — 99213 OFFICE O/P EST LOW 20 MIN: CPT | Performed by: PHYSICIAN ASSISTANT

## 2025-03-04 NOTE — PROGRESS NOTES
stenosis L4-5 from disc osteophyte complex and facet arthropathy.    MRI Result (most recent):  MRI LUMBAR SPINE WO CONTRAST 10/14/2024    Narrative  History: Radiculopathy, lumbosacral region; Spinal stenosis, lumbar region with  neurogenic claudication; Other intervertebral disc degeneration, lumbar region  without mention of lumbar back pain or lower extremity pain; Spondylosis without  myelopathy or radiculopathy, lumbar region; Body mass index (BMI) 45.    Exam: MRI lumbar spine without contrast    Technique: Axial and sagittal T1 and T2-weighted sequences are available for  review.  A sagittal STIR sequence is also available for review.    COMPARISON:  None    Findings:    Vertebral body heights are within normal limits. Negative for fracture or marrow  replacement. Mild levoscoliosis. Conus medullaris is intact and terminates at  L1. Mild paraspinal muscle atrophy. No paraspinal mass.    L1-L2: No focal disc abnormality, spinal stenosis or foraminal narrowing. Mild  bilateral facet arthrosis.    L2-L3: Posterior disc bulge with a superimposed left central disc extrusion  demonstrating 4 mm of cranial migration. Moderate bilateral facet arthrosis and  ligamentum flavum hypertrophy. Moderate spinal stenosis including severe  narrowing of the left lateral recess. Mild bilateral foraminal narrowing.    L3-L4: Posterior disc bulge. Moderate bilateral facet arthrosis and ligamentum  flavum hypertrophy. Mild spinal stenosis. Mild bilateral foraminal narrowing.    L4-L5: Posterior disc bulge. Moderate/severe bilateral facet arthrosis.  Ligamentum flavum hypertrophy. Moderate/severe spinal stenosis with narrowing of  both lateral recesses. Moderate left and severe right foraminal narrowing.    L5-S1: Mild posterior disc bulge. Mild bilateral facet arthrosis and ligamentum  flavum hypertrophy. No significant spinal or foraminal narrowing.    Impression  1. Acquired moderate and moderate/severe spinal stenosis at L2-L3

## 2025-03-20 ENCOUNTER — OFFICE VISIT (OUTPATIENT)
Dept: UROLOGY | Age: 70
End: 2025-03-20
Payer: OTHER GOVERNMENT

## 2025-03-20 DIAGNOSIS — R97.20 ELEVATED PSA: Primary | ICD-10-CM

## 2025-03-20 LAB
BILIRUBIN, URINE, POC: NEGATIVE
BLOOD URINE, POC: NEGATIVE
GLUCOSE URINE, POC: NEGATIVE MG/DL
KETONES, URINE, POC: NEGATIVE MG/DL
LEUKOCYTE ESTERASE, URINE, POC: NORMAL
NITRITE, URINE, POC: NEGATIVE
PH, URINE, POC: 5.5 (ref 4.6–8)
PROTEIN,URINE, POC: 300 MG/DL
SPECIFIC GRAVITY, URINE, POC: 1.01 (ref 1–1.03)
URINALYSIS CLARITY, POC: NORMAL
URINALYSIS COLOR, POC: NORMAL
UROBILINOGEN, POC: NORMAL MG/DL

## 2025-03-20 PROCEDURE — 81003 URINALYSIS AUTO W/O SCOPE: CPT | Performed by: UROLOGY

## 2025-03-20 PROCEDURE — 1123F ACP DISCUSS/DSCN MKR DOCD: CPT | Performed by: UROLOGY

## 2025-03-20 PROCEDURE — 99204 OFFICE O/P NEW MOD 45 MIN: CPT | Performed by: UROLOGY

## 2025-03-20 ASSESSMENT — ENCOUNTER SYMPTOMS
ABDOMINAL PAIN: 0
VOMITING: 0
NAUSEA: 0
EYE DISCHARGE: 0
BACK PAIN: 0
SKIN LESIONS: 0
COUGH: 0
DIARRHEA: 0
HEARTBURN: 0
EYE PAIN: 0
WHEEZING: 0
BLOOD IN STOOL: 0
INDIGESTION: 0
CONSTIPATION: 0
SHORTNESS OF BREATH: 0

## 2025-03-20 NOTE — PROGRESS NOTES
Bilirubin, Urine, POC Negative Negative    KETONES, Urine, POC Negative Negative mg/dL    Specific Gravity, Urine, POC 1.015 1.001 - 1.035    Blood (UA POC) Negative     pH, Urine, POC 5.5 4.6 - 8.0    Protein, Urine,  Negative mg/dL    Urobilinogen, POC 0.2 mg/dL <1.1 mg/dL    Nitrite, Urine, POC Negative Negative    Leukocyte Esterase, Urine, POC Trace Negative         UA - Micro  WBC - 0  RBC - 0  Bacteria - 0  Epith - 0    Assessment/Plan    ICD-10-CM    1. Elevated PSA  R97.20 AMB POC URINALYSIS DIP STICK AUTO W/O MICRO        Reviewed option for observation vs MRI vs bx.  He opted to recheck a PSA this summer given age and MMC.  RTO in 4 mo with PSA prior.     ITALO STONER DO    Total time for today's encounter including chart review, result review, documentation and face to face encounter was 46 minutes.

## 2025-03-27 ENCOUNTER — HOSPITAL ENCOUNTER (OUTPATIENT)
Dept: LAB | Age: 70
Discharge: HOME | End: 2025-03-27
Payer: OTHER GOVERNMENT

## 2025-03-27 ENCOUNTER — HOSPITAL ENCOUNTER (OUTPATIENT)
Dept: PET IMAGING | Age: 70
Discharge: HOME | End: 2025-03-30
Payer: OTHER GOVERNMENT

## 2025-03-27 DIAGNOSIS — D64.9 ANEMIA, UNSPECIFIED TYPE: ICD-10-CM

## 2025-03-27 DIAGNOSIS — C43.9 MALIGNANT MELANOMA, UNSPECIFIED SITE (HCC): ICD-10-CM

## 2025-03-27 LAB
ALBUMIN SERPL-MCNC: 3.3 G/DL (ref 3.2–4.6)
ALBUMIN/GLOB SERPL: 0.8 (ref 1–1.9)
ALP SERPL-CCNC: 119 U/L (ref 40–129)
ALT SERPL-CCNC: 14 U/L (ref 8–55)
ANION GAP SERPL CALC-SCNC: 11 MMOL/L (ref 7–16)
AST SERPL-CCNC: 21 U/L (ref 15–37)
BASOPHILS # BLD: 0.08 K/UL (ref 0–0.2)
BASOPHILS NFR BLD: 0.8 % (ref 0–2)
BILIRUB SERPL-MCNC: 0.4 MG/DL (ref 0–1.2)
BUN SERPL-MCNC: 21 MG/DL (ref 8–23)
CALCIUM SERPL-MCNC: 9.1 MG/DL (ref 8.8–10.2)
CHLORIDE SERPL-SCNC: 102 MMOL/L (ref 98–107)
CO2 SERPL-SCNC: 25 MMOL/L (ref 20–29)
CREAT SERPL-MCNC: 1.28 MG/DL (ref 0.8–1.3)
DIFFERENTIAL METHOD BLD: ABNORMAL
EOSINOPHIL # BLD: 0.54 K/UL (ref 0–0.8)
EOSINOPHIL NFR BLD: 5.7 % (ref 0.5–7.8)
ERYTHROCYTE [DISTWIDTH] IN BLOOD BY AUTOMATED COUNT: 15 % (ref 11.9–14.6)
FERRITIN SERPL-MCNC: 53 NG/ML (ref 8–388)
FOLATE SERPL-MCNC: 8.1 NG/ML (ref 3.1–17.5)
GLOBULIN SER CALC-MCNC: 4.1 G/DL (ref 2.3–3.5)
GLUCOSE BLD STRIP.AUTO-MCNC: 91 MG/DL (ref 65–100)
GLUCOSE SERPL-MCNC: 110 MG/DL (ref 70–99)
HCT VFR BLD AUTO: 41.2 % (ref 41.1–50.3)
HGB BLD-MCNC: 13 G/DL (ref 13.6–17.2)
IMM GRANULOCYTES # BLD AUTO: 0.02 K/UL (ref 0–0.5)
IMM GRANULOCYTES NFR BLD AUTO: 0.2 % (ref 0–5)
IRON SATN MFR SERPL: 17 % (ref 20–50)
IRON SERPL-MCNC: 56 UG/DL (ref 35–100)
LYMPHOCYTES # BLD: 2.47 K/UL (ref 0.5–4.6)
LYMPHOCYTES NFR BLD: 26.2 % (ref 13–44)
MCH RBC QN AUTO: 28.6 PG (ref 26.1–32.9)
MCHC RBC AUTO-ENTMCNC: 31.6 G/DL (ref 31.4–35)
MCV RBC AUTO: 90.7 FL (ref 82–102)
MONOCYTES # BLD: 0.65 K/UL (ref 0.1–1.3)
MONOCYTES NFR BLD: 6.9 % (ref 4–12)
NEUTS SEG # BLD: 5.68 K/UL (ref 1.7–8.2)
NEUTS SEG NFR BLD: 60.2 % (ref 43–78)
NRBC # BLD: 0 K/UL (ref 0–0.2)
PLATELET # BLD AUTO: 247 K/UL (ref 150–450)
PMV BLD AUTO: 10.3 FL (ref 9.4–12.3)
POTASSIUM SERPL-SCNC: 4 MMOL/L (ref 3.5–5.1)
PROT SERPL-MCNC: 7.3 G/DL (ref 6.3–8.2)
RBC # BLD AUTO: 4.54 M/UL (ref 4.23–5.6)
SERVICE CMNT-IMP: NORMAL
SODIUM SERPL-SCNC: 138 MMOL/L (ref 136–145)
TIBC SERPL-MCNC: 328 UG/DL (ref 240–450)
UIBC SERPL-MCNC: 272 UG/DL (ref 112–347)
VIT B12 SERPL-MCNC: 1592 PG/ML (ref 193–986)
WBC # BLD AUTO: 9.4 K/UL (ref 4.3–11.1)

## 2025-03-27 PROCEDURE — 6360000004 HC RX CONTRAST MEDICATION: Performed by: INTERNAL MEDICINE

## 2025-03-27 PROCEDURE — 83550 IRON BINDING TEST: CPT

## 2025-03-27 PROCEDURE — 2500000003 HC RX 250 WO HCPCS: Performed by: INTERNAL MEDICINE

## 2025-03-27 PROCEDURE — 82728 ASSAY OF FERRITIN: CPT

## 2025-03-27 PROCEDURE — 82746 ASSAY OF FOLIC ACID SERUM: CPT

## 2025-03-27 PROCEDURE — 80053 COMPREHEN METABOLIC PANEL: CPT

## 2025-03-27 PROCEDURE — 36591 DRAW BLOOD OFF VENOUS DEVICE: CPT

## 2025-03-27 PROCEDURE — 78816 PET IMAGE W/CT FULL BODY: CPT

## 2025-03-27 PROCEDURE — 6360000002 HC RX W HCPCS: Performed by: INTERNAL MEDICINE

## 2025-03-27 PROCEDURE — 3430000000 HC RX DIAGNOSTIC RADIOPHARMACEUTICAL: Performed by: INTERNAL MEDICINE

## 2025-03-27 PROCEDURE — 83540 ASSAY OF IRON: CPT

## 2025-03-27 PROCEDURE — 82607 VITAMIN B-12: CPT

## 2025-03-27 PROCEDURE — 85025 COMPLETE CBC W/AUTO DIFF WBC: CPT

## 2025-03-27 PROCEDURE — A9609 HC RX DIAGNOSTIC RADIOPHARMACEUTICAL: HCPCS | Performed by: INTERNAL MEDICINE

## 2025-03-27 PROCEDURE — 82962 GLUCOSE BLOOD TEST: CPT

## 2025-03-27 RX ORDER — SODIUM CHLORIDE 0.9 % (FLUSH) 0.9 %
20 SYRINGE (ML) INJECTION AS NEEDED
Status: DISCONTINUED | OUTPATIENT
Start: 2025-03-27 | End: 2025-03-31 | Stop reason: HOSPADM

## 2025-03-27 RX ORDER — HEPARIN 100 UNIT/ML
300 SYRINGE INTRAVENOUS PRN
Status: DISCONTINUED | OUTPATIENT
Start: 2025-03-27 | End: 2025-03-28 | Stop reason: HOSPADM

## 2025-03-27 RX ORDER — DIATRIZOATE MEGLUMINE AND DIATRIZOATE SODIUM 660; 100 MG/ML; MG/ML
10 SOLUTION ORAL; RECTAL
Status: DISCONTINUED | OUTPATIENT
Start: 2025-03-27 | End: 2025-03-31 | Stop reason: HOSPADM

## 2025-03-27 RX ORDER — SODIUM CHLORIDE 0.9 % (FLUSH) 0.9 %
5-40 SYRINGE (ML) INJECTION 2 TIMES DAILY
Status: DISCONTINUED | OUTPATIENT
Start: 2025-03-27 | End: 2025-03-28 | Stop reason: HOSPADM

## 2025-03-27 RX ORDER — FLUDEOXYGLUCOSE F 18 200 MCI/ML
13.78 INJECTION, SOLUTION INTRAVENOUS
Status: COMPLETED | OUTPATIENT
Start: 2025-03-27 | End: 2025-03-27

## 2025-03-27 RX ADMIN — FLUDEOXYGLUCOSE F 18 13.78 MILLICURIE: 200 INJECTION, SOLUTION INTRAVENOUS at 09:49

## 2025-03-27 RX ADMIN — HEPARIN 300 UNITS: 100 SYRINGE at 10:04

## 2025-03-27 RX ADMIN — SODIUM CHLORIDE, PRESERVATIVE FREE 20 ML: 5 INJECTION INTRAVENOUS at 09:49

## 2025-03-27 RX ADMIN — SODIUM CHLORIDE, PRESERVATIVE FREE 20 ML: 5 INJECTION INTRAVENOUS at 08:20

## 2025-03-27 RX ADMIN — DIATRIZOATE MEGLUMINE AND DIATRIZOATE SODIUM 10 ML: 660; 100 LIQUID ORAL; RECTAL at 09:49

## 2025-03-27 NOTE — PROGRESS NOTES
Patient to port lab for port access and lab draw. Port accessed per protocol; using 20g 0.75\" power needle without difficulty. Labs drawn from port and port flushed. Port remains accessed. Patient tolerated well. Discharged ambulatory.

## 2025-03-28 NOTE — PROGRESS NOTES
daily.    Follow-up  The patient will follow up at the end of July with CT prior. Total visit time 30 minutes.       Bartolo Arreola MD  [facility]  [address] [place] [address]  Office : [phone]  Fax : [phone]      Elements of this note have been dictated using speech recognition software. As a result, errors of speech recognition may have occurred.    The patient (or guardian, if applicable) and other individuals in attendance with the patient were advised that Artificial Intelligence will be utilized during this visit to record, process the conversation to generate a clinical note, and support improvement of the AI technology. The patient (or guardian, if applicable) and other individuals in attendance at the appointment consented to the use of AI, including the recording.

## 2025-04-03 ENCOUNTER — OFFICE VISIT (OUTPATIENT)
Dept: ONCOLOGY | Age: 70
End: 2025-04-03
Payer: OTHER GOVERNMENT

## 2025-04-03 VITALS
HEART RATE: 70 BPM | HEIGHT: 70 IN | DIASTOLIC BLOOD PRESSURE: 93 MMHG | OXYGEN SATURATION: 97 % | BODY MASS INDEX: 45.1 KG/M2 | TEMPERATURE: 97.3 F | RESPIRATION RATE: 16 BRPM | WEIGHT: 315 LBS | SYSTOLIC BLOOD PRESSURE: 160 MMHG

## 2025-04-03 DIAGNOSIS — C43.9 MALIGNANT MELANOMA, UNSPECIFIED SITE (HCC): Primary | ICD-10-CM

## 2025-04-03 PROCEDURE — 3077F SYST BP >= 140 MM HG: CPT | Performed by: INTERNAL MEDICINE

## 2025-04-03 PROCEDURE — 99214 OFFICE O/P EST MOD 30 MIN: CPT | Performed by: INTERNAL MEDICINE

## 2025-04-03 PROCEDURE — 1123F ACP DISCUSS/DSCN MKR DOCD: CPT | Performed by: INTERNAL MEDICINE

## 2025-04-03 PROCEDURE — 3080F DIAST BP >= 90 MM HG: CPT | Performed by: INTERNAL MEDICINE

## 2025-04-03 ASSESSMENT — PATIENT HEALTH QUESTIONNAIRE - PHQ9
SUM OF ALL RESPONSES TO PHQ QUESTIONS 1-9: 0
2. FEELING DOWN, DEPRESSED OR HOPELESS: NOT AT ALL
1. LITTLE INTEREST OR PLEASURE IN DOING THINGS: NOT AT ALL

## 2025-04-27 PROCEDURE — 93296 REM INTERROG EVL PM/IDS: CPT | Performed by: INTERNAL MEDICINE

## 2025-04-27 PROCEDURE — 93294 REM INTERROG EVL PM/LDLS PM: CPT | Performed by: INTERNAL MEDICINE

## 2025-06-13 ENCOUNTER — OFFICE VISIT (OUTPATIENT)
Age: 70
End: 2025-06-13
Payer: MEDICARE

## 2025-06-13 VITALS
SYSTOLIC BLOOD PRESSURE: 136 MMHG | DIASTOLIC BLOOD PRESSURE: 88 MMHG | HEART RATE: 71 BPM | HEIGHT: 71 IN | BODY MASS INDEX: 44.1 KG/M2 | WEIGHT: 315 LBS

## 2025-06-13 DIAGNOSIS — Z79.01 LONG TERM (CURRENT) USE OF ANTICOAGULANTS: Chronic | ICD-10-CM

## 2025-06-13 DIAGNOSIS — Z95.0 PRESENCE OF CARDIAC PACEMAKER: ICD-10-CM

## 2025-06-13 DIAGNOSIS — I50.22 CHRONIC SYSTOLIC (CONGESTIVE) HEART FAILURE (HCC): Primary | Chronic | ICD-10-CM

## 2025-06-13 DIAGNOSIS — Z95.818 PRESENCE OF WATCHMAN LEFT ATRIAL APPENDAGE CLOSURE DEVICE: ICD-10-CM

## 2025-06-13 DIAGNOSIS — G47.33 OBSTRUCTIVE SLEEP APNEA ON CPAP: Chronic | ICD-10-CM

## 2025-06-13 DIAGNOSIS — I27.20 PULMONARY HYPERTENSION (HCC): Chronic | ICD-10-CM

## 2025-06-13 PROCEDURE — 1123F ACP DISCUSS/DSCN MKR DOCD: CPT | Performed by: INTERNAL MEDICINE

## 2025-06-13 PROCEDURE — 93000 ELECTROCARDIOGRAM COMPLETE: CPT | Performed by: INTERNAL MEDICINE

## 2025-06-13 PROCEDURE — 3079F DIAST BP 80-89 MM HG: CPT | Performed by: INTERNAL MEDICINE

## 2025-06-13 PROCEDURE — 3075F SYST BP GE 130 - 139MM HG: CPT | Performed by: INTERNAL MEDICINE

## 2025-06-13 PROCEDURE — 99214 OFFICE O/P EST MOD 30 MIN: CPT | Performed by: INTERNAL MEDICINE

## 2025-06-13 NOTE — PROGRESS NOTES
[address] [facility], [place] [address]  PHONE: [phone]     25    NAME:  Steve Mcgovern  : 1955  MRN: [id number]       SUBJECTIVE:   Steve Mcgovern is a 70 y.o. male seen for a follow up visit regarding the following:     Chief Complaint   Patient presents with    Hypertension       HPI:   Longstanding persistent Afib s/p PM and AVN ablation.   [facility] PPM .  JUAN JOSE on CPAP.     [facility] 2020: mild CAD, EDP 20      2020 admission for COVID, better now.     Echo 10/2022: normal EF, LVH, normal RVSP  2024: watchman  CRISTINA 3/2024: normal EF.      More msk issues.  Lack of mobility, poor exercise tolerance, not getting around well at all.  No new angina, edema, palp.  No new edema, palp.    NO CP, PATEL ok.   Home BP better now.  Home -120s.     Patient denies recent history of orthopnea, PND, excessive dizziness and/or syncope.     [facility]        Past Medical History, Past Surgical History, Family history, Social History, and Medications were all reviewed with the patient today and updated as necessary.     Current Outpatient Medications   Medication Sig Dispense Refill    budesonide (PULMICORT) 0.5 MG/2ML nebulizer suspension Take 2 mLs by nebulization 2 times daily 60 each 3    carvedilol (COREG) 6.25 MG tablet Take 1 tablet by mouth 2 times daily (with meals)      ferrous sulfate (IRON 325) 325 (65 Fe) MG tablet Take 1 tablet by mouth daily (with breakfast) 90 tablet 1    metFORMIN (GLUCOPHAGE) 500 MG tablet TAKE ONE TABLET BY MOUTH TWICE A DAY WITH A MEAL 180 tablet 3    atorvastatin (LIPITOR) 10 MG tablet Take 1 tablet by mouth daily 90 tablet 3    OZEMPIC, 1 MG/DOSE, 4 MG/3ML SOPN sc injection Inject 1 mg into the skin once a week 9 mL 3    pioglitazone (ACTOS) 30 MG tablet Take 0.5 tablets by mouth daily 45 tablet 3    LANTUS SOLOSTAR 100 UNIT/ML injection pen Inject 54 Units into the skin 2 times daily 90 mL 3    torsemide (DEMADEX) 20 MG tablet Take 1

## 2025-06-17 ENCOUNTER — OFFICE VISIT (OUTPATIENT)
Dept: ORTHOPEDIC SURGERY | Age: 70
End: 2025-06-17
Payer: MEDICARE

## 2025-06-17 DIAGNOSIS — M25.572 PAIN IN JOINT INVOLVING LEFT ANKLE AND FOOT: Primary | ICD-10-CM

## 2025-06-17 PROCEDURE — 1123F ACP DISCUSS/DSCN MKR DOCD: CPT | Performed by: ORTHOPAEDIC SURGERY

## 2025-06-17 PROCEDURE — 99213 OFFICE O/P EST LOW 20 MIN: CPT | Performed by: ORTHOPAEDIC SURGERY

## 2025-06-17 NOTE — PROGRESS NOTES
Name: Steve Mcgovern  YOB: 1955  Gender: male  MRN: [id number]    Summary:   Lateral ankle pain with peroneal symptoms subfibular impingement symptoms.  Significant lymphedema    Unable to take medications due to cardiac issues and gastric issues    Physical therapy ankle compression sleeve recommended    Follow-up in 8 weeks to discuss potential alternative steroid     History of Present Illness  The patient presents for evaluation of left ankle pain.    The patient reports pain localized to the lateral aspect of the left ankle, described as resembling a contusion, persisting for a duration of two weeks. They sleep with their feet elevated on two pillows. The patient is not currently receiving anticoagulant therapy.    The patient has a history of lymphedema, which has been exacerbated by recent hot weather and is managed with the use of compression socks worn nightly.    The patient has a history of chronic kidney disease, which was complicated by a COVID-19 infection in 2020. Their spouse noted that their hemoglobin A1c levels were recorded as approximately 300 to 400 mg/dL on MyChart. The patient was prescribed meloxicam but has since discontinued its use.    The patient has a history of melanoma, [facility]. They are currently one year post-treatment and undergo positron emission tomography (PET) scans every four months. The patient had a Watchman device implanted and was subsequently discontinued from anticoagulant therapy. They have a diagnosis of permanent atrial fibrillation.    The patient had a consultation with a cardiologist last week and is prescribed a diuretic, which they take once daily. The cardiologist advised increasing the dosage to twice daily if edema occurs.      ROS/Meds/PSH/PMH/FH/SH: see bottom of not for full  Patient Denies fever/chills, headache, visual changes, chest pain, shortness of breath, and nausea/vomiting/diarrhea

## 2025-06-26 ENCOUNTER — HOSPITAL ENCOUNTER (OUTPATIENT)
Dept: LAB | Age: 70
Discharge: HOME | End: 2025-06-26
Payer: OTHER GOVERNMENT

## 2025-06-26 PROCEDURE — 6360000002 HC RX W HCPCS: Performed by: INTERNAL MEDICINE

## 2025-06-26 PROCEDURE — 2500000003 HC RX 250 WO HCPCS: Performed by: INTERNAL MEDICINE

## 2025-06-26 PROCEDURE — 96523 IRRIG DRUG DELIVERY DEVICE: CPT

## 2025-06-26 RX ORDER — HEPARIN 100 UNIT/ML
300 SYRINGE INTRAVENOUS PRN
Status: DISCONTINUED | OUTPATIENT
Start: 2025-06-26 | End: 2025-06-27 | Stop reason: HOSPADM

## 2025-06-26 RX ORDER — SODIUM CHLORIDE 0.9 % (FLUSH) 0.9 %
5-40 SYRINGE (ML) INJECTION PRN
Status: DISCONTINUED | OUTPATIENT
Start: 2025-06-26 | End: 2025-06-27 | Stop reason: HOSPADM

## 2025-06-26 RX ADMIN — HEPARIN 300 UNITS: 100 SYRINGE at 14:31

## 2025-06-26 RX ADMIN — SODIUM CHLORIDE, PRESERVATIVE FREE 20 ML: 5 INJECTION INTRAVENOUS at 14:30

## 2025-06-26 NOTE — PROGRESS NOTES
Patient to port lab for port access and flushed. Port accessed per protocol; using 20g 0.75\" power needle without difficulty. Brisk blood return, flushed without difficult, locked with Heparin. Port de-accessed, dressing applied. Patient tolerated well. Discharged ambulatory.

## 2025-07-01 ENCOUNTER — OFFICE VISIT (OUTPATIENT)
Dept: INTERNAL MEDICINE CLINIC | Facility: CLINIC | Age: 70
End: 2025-07-01
Payer: MEDICARE

## 2025-07-01 VITALS
DIASTOLIC BLOOD PRESSURE: 88 MMHG | OXYGEN SATURATION: 95 % | BODY MASS INDEX: 44.1 KG/M2 | TEMPERATURE: 98.1 F | WEIGHT: 315 LBS | HEIGHT: 71 IN | SYSTOLIC BLOOD PRESSURE: 130 MMHG | HEART RATE: 93 BPM

## 2025-07-01 DIAGNOSIS — T30.0 BURN: ICD-10-CM

## 2025-07-01 DIAGNOSIS — E11.22 TYPE 2 DIABETES MELLITUS WITH STAGE 3B CHRONIC KIDNEY DISEASE, WITH LONG-TERM CURRENT USE OF INSULIN (HCC): Primary | Chronic | ICD-10-CM

## 2025-07-01 DIAGNOSIS — Z79.4 TYPE 2 DIABETES MELLITUS WITH STAGE 3B CHRONIC KIDNEY DISEASE, WITH LONG-TERM CURRENT USE OF INSULIN (HCC): Primary | Chronic | ICD-10-CM

## 2025-07-01 DIAGNOSIS — N18.32 TYPE 2 DIABETES MELLITUS WITH STAGE 3B CHRONIC KIDNEY DISEASE, WITH LONG-TERM CURRENT USE OF INSULIN (HCC): Primary | Chronic | ICD-10-CM

## 2025-07-01 DIAGNOSIS — E78.00 HYPERCHOLESTEROLEMIA: ICD-10-CM

## 2025-07-01 PROCEDURE — 3079F DIAST BP 80-89 MM HG: CPT | Performed by: INTERNAL MEDICINE

## 2025-07-01 PROCEDURE — 3044F HG A1C LEVEL LT 7.0%: CPT | Performed by: INTERNAL MEDICINE

## 2025-07-01 PROCEDURE — 99214 OFFICE O/P EST MOD 30 MIN: CPT | Performed by: INTERNAL MEDICINE

## 2025-07-01 PROCEDURE — 1123F ACP DISCUSS/DSCN MKR DOCD: CPT | Performed by: INTERNAL MEDICINE

## 2025-07-01 PROCEDURE — 3075F SYST BP GE 130 - 139MM HG: CPT | Performed by: INTERNAL MEDICINE

## 2025-07-01 RX ORDER — ATORVASTATIN CALCIUM 10 MG/1
10 TABLET, FILM COATED ORAL DAILY
Qty: 90 TABLET | Refills: 3 | Status: SHIPPED | OUTPATIENT
Start: 2025-07-01 | End: 2026-06-26

## 2025-07-01 RX ORDER — DOXYCYCLINE HYCLATE 100 MG
100 TABLET ORAL 2 TIMES DAILY
Qty: 20 TABLET | Refills: 0 | Status: SHIPPED | OUTPATIENT
Start: 2025-07-01 | End: 2025-07-11

## 2025-07-01 NOTE — PROGRESS NOTES
Steve Mcgovern (: 1955) is a 70 y.o. male, here for evaluation of the following chief complaint(s):  Established New Doctor (Needs PCP his Dr left)     Assessment & Plan  1. Burn on leg.  - The burn on the leg is showing signs of improvement, with a low suspicion for infection.  - No numbness or tingling reported in the area of the burn.  - A 10-day course of doxycycline has been prescribed and sent to pharmacy.  - Recommended wound care includes the use of Vaseline and nonstick pads, maintaining a healthy diet, ensuring good hydration, and keeping the affected area elevated.    2. Diabetes mellitus.  - Blood sugar levels have been stable, with an A1c of 6.8.  - Physical exam findings include blood sugar readings around 120, with a recent reading of 93 in the morning and 150 last night.  - Advised to continue current diabetic treatment regimen and maintain a balanced diet to manage weight.  - Regular monitoring of blood sugar levels is recommended.    3. Atrial fibrillation.  - History of atrial fibrillation with a Watchman device implanted to protect kidneys and reduce medication use.  - Currently on the second pacemaker.  - No recent ablation reported; pacemaker is managing the heart's electrical system.  - Regular follow-up with cardiologist is scheduled every 6 months.    4. Hypertension.  - History of hypertension.  - No specific physical exam findings or test results discussed.  - Regular monitoring and management of blood pressure are implied.  - Continue current treatment regimen as prescribed.    5. Skin cancer.  - History of skin cancer, treated with Keytruda, and has been off the medication for a year.  - Four scans have been negative so far, with four more years of monitoring planned.  - [facility]  - No new treatment prescribed.    6. Severe sleep apnea.  - Uses a BiPAP machine for severe sleep apnea.  - No specific physical exam findings or

## 2025-07-03 ENCOUNTER — OFFICE VISIT (OUTPATIENT)
Age: 70
End: 2025-07-03
Payer: OTHER GOVERNMENT

## 2025-07-03 DIAGNOSIS — M48.062 SPINAL STENOSIS OF LUMBAR REGION WITH NEUROGENIC CLAUDICATION: Primary | ICD-10-CM

## 2025-07-03 PROCEDURE — 1123F ACP DISCUSS/DSCN MKR DOCD: CPT | Performed by: PHYSICIAN ASSISTANT

## 2025-07-03 PROCEDURE — 99213 OFFICE O/P EST LOW 20 MIN: CPT | Performed by: PHYSICIAN ASSISTANT

## 2025-07-03 ASSESSMENT — ENCOUNTER SYMPTOMS
EYES NEGATIVE: 1
ABDOMINAL PAIN: 0
ALLERGIC/IMMUNOLOGIC NEGATIVE: 1
SHORTNESS OF BREATH: 0

## 2025-07-03 NOTE — PROGRESS NOTES
Mr. Mcgovern is a first time follow-up for new patient visit.  We previously evaluated him in November at which time we discussed Minuteman which they were interested in.  Unfortunately the hospital has put a hold on this procedure.  We discussed possible repeat DAQUAN at L2-3 but at the time he did not want to do it as it was simply a cover up for his pain condition.  He has since followed up with Nydia De La Rosa.  He did discuss the two 3 injection with Nydia at the March visit and she agreed that may be beneficial injection to proceed with.

## 2025-07-03 NOTE — PROGRESS NOTES
Chronic Pain Consult Note      Plan:    A comprehensive pain management plan may consist of the following: Testing, Therapy, Medications, Interventions, Consults, and Follow up.    Lumbar Spinal Stenosis with neurogenic claudication  Trial BL TF DAQUAN at L2/3. He has had several at L4/5 without lasting benefit.   Lumbar MRI reviewed with patient and wife.  Patient was very excited to consider Minuteman for his stenosis as he is a nonsurgical candidate secondary to multiple medical comorbidities.  Unfortunately the Minuteman procedure is on hold with the hospital system.  MRI reviewed shows severe stenosis at L2/3 and 4/5. We would plan to do Minuteman at both levels.   He may be a candidate for mild procedure at L2-3.  Will need to discuss this with Dr. Peacock upon his return to the office.    General Recommendations: The pain condition that the patient suffers from is best treated with a multidisciplinary approach that involves an increase in physical activity to prevent de-conditioning and worsening of the pain cycle, as well as psychological counseling (formal and/or informal) to address the co morbid psychological effects of pain. Treatment will often involve judicious use of pain medications and interventional procedures to decrease the pain, allowing the patient to participate in the physical activity that will ultimately produce long-lasting pain reductions. The goal of the multidisciplinary approach is to return the patient to a higher level of overall function and to restore their ability to perform activities of daily living.    Narcan nasal spray was prescribed on:    Orders Placed This Encounter   Procedures    Ambulatory Referral to Spine Injection     Referral Priority:   Routine     Referral Type:   Eval and Treat     Referral Reason:   Specialty Services Required     Requested Specialty:   Pain Management     Number of Visits Requested:   1     Requested Prescriptions      No prescriptions requested

## 2025-07-11 ENCOUNTER — OFFICE VISIT (OUTPATIENT)
Dept: INTERNAL MEDICINE CLINIC | Facility: CLINIC | Age: 70
End: 2025-07-11
Payer: OTHER GOVERNMENT

## 2025-07-11 VITALS
WEIGHT: 315 LBS | DIASTOLIC BLOOD PRESSURE: 78 MMHG | BODY MASS INDEX: 44.1 KG/M2 | OXYGEN SATURATION: 96 % | HEART RATE: 88 BPM | TEMPERATURE: 98.3 F | HEIGHT: 71 IN | SYSTOLIC BLOOD PRESSURE: 130 MMHG

## 2025-07-11 DIAGNOSIS — Z00.00 MEDICARE ANNUAL WELLNESS VISIT, SUBSEQUENT: Primary | ICD-10-CM

## 2025-07-11 DIAGNOSIS — E11.65 TYPE 2 DIABETES MELLITUS WITH HYPERGLYCEMIA, WITH LONG-TERM CURRENT USE OF INSULIN (HCC): ICD-10-CM

## 2025-07-11 DIAGNOSIS — Z79.4 TYPE 2 DIABETES MELLITUS WITH HYPERGLYCEMIA, WITH LONG-TERM CURRENT USE OF INSULIN (HCC): ICD-10-CM

## 2025-07-11 DIAGNOSIS — T30.0 BURN: ICD-10-CM

## 2025-07-11 DIAGNOSIS — L97.912 ULCER OF RIGHT LOWER EXTREMITY WITH FAT LAYER EXPOSED (HCC): ICD-10-CM

## 2025-07-11 PROCEDURE — 99214 OFFICE O/P EST MOD 30 MIN: CPT | Performed by: INTERNAL MEDICINE

## 2025-07-11 PROCEDURE — 3075F SYST BP GE 130 - 139MM HG: CPT | Performed by: INTERNAL MEDICINE

## 2025-07-11 PROCEDURE — 1123F ACP DISCUSS/DSCN MKR DOCD: CPT | Performed by: INTERNAL MEDICINE

## 2025-07-11 PROCEDURE — 3044F HG A1C LEVEL LT 7.0%: CPT | Performed by: INTERNAL MEDICINE

## 2025-07-11 PROCEDURE — 3078F DIAST BP <80 MM HG: CPT | Performed by: INTERNAL MEDICINE

## 2025-07-11 PROCEDURE — G2211 COMPLEX E/M VISIT ADD ON: HCPCS | Performed by: INTERNAL MEDICINE

## 2025-07-11 PROCEDURE — G0439 PPPS, SUBSEQ VISIT: HCPCS | Performed by: INTERNAL MEDICINE

## 2025-07-11 ASSESSMENT — PATIENT HEALTH QUESTIONNAIRE - PHQ9
SUM OF ALL RESPONSES TO PHQ QUESTIONS 1-9: 0
2. FEELING DOWN, DEPRESSED OR HOPELESS: NOT AT ALL
1. LITTLE INTEREST OR PLEASURE IN DOING THINGS: NOT AT ALL
SUM OF ALL RESPONSES TO PHQ QUESTIONS 1-9: 0

## 2025-07-11 NOTE — PATIENT INSTRUCTIONS

## 2025-07-11 NOTE — PROGRESS NOTES
Steve Mcgovern (: 1955) is a 70 y.o. male, here for evaluation of the following chief complaint(s):  Follow-up ( Right right leg) and Medicare AW     Assessment & Plan  1. Wound.  - The wound is showing signs of improvement, with a decrease in size and pain. It is currently in the process of forming new skin over a two-week period.  - The wound is clean and not infected.  - The patient was advised to continue applying Vaseline and covering the wound during the day, while leaving it uncovered at night. He was also instructed to keep the wound elevated and maintain his weight loss efforts to improve blood flow.  - The use of antibiotic cream was discussed but deemed unnecessary as Vaseline has some antibiotic properties.    2. Diabetes mellitus.  - His blood sugar levels are well-controlled, although he experiences occasional hypoglycemic episodes, particularly at night.  - He is currently on Lantus 54 units twice a day and Ozempic.  - The patient was advised to reduce his evening dose of Lantus by 8-10 units to prevent hypoglycemia. A prescription for Ozempic 2 mg was provided and sent to the pharmacy.  - He was also advised to continue follow-ups with Dr. Melo every six months for blood sugar management.    Follow-up  - A follow-up visit is scheduled in 2 weeks.  1. Medicare annual wellness visit, subsequent  2. Burn  3. Ulcer of right lower extremity with fat layer exposed (HCC)  4. Type 2 diabetes mellitus with hyperglycemia, with long-term current use of insulin (HCC)  -     semaglutide, 2 MG/DOSE, (OZEMPIC) 8 MG/3ML SOPN sc injection; Inject 2 mg into the skin every 7 days, Disp-9 mL, R-3Print    History of Present Illness  The patient presents for evaluation of a wound and diabetes management.    He has been applying Vaseline to the wound in the morning and covering it with nonstick pads. The wound size has decreased, and he reports less pain. He is not experiencing any fever, chills, or nausea. He

## 2025-07-14 ENCOUNTER — LAB (OUTPATIENT)
Dept: UROLOGY | Age: 70
End: 2025-07-14

## 2025-07-14 DIAGNOSIS — R97.20 ELEVATED PSA: ICD-10-CM

## 2025-07-14 LAB — PSA SERPL-MCNC: 5.6 NG/ML (ref 0–4)

## 2025-07-18 NOTE — PROGRESS NOTES
Transforaminal Epidural Steroid Injection Procedure Note  NAME: Steve Mcgovern  ID: [id number]  : 1955  DOS: 2025  Location: [address] [place] [address]    Procedure: Bilateral L2-3 transforaminal epidural steroid injection    Pre-op Diagnosis: Radiculopathy    Post-op Diagnosis: Same    Consent: Informed consent was obtained and potential risks discussed including, but not limited to bleeding, bruising, severe allergic reactions to components of the injection materials, infection, nerve damage, no benefit from the procedure, or, in rare instances, worsening of the pain.  Questions were answered to the patient's satisfaction and the patient wished to proceed. Alternative options for treatment were discussed previously with the patient.     The patient denies taking any anticoagulants or antiplatelet agents.     Anesthesia: Local only    Medications Injected: Dexamethasone 10 mg and preservative-free normal saline 1 mL per target level    Estimated Blood Loss: None    Complications: None    Technique:  A timeout was performed and the correct location was confirmed with the patient.  The area to be injected was sterilely prepped with chlorhexidine and sterilely draped.     The fluoroscopic beam was rotated and tilted to optimize the visualization of the foraminal space so that the vertebral endplates and facets were aligned. A 25 gauge 5 inch spinal needle was advanced towards and walked off of the inferior lateral border of the pedicle. The needle was then advanced in the lateral fluoroscopic view until it reached the designated vertebral foramen. The AP fluoroscopic view was then utilized to confirm appropriate needle placement. After negative aspiration of blood and CSF, Omnipaque 240 was injected slowly and demonstrated appropriate epidural spread without vascular uptake. Then the above described injectate was injected slowly. If indicated, the procedure was then repeated at the other designated level(s) in

## 2025-07-21 ENCOUNTER — OFFICE VISIT (OUTPATIENT)
Dept: UROLOGY | Age: 70
End: 2025-07-21
Payer: OTHER GOVERNMENT

## 2025-07-21 DIAGNOSIS — R97.20 ELEVATED PSA: Primary | ICD-10-CM

## 2025-07-21 LAB
BILIRUBIN, URINE, POC: NEGATIVE
BLOOD URINE, POC: NEGATIVE
GLUCOSE URINE, POC: NEGATIVE MG/DL
KETONES, URINE, POC: NEGATIVE MG/DL
LEUKOCYTE ESTERASE, URINE, POC: NEGATIVE
NITRITE, URINE, POC: NEGATIVE
PH, URINE, POC: 6 (ref 4.6–8)
PROTEIN,URINE, POC: 100 MG/DL
SPECIFIC GRAVITY, URINE, POC: 1.01 (ref 1–1.03)
URINALYSIS CLARITY, POC: NORMAL
URINALYSIS COLOR, POC: NORMAL
UROBILINOGEN, POC: NORMAL MG/DL

## 2025-07-21 PROCEDURE — 99213 OFFICE O/P EST LOW 20 MIN: CPT | Performed by: UROLOGY

## 2025-07-21 PROCEDURE — 1123F ACP DISCUSS/DSCN MKR DOCD: CPT | Performed by: UROLOGY

## 2025-07-21 PROCEDURE — 1159F MED LIST DOCD IN RCRD: CPT | Performed by: UROLOGY

## 2025-07-21 PROCEDURE — 81003 URINALYSIS AUTO W/O SCOPE: CPT | Performed by: UROLOGY

## 2025-07-22 ENCOUNTER — PATIENT MESSAGE (OUTPATIENT)
Dept: ENDOCRINOLOGY | Age: 70
End: 2025-07-22

## 2025-07-24 ENCOUNTER — OFFICE VISIT (OUTPATIENT)
Age: 70
End: 2025-07-24

## 2025-07-24 DIAGNOSIS — M48.062 SPINAL STENOSIS OF LUMBAR REGION WITH NEUROGENIC CLAUDICATION: Primary | ICD-10-CM

## 2025-07-24 RX ORDER — DEXAMETHASONE SODIUM PHOSPHATE 10 MG/ML
10 INJECTION, SOLUTION INTRA-ARTICULAR; INTRALESIONAL; INTRAMUSCULAR; INTRAVENOUS; SOFT TISSUE ONCE
Status: COMPLETED | OUTPATIENT
Start: 2025-07-24 | End: 2025-07-24

## 2025-07-25 ENCOUNTER — OFFICE VISIT (OUTPATIENT)
Dept: INTERNAL MEDICINE CLINIC | Facility: CLINIC | Age: 70
End: 2025-07-25
Payer: MEDICARE

## 2025-07-25 VITALS
HEIGHT: 71 IN | OXYGEN SATURATION: 96 % | WEIGHT: 315 LBS | TEMPERATURE: 97.9 F | BODY MASS INDEX: 44.1 KG/M2 | DIASTOLIC BLOOD PRESSURE: 74 MMHG | HEART RATE: 82 BPM | SYSTOLIC BLOOD PRESSURE: 126 MMHG

## 2025-07-25 DIAGNOSIS — Z79.4 TYPE 2 DIABETES MELLITUS WITH HYPERGLYCEMIA, WITH LONG-TERM CURRENT USE OF INSULIN (HCC): ICD-10-CM

## 2025-07-25 DIAGNOSIS — L97.912 ULCER OF RIGHT LOWER EXTREMITY WITH FAT LAYER EXPOSED (HCC): Primary | ICD-10-CM

## 2025-07-25 DIAGNOSIS — E11.65 TYPE 2 DIABETES MELLITUS WITH HYPERGLYCEMIA, WITH LONG-TERM CURRENT USE OF INSULIN (HCC): ICD-10-CM

## 2025-07-25 PROCEDURE — 3044F HG A1C LEVEL LT 7.0%: CPT | Performed by: INTERNAL MEDICINE

## 2025-07-25 PROCEDURE — 1159F MED LIST DOCD IN RCRD: CPT | Performed by: INTERNAL MEDICINE

## 2025-07-25 PROCEDURE — 3074F SYST BP LT 130 MM HG: CPT | Performed by: INTERNAL MEDICINE

## 2025-07-25 PROCEDURE — 99214 OFFICE O/P EST MOD 30 MIN: CPT | Performed by: INTERNAL MEDICINE

## 2025-07-25 PROCEDURE — 3078F DIAST BP <80 MM HG: CPT | Performed by: INTERNAL MEDICINE

## 2025-07-25 PROCEDURE — 1123F ACP DISCUSS/DSCN MKR DOCD: CPT | Performed by: INTERNAL MEDICINE

## 2025-07-25 NOTE — PROGRESS NOTES
Chief Complaint:   Derick Saeed is a 46 year old male that presents for   Chief Complaint   Patient presents with   • Establish Care     Physical yearly       HISTORY OF PRESENT ILLNESS: 46 yr old male here to [facility] and for checkup  He   Tdap given in 2020    Has had some pain on bottom of feet and heel-no injury    /80-  No CP/SOB/Syncope/edema/palpitations      Weight 245 lbs  BMI 35.15      Non-smoker  Works as car, former       Past Medical History:   Diagnosis Date   • Allergic rhinitis, cause unspecified     Seasonal Allergies       Past Surgical History:   Procedure Laterality Date   • No past surgeries     • Past surgical history  02/26/2008    PSH of NONE        Social History     Socioeconomic History   • Marital status: /Civil Union     Spouse name: Not on file   • Number of children: Not on file   • Years of education: Not on file   • Highest education level: Not on file   Occupational History   • Not on file   Tobacco Use   • Smoking status: Never Smoker   • Smokeless tobacco: Never Used   Substance and Sexual Activity   • Alcohol use: Yes     Comment: socially   • Drug use: Never   • Sexual activity: Not on file   Other Topics Concern   • Not on file   Social History Narrative    Works as car-residential     Social Determinants of Health     Financial Resource Strain: Not on file   Food Insecurity: Not on file   Transportation Needs: Not on file   Physical Activity: Not on file   Stress: Not on file   Social Connections: Not on file   Intimate Partner Violence: Not At Risk   • Social Determinants: Intimate Partner Violence Past Fear: No   • Social Determinants: Intimate Partner Violence Current Fear: No        There is no problem list on file for this patient.      ALLERGIES:  No Known Allergies    Family History   Problem Relation Age of Onset   • Heart disease Father    • Stroke Father        Family Status   Relation Name Status   • Mo  Alive   • Fa  Alive   •  Steve Mcgovern (: 1955) is a 70 y.o. male, here for evaluation of the following chief complaint(s):  Follow-up (2 week F/U)     Assessment & Plan  1. Leg wound.  - The wound appears to be healing well, with no signs of infection.  - Physical exam shows granulation tissue and no pain or redness.  - Advised to maintain a healthy diet, avoid salty foods, and elevate the wound. Importance of weight loss emphasized.  - Instructed to keep the wound clean with soap and water, avoid scrubbing, use non-stick bandages, and avoid exposing the wound to ponds, lakes, or rivers.    2. Diabetes mellitus.  - Blood sugars are acting up due to recent steroid injections.  - Currently taking Ozempic once a week and Lantus.  - Advised to continue current medication regimen and have A1c level checked every 6 months.  - Last A1c check was in 2025; recommended to get it checked soon.    3. Prostate health.  - PSA level was 5.6, slightly above prervious of 5.5.  - Advised to monitor PSA level and recheck in 6 months. If increased, an MRI will be ordered.  - B12 levels were too high; advised to reduce B12 intake to once a week.    4. Cancer monitoring.  - Multiple doctor appointments scheduled, including follow-up with Dr. Melo and other specialists.  - No low blood sugars reported currently.  - Advised to keep monitoring and follow up with scheduled appointments.    Follow-up: Next scheduled visit on 2025.  1. Ulcer of right lower extremity with fat layer exposed (HCC)  2. Type 2 diabetes mellitus with hyperglycemia, with long-term current use of insulin (HCC)    History of Present Illness  The patient presents for evaluation of a leg wound and diabetes.    He reports that his leg wound is healing well, with no signs of infection or pain. He has been allowing the wound to air dry when at home and covers it when he goes out. Antibiotic Vaseline has been part of his treatment regimen. He has been receiving steroid  Sis  Alive       Current Outpatient Medications   Medication Sig Dispense Refill   • ALBUTEROL 90 MCG/ACT IN AERS 2 PUFFS Q4-6H PRN WHEEZING 1 1     No current facility-administered medications for this visit.       Immunization History   Administered Date(s) Administered   • Tdap 09/23/2020       Health Maintenance   Topic Date Due   • Hepatitis B Vaccine (1 of 3 - 3-dose series) Never done   • COVID-19 Vaccine (1) Never done   • Depression Screening  Never done   • Colorectal Cancer Screen-  Never done   • Influenza Vaccine (1) Never done   • DTaP/Tdap/Td Vaccine (2 - Td or Tdap) 09/23/2030   • Meningococcal Vaccine  Aged Out   • HPV Vaccine  Aged Out   • Pneumococcal Vaccine 0-64  Aged Out         Review of systems:    All other systems are reviewed and are negative except as documented in the HPI.    Physical Exam:   GENERAL:  General appearance:  Well developed and well nourished.  VITAL SIGNS:   Visit Vitals  /80 (BP Location: LUE - Left upper extremity, Patient Position: Sitting, Cuff Size: Large Adult)   Pulse 83   Wt 111.1 kg (245 lb)   SpO2 98%   BMI 35.15 kg/m²     SKIN:  Clear to inspection and palpation.  HEENT:  Conjunctivae and lids clear without injection.  PERRLA.  EOMI.  Ears:  Normal canals.  TMs clear.  Hearing within normal limits.  Sinuses nontender to palpation and gravity.  Lips and dentition within normal limits.  Oropharynx clear.  NECK:  Supple.  Full range of motion.  No lymphadenopathy or thyromegaly.  HEART:  Clear to auscultation.  No murmurs, rubs or gallops. Carotids without bruits.  No abdominal aortic bruits.    RESPIRATORY:  Full, easy respiratory effort.  Clear to auscultation bilaterally.  BACK:  Straight.  No CVA tenderness.  ABDOMEN:  Soft and nontender to deep palpation.  No hepatosplenomegaly.  No ventral hernias with head rise.    PERIPHERAL VASCULAR:  Normal pedal pulses.  No peripheral edema or varicose veins.  No cyanosis, edema, or clubbing.  MUSCULOSKELETAL:   Full muscle strength and range of motion throughout.   NEUROLOGICAL:  Intact. No focal deficits.  PSYCH: Normal mood and affect.      Assessment/Plan:   Derick was seen today for establish care.    Diagnoses and all orders for this visit:    Routine physical examination  -     COMPREHENSIVE METABOLIC PANEL; Future  -     CBC WITH DIFFERENTIAL; Future    Pain of left heel    Lipid screening  -     LIPID PANEL WITH REFLEX; Future    Thyroid disorder screen  -     THYROID STIMULATING HORMONE REFLEX; Future    Encounter for vitamin deficiency screening  -     VITAMIN D -25 HYDROXY; Future    Lateral epicondylitis of right elbow      Get labs fasting  Use inserts as needed  Use brace or sleeve for elbow  To notify if worsening/no improvement in symptoms of numbness/tingling

## 2025-07-29 ENCOUNTER — OFFICE VISIT (OUTPATIENT)
Dept: ORTHOPEDIC SURGERY | Age: 70
End: 2025-07-29
Payer: MEDICARE

## 2025-07-29 DIAGNOSIS — M25.572 PAIN IN JOINT INVOLVING LEFT ANKLE AND FOOT: Primary | ICD-10-CM

## 2025-07-29 PROCEDURE — 99213 OFFICE O/P EST LOW 20 MIN: CPT | Performed by: ORTHOPAEDIC SURGERY

## 2025-07-29 PROCEDURE — 1123F ACP DISCUSS/DSCN MKR DOCD: CPT | Performed by: ORTHOPAEDIC SURGERY

## 2025-07-29 NOTE — PROGRESS NOTES
Left 01/11/2023    Left Posterior ankle scope and amber SUPINE//PACEMAKER performed by Reggie Lazaro MD at [facility]    HERNIA REPAIR  2014    Dr. Solano    IR PORT PLACEMENT > 5 YEARS  07/10/2023    IR PORT PLACEMENT EQUAL OR GREATER THAN 5 YEARS 7/10/2023 [facility] RADIOLOGY SPECIALS    KNEE SURGERY Bilateral     multiple surgeries with bilateral TKAs    LYMPH NODE BIOPSY N/A 04/25/2023    LEFT AXILLARY SENTINEL LYMPH NODE BIOPSY EXCISION performed by Reggie Solano MD at [facility]    ORTHOPEDIC SURGERY  1990's    right hand    PACEMAKER PLACEMENT  12/29/2020    [employer] biventricular pacemaker    PACEMAKER PLACEMENT  2014    Dr. Briones    SHOULDER SURGERY Left 04/25/2023    WIDE LOCAL EXCISION LEFT SHOULDER MELANOMA, & SENTINEL NODE EXCISION performed by Reggie Solano MD at [facility]    TONSILLECTOMY Bilateral     mid 2000s    TOTAL KNEE ARTHROPLASTY Bilateral     UPPP UVULOPALATOPHARYGOPLASTY  mid 2000's     Allergies   Allergen Reactions    Lisinopril Angioedema    Losartan Angioedema    Morphine Anaphylaxis and Swelling    Penicillins Anaphylaxis     Previous RX for cephalosporin tolerated    Tizanidine Other (See Comments)     Pancreatitis         Current Outpatient Medications:     semaglutide, 2 MG/DOSE, (OZEMPIC) 8 MG/3ML SOPN sc injection, Inject 2 mg into the skin every 7 days, Disp: 9 mL, Rfl: 3    atorvastatin (LIPITOR) 10 MG tablet, Take 1 tablet by mouth daily, Disp: 90 tablet, Rfl: 3    budesonide (PULMICORT) 0.5 MG/2ML nebulizer suspension, Take 2 mLs by nebulization 2 times daily, Disp: 60 each, Rfl: 3    carvedilol (COREG) 6.25 MG tablet, Take 1 tablet by mouth 2 times daily (with meals), Disp: , Rfl:     ferrous sulfate (IRON 325) 325 (65 Fe) MG tablet, Take 1 tablet by mouth daily (with breakfast), Disp: 90 tablet, Rfl: 1    metFORMIN (GLUCOPHAGE) 500 MG tablet, TAKE ONE TABLET BY MOUTH TWICE A DAY WITH A MEAL, Disp: 180 tablet, Rfl: 3    pioglitazone (ACTOS) 30 MG tablet, Take 0.5 tablets by

## 2025-08-07 ENCOUNTER — HOSPITAL ENCOUNTER (OUTPATIENT)
Dept: CT IMAGING | Age: 70
Discharge: HOME | End: 2025-08-09
Attending: INTERNAL MEDICINE
Payer: OTHER GOVERNMENT

## 2025-08-07 DIAGNOSIS — C43.9 MALIGNANT MELANOMA, UNSPECIFIED SITE (HCC): ICD-10-CM

## 2025-08-07 PROCEDURE — 74176 CT ABD & PELVIS W/O CONTRAST: CPT

## 2025-08-07 ASSESSMENT — ENCOUNTER SYMPTOMS: BACK PAIN: 1

## 2025-08-08 ENCOUNTER — OFFICE VISIT (OUTPATIENT)
Dept: ENDOCRINOLOGY | Age: 70
End: 2025-08-08
Payer: MEDICARE

## 2025-08-08 VITALS
HEIGHT: 71 IN | BODY MASS INDEX: 44.1 KG/M2 | HEART RATE: 74 BPM | WEIGHT: 315 LBS | SYSTOLIC BLOOD PRESSURE: 140 MMHG | OXYGEN SATURATION: 96 % | DIASTOLIC BLOOD PRESSURE: 90 MMHG

## 2025-08-08 DIAGNOSIS — I10 ESSENTIAL HYPERTENSION: ICD-10-CM

## 2025-08-08 DIAGNOSIS — E11.65 TYPE 2 DIABETES MELLITUS WITH HYPERGLYCEMIA, WITH LONG-TERM CURRENT USE OF INSULIN (HCC): Primary | ICD-10-CM

## 2025-08-08 DIAGNOSIS — N18.31 STAGE 3A CHRONIC KIDNEY DISEASE (HCC): ICD-10-CM

## 2025-08-08 DIAGNOSIS — Z79.4 TYPE 2 DIABETES MELLITUS WITH HYPERGLYCEMIA, WITH LONG-TERM CURRENT USE OF INSULIN (HCC): Primary | ICD-10-CM

## 2025-08-08 DIAGNOSIS — R80.9 ALBUMINURIA: ICD-10-CM

## 2025-08-08 DIAGNOSIS — E78.00 HYPERCHOLESTEROLEMIA: ICD-10-CM

## 2025-08-08 LAB — HBA1C MFR BLD: 6.6 %

## 2025-08-08 PROCEDURE — 3077F SYST BP >= 140 MM HG: CPT | Performed by: INTERNAL MEDICINE

## 2025-08-08 PROCEDURE — 83036 HEMOGLOBIN GLYCOSYLATED A1C: CPT | Performed by: INTERNAL MEDICINE

## 2025-08-08 PROCEDURE — 1123F ACP DISCUSS/DSCN MKR DOCD: CPT | Performed by: INTERNAL MEDICINE

## 2025-08-08 PROCEDURE — 1159F MED LIST DOCD IN RCRD: CPT | Performed by: INTERNAL MEDICINE

## 2025-08-08 PROCEDURE — 3080F DIAST BP >= 90 MM HG: CPT | Performed by: INTERNAL MEDICINE

## 2025-08-08 PROCEDURE — 99214 OFFICE O/P EST MOD 30 MIN: CPT | Performed by: INTERNAL MEDICINE

## 2025-08-08 PROCEDURE — 3044F HG A1C LEVEL LT 7.0%: CPT | Performed by: INTERNAL MEDICINE

## 2025-08-08 PROCEDURE — G2211 COMPLEX E/M VISIT ADD ON: HCPCS | Performed by: INTERNAL MEDICINE

## 2025-08-08 RX ORDER — CARVEDILOL 6.25 MG/1
6.25 TABLET ORAL 2 TIMES DAILY WITH MEALS
Status: SHIPPED | COMMUNITY
Start: 2025-08-08

## 2025-08-08 RX ORDER — PIOGLITAZONE 30 MG/1
15 TABLET ORAL DAILY
Qty: 45 TABLET | Refills: 3 | Status: SHIPPED
Start: 2025-08-08

## 2025-08-08 RX ORDER — ATORVASTATIN CALCIUM 10 MG/1
10 TABLET, FILM COATED ORAL DAILY
Qty: 90 TABLET | Refills: 3 | Status: SHIPPED | OUTPATIENT
Start: 2025-08-08 | End: 2026-08-03

## 2025-08-08 RX ORDER — INSULIN GLARGINE 100 [IU]/ML
54 INJECTION, SOLUTION SUBCUTANEOUS 2 TIMES DAILY
Qty: 90 ML | Refills: 3 | Status: SHIPPED
Start: 2025-08-08

## 2025-08-14 ENCOUNTER — OFFICE VISIT (OUTPATIENT)
Dept: ONCOLOGY | Age: 70
End: 2025-08-14
Payer: OTHER GOVERNMENT

## 2025-08-14 ENCOUNTER — HOSPITAL ENCOUNTER (OUTPATIENT)
Dept: LAB | Age: 70
Discharge: HOME | End: 2025-08-14
Payer: OTHER GOVERNMENT

## 2025-08-14 VITALS
WEIGHT: 315 LBS | OXYGEN SATURATION: 96 % | SYSTOLIC BLOOD PRESSURE: 142 MMHG | HEIGHT: 70 IN | RESPIRATION RATE: 16 BRPM | TEMPERATURE: 97.8 F | HEART RATE: 70 BPM | DIASTOLIC BLOOD PRESSURE: 82 MMHG | BODY MASS INDEX: 45.1 KG/M2

## 2025-08-14 DIAGNOSIS — C43.9 MALIGNANT MELANOMA, UNSPECIFIED SITE (HCC): Primary | ICD-10-CM

## 2025-08-14 DIAGNOSIS — C43.9 MALIGNANT MELANOMA, UNSPECIFIED SITE (HCC): ICD-10-CM

## 2025-08-14 LAB
ALBUMIN SERPL-MCNC: 3.3 G/DL (ref 3.2–4.6)
ALBUMIN/GLOB SERPL: 0.9 (ref 1–1.9)
ALP SERPL-CCNC: 112 U/L (ref 40–129)
ALT SERPL-CCNC: 17 U/L (ref 8–55)
ANION GAP SERPL CALC-SCNC: 13 MMOL/L (ref 7–16)
AST SERPL-CCNC: 20 U/L (ref 15–37)
BASOPHILS # BLD: 0.07 K/UL (ref 0–0.2)
BASOPHILS NFR BLD: 0.7 % (ref 0–2)
BILIRUB SERPL-MCNC: 0.4 MG/DL (ref 0–1.2)
BUN SERPL-MCNC: 21 MG/DL (ref 8–23)
CALCIUM SERPL-MCNC: 9.1 MG/DL (ref 8.8–10.2)
CHLORIDE SERPL-SCNC: 103 MMOL/L (ref 98–107)
CO2 SERPL-SCNC: 25 MMOL/L (ref 20–29)
CREAT SERPL-MCNC: 1.46 MG/DL (ref 0.8–1.3)
DIFFERENTIAL METHOD BLD: ABNORMAL
EOSINOPHIL # BLD: 0.56 K/UL (ref 0–0.8)
EOSINOPHIL NFR BLD: 5.8 % (ref 0.5–7.8)
ERYTHROCYTE [DISTWIDTH] IN BLOOD BY AUTOMATED COUNT: 15.1 % (ref 11.9–14.6)
FERRITIN SERPL-MCNC: 34 NG/ML (ref 8–388)
GLOBULIN SER CALC-MCNC: 3.8 G/DL (ref 2.3–3.5)
GLUCOSE SERPL-MCNC: 121 MG/DL (ref 70–99)
HCT VFR BLD AUTO: 40.4 % (ref 41.1–50.3)
HGB BLD-MCNC: 13 G/DL (ref 13.6–17.2)
IMM GRANULOCYTES # BLD AUTO: 0.02 K/UL (ref 0–0.5)
IMM GRANULOCYTES NFR BLD AUTO: 0.2 % (ref 0–5)
IRON SATN MFR SERPL: 14 % (ref 20–50)
IRON SERPL-MCNC: 46 UG/DL (ref 35–100)
LYMPHOCYTES # BLD: 2.41 K/UL (ref 0.5–4.6)
LYMPHOCYTES NFR BLD: 25.1 % (ref 13–44)
MCH RBC QN AUTO: 29.5 PG (ref 26.1–32.9)
MCHC RBC AUTO-ENTMCNC: 32.2 G/DL (ref 31.4–35)
MCV RBC AUTO: 91.8 FL (ref 82–102)
MONOCYTES # BLD: 0.7 K/UL (ref 0.1–1.3)
MONOCYTES NFR BLD: 7.3 % (ref 4–12)
NEUTS SEG # BLD: 5.85 K/UL (ref 1.7–8.2)
NEUTS SEG NFR BLD: 60.9 % (ref 43–78)
NRBC # BLD: 0 K/UL (ref 0–0.2)
PLATELET # BLD AUTO: 253 K/UL (ref 150–450)
PMV BLD AUTO: 9.8 FL (ref 9.4–12.3)
POTASSIUM SERPL-SCNC: 4.1 MMOL/L (ref 3.5–5.1)
PROT SERPL-MCNC: 7 G/DL (ref 6.3–8.2)
RBC # BLD AUTO: 4.4 M/UL (ref 4.23–5.6)
SODIUM SERPL-SCNC: 141 MMOL/L (ref 136–145)
TIBC SERPL-MCNC: 318 UG/DL (ref 240–450)
UIBC SERPL-MCNC: 272 UG/DL (ref 112–347)
WBC # BLD AUTO: 9.6 K/UL (ref 4.3–11.1)

## 2025-08-14 PROCEDURE — 6360000002 HC RX W HCPCS: Performed by: INTERNAL MEDICINE

## 2025-08-14 PROCEDURE — 2500000003 HC RX 250 WO HCPCS: Performed by: INTERNAL MEDICINE

## 2025-08-14 PROCEDURE — 83540 ASSAY OF IRON: CPT

## 2025-08-14 PROCEDURE — 3079F DIAST BP 80-89 MM HG: CPT | Performed by: INTERNAL MEDICINE

## 2025-08-14 PROCEDURE — 83550 IRON BINDING TEST: CPT

## 2025-08-14 PROCEDURE — 1125F AMNT PAIN NOTED PAIN PRSNT: CPT | Performed by: INTERNAL MEDICINE

## 2025-08-14 PROCEDURE — 3077F SYST BP >= 140 MM HG: CPT | Performed by: INTERNAL MEDICINE

## 2025-08-14 PROCEDURE — 85025 COMPLETE CBC W/AUTO DIFF WBC: CPT

## 2025-08-14 PROCEDURE — 80053 COMPREHEN METABOLIC PANEL: CPT

## 2025-08-14 PROCEDURE — 36591 DRAW BLOOD OFF VENOUS DEVICE: CPT

## 2025-08-14 PROCEDURE — 99214 OFFICE O/P EST MOD 30 MIN: CPT | Performed by: INTERNAL MEDICINE

## 2025-08-14 PROCEDURE — 82728 ASSAY OF FERRITIN: CPT

## 2025-08-14 PROCEDURE — 1123F ACP DISCUSS/DSCN MKR DOCD: CPT | Performed by: INTERNAL MEDICINE

## 2025-08-14 RX ORDER — SODIUM CHLORIDE 0.9 % (FLUSH) 0.9 %
5-40 SYRINGE (ML) INJECTION 2 TIMES DAILY
Status: DISCONTINUED | OUTPATIENT
Start: 2025-08-14 | End: 2025-08-15 | Stop reason: HOSPADM

## 2025-08-14 RX ORDER — HEPARIN 100 UNIT/ML
300 SYRINGE INTRAVENOUS PRN
Status: DISCONTINUED | OUTPATIENT
Start: 2025-08-14 | End: 2025-08-15 | Stop reason: HOSPADM

## 2025-08-14 RX ADMIN — SODIUM CHLORIDE, PRESERVATIVE FREE 20 ML: 5 INJECTION INTRAVENOUS at 11:52

## 2025-08-14 RX ADMIN — HEPARIN 300 UNITS: 100 SYRINGE at 11:52

## 2025-08-14 ASSESSMENT — PATIENT HEALTH QUESTIONNAIRE - PHQ9
SUM OF ALL RESPONSES TO PHQ QUESTIONS 1-9: 0
SUM OF ALL RESPONSES TO PHQ QUESTIONS 1-9: 0
1. LITTLE INTEREST OR PLEASURE IN DOING THINGS: NOT AT ALL
SUM OF ALL RESPONSES TO PHQ QUESTIONS 1-9: 0
2. FEELING DOWN, DEPRESSED OR HOPELESS: NOT AT ALL
SUM OF ALL RESPONSES TO PHQ QUESTIONS 1-9: 0

## 2025-08-22 ENCOUNTER — OFFICE VISIT (OUTPATIENT)
Age: 70
End: 2025-08-22
Payer: OTHER GOVERNMENT

## 2025-08-22 DIAGNOSIS — M48.062 SPINAL STENOSIS OF LUMBAR REGION WITH NEUROGENIC CLAUDICATION: Primary | ICD-10-CM

## 2025-08-22 PROCEDURE — 1159F MED LIST DOCD IN RCRD: CPT | Performed by: PHYSICIAN ASSISTANT

## 2025-08-22 PROCEDURE — 99213 OFFICE O/P EST LOW 20 MIN: CPT | Performed by: PHYSICIAN ASSISTANT

## 2025-08-22 PROCEDURE — 1123F ACP DISCUSS/DSCN MKR DOCD: CPT | Performed by: PHYSICIAN ASSISTANT

## 2025-08-22 ASSESSMENT — ENCOUNTER SYMPTOMS
SHORTNESS OF BREATH: 0
ABDOMINAL PAIN: 0
ALLERGIC/IMMUNOLOGIC NEGATIVE: 1
EYES NEGATIVE: 1

## 2025-08-29 ENCOUNTER — HOSPITAL ENCOUNTER (EMERGENCY)
Age: 70
Discharge: HOME | End: 2025-08-29
Attending: EMERGENCY MEDICINE
Payer: OTHER GOVERNMENT

## 2025-08-29 ENCOUNTER — APPOINTMENT (OUTPATIENT)
Dept: GENERAL RADIOLOGY | Age: 70
End: 2025-08-29
Payer: OTHER GOVERNMENT

## 2025-08-29 VITALS
TEMPERATURE: 97.5 F | BODY MASS INDEX: 45.1 KG/M2 | DIASTOLIC BLOOD PRESSURE: 88 MMHG | WEIGHT: 315 LBS | HEART RATE: 72 BPM | SYSTOLIC BLOOD PRESSURE: 142 MMHG | OXYGEN SATURATION: 97 % | HEIGHT: 70 IN | RESPIRATION RATE: 17 BRPM

## 2025-08-29 DIAGNOSIS — R07.9 CHEST PAIN, UNSPECIFIED TYPE: Primary | ICD-10-CM

## 2025-08-29 LAB
ALBUMIN SERPL-MCNC: 3.4 G/DL (ref 3.2–4.6)
ALBUMIN/GLOB SERPL: 0.8 (ref 1–1.9)
ALP SERPL-CCNC: 132 U/L (ref 40–129)
ALT SERPL-CCNC: 20 U/L (ref 8–55)
ANION GAP SERPL CALC-SCNC: 14 MMOL/L (ref 7–16)
AST SERPL-CCNC: 26 U/L (ref 15–37)
BASOPHILS # BLD: 0.08 K/UL (ref 0–0.2)
BASOPHILS NFR BLD: 0.7 % (ref 0–2)
BILIRUB SERPL-MCNC: 0.4 MG/DL (ref 0–1.2)
BUN SERPL-MCNC: 19 MG/DL (ref 8–23)
CALCIUM SERPL-MCNC: 9.1 MG/DL (ref 8.8–10.2)
CHLORIDE SERPL-SCNC: 100 MMOL/L (ref 98–107)
CO2 SERPL-SCNC: 26 MMOL/L (ref 20–29)
CREAT SERPL-MCNC: 1.49 MG/DL (ref 0.8–1.3)
DIFFERENTIAL METHOD BLD: ABNORMAL
EKG ATRIAL RATE: 72 BPM
EKG DIAGNOSIS: NORMAL
EKG Q-T INTERVAL: 436 MS
EKG QRS DURATION: 122 MS
EKG QTC CALCULATION (BAZETT): 477 MS
EKG R AXIS: 161 DEGREES
EKG T AXIS: 30 DEGREES
EKG VENTRICULAR RATE: 72 BPM
EOSINOPHIL # BLD: 0.58 K/UL (ref 0–0.8)
EOSINOPHIL NFR BLD: 4.9 % (ref 0.5–7.8)
ERYTHROCYTE [DISTWIDTH] IN BLOOD BY AUTOMATED COUNT: 14.9 % (ref 11.9–14.6)
GLOBULIN SER CALC-MCNC: 4.3 G/DL (ref 2.3–3.5)
GLUCOSE SERPL-MCNC: 136 MG/DL (ref 70–99)
HCT VFR BLD AUTO: 44.6 % (ref 41.1–50.3)
HGB BLD-MCNC: 14.3 G/DL (ref 13.6–17.2)
IMM GRANULOCYTES # BLD AUTO: 0.03 K/UL (ref 0–0.5)
IMM GRANULOCYTES NFR BLD AUTO: 0.3 % (ref 0–5)
LYMPHOCYTES # BLD: 2.66 K/UL (ref 0.5–4.6)
LYMPHOCYTES NFR BLD: 22.4 % (ref 13–44)
MAGNESIUM SERPL-MCNC: 1.9 MG/DL (ref 1.8–2.4)
MCH RBC QN AUTO: 30 PG (ref 26.1–32.9)
MCHC RBC AUTO-ENTMCNC: 32.1 G/DL (ref 31.4–35)
MCV RBC AUTO: 93.7 FL (ref 82–102)
MONOCYTES # BLD: 0.73 K/UL (ref 0.1–1.3)
MONOCYTES NFR BLD: 6.1 % (ref 4–12)
NEUTS SEG # BLD: 7.81 K/UL (ref 1.7–8.2)
NEUTS SEG NFR BLD: 65.6 % (ref 43–78)
NRBC # BLD: 0 K/UL (ref 0–0.2)
NT PRO BNP: 740 PG/ML (ref 0–125)
PLATELET # BLD AUTO: 275 K/UL (ref 150–450)
PMV BLD AUTO: 10.1 FL (ref 9.4–12.3)
POTASSIUM SERPL-SCNC: 4.1 MMOL/L (ref 3.5–5.1)
PROT SERPL-MCNC: 7.7 G/DL (ref 6.3–8.2)
RBC # BLD AUTO: 4.76 M/UL (ref 4.23–5.6)
SODIUM SERPL-SCNC: 140 MMOL/L (ref 136–145)
TROPONIN T SERPL HS-MCNC: 38.8 NG/L (ref 0–22)
TROPONIN T SERPL HS-MCNC: 41.6 NG/L (ref 0–22)
WBC # BLD AUTO: 11.9 K/UL (ref 4.3–11.1)

## 2025-08-29 PROCEDURE — 93010 ELECTROCARDIOGRAM REPORT: CPT | Performed by: INTERNAL MEDICINE

## 2025-08-29 PROCEDURE — 83880 ASSAY OF NATRIURETIC PEPTIDE: CPT

## 2025-08-29 PROCEDURE — 71045 X-RAY EXAM CHEST 1 VIEW: CPT

## 2025-08-29 PROCEDURE — 6370000000 HC RX 637 (ALT 250 FOR IP): Performed by: EMERGENCY MEDICINE

## 2025-08-29 PROCEDURE — 93005 ELECTROCARDIOGRAM TRACING: CPT | Performed by: EMERGENCY MEDICINE

## 2025-08-29 PROCEDURE — 83735 ASSAY OF MAGNESIUM: CPT

## 2025-08-29 PROCEDURE — 85025 COMPLETE CBC W/AUTO DIFF WBC: CPT

## 2025-08-29 PROCEDURE — 84484 ASSAY OF TROPONIN QUANT: CPT

## 2025-08-29 PROCEDURE — 99285 EMERGENCY DEPT VISIT HI MDM: CPT

## 2025-08-29 PROCEDURE — 80053 COMPREHEN METABOLIC PANEL: CPT

## 2025-08-29 PROCEDURE — 6370000000 HC RX 637 (ALT 250 FOR IP): Performed by: STUDENT IN AN ORGANIZED HEALTH CARE EDUCATION/TRAINING PROGRAM

## 2025-08-29 RX ORDER — NITROGLYCERIN 0.4 MG/1
0.4 TABLET SUBLINGUAL EVERY 5 MIN PRN
Qty: 25 TABLET | Refills: 3 | Status: SHIPPED | OUTPATIENT
Start: 2025-08-29

## 2025-08-29 RX ORDER — ASPIRIN 325 MG
325 TABLET ORAL
Status: COMPLETED | OUTPATIENT
Start: 2025-08-29 | End: 2025-08-29

## 2025-08-29 RX ORDER — NITROGLYCERIN 0.4 MG/1
0.4 TABLET SUBLINGUAL EVERY 5 MIN PRN
Status: DISCONTINUED | OUTPATIENT
Start: 2025-08-29 | End: 2025-08-29 | Stop reason: HOSPADM

## 2025-08-29 RX ADMIN — NITROGLYCERIN 0.4 MG: 0.4 TABLET SUBLINGUAL at 13:06

## 2025-08-29 RX ADMIN — ASPIRIN 325 MG: 325 TABLET, FILM COATED ORAL at 12:59

## 2025-08-29 RX ADMIN — NITROGLYCERIN 0.4 MG: 0.4 TABLET SUBLINGUAL at 12:59

## 2025-08-29 ASSESSMENT — PAIN DESCRIPTION - ORIENTATION: ORIENTATION: LEFT

## 2025-08-29 ASSESSMENT — LIFESTYLE VARIABLES
HOW MANY STANDARD DRINKS CONTAINING ALCOHOL DO YOU HAVE ON A TYPICAL DAY: PATIENT DOES NOT DRINK
HOW OFTEN DO YOU HAVE A DRINK CONTAINING ALCOHOL: NEVER

## 2025-08-29 ASSESSMENT — PAIN DESCRIPTION - LOCATION: LOCATION: CHEST

## 2025-08-29 ASSESSMENT — PAIN - FUNCTIONAL ASSESSMENT
PAIN_FUNCTIONAL_ASSESSMENT: 0-10
PAIN_FUNCTIONAL_ASSESSMENT: 0-10

## 2025-08-29 ASSESSMENT — PAIN DESCRIPTION - DESCRIPTORS: DESCRIPTORS: SHARP;PRESSURE

## 2025-08-29 ASSESSMENT — PAIN SCALES - GENERAL
PAINLEVEL_OUTOF10: 5
PAINLEVEL_OUTOF10: 0

## 2025-08-29 ASSESSMENT — PAIN DESCRIPTION - PAIN TYPE: TYPE: ACUTE PAIN

## 2025-09-03 ENCOUNTER — OFFICE VISIT (OUTPATIENT)
Age: 70
End: 2025-09-03
Payer: MEDICARE

## 2025-09-03 VITALS
HEIGHT: 70 IN | SYSTOLIC BLOOD PRESSURE: 136 MMHG | DIASTOLIC BLOOD PRESSURE: 92 MMHG | BODY MASS INDEX: 45.1 KG/M2 | HEART RATE: 88 BPM | WEIGHT: 315 LBS

## 2025-09-03 DIAGNOSIS — R06.02 SHORTNESS OF BREATH: Primary | ICD-10-CM

## 2025-09-03 DIAGNOSIS — I50.22 CHRONIC SYSTOLIC (CONGESTIVE) HEART FAILURE (HCC): Chronic | ICD-10-CM

## 2025-09-03 DIAGNOSIS — Z95.818 PRESENCE OF WATCHMAN LEFT ATRIAL APPENDAGE CLOSURE DEVICE: ICD-10-CM

## 2025-09-03 DIAGNOSIS — Z95.0 PRESENCE OF CARDIAC PACEMAKER: ICD-10-CM

## 2025-09-03 DIAGNOSIS — E78.00 HYPERCHOLESTEROLEMIA: Chronic | ICD-10-CM

## 2025-09-03 DIAGNOSIS — I27.20 PULMONARY HYPERTENSION (HCC): Chronic | ICD-10-CM

## 2025-09-03 PROCEDURE — 3080F DIAST BP >= 90 MM HG: CPT | Performed by: INTERNAL MEDICINE

## 2025-09-03 PROCEDURE — 99214 OFFICE O/P EST MOD 30 MIN: CPT | Performed by: INTERNAL MEDICINE

## 2025-09-03 PROCEDURE — 3075F SYST BP GE 130 - 139MM HG: CPT | Performed by: INTERNAL MEDICINE

## 2025-09-03 PROCEDURE — 1123F ACP DISCUSS/DSCN MKR DOCD: CPT | Performed by: INTERNAL MEDICINE

## 2025-09-03 PROCEDURE — 1126F AMNT PAIN NOTED NONE PRSNT: CPT | Performed by: INTERNAL MEDICINE

## (undated) DEVICE — SPLINT THMB W4XL30IN FBRGLS PD PRECUT LTWT DURABLE FAST SET

## (undated) DEVICE — COVER US PRB W12XL244CM FLD IORT STR TIP

## (undated) DEVICE — Device: Brand: NRG TRANSSEPTAL NEEDLE

## (undated) DEVICE — 1 X VERSACROSS CONNECT TRANSSEPTAL DILATOR (INCLUDING 1 X J-TIP MECHANICAL GUIDEWIRE); 1 X VERSACROSS RF WIRE (INCLUDING 1 X CONNECTOR CABLE (SINGLE USE)); 1 X DISPERSIVE ELECTRODE: Brand: VERSACROSS CONNECT LAAC ACCESS SOLUTION

## (undated) DEVICE — SOLUTION IRRIG 1000ML 0.9% SOD CHL USP POUR PLAS BTL

## (undated) DEVICE — NEEDLE HYPO 21GA L1.5IN INTRAMUSCULAR S STL LATCH BVL UP

## (undated) DEVICE — GLOVE ORTHO 8   MSG9480

## (undated) DEVICE — T-DRAPE,EXTREMITY,STERILE: Brand: MEDLINE

## (undated) DEVICE — SOLUTION IRRIG 1000ML 09% SOD CHL USP PIC PLAS CONTAINER

## (undated) DEVICE — GOWN,SIRUS,NONRNF,SETINSLV,XL,20/CS: Brand: MEDLINE

## (undated) DEVICE — ZIMMER® STERILE DISPOSABLE TOURNIQUET CUFF WITH PLC, DUAL PORT, SINGLE BLADDER, 34 IN. (86 CM)

## (undated) DEVICE — MINOR SPLIT GENERAL: Brand: MEDLINE INDUSTRIES, INC.

## (undated) DEVICE — BANDAGE,GAUZE,CONFORMING,2"X75",STRL,LF: Brand: MEDLINE INDUSTRIES, INC.

## (undated) DEVICE — NEEDLE SPNL L3.5IN PNK HUB S STL REG WALL FIT STYL W/ QNCKE

## (undated) DEVICE — FOOT & ANKLE SOFT DR WOMACK: Brand: MEDLINE INDUSTRIES, INC.

## (undated) DEVICE — ZIMMER® STERILE DISPOSABLE TOURNIQUET CUFF WITH PLC, DUAL PORT, SINGLE BLADDER, 18 IN. (46 CM)

## (undated) DEVICE — STERILE PVP: Brand: MEDLINE INDUSTRIES, INC.

## (undated) DEVICE — ACCESS SHEATH WITH DILATOR: Brand: WATCHMAN FXD CURVE™ ACCESS SYSTEM

## (undated) DEVICE — PERCLOSE™ PROSTYLE™ SUTURE-MEDIATED CLOSURE AND REPAIR SYSTEM: Brand: PERCLOSE™ PROSTYLE™

## (undated) DEVICE — SYSTEM CLOSURE 6-12 FR VEN VASC VASCADE MVP

## (undated) DEVICE — STERILE (15.2 TAPERED TO 7.6 X 183CM) POLYETHYLENE ACCORDION-FOLDED COVER FOR USE WITH SIEMENS ACUNAV ULTRASOUND CATHETER FAMILY CONNECTOR: Brand: SWIFTLINK TRANSDUCER COVER

## (undated) DEVICE — INTRODUCER SHTH CARDIAGUIDE FCL20100

## (undated) DEVICE — BOWL MED M 16OZ PLAS CAP GRAD

## (undated) DEVICE — GLOVE SURG SZ 65 THK91MIL LTX FREE SYN POLYISOPRENE

## (undated) DEVICE — 18G NG KIT WITH 96IN PROBE COVER (10 PK): Brand: SITE-RITE

## (undated) DEVICE — SUTURE PERMAHAND SZ 3-0 L18IN NONABSORBABLE BLK L26MM SH C013D

## (undated) DEVICE — PINNACLE INTRODUCER SHEATH: Brand: PINNACLE

## (undated) DEVICE — CHECK-FLO PERFORMER INTRODUCER: Brand: PERFORMER

## (undated) DEVICE — GLOVE SURG SZ 8 L12IN FNGR THK79MIL GRN LTX FREE

## (undated) DEVICE — BANDAGE COMPR L5YDXW2IN FOAM CO FLX

## (undated) DEVICE — SUTURE ETHLN SZ 2-0 L18IN NONABSORBABLE BLK L26MM PS 3/8 585H

## (undated) DEVICE — Device

## (undated) DEVICE — CATHETER REPROC ULTRASOUND 10 FRX90 CM ACUSON ACUNAV

## (undated) DEVICE — BANDAGE,ELASTIC,ESMARK,STERILE,4"X9',LF: Brand: MEDLINE

## (undated) DEVICE — SHEET,DRAPE,53X77,STERILE: Brand: MEDLINE

## (undated) DEVICE — GLOVE ORANGE PI 7 1/2   MSG9075

## (undated) DEVICE — GLOVE SURG SZ 7 L12IN FNGR THK79MIL GRN LTX FREE

## (undated) DEVICE — APPLIER CLP L9.38IN M LIG TI DISP STR RNG HNDL LIGACLP

## (undated) DEVICE — APPLICATOR MEDICATED 26 CC SOLUTION HI LT ORNG CHLORAPREP

## (undated) DEVICE — PAD,NON-ADHERENT,3X8,STERILE,LF,1/PK: Brand: MEDLINE

## (undated) DEVICE — GLOVE SURG SZ 85 L12IN FNGR ORTHO 126MIL CRM LTX FREE

## (undated) DEVICE — SUTURE VCRL SZ 3-0 L18IN ABSRB UD L26MM SH 1/2 CIR J864D

## (undated) DEVICE — CATHETER DIAG AD 6FR L110CM 0.038IN COR POLYUR PGTL W/ 6

## (undated) DEVICE — DRESSING PETRO W3XL8IN OIL EMUL N ADH GZ KNIT IMPREG CELOS

## (undated) DEVICE — CLOTH PRE OP W9XL10.5IN 2% CHG FRAGRANCE RNS FREE READYPREP